# Patient Record
Sex: MALE | Race: WHITE | NOT HISPANIC OR LATINO | Employment: OTHER | URBAN - METROPOLITAN AREA
[De-identification: names, ages, dates, MRNs, and addresses within clinical notes are randomized per-mention and may not be internally consistent; named-entity substitution may affect disease eponyms.]

---

## 2017-02-13 ENCOUNTER — TELEPHONE (OUTPATIENT)
Dept: RHEUMATOLOGY | Facility: CLINIC | Age: 72
End: 2017-02-13

## 2017-03-02 ENCOUNTER — TELEPHONE (OUTPATIENT)
Dept: RHEUMATOLOGY | Facility: CLINIC | Age: 72
End: 2017-03-02

## 2017-03-02 ENCOUNTER — OFFICE VISIT (OUTPATIENT)
Dept: RHEUMATOLOGY | Facility: CLINIC | Age: 72
End: 2017-03-02
Payer: MEDICARE

## 2017-03-02 VITALS
SYSTOLIC BLOOD PRESSURE: 152 MMHG | HEART RATE: 70 BPM | WEIGHT: 282.19 LBS | DIASTOLIC BLOOD PRESSURE: 82 MMHG | BODY MASS INDEX: 44.2 KG/M2

## 2017-03-02 DIAGNOSIS — M15.9 PRIMARY OSTEOARTHRITIS INVOLVING MULTIPLE JOINTS: Primary | ICD-10-CM

## 2017-03-02 DIAGNOSIS — M25.552 LEFT HIP PAIN: ICD-10-CM

## 2017-03-02 DIAGNOSIS — M1A.09X0 IDIOPATHIC CHRONIC GOUT OF MULTIPLE SITES WITHOUT TOPHUS: ICD-10-CM

## 2017-03-02 DIAGNOSIS — E66.01 MORBID OBESITY WITH BMI OF 40.0-44.9, ADULT: ICD-10-CM

## 2017-03-02 DIAGNOSIS — M81.0 SENILE OSTEOPOROSIS: ICD-10-CM

## 2017-03-02 DIAGNOSIS — Z51.81 MEDICATION MONITORING ENCOUNTER: ICD-10-CM

## 2017-03-02 DIAGNOSIS — M16.12 PRIMARY OSTEOARTHRITIS OF LEFT HIP: ICD-10-CM

## 2017-03-02 PROCEDURE — 1157F ADVNC CARE PLAN IN RCRD: CPT | Mod: S$GLB,,, | Performed by: PHYSICIAN ASSISTANT

## 2017-03-02 PROCEDURE — 3077F SYST BP >= 140 MM HG: CPT | Mod: S$GLB,,, | Performed by: PHYSICIAN ASSISTANT

## 2017-03-02 PROCEDURE — 1160F RVW MEDS BY RX/DR IN RCRD: CPT | Mod: S$GLB,,, | Performed by: PHYSICIAN ASSISTANT

## 2017-03-02 PROCEDURE — 1125F AMNT PAIN NOTED PAIN PRSNT: CPT | Mod: S$GLB,,, | Performed by: PHYSICIAN ASSISTANT

## 2017-03-02 PROCEDURE — 3079F DIAST BP 80-89 MM HG: CPT | Mod: S$GLB,,, | Performed by: PHYSICIAN ASSISTANT

## 2017-03-02 PROCEDURE — 1159F MED LIST DOCD IN RCRD: CPT | Mod: S$GLB,,, | Performed by: PHYSICIAN ASSISTANT

## 2017-03-02 PROCEDURE — 99214 OFFICE O/P EST MOD 30 MIN: CPT | Mod: S$GLB,,, | Performed by: PHYSICIAN ASSISTANT

## 2017-03-02 PROCEDURE — 99999 PR PBB SHADOW E&M-EST. PATIENT-LVL IV: CPT | Mod: PBBFAC,,, | Performed by: PHYSICIAN ASSISTANT

## 2017-03-02 NOTE — PROGRESS NOTES
Subjective:       Patient ID: Bo Chase is a 72 y.o. male.    Chief Complaint: Hip Pain      HPI Comments: Bo is  Here for urgent visit for left hip pain. Both hip biggest issue.  Most recently left hip. Hip been bothering him vijay with weight bearing. Went to [facility] was in wheelchair most of the time. Left hip started then and has persisted. No injury.  CKD mild. Sparing use of ibuprofen.  200-400 mg once daily.  Tylenol not a lot of help. X-ray right hip showed DJD. Prior back surgery due to ddd and DJD lumbar spine. Today pain 6/10. Left anterior groin. No lateral or posterior hip pain. No radicular qualities. No numbness or tingling.       We follow him regularly for Gout multiarticular involvmeent affecting  bilateral feet and  left elbow in the past. He is  currently on allopurinol 450 mg daily and colchicine only prn. Last uric acid level 3.5 at goal. No attacks.   Prior compression fracture DEXA showed osteopenia he is currently on treatment fosamax Q 14 days dexa scheduled in may when he sees dr osborn.       Hip Pain   Associated symptoms include arthralgias. Pertinent negatives include no abdominal pain, chest pain, chills, congestion, coughing, fatigue, fever, joint swelling, nausea, numbness, rash, vomiting or weakness.   Osteoarthritis   Associated symptoms include arthralgias. Pertinent negatives include no abdominal pain, chest pain, chills, congestion, coughing, fatigue, fever, joint swelling, nausea, numbness, rash, vomiting or weakness.     Review of Systems   Constitutional: Negative.  Negative for chills, fatigue and fever.   HENT: Negative.  Negative for congestion, mouth sores and trouble swallowing.    Eyes: Negative.  Negative for photophobia, redness and visual disturbance.   Respiratory: Negative.  Negative for cough, shortness of breath and wheezing.    Cardiovascular: Negative.  Negative for chest pain and leg swelling.   Gastrointestinal: Negative.  Negative for abdominal  distention, abdominal pain, diarrhea, nausea and vomiting.   Genitourinary: Negative.  Negative for dysuria, flank pain, frequency and urgency.   Musculoskeletal: Positive for arthralgias. Negative for back pain and joint swelling.   Skin: Negative.  Negative for rash.   Neurological: Negative.  Negative for dizziness, weakness and numbness.         Objective:     BP (!) 152/82  Pulse 70  Wt 128 kg (282 lb 3 oz)  BMI 44.2 kg/m2     Physical Exam   Constitutional: He is oriented to person, place, and time and well-developed, well-nourished, and in no distress. No distress.   HENT:   Head: Normocephalic and atraumatic.   Right Ear: External ear normal.   Left Ear: External ear normal.   Mouth/Throat: No oropharyngeal exudate.   Eyes: Conjunctivae and EOM are normal. Pupils are equal, round, and reactive to light. No scleral icterus.   Neck: Neck supple. No thyromegaly present.   Cardiovascular: Normal rate, regular rhythm and normal heart sounds.    No murmur heard.  Pulmonary/Chest: Effort normal and breath sounds normal. No respiratory distress.   Abdominal: Soft. Bowel sounds are normal.   Lymphadenopathy:     He has no cervical adenopathy.   Neurological: He is alert and oriented to person, place, and time. No cranial nerve deficit. He exhibits normal muscle tone. Gait normal. Coordination normal.   Skin: Skin is warm and dry.     Musculoskeletal: Normal range of motion. He exhibits tenderness. He exhibits no edema.   Both hips have normal range of motion  Pain left hip with internal rotation and full flexion  Minimal pain right with IR and flexion  No TTP troch bursa bilaterally   Neg SLR bilaterally              Component      Latest Ref Rng & Units 11/17/2016   WBC      3.90 - 12.70 K/uL 8.92   RBC      4.60 - 6.20 M/uL 5.82   Hemoglobin      14.0 - 18.0 g/dL 19.4 (H)   Hematocrit      40.0 - 54.0 % 55.4 (H)   MCV      82 - 98 fL 95   MCH      27.0 - 31.0 pg 33.3 (H)   MCHC      32.0 - 36.0 % 35.0   RDW       11.5 - 14.5 % 14.6 (H)   Platelets      150 - 350 K/uL 147 (L)   MPV      9.2 - 12.9 fL 10.3   Gran #      1.8 - 7.7 K/uL 6.7   Lymph #      1.0 - 4.8 K/uL 1.8   Mono #      0.3 - 1.0 K/uL 0.6   Eos #      0.0 - 0.5 K/uL 0.1   Baso #      0.00 - 0.20 K/uL 0.02   Gran%      38.0 - 73.0 % 72.4   Lymph%      18.0 - 48.0 % 19.8   Mono%      4.0 - 15.0 % 6.8   Eosinophil%      0.0 - 8.0 % 1.2   Basophil%      0.0 - 1.9 % 0.2   Differential Method       Automated   Sodium      136 - 145 mmol/L 141   Potassium      3.5 - 5.1 mmol/L 4.5   Chloride      95 - 110 mmol/L 105   CO2      23 - 29 mmol/L 24   Glucose      70 - 110 mg/dL 115 (H)   BUN, Bld      8 - 23 mg/dL 28 (H)   Creatinine      0.5 - 1.4 mg/dL 1.7 (H)   Calcium      8.7 - 10.5 mg/dL 9.6   Total Protein      6.0 - 8.4 g/dL 6.8   Albumin      3.5 - 5.2 g/dL 4.0   Total Bilirubin      0.1 - 1.0 mg/dL 0.7   Alkaline Phosphatase      55 - 135 U/L 75   AST      10 - 40 U/L 24   ALT      10 - 44 U/L 47 (H)   Anion Gap      8 - 16 mmol/L 12   eGFR if African American      >60 mL/min/1.73 m:2 46 (A)   eGFR if non African American      >60 mL/min/1.73 m:2 40 (A)   CRP      0.0 - 8.2 mg/L 0.6   Sed Rate      0 - 10 mm/Hr 1   Uric Acid      3.4 - 7.0 mg/dL 3.5       No results found for this or any previous visit (from the past 1008 hour(s)).        9/12/14 DEXA total femur T score -0.8, femur neck -1.2, spine 1.0 osteopenia      9/9/14 lumbar spine x-ray  Findings: Lumbar spine series performed with lateral flexion and extension views. Comparison to 06/09/14. Note again made of generalized osteopenia and multilevel degenerative change. Minimal grade 1 anterolisthesis observed at L4-5 and L5-S1 with   little change between flexion and extension. Stable mild anterior wedge deformity of the T12 and L1 vertebrae. No acute findings.    Assessment:       1. Primary osteoarthritis involving multiple joints    2. Left hip pain    3. Primary osteoarthritis of left hip    4. Senile  osteoporosis    5. Morbid obesity with BMI of 40.0-44.9, adult    6. Idiopathic chronic gout of multiple sites without tophus    7. Medication monitoring encounter        1.  Osteoarthritis bilateral hips left >Right -- left symptomatic   2.  Idiopathic gout of multiple sites with optimal uric acid level on allopurinol 450 mg daily and colchicine prn  3.  Right knee OA- monitored by orthro   4.  Lumbar spondylosis to have laminectomy and decompression surgery 6/24/14  5.  Osteopenia with T12-L1 wedge deformity noted on x-ray will follow- on prevention fosamax Q 14 days   6.  Mild elevated creatinine- will watch     Plan:         Trial limbrel 500 mg bid X 8 weeks, Discussed risk versus benefits and potential side effects of limbrel. Medication Literature given to patient  Can also try otc instaflex advance  Consider ultrasound guided hip injection to help with hip pain  Ibuprofen sparingly 200-400 mg once daily, maintain good hydration for renal function- if limbrel helps then stop the ibuprofen     Follow up as scheduled in 8 weeks with dr osborn with labs and dexa     keep allopurinol to 450 mg daily,   colcrys only prn   continue fosamax Q 14 day,  Watch renal function, dexa next visit     call with any questions, changes, or concerns.

## 2017-03-02 NOTE — TELEPHONE ENCOUNTER
----- Message from Balbina Boyd sent at 3/2/2017  9:44 AM CST -----  Contact: Pt   States he is calling rg wanting to know if he can be seen earlier than his appt/ he is in town now and would like to be called rather quickly and can be reached at [phone]//[email or web address]

## 2017-03-18 ENCOUNTER — NURSE TRIAGE (OUTPATIENT)
Dept: ADMINISTRATIVE | Facility: CLINIC | Age: 72
End: 2017-03-18

## 2017-03-18 DIAGNOSIS — R31.9 HEMATURIA: Primary | ICD-10-CM

## 2017-03-18 NOTE — TELEPHONE ENCOUNTER
"  Reason for Disposition   Blood in urine, but all triage questions negative  (Exception: could be normal menstrual bleeding)    Answer Assessment - Initial Assessment Questions  1. COLOR of URINE: "Describe the color of the urine."  (e.g., tea-colored, pink, red, blood clots, bloody)      Significantly red colored blood is mixed with urine and pieces of flesh and tissue noted  2. ONSET: "When did the bleeding start?"       Past evening  3. EPISODES: "How many times has there been blood in the urine?" or "How many times today?"      Every time pt voids  4. PAIN with URINATION: "Is there any pain with passing your urine?" If so, ask: "How bad is the pain?"  (Scale 1-10; or mild, moderate, severe)     - MILD - complains slightly about urination hurting     - MODERATE - interferes with normal activities       - SEVERE - excruciating, unwilling or unable to urinate because of the pain       No pain   5. FEVER: "Do you have a fever?" If so, ask: "What is your temperature, how was it measured, and when did it start?"      no  6. ASSOCIATED SYMPTOMS: "Are you passing urine more frequently than usual?"      Take a diuretics. No back pain  7. OTHER SYMPTOMS: "Do you have any other symptoms?" (e.g., back/flank pain, abdominal pain, vomiting)      no  8. PREGNANCY: "Is there any chance you are pregnant?" "When was your last menstrual period?"      no    Protocols used: ST URINE - BLOOD IN-A-  Pt advised to [facility] walk in clin today open 8-3. Verbalized understanding.  "

## 2017-03-29 ENCOUNTER — CLINICAL SUPPORT (OUTPATIENT)
Dept: DIABETES | Facility: CLINIC | Age: 72
End: 2017-03-29
Payer: MEDICARE

## 2017-03-29 ENCOUNTER — TELEPHONE (OUTPATIENT)
Dept: UROLOGY | Facility: CLINIC | Age: 72
End: 2017-03-29

## 2017-03-29 VITALS — WEIGHT: 274.69 LBS | BODY MASS INDEX: 43.11 KG/M2 | HEIGHT: 67 IN

## 2017-03-29 DIAGNOSIS — E03.9 UNSPECIFIED HYPOTHYROIDISM: ICD-10-CM

## 2017-03-29 DIAGNOSIS — R60.9 EDEMA, UNSPECIFIED TYPE: ICD-10-CM

## 2017-03-29 DIAGNOSIS — E66.01 MORBID OBESITY DUE TO EXCESS CALORIES: Primary | ICD-10-CM

## 2017-03-29 DIAGNOSIS — I25.810 ATHEROSCLEROSIS OF AUTOLOGOUS VEIN CORONARY ARTERY BYPASS GRAFT, ANGINA PRESENCE UNSPECIFIED: ICD-10-CM

## 2017-03-29 PROCEDURE — 97802 MEDICAL NUTRITION INDIV IN: CPT | Mod: S$GLB,,, | Performed by: DIETITIAN, REGISTERED

## 2017-03-29 PROCEDURE — 99999 PR PBB SHADOW E&M-EST. PATIENT-LVL III: CPT | Mod: PBBFAC,,, | Performed by: DIETITIAN, REGISTERED

## 2017-03-29 RX ORDER — NEPAFENAC 3 MG/ML
SUSPENSION/ DROPS OPHTHALMIC
COMMUNITY
Start: 2017-03-10 | End: 2017-05-31 | Stop reason: ALTCHOICE

## 2017-03-29 RX ORDER — DUREZOL 0.5 MG/ML
EMULSION OPHTHALMIC
Refills: 0 | COMMUNITY
Start: 2017-03-13 | End: 2017-05-31 | Stop reason: ALTCHOICE

## 2017-03-29 RX ORDER — GATIFLOXACIN 5 MG/ML
SOLUTION/ DROPS OPHTHALMIC
COMMUNITY
Start: 2017-03-02 | End: 2017-05-31 | Stop reason: ALTCHOICE

## 2017-03-29 NOTE — MR AVS SNAPSHOT
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, angina presence unspecified         Edema, unspecified type         Unspecified hypothyroidism                To Do List           Future Appointments        Provider Department Dept Phone    4/3/2017 9:10 AM [facility] CT1 LIMIT 500 LBS [facility] [phone]    4/3/2017 12:30 PM [facility] US3 [facility] [phone]    2017 2:00 PM [facility] BMD1 [facility] [phone]    2017 2:40 PM [facility]AT[facility] [phone]    2017 3:00 PM Jordan Floyd MD [facility] Rheumatology [phone]      Goals (5 Years of Data)     None      Follow-Up and Disposition     Return in about 4 weeks (around 2017), or E66.01, I25.810, R60.9, E03.9 Ankita; 4 week f/u.      [facility]     [facility] Nurse Care [facility] -  Assistance  Registered nurses in the [facility] provide clinical advisement, health education, appointment booking, and other advisory services.  Call for this free service at [phone].             Medications           Message regarding Medications     Verify the changes and/or additions to your medication regime listed below are the same as discussed with your clinician today.  If any of these changes or additions are incorrect, please notify your healthcare provider.        STOP taking these medications     colchicine (COLCRYS) 0.6 mg tablet Take 1 tablet (0.6 mg total) by mouth once  daily.           Verify that the below list of medications is an accurate representation of the medications you are currently taking.  If none reported, the list may be blank. If incorrect, please contact your healthcare provider. Carry this list with you in case of emergency.           Current Medications     alendronate (FOSAMAX) 70 MG tablet Take 1 tablet (70 mg total) by mouth every 14 (fourteen) days.    allopurinol (ZYLOPRIM) 300 MG tablet TAKE 1& 1/2 TABLETS BY MOUTH EVERY DAY    aspirin 81 mg Tab 81 mg 2 (two) times daily. 1 Tablet Oral daily    atorvastatin (LIPITOR) 40 MG tablet TAKE 1 TABLET EVERY EVENING     bumetanide (BUMEX) 2 MG tablet TAKE 1 TABLET ONE TIME DAILY    cholecalciferol, vitamin D3, (VITAMIN D3) 400 unit Tab Take 1 tablet by mouth once daily.    DUREZOL 0.05 % Drop ophthalmic solution INSTILL 1 DROP IN OD QID STARTING DAY OF SURGERY FOR 30 DAYS    gatifloxacin (ZYMAR) 0.5 % Drop drops     hydrALAZINE (APRESOLINE) 100 MG tablet 100 mg 2 (two) times daily. 1/2 tablet twice daily    ibuprofen (ADVIL,MOTRIN) 200 MG tablet Take 400 mg by mouth 2 (two) times daily.     ILEVRO 0.3 % DrpS     isosorbide mononitrate (IMDUR) 60 MG 24 hr tablet 60 mg 2 (two) times daily. 1/2 tab twice daily    levothyroxine (SYNTHROID) 100 MCG tablet 1 Tablet Oral Every day    LIMBREL 500 mg Cap Take 500 mg by mouth 2 (two) times daily.    metoprolol tartrate (LOPRESSOR) 25 MG tablet TAKE 1 TABLET TWICE DAILY    multivitamin (THERAGRAN) per tablet Take 1 tablet by mouth once daily.    NITROSTAT 0.4 mg SL tablet DISSOLVE 1 TABLET UNDER THE TONGUE EVERY 5 MINUTES AS NEEDED FOR CHEST PAIN    potassium chloride (KLOR-CON) 10 MEQ TbSR TAKE 1 TABLET FOUR TIMES DAILY    vitamin E 400 UNIT capsule Take 1 capsule (400 Units total) by mouth once daily.    papaverine 30 mg/mL injection Inject 0.3 ml of trimix solution prn ED max once daily  Prepare 10ml of trimix solution containing:    Papaverine 30mg/ml    Phentolamine 1  "mg/ml    Alprostadil 10mcg/ml  Dispense 10ml per refill  Qs syringes 1cc/30g/0.5" and alcohol swabs dispense as needed for Intracavernosal injection           Clinical Reference Information           Your Vitals Were     Height Weight BMI          5' 7" (1.702 m) 124.6 kg (274 lb 11.1 oz) 43.02 kg/m2        Allergies as of 3/29/2017     Cat/feline Products    Latex    Sulfa (Sulfonamide Antibiotics)      Immunizations Administered on Date of Encounter - 3/29/2017     None      Language Assistance Services     ATTENTION: Language assistance services are available, free of charge. Please call [phone].      ATENCIÓN: Si bakari stone, tiene a donohue disposición servicios gratuitos de asistencia lingüística. Llame al [phone].     CHÚ Ý: N?u b?n nói Ti?ng Vi?t, có các d?ch v? h? tr? ngôn ng? mi?n phí dành cho b?n. G?i s? [phone].         [facility] complies with applicable Federal civil rights laws and does not discriminate on the basis of race, color, national origin, age, disability, or sex.        "

## 2017-03-29 NOTE — TELEPHONE ENCOUNTER
Patient was contacted and informed that his appt on 4/12/17 needed to be rescheduled to either earlier or later on that day as MD would not be available; pt was rude and stated that he had no idea that he had an appt or knew anything about the CT and US that were ordered.  Stated that he sees multiple doctors at [facility] and he was too busy to keep running back and forth to the clinic.  Pt stated that he was extremely busy and could not continue the conversation with me. Stated that he would call back and hung up on me.

## 2017-03-29 NOTE — PROGRESS NOTES
"PCP: Kym Luciano MD   REFERRING PROVIDER: Kym Luciano MD     HISTORY OF PRESENT ILLNESS: 72 y.o. male patient is in clinic today for weight management.     Patient has been trying to lose weight for ~25 years. Successful with Medifast previously (13 lbs in two week). Patient's weight has gradually been going up. CABGx4 21 years ago.     VITAL SIGNS:  Height: 5' 7" (170.2 cm)   Weight: 124.6 kg (274 lb 11.1 oz)   IBW: 148 lbs +/-10% and AdjIBW: 180 lbs/82kg    ALLERGIES & MEDICATIONS: Reviewed and Reconciled  MEDICAL/SURGICAL & FAMILY HISTORY: Reviewed and Reconciled    LABORATORY:  Reviewed Diabetes Management Flowsheet and Results Review.    SELF-MONITORING: Food - none are avail    ACTIVITY LEVEL: Aerobic - none. Resistance - none. Patient has outdoor pool that he can use year round - heated. Exercises sporadically.    NUTRITION INTAKE: Meal patterns include 3 meals, 1-2 snacks daily with intake ~2600 cals/d. Patient is not limiting carbohydrates, saturated fat or sodium. Inadequate intakes of fruits, vegetables, whole grains.  B - 2 eggs, small pc. Sausage, 1 slice toast with butter+jelly - coffee with sweeter and milk  S - coffee  L - gumbo with rice, crackers - 1/2 Coke  S - coffee OR 1/2 can Coke  D - Danielson hamburger steak, mashed potatoes w/ gravy, green beans and carrots - 1/2 Coke  S - fruit, yogurt OR cookie  Beverages - water, Coke  Dining out - 3x/week    PSYCHOSOCIAL: Stage of change - preparation  Barriers to change - none.    EDUCATIONAL ASSESSMENT:  Patient needs improvement in self care management skills:    Healthy Eating   Being Active   Monitoring  Taking medications  Problem solving  Healthy coping  Reducing risks      MNT ASSESSMENT:   1600 calories, 7-8 ounces protein daily  30-45 grams carb/meal, 15-30 grams carb/snack  increase fruit 2 serv/d, vegetables 2+ cups/d, whole grains 3+serv/d, lowfat dairy 3+serv/d  low-fat, low-sodium  150 min physical activity per week, moderate " intensity, as tolerated    PLAN:   Reviewed MNT guidelines for weight management.    Encouraged daily self-monitoring of food and activity patterns, return records to clinic.   Provided meal-planning instruction via foodlists, plate method, food models, food labels and/or ADA booklet. Reviewed micro/macronutrient effect on health management. Discussed carbohydrate counting and spacing techniques with emphasis on cardiovascular wellness health strategies, functional foods. Reviewed principles of energy metabolism to assist weight and health management. Discussed use of meal replacements to aid in weight loss. Will decrease use of sugary beverages.   Discussed importance of daily physical activity with review of benefits, methods, guidelines and precautions.   Discussed behavioral strategies for improving social and environmental support of lifestyle changes.    GOALS: Self-monitoring: Daily food & activity journal. Meal Plan: 90% Accuracy. Activity:150 min/wk.  Goal of 10 lbs weight loss by 4 week f/u appt set by patient.   Visit Time Spent:  60 minutes  Thank you for the opportunity to work with your patient.

## 2017-03-31 ENCOUNTER — TELEPHONE (OUTPATIENT)
Dept: RADIOLOGY | Facility: HOSPITAL | Age: 72
End: 2017-03-31

## 2017-04-11 ENCOUNTER — TELEPHONE (OUTPATIENT)
Dept: RADIOLOGY | Facility: HOSPITAL | Age: 72
End: 2017-04-11

## 2017-04-12 ENCOUNTER — HOSPITAL ENCOUNTER (OUTPATIENT)
Dept: RADIOLOGY | Facility: HOSPITAL | Age: 72
Discharge: HOME | End: 2017-04-12
Attending: UROLOGY
Payer: MEDICARE

## 2017-04-12 DIAGNOSIS — R31.9 HEMATURIA: ICD-10-CM

## 2017-04-12 PROCEDURE — 76770 US EXAM ABDO BACK WALL COMP: CPT | Mod: TC,PO

## 2017-04-12 PROCEDURE — 76770 US EXAM ABDO BACK WALL COMP: CPT | Mod: 26,,, | Performed by: RADIOLOGY

## 2017-04-12 PROCEDURE — 74176 CT ABD & PELVIS W/O CONTRAST: CPT | Mod: 26,,, | Performed by: RADIOLOGY

## 2017-04-12 PROCEDURE — 74176 CT ABD & PELVIS W/O CONTRAST: CPT | Mod: TC,PO

## 2017-04-17 ENCOUNTER — TELEPHONE (OUTPATIENT)
Dept: UROLOGY | Facility: CLINIC | Age: 72
End: 2017-04-17

## 2017-04-17 NOTE — TELEPHONE ENCOUNTER
----- Message from Eliana Garcia sent at 4/17/2017  1:58 PM CDT -----  Contact: pt  Pt needs to schedule an appt to review last weeks tests.  First available appt is not until June 1st. Please call pt at [phone]

## 2017-04-24 ENCOUNTER — OFFICE VISIT (OUTPATIENT)
Dept: UROLOGY | Facility: CLINIC | Age: 72
End: 2017-04-24
Payer: MEDICARE

## 2017-04-24 VITALS
SYSTOLIC BLOOD PRESSURE: 134 MMHG | BODY MASS INDEX: 42.33 KG/M2 | WEIGHT: 270.31 LBS | DIASTOLIC BLOOD PRESSURE: 79 MMHG | HEART RATE: 81 BPM

## 2017-04-24 DIAGNOSIS — R31.9 HEMATURIA: Primary | ICD-10-CM

## 2017-04-24 LAB
BACTERIA #/AREA URNS AUTO: NORMAL /HPF
HYALINE CASTS UR QL AUTO: 1 /LPF
MICROSCOPIC COMMENT: NORMAL
RBC #/AREA URNS AUTO: 1 /HPF (ref 0–4)
SQUAMOUS #/AREA URNS AUTO: 0 /HPF
WBC #/AREA URNS AUTO: 2 /HPF (ref 0–5)

## 2017-04-24 PROCEDURE — 3078F DIAST BP <80 MM HG: CPT | Mod: S$GLB,,, | Performed by: UROLOGY

## 2017-04-24 PROCEDURE — 3075F SYST BP GE 130 - 139MM HG: CPT | Mod: S$GLB,,, | Performed by: UROLOGY

## 2017-04-24 PROCEDURE — 81001 URINALYSIS AUTO W/SCOPE: CPT

## 2017-04-24 PROCEDURE — 1159F MED LIST DOCD IN RCRD: CPT | Mod: S$GLB,,, | Performed by: UROLOGY

## 2017-04-24 PROCEDURE — 1126F AMNT PAIN NOTED NONE PRSNT: CPT | Mod: S$GLB,,, | Performed by: UROLOGY

## 2017-04-24 PROCEDURE — 87086 URINE CULTURE/COLONY COUNT: CPT

## 2017-04-24 PROCEDURE — 1160F RVW MEDS BY RX/DR IN RCRD: CPT | Mod: S$GLB,,, | Performed by: UROLOGY

## 2017-04-24 PROCEDURE — 99214 OFFICE O/P EST MOD 30 MIN: CPT | Mod: S$GLB,,, | Performed by: UROLOGY

## 2017-04-24 PROCEDURE — 99999 PR PBB SHADOW E&M-EST. PATIENT-LVL II: CPT | Mod: PBBFAC,,, | Performed by: UROLOGY

## 2017-04-24 NOTE — MR AVS SNAPSHOT
[facility]  [address] [address]  Phone: [phone]  Fax: [phone]                  Bo Chase   2017 11:20 AM   Office Visit    Description:  Male : 1945   Provider:  Brian Leroy IV, MD   Department:  [facility]           Diagnoses this Visit        Comments    Hematuria    -  Primary            To Do List           Future Appointments        Provider Department Dept Phone    2017 1:30 PM Vale Mahoney RD, CDE [facility] Diabetes Management [phone]    2017 1:40 PM Brian Leroy IV, MD [facility] [phone]    2017 2:00 PM [facility] BMD1 [facility] [phone]    2017 2:40 PM LABORATORY, [facility] [phone]    2017 3:00 PM Jordan Floyd MD [facility] Rheumatology [phone]      Goals (5 Years of Data)     None      [facility]     [facility] Nurse Care Line -  Assistance  Unless otherwise directed by your provider, please contact [facility], our nurse care line that is available for  assistance.     Registered nurses in the [facility] provide: appointment scheduling, clinical advisement, health education, and other advisory services.  Call: [phone] (toll free)               Medications           Message regarding Medications     Verify the changes and/or additions to your medication regime listed below are the same as discussed with your clinician today.  If any of these changes or additions are incorrect, please notify your healthcare provider.             Verify that the below list of medications is an accurate representation of the medications you are currently taking.  If none reported, the list may be blank. If incorrect, please contact your healthcare provider. Carry this list with you in case of emergency.           Current Medications     alendronate (FOSAMAX) 70 MG tablet Take 1 tablet (70 mg total) by  "mouth every 14 (fourteen) days.    allopurinol (ZYLOPRIM) 300 MG tablet TAKE 1& 1/2 TABLETS BY MOUTH EVERY DAY    aspirin 81 mg Tab 81 mg 2 (two) times daily. 1 Tablet Oral daily    atorvastatin (LIPITOR) 40 MG tablet TAKE 1 TABLET EVERY EVENING     bumetanide (BUMEX) 2 MG tablet TAKE 1 TABLET ONE TIME DAILY    cholecalciferol, vitamin D3, (VITAMIN D3) 400 unit Tab Take 1 tablet by mouth once daily.    DUREZOL 0.05 % Drop ophthalmic solution INSTILL 1 DROP IN OD QID STARTING DAY OF SURGERY FOR 30 DAYS    gatifloxacin (ZYMAR) 0.5 % Drop drops     hydrALAZINE (APRESOLINE) 100 MG tablet 100 mg 2 (two) times daily. 1/2 tablet twice daily    ibuprofen (ADVIL,MOTRIN) 200 MG tablet Take 400 mg by mouth 2 (two) times daily.     ILEVRO 0.3 % DrpS     isosorbide mononitrate (IMDUR) 60 MG 24 hr tablet 60 mg 2 (two) times daily. 1/2 tab twice daily    levothyroxine (SYNTHROID) 100 MCG tablet 1 Tablet Oral Every day    LIMBREL 500 mg Cap Take 500 mg by mouth 2 (two) times daily.    metoprolol tartrate (LOPRESSOR) 25 MG tablet TAKE 1 TABLET TWICE DAILY    multivitamin (THERAGRAN) per tablet Take 1 tablet by mouth once daily.    NITROSTAT 0.4 mg SL tablet DISSOLVE 1 TABLET UNDER THE TONGUE EVERY 5 MINUTES AS NEEDED FOR CHEST PAIN    papaverine 30 mg/mL injection Inject 0.3 ml of trimix solution prn ED max once daily  Prepare 10ml of trimix solution containing:    Papaverine 30mg/ml    Phentolamine 1 mg/ml    Alprostadil 10mcg/ml  Dispense 10ml per refill  Qs syringes 1cc/30g/0.5" and alcohol swabs dispense as needed for Intracavernosal injection    potassium chloride (KLOR-CON) 10 MEQ TbSR TAKE 1 TABLET FOUR TIMES DAILY    vitamin E 400 UNIT capsule Take 1 capsule (400 Units total) by mouth once daily.           Clinical Reference Information           Your Vitals Were     BP Pulse Weight BMI       134/79 (BP Location: Right arm, Patient Position: Sitting, BP Method: Automatic) 81 122.6 kg (270 lb 4.5 oz) 42.33 kg/m2       Blood " Pressure          Most Recent Value    BP  134/79      Allergies as of 4/24/2017     Cat/feline Products    Latex    Sulfa (Sulfonamide Antibiotics)      Immunizations Administered on Date of Encounter - 4/24/2017     None      Orders Placed During Today's Visit      Normal Orders This Visit    Urinalysis Microscopic     Urine culture       Language Assistance Services     ATTENTION: Language assistance services are available, free of charge. Please call [phone].      ATENCIÓN: Si habla español, tiene a donohue disposición servicios gratuitos de asistencia lingüística. Llame al [phone].     CHÚ Ý: N?u b?n nói Ti?ng Vi?t, có các d?ch v? h? tr? ngôn ng? mi?n phí dành cho b?n. G?i s? [phone].         [facility] complies with applicable Federal civil rights laws and does not discriminate on the basis of race, color, national origin, age, disability, or sex.

## 2017-04-24 NOTE — PROGRESS NOTES
Chief Complaint: Gross hematuria    HPI:   4/24/17: Didn't use the trimix the situation changed.  Did get it and knew how but couldn't come to do it.  After intercourse got hematuria for a day with some small pieces of tissue and clot.  CT/US shows no obvious findings.  Cannot have contrasted study.  No sexual activity since that scare.  Has a thin stream sometimes and other times a good stream.  Had gonorrhea in his youth.  10/7/16: 72 yo man with a heart stent is having ED worsening over the last 5 years and no erection in the last few years.  No abd/pelvic pain and no exac/rel factors.  No hematuria.  No urolithiasis.  No urinary bother.  No  history.  Normal sexual function.  No family history of prostate cancer.  No recent PSA, but PCP checks prostate annually and did this about a month ago.  Has gained 40 pound in last five years.  Recent cardiac workup negative.  Tried a PDE-5 inhib 5 years ago and didn't like how it felt.  Has a weak stream sometimes that opens after a few seconds.    Allergies:  Cat/feline products; Latex; and Sulfa (sulfonamide antibiotics)    Medications:  has a current medication list which includes the following prescription(s): alendronate, allopurinol, aspirin, atorvastatin, bumetanide, cholecalciferol (vitamin d3), durezol, gatifloxacin, hydralazine, ibuprofen, ilevro, isosorbide mononitrate, levothyroxine, limbrel, metoprolol tartrate, multivitamin, nitrostat, papaverine, potassium chloride, and vitamin e.    Review of Systems:  General: No fever, chills, fatigability, or weight loss.  Skin: No rashes, itching, or changes in color or texture of skin.  Chest: Denies MENA, cyanosis, wheezing, cough, and sputum production.  Abdomen: Appetite fine. No weight loss. Denies diarrhea, abdominal pain, hematemesis, or blood in stool.  Musculoskeletal: No joint stiffness or swelling. Denies back pain.  : As above.  All other review of systems negative.    PMH:   has a past medical history  of Acute coronary syndrome; CHF (congestive heart failure); Chronic kidney disease; Coronary artery disease; Hyperlipidemia; Hypertension; Lumbar spondylosis; and Sleep apnea.    PSH:   has a past surgical history that includes throid lobectomy (4/2012); umbilicial hernia; Coronary artery bypass graft (1996); Cardiac catheterization (3/6/2012); Coronary angioplasty; and back surgary.    FamHx: family history includes Heart disease in his father.    SocHx:  reports that he has never smoked. He has never used smokeless tobacco. He reports that he drinks alcohol. He reports that he does not use illicit drugs.      Physical Exam:  Vitals:    04/24/17 1132   BP: 134/79   Pulse: 81     General: A&Ox3, no apparent distress, no deformities  Neck: No masses, normal thyroid  Lungs: normal inspiration, no use of accessory muscles  Heart: normal pulse, no arrhythmias  Abdomen: Soft, NT, ND, no masses, no hernias, no hepatosplenomegaly  Lymphatic: Neck and groin nodes negative  Skin: The skin is warm and dry. No jaundice.  Ext: No c/c/e.  :   10/7/16: Test desc bess, no abnormalities of epididymus. Penis normal, with normal penile and scrotal skin. Meatus normal.     Labs/Studies:   Urinalysis performed in clinic, summary: UA normal    Impression/Plan:   1. Will not address ED going forward  2. Imaging reassuring, will cysto to eval.  3. UA/UCx to lab

## 2017-04-26 ENCOUNTER — NUTRITION (OUTPATIENT)
Dept: DIABETES | Facility: CLINIC | Age: 72
End: 2017-04-26
Payer: MEDICARE

## 2017-04-26 VITALS — WEIGHT: 270.31 LBS | BODY MASS INDEX: 42.43 KG/M2 | HEIGHT: 67 IN

## 2017-04-26 DIAGNOSIS — R60.9 EDEMA, UNSPECIFIED TYPE: ICD-10-CM

## 2017-04-26 DIAGNOSIS — E66.01 MORBID OBESITY DUE TO EXCESS CALORIES: Primary | ICD-10-CM

## 2017-04-26 DIAGNOSIS — E03.9 UNSPECIFIED HYPOTHYROIDISM: ICD-10-CM

## 2017-04-26 DIAGNOSIS — I25.810 ATHEROSCLEROSIS OF AUTOLOGOUS VEIN CORONARY ARTERY BYPASS GRAFT, ANGINA PRESENCE UNSPECIFIED: ICD-10-CM

## 2017-04-26 LAB — BACTERIA UR CULT: NO GROWTH

## 2017-04-26 PROCEDURE — 97803 MED NUTRITION INDIV SUBSEQ: CPT | Mod: S$GLB,,, | Performed by: DIETITIAN, REGISTERED

## 2017-04-26 PROCEDURE — 99999 PR PBB SHADOW E&M-EST. PATIENT-LVL III: CPT | Mod: PBBFAC,,, | Performed by: DIETITIAN, REGISTERED

## 2017-04-26 NOTE — PROGRESS NOTES
"PCP: Kym Luciano MD   REFERRING PROVIDER: Kym Luciano MD     HISTORY OF PRESENT ILLNESS: 72 y.o. male patient is in clinic today for weight management f/u. 5 lbs lost since initial visit.     Patient has been trying to lose weight for ~25 years. Successful with Medifast previously (13 lbs in two week). Patient's weight has gradually been going up. CABGx4 21 years ago.     VITAL SIGNS:  Height: 5' 7" (170.2 cm)   Weight: 122.6 kg (270 lb 4.5 oz)   IBW: 148 lbs +/-10% and AdjIBW: 180 lbs/82kg    ALLERGIES & MEDICATIONS: Reviewed and Reconciled  MEDICAL/SURGICAL & FAMILY HISTORY: Reviewed and Reconciled    LABORATORY:  Reviewed Diabetes Management Flowsheet and Results Review.    SELF-MONITORING: Food - none are avail    ACTIVITY LEVEL: Aerobic - none. Resistance - none. Patient has outdoor pool that he can use year round - heated. Exercises sporadically.    NUTRITION INTAKE: Meal patterns include 3 meals, 1-2 snacks daily with intake ~1500 cals/d. Patient has decreased sugary beverages. Patient has decreased intake of bread/pasta, candy, pastries.   B - oatmeal pack - coffee w/ sweetener & creamer  S - none  L - sandwich on whole wheat sandwich thin, cheese, chicken, mustard   S - snack  D - chili and beans OR grilled salmon, cheesy broccoli  S - none  Beverages - water, Coke  Dining out - 3x/week    PSYCHOSOCIAL: Stage of change - preparation  Barriers to change - none.    EDUCATIONAL ASSESSMENT:  Patient needs improvement in self care management skills:    Healthy Eating   Being Active   Monitoring  Taking medications  Problem solving  Healthy coping  Reducing risks      MNT ASSESSMENT:   1600 calories, 7-8 ounces protein daily  30-45 grams carb/meal, 15-30 grams carb/snack  increase fruit 2 serv/d, vegetables 2+ cups/d, whole grains 3+serv/d, lowfat dairy 3+serv/d  low-fat, low-sodium  150 min physical activity per week, moderate intensity, as tolerated    PLAN:   Reviewed MNT guidelines for weight " management.    Encouraged daily self-monitoring of food and activity patterns, return records to clinic.   Reviewed micro/macronutrient effect on health management.Reviewed principles of energy metabolism to assist weight and health management. Will use calorie tracking leo or food diary. Planning to encourage wife to attend d/t primary source for grocery shopping and cooking.    Discussed importance of daily physical activity with review of benefits, methods, guidelines and precautions.   Discussed behavioral strategies for improving social and environmental support of lifestyle changes.    GOALS: Self-monitoring: Daily food & activity journal. Meal Plan: 90% Accuracy. Activity:150 min/wk.  Goal of 10 lbs weight loss by 4 week f/u appt set by patient.   Visit Time Spent:  30 minutes  Thank you for the opportunity to work with your patient.

## 2017-04-26 NOTE — MR AVS SNAPSHOT
[facility]  [address] [place] [address]  Phone: [phone]  Fax: [phone]                  Bo Chase   2017 1:30 PM   Nutrition    Description:  Male : 1945   Provider:  Vale Mahoney RD, CDE   Department:  [facility]           Diagnoses this Visit        Comments    Morbid obesity due to excess calories    -  Primary     Atherosclerosis of autologous vein coronary artery bypass graft, angina presence unspecified         Edema, unspecified type         Unspecified hypothyroidism                To Do List           Future Appointments        Provider Department Dept Phone    2017 1:40 PM Brian Leroy IV, MD [facility] [phone]    2017 2:00 PM [facility] [phone]    2017 2:40 PM LABORATORY, [facility] [phone]    2017 3:00 PM Jordan Floyd MD [facility] Rheumatology [phone]      Goals (5 Years of Data)     None      Follow-Up and Disposition     Return in about 3 months (around 2017), or E66.[address] [place]; 3 mos. f/u; .      [facility]     [facility] Nurse Care [facility] -  Assistance  Unless otherwise directed by your provider, please contact [facility], our nurse care line that is available for  assistance.     Registered nurses in the [facility] provide: appointment scheduling, clinical advisement, health education, and other advisory services.  Call: [phone] (toll free)               Medications           Message regarding Medications     Verify the changes and/or additions to your medication regime listed below are the same as discussed with your clinician today.  If any of these changes or additions are incorrect, please notify your healthcare provider.             Verify that the below list of medications is an accurate representation of the medications you are currently taking.  If none reported,  "the list may be blank. If incorrect, please contact your healthcare provider. Carry this list with you in case of emergency.           Current Medications     alendronate (FOSAMAX) 70 MG tablet Take 1 tablet (70 mg total) by mouth every 14 (fourteen) days.    allopurinol (ZYLOPRIM) 300 MG tablet TAKE 1& 1/2 TABLETS BY MOUTH EVERY DAY    aspirin 81 mg Tab 81 mg 2 (two) times daily. 1 Tablet Oral daily    atorvastatin (LIPITOR) 40 MG tablet TAKE 1 TABLET EVERY EVENING     bumetanide (BUMEX) 2 MG tablet TAKE 1 TABLET ONE TIME DAILY    cholecalciferol, vitamin D3, (VITAMIN D3) 400 unit Tab Take 1 tablet by mouth once daily.    hydrALAZINE (APRESOLINE) 100 MG tablet 100 mg 2 (two) times daily. 1/2 tablet twice daily    ibuprofen (ADVIL,MOTRIN) 200 MG tablet Take 400 mg by mouth 2 (two) times daily.     isosorbide mononitrate (IMDUR) 60 MG 24 hr tablet 60 mg 2 (two) times daily. 1/2 tab twice daily    levothyroxine (SYNTHROID) 100 MCG tablet 1 Tablet Oral Every day    LIMBREL 500 mg Cap Take 500 mg by mouth 2 (two) times daily.    metoprolol tartrate (LOPRESSOR) 25 MG tablet TAKE 1 TABLET TWICE DAILY    multivitamin (THERAGRAN) per tablet Take 1 tablet by mouth once daily.    NITROSTAT 0.4 mg SL tablet DISSOLVE 1 TABLET UNDER THE TONGUE EVERY 5 MINUTES AS NEEDED FOR CHEST PAIN    papaverine 30 mg/mL injection Inject 0.3 ml of trimix solution prn ED max once daily  Prepare 10ml of trimix solution containing:    Papaverine 30mg/ml    Phentolamine 1 mg/ml    Alprostadil 10mcg/ml  Dispense 10ml per refill  Qs syringes 1cc/30g/0.5" and alcohol swabs dispense as needed for Intracavernosal injection    potassium chloride (KLOR-CON) 10 MEQ TbSR TAKE 1 TABLET FOUR TIMES DAILY    vitamin E 400 UNIT capsule Take 1 capsule (400 Units total) by mouth once daily.    DUREZOL 0.05 % Drop ophthalmic solution INSTILL 1 DROP IN OD QID STARTING DAY OF SURGERY FOR 30 DAYS    gatifloxacin (ZYMAR) 0.5 % Drop drops     ILEVRO 0.3 % Otf          " "  Clinical Reference Information           Your Vitals Were     Height Weight BMI          5' 7" (1.702 m) 122.6 kg (270 lb 4.5 oz) 42.33 kg/m2        Allergies as of 4/26/2017     Cat/feline Products    Latex    Sulfa (Sulfonamide Antibiotics)      Immunizations Administered on Date of Encounter - 4/26/2017     None      Language Assistance Services     ATTENTION: Language assistance services are available, free of charge. Please call [phone].      ATENCIÓN: Si habla español, tiene a donohue disposición servicios gratuitos de asistencia lingüística. Llame al [phone].     CHÚ Ý: N?u b?n nói Ti?ng Vi?t, có các d?ch v? h? tr? ngôn ng? mi?n phí dành cho b?n. G?i s? [phone].         [facility] complies with applicable Federal civil rights laws and does not discriminate on the basis of race, color, national origin, age, disability, or sex.        "

## 2017-05-01 ENCOUNTER — TELEPHONE (OUTPATIENT)
Dept: RHEUMATOLOGY | Facility: CLINIC | Age: 72
End: 2017-05-01

## 2017-05-01 ENCOUNTER — TELEPHONE (OUTPATIENT)
Dept: UROLOGY | Facility: CLINIC | Age: 72
End: 2017-05-01

## 2017-05-01 NOTE — TELEPHONE ENCOUNTER
----- Message from Mel Maria sent at 5/1/2017 12:44 PM CDT -----  Contact: Pt  Pt has an appt on 05/18. Pt states that he has been having severe pain in his left hip. Pt is requesting to have a shot when he comes in for his appt. Pls call pt back at [phone].

## 2017-05-01 NOTE — TELEPHONE ENCOUNTER
Spoke with Mr. Chase and informed him that an injection can be discussed during appointment time.

## 2017-05-01 NOTE — TELEPHONE ENCOUNTER
----- Message from Jaida Petit sent at 5/1/2017 12:54 PM CDT -----  Contact: Patient  Patient called for test results. He can be contacted at [phone].    Thanks,  Jaida

## 2017-05-04 ENCOUNTER — TELEPHONE (OUTPATIENT)
Dept: UROLOGY | Facility: CLINIC | Age: 72
End: 2017-05-04

## 2017-05-04 NOTE — TELEPHONE ENCOUNTER
Returned call to pt; stated he wanted to cancel his cysto appt as he is no longer having blood in his urine and he is voiding without problems.  Stated that he would call us back should symptoms return.

## 2017-05-08 ENCOUNTER — TELEPHONE (OUTPATIENT)
Dept: RHEUMATOLOGY | Facility: CLINIC | Age: 72
End: 2017-05-08

## 2017-05-12 ENCOUNTER — HOSPITAL ENCOUNTER (OUTPATIENT)
Dept: RADIOLOGY | Facility: HOSPITAL | Age: 72
Discharge: HOME | End: 2017-05-12
Attending: INTERNAL MEDICINE
Payer: MEDICARE

## 2017-05-12 ENCOUNTER — OFFICE VISIT (OUTPATIENT)
Dept: RHEUMATOLOGY | Facility: CLINIC | Age: 72
End: 2017-05-12
Payer: MEDICARE

## 2017-05-12 VITALS
HEART RATE: 61 BPM | WEIGHT: 267.44 LBS | DIASTOLIC BLOOD PRESSURE: 72 MMHG | HEIGHT: 67 IN | SYSTOLIC BLOOD PRESSURE: 140 MMHG | BODY MASS INDEX: 41.98 KG/M2

## 2017-05-12 DIAGNOSIS — M1A.09X0 IDIOPATHIC CHRONIC GOUT OF MULTIPLE SITES WITHOUT TOPHUS: ICD-10-CM

## 2017-05-12 DIAGNOSIS — M47.816 SPONDYLOSIS OF LUMBAR REGION WITHOUT MYELOPATHY OR RADICULOPATHY: ICD-10-CM

## 2017-05-12 DIAGNOSIS — N18.30 CHRONIC KIDNEY DISEASE, STAGE III (MODERATE): ICD-10-CM

## 2017-05-12 DIAGNOSIS — M81.0 SENILE OSTEOPOROSIS: ICD-10-CM

## 2017-05-12 DIAGNOSIS — M25.552 LEFT HIP PAIN: Primary | ICD-10-CM

## 2017-05-12 DIAGNOSIS — M25.552 LEFT HIP PAIN: ICD-10-CM

## 2017-05-12 PROCEDURE — 99214 OFFICE O/P EST MOD 30 MIN: CPT | Mod: S$GLB,,, | Performed by: INTERNAL MEDICINE

## 2017-05-12 PROCEDURE — 3077F SYST BP >= 140 MM HG: CPT | Mod: S$GLB,,, | Performed by: INTERNAL MEDICINE

## 2017-05-12 PROCEDURE — 1160F RVW MEDS BY RX/DR IN RCRD: CPT | Mod: S$GLB,,, | Performed by: INTERNAL MEDICINE

## 2017-05-12 PROCEDURE — 1125F AMNT PAIN NOTED PAIN PRSNT: CPT | Mod: S$GLB,,, | Performed by: INTERNAL MEDICINE

## 2017-05-12 PROCEDURE — 73521 X-RAY EXAM HIPS BI 2 VIEWS: CPT | Mod: 26,LT,, | Performed by: RADIOLOGY

## 2017-05-12 PROCEDURE — 99999 PR PBB SHADOW E&M-EST. PATIENT-LVL IV: CPT | Mod: PBBFAC,,, | Performed by: INTERNAL MEDICINE

## 2017-05-12 PROCEDURE — 3078F DIAST BP <80 MM HG: CPT | Mod: S$GLB,,, | Performed by: INTERNAL MEDICINE

## 2017-05-12 PROCEDURE — 73521 X-RAY EXAM HIPS BI 2 VIEWS: CPT | Mod: TC,PO,LT

## 2017-05-12 PROCEDURE — 1159F MED LIST DOCD IN RCRD: CPT | Mod: S$GLB,,, | Performed by: INTERNAL MEDICINE

## 2017-05-12 RX ORDER — ALENDRONATE SODIUM 70 MG/1
70 TABLET ORAL
Qty: 6 TABLET | Refills: 6 | Status: SHIPPED | OUTPATIENT
Start: 2017-05-12 | End: 2018-03-26

## 2017-05-12 RX ORDER — ALLOPURINOL 300 MG/1
TABLET ORAL
Qty: 135 TABLET | Refills: 3 | Status: SHIPPED | OUTPATIENT
Start: 2017-05-12 | End: 2018-07-07 | Stop reason: SDUPTHER

## 2017-05-12 NOTE — MR AVS SNAPSHOT
[facility]umat[facility]  [address] [place] [address]  Phone: [phone]  Fax: [phone]                  Bo Chase   2017 11:30 AM   Office Visit    Description:  Male : 1945   Provider:  Jordan Floyd MD   Department:  [facility] Rheumatology           Reason for Visit     Gout     Osteoarthritis           Diagnoses this Visit        Comments    Left hip pain    -  Primary     Spondylosis of lumbar region without myelopathy or radiculopathy         Senile osteoporosis         Chronic kidney disease, stage III (moderate)         Idiopathic chronic gout of multiple sites without tophus                To Do List           Future Appointments        Provider Department Dept Phone    2017 12:45 PM [facility] XR2 [facility] [phone]    2017 1:00 PM Vale Mahoney RD, CDE [facility] [phone]    8/10/2017 2:30 PM LAB, SAME DAY [facility] [phone]    8/10/2017 3:00 PM Gloria Fang PA-C [facility] Rheumatology [phone]      Goals (5 Years of Data)     None      Follow-Up and Disposition     Return in about 3 months (around 2017) for diego dutton.       These Medications        Disp Refills Start End    alendronate (FOSAMAX) 70 MG tablet 6 tablet 6 2017    Take 1 tablet (70 mg total) by mouth every 14 (fourteen) days. - Oral    Pharmacy: [facility] - [facility] [address] Ph #: [phone]       allopurinol (ZYLOPRIM) 300 MG tablet 135 tablet 3 2017     TAKE 1& 1/2 TABLETS BY MOUTH EVERY DAY    Pharmacy: [facility] - [facility] [address] [place] [address] Ph #: [phone]       Notes to Pharmacy: **Patient requests 90 days supply**      [facility]     [facility] Care [facility] -  Assistance  Unless otherwise directed by your provider, please contact [facility], our nurse care line that is available for   assistance.     Registered nurses in the [facility] provide: appointment scheduling, clinical advisement, health education, and other advisory services.  Call: [phone] (toll free)               Medications           Message regarding Medications     Verify the changes and/or additions to your medication regime listed below are the same as discussed with your clinician today.  If any of these changes or additions are incorrect, please notify your healthcare provider.             Verify that the below list of medications is an accurate representation of the medications you are currently taking.  If none reported, the list may be blank. If incorrect, please contact your healthcare provider. Carry this list with you in case of emergency.           Current Medications     alendronate (FOSAMAX) 70 MG tablet Take 1 tablet (70 mg total) by mouth every 14 (fourteen) days.    allopurinol (ZYLOPRIM) 300 MG tablet TAKE 1& 1/2 TABLETS BY MOUTH EVERY DAY    aspirin 81 mg Tab 81 mg 2 (two) times daily. 1 Tablet Oral daily    atorvastatin (LIPITOR) 40 MG tablet TAKE 1 TABLET EVERY EVENING     bumetanide (BUMEX) 2 MG tablet TAKE 1 TABLET ONE TIME DAILY    cholecalciferol, vitamin D3, (VITAMIN D3) 400 unit Tab Take 1 tablet by mouth once daily.    DUREZOL 0.05 % Drop ophthalmic solution INSTILL 1 DROP IN OD QID STARTING DAY OF SURGERY FOR 30 DAYS    gatifloxacin (ZYMAR) 0.5 % Drop drops     hydrALAZINE (APRESOLINE) 100 MG tablet 100 mg 2 (two) times daily. 1/2 tablet twice daily    ibuprofen (ADVIL,MOTRIN) 200 MG tablet Take 400 mg by mouth 2 (two) times daily.     ILEVRO 0.3 % DrpS     isosorbide mononitrate (IMDUR) 60 MG 24 hr tablet 60 mg 2 (two) times daily. 1/2 tab twice daily    levothyroxine (SYNTHROID) 100 MCG tablet 1 Tablet Oral Every day    LIMBREL 500 mg Cap Take 500 mg by mouth 2 (two) times daily.    metoprolol tartrate (LOPRESSOR) 25 MG tablet TAKE 1 TABLET TWICE DAILY    multivitamin (THERAGRAN) per  "tablet Take 1 tablet by mouth once daily.    NITROSTAT 0.4 mg SL tablet DISSOLVE 1 TABLET UNDER THE TONGUE EVERY 5 MINUTES AS NEEDED FOR CHEST PAIN    papaverine 30 mg/mL injection Inject 0.3 ml of trimix solution prn ED max once daily  Prepare 10ml of trimix solution containing:    Papaverine 30mg/ml    Phentolamine 1 mg/ml    Alprostadil 10mcg/ml  Dispense 10ml per refill  Qs syringes 1cc/30g/0.5" and alcohol swabs dispense as needed for Intracavernosal injection    potassium chloride (KLOR-CON) 10 MEQ TbSR TAKE 1 TABLET FOUR TIMES DAILY    vitamin E 400 UNIT capsule Take 1 capsule (400 Units total) by mouth once daily.           Clinical Reference Information           Your Vitals Were     BP Pulse Height Weight BMI    140/72 61 5' 7" (1.702 m) 121.3 kg (267 lb 6.7 oz) 41.88 kg/m2      Blood Pressure          Most Recent Value    BP  (!)  140/72      Allergies as of 5/12/2017     Cat/feline Products    Latex    Sulfa (Sulfonamide Antibiotics)      Immunizations Administered on Date of Encounter - 5/12/2017     None      Orders Placed During Today's Visit     Future Labs/Procedures Expected by Expires    MRI Lower Extremity Joint WO Cont Left  5/12/2017 5/12/2018    X-Ray Hips Bilateral 2 View Inc AP Pelvis  5/12/2017 5/12/2018    Recurring Lab Work Interval Expires    Basic metabolic panel   7/11/2018      Instructions    Lower ibuprofen to just in am    Tylenol 500 mg- 2  In pm    Stay hydrated     Continue exercises- will help    No chnge in Fosamax       Language Assistance Services     ATTENTION: Language assistance services are available, free of charge. Please call [phone].      ATENCIÓN: Si habla español, tiene a donohue disposición servicios gratuitos de asistencia lingüística. Llame al [phone].     CHÚ Ý: N?u b?n nói Ti?ng Vi?t, có các d?ch v? h? tr? ngôn ng? mi?n phí dành cho b?n. G?i s? [phone].         [facility] complies with applicable Federal civil rights laws and does not " discriminate on the basis of race, color, national origin, age, disability, or sex.

## 2017-05-12 NOTE — PROGRESS NOTES
RHEUMATOLOGY FOLLOWUP    CHIEF COMPLAINT:  Left hip pain, 3 to 4 out of 10.    PERTINENT HISTORY:  Bo was last seen in March.  At that time, he was beginning   to have more pain in his left hip.  This was interesting, because in the past   it had always been his right hip that had bothered him when he had had an MRI   done several years ago showing damage in the hip on the right side.  His big   problem over the past has been his lumbar disk disease.  He has had major   surgery at four levels and that did improve his quality of life.  Unfortunately   now, with activities or anything extra brings a lot of pain in his groin.    Unfortunately, he is taking more ibuprofen now, 600 mg twice daily.  We tried to   have him limit that, because he has underlying renal disease.  Allopurinol is   at 450 mg a day, and he has not had  to take any colchicine and no gout attacks.  His   uric acid is at goal.  He does have osteopenia with chronic compression   fracture.  He is on Fosamax to treat this, but a little lower dose than the   usual, because of his renal insufficiency.    REVIEW OF SYSTEMS:  GENERAL:  Weight is a little higher that he would like.  No recent infections.    No recent falls or severe injuries.  No fevers, chills, fatigability, change in   appetite.  SKIN:  No rashes, itching or changes in color or texture of skin, no Raynaud's,   no malar rash.  HEAD:  No headache or recent head trauma.  EYES:  Visual acuity fine.  No ocular pain or redness.  EARS:  Denies ear pain, discharge or vertigo.  NOSE:  No loss of smell.  No epistaxis or postnasal drip.  MOUTH AND THROAT:  No hoarseness or change in voice or oral ulcers.  No   difficulty swallowing or dryness.  NODES:  Denies swollen glands.  CHEST:  No lung diseases.  Denies shortness of breath, MENA, cyanosis, wheezing,   cough and sputum production.  CARDIOVASCULAR:  Cardiac disease is present and he is on antihypertensives and   has a history of coronary artery  disease.  Denies chest pain, PND, orthopnea or   reduced exercise tolerance.  ABDOMEN:  No weight loss.  Denies nausea, vomiting, diarrhea, abdominal pain,   hematemesis or blood in stool.  URINARY:  Renal disease as mentioned in his HPI.  No flank pain, dysuria or   hematuria.  PERIPHERAL VASCULAR:  No claudication or cyanosis.  NEUROLOGIC:  No numbness, weakness or tingling.  MUSCULOSKELETAL:  Now, he is on Enbrel now.  He is trying help his inflammation   without affecting his kidney.  He is maintenance vitamin D along with Fosamax as   mentioned every other week.  His MedCard otherwise is reviewed.    PHYSICAL EXAMINATION:  VITAL SIGNS:  With his height 67 inches, his weight is at 121 kg, his BMI is 41,   blood pressure 148/72, pulse in the 60s.  APPEARANCE:  Well nourished, well developed, in no acute distress.  SKIN:  No rashes, no wounds, no ecchymosis.  Nail beds pink.  No digital ulcers.    No nodules or tightness noted.  HEENT:  Normocephalic, atraumatic, alert and oriented x3.  PERRLA.  Conjunctivae   clear, sclerae nonicteric.  No tonsillar enlargement.  No pharyngeal erythema   or exudate.  No ulcers or lesions noted.  Mucous membranes moist and pink.  Oral   pharynx clear.  Neck supple, no JVD.  Normal ROM.  Thyroid normal.  No masses   or tracheal deviation.  No cervical, axillary or inguinal lymph node   enlargement.  CHEST:  Lungs clear to auscultation bilaterally.  No dullness to percussion.  No   accessory muscle use.  No intercostal retraction noted.  CARDIOVASCULAR:  Normal S1, S2.  No rubs, murmurs or gallops.  No peripheral   edema.  ABDOMEN:  Bowel sounds normal, abdomen soft, not distended.  No tenderness or   masses.  No hepatosplenomegaly.  EXTREMITIES:  No clubbing, cyanosis or edema noted.  Dorsalis and pedis pulses   2+ palpable.  NEUROLOGICAL:  Cranial nerves II-XII intact.  Motor 5/5 strength major   flexors/extensors.  No tremor.  GAIT AND POSTURE:  Normal gait.  No cerebellar  signs.  MUSCULOSKELETAL:  Exam of the hip shows a definite decreased internal rotation   with pain on the left.  The pain is in the groin.  The right also has decreased   internal rotation, though it is not painful today.  His right knee has minimal   crepitus, minimal tenderness.  X-rays though in the past have shown moderately   advanced damage in the medial compartment.  The left knee moves well.  Straight   leg raises are negative today.  He has had no flare-ups of the small joints, red   swollen tender joints or gout including the feet.    LABORATORY DATA:  His lab today shows his creatinine is mainly elevated at 1.6,   with creatinine clearance at 43.  Electrolytes still good.  LFTs still normal.    Glucose and calcium are normal.    IMPRESSION:  1.  Persistent left hip pain - I think this may be intrinsic hip osteoarthritis.    Referral from the back is possible, but seems less likely.  2.  Prior history of right degenerative hip disease.  3.  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  4.  Gout, stable with uric acid today at 4, excellent control.    PLAN:  1.  X-rays of the left hip today.  If we do not see significant changes, we will   do an MRI of the hip, because we need to sort out this problem for him.  2.  Try to cut back on his ibuprofen, just using 600 mg in the morning and then   switch over to Tylenol 500 two in the evening.  3.  Continue his exercise program.  I think that will be great for his arthritis   and his back.  4.  No change in allopurinol.  5.  See back in several months, but we will try to sort out his hip problem with   the needed tests soon.            /ls [id number] blank(s)        SL/PN  dd: 05/12/2017 12:04:03 (CDT)  td: 05/12/2017 17:33:11 (CDT)  Doc ID   #[id number]  Job ID #[id number]    CC: Kym Luciano M.D.      DXa- Openia- T score  -1.6 at Fn, nl at TH and spine-  Mild decrease at both sites since 2014

## 2017-05-12 NOTE — PATIENT INSTRUCTIONS
Lower ibuprofen to just 3 in am- if gets better, skip     Tylenol 500 mg- 2  In pm and can repeat in 4 hrs if needed    Stay hydrated - helps kidney    Continue exercises- will help    No change in Fosamax- bone density relatively stable    Will send you hip reports

## 2017-05-31 ENCOUNTER — TELEPHONE (OUTPATIENT)
Dept: RHEUMATOLOGY | Facility: CLINIC | Age: 72
End: 2017-05-31

## 2017-05-31 ENCOUNTER — NUTRITION (OUTPATIENT)
Dept: DIABETES | Facility: CLINIC | Age: 72
End: 2017-05-31
Payer: MEDICARE

## 2017-05-31 VITALS — HEIGHT: 67 IN | BODY MASS INDEX: 40.48 KG/M2 | WEIGHT: 257.94 LBS

## 2017-05-31 DIAGNOSIS — E66.01 MORBID OBESITY DUE TO EXCESS CALORIES: Primary | ICD-10-CM

## 2017-05-31 DIAGNOSIS — N18.30 CHRONIC KIDNEY DISEASE, STAGE III (MODERATE): ICD-10-CM

## 2017-05-31 PROCEDURE — 99999 PR PBB SHADOW E&M-EST. PATIENT-LVL III: CPT | Mod: PBBFAC,,, | Performed by: DIETITIAN, REGISTERED

## 2017-05-31 PROCEDURE — 97803 MED NUTRITION INDIV SUBSEQ: CPT | Mod: S$GLB,,, | Performed by: DIETITIAN, REGISTERED

## 2017-05-31 NOTE — PROGRESS NOTES
"PCP: Kym Luciano MD   REFERRING PROVIDER: Kym Luciano MD     HISTORY OF PRESENT ILLNESS: 72 y.o. male patient is in clinic today for weight management f/u. 16.8 lbs lost since initial visit (3/26/17).  Exceeded 10 lb weight loss goal set at last visit.    Patient has been trying to lose weight for ~25 years. Successful with Medifast previously (13 lbs in two weeks). Patient's weight has gradually been going up. CABGx4 21 years ago.     VITAL SIGNS:  Height: 5' 7" (170.2 cm)   Weight: 117 kg (257 lb 15 oz)   IBW: 148 lbs +/-10% and AdjIBW: 180 lbs/82kg    ALLERGIES & MEDICATIONS: Reviewed and Reconciled  MEDICAL/SURGICAL & FAMILY HISTORY: Reviewed and Reconciled    LABORATORY:  Reviewed Diabetes Management Flowsheet and Results Review.    SELF-MONITORING: Food - none are avail    ACTIVITY LEVEL:  Patient has outdoor pool that he can use year round - heated. Patient has started physical activity 4-5 days per week, (70-80 minutes per day) mix of cardio + resistance).     NUTRITION INTAKE: Meal patterns include 3 meals, 1-2 snacks daily with intake ~1500 cals/d. Has cut out sugary beverages, portion control improved. Using protein bars, supplements as needed.   B - protein shake with water + ice + protein w/ meds - 2 cups coffee w/ sweetener & creamer (before workout)  S - none  L - small salad, García's dinner  S - none  D - small pork, string beans, vegetable  S - none OR grape  Beverages - water, Coke once per week  Dining out - 3x/week    PSYCHOSOCIAL: Stage of change - action  Barriers to change - none.    EDUCATIONAL ASSESSMENT:  Patient needs improvement in self care management skills:    Healthy Eating   Being Active   Monitoring  Taking medications  Problem solving  Healthy coping  Reducing risks      MNT ASSESSMENT:   1600 calories, 7-8 ounces protein daily  30-45 grams carb/meal, 15-30 grams carb/snack  increase fruit 2 serv/d, vegetables 2+ cups/d, whole grains 3+serv/d, lowfat dairy " 3+serv/d  low-fat, low-sodium  150 min physical activity per week, moderate intensity, as tolerated    PLAN:   Reviewed MNT guidelines for weight management.    Encouraged daily self-monitoring of food and activity patterns, return records to clinic.   Reviewed micro/macronutrient effect on health management.Reviewed principles of energy metabolism to assist weight and health management.     Discussed importance of daily physical activity with review of benefits, methods, guidelines and precautions.   Discussed behavioral strategies for improving social and environmental support of lifestyle changes.    GOALS: Self-monitoring: Daily food & activity journal. Meal Plan: 90% Accuracy. Activity:150 min/wk.  Goal of 10 lbs weight loss by 4 week f/u appt set by patient. Emphasis on pre- and post-workout snacks/meals since patient has started physical activity regimen.  Visit Time Spent:  30 minutes  Thank you for the opportunity to work with your patient.

## 2017-05-31 NOTE — TELEPHONE ENCOUNTER
Left a message for call back from patient. Last seen [facility] 06/2017. Unsure if he is seen elsewhere.

## 2017-05-31 NOTE — TELEPHONE ENCOUNTER
----- Message from Jordan Floyd MD sent at 5/28/2017 10:23 PM CDT -----  /See if has orthro in mind for hip surgery

## 2017-06-02 NOTE — TELEPHONE ENCOUNTER
Spoke with Mr. Martinezaaron who states that he does see an Orthopedic outside of [facility] who did Xrays some time back and he was informed that he was not a candidate then for Hip surgery.  Salvatore will contact his Orthopedic and have them fax over the results to compare with xrays done at [facility]. Please advise.

## 2017-06-05 DIAGNOSIS — M25.552 LEFT HIP PAIN: Primary | ICD-10-CM

## 2017-06-05 DIAGNOSIS — M16.12 PRIMARY OSTEOARTHRITIS OF LEFT HIP: ICD-10-CM

## 2017-06-05 RX ORDER — POTASSIUM CHLORIDE 750 MG/1
TABLET, EXTENDED RELEASE ORAL
Qty: 360 TABLET | Refills: 1 | OUTPATIENT
Start: 2017-06-05

## 2017-06-05 RX ORDER — METOPROLOL TARTRATE 25 MG/1
TABLET, FILM COATED ORAL
Qty: 180 TABLET | Refills: 3 | OUTPATIENT
Start: 2017-06-05

## 2017-06-05 NOTE — TELEPHONE ENCOUNTER
Mr. Chase states he would like a referral to  Dr. Gregory at [facility] or to whom Dr. Floyd would suggest or hip surgery. He did state that he would like an Orthopedic who does this type of surgery regularly. Please advise.

## 2017-06-07 NOTE — TELEPHONE ENCOUNTER
Spoke with Mr. Chase and informed him of Minh Haynes PA-C's advisement. Mr. Martinezaaron states he will call Dr. Gregory's office to schedule an appointment.

## 2017-06-20 ENCOUNTER — OFFICE VISIT (OUTPATIENT)
Dept: NEPHROLOGY | Facility: CLINIC | Age: 72
End: 2017-06-20
Payer: MEDICARE

## 2017-06-20 VITALS
SYSTOLIC BLOOD PRESSURE: 116 MMHG | DIASTOLIC BLOOD PRESSURE: 70 MMHG | BODY MASS INDEX: 41.35 KG/M2 | HEART RATE: 64 BPM | WEIGHT: 263.44 LBS | HEIGHT: 67 IN

## 2017-06-20 DIAGNOSIS — N52.01 ERECTILE DYSFUNCTION DUE TO ARTERIAL INSUFFICIENCY: ICD-10-CM

## 2017-06-20 DIAGNOSIS — I51.89 DIASTOLIC DYSFUNCTION: ICD-10-CM

## 2017-06-20 DIAGNOSIS — M16.12 ARTHRITIS OF LEFT HIP: ICD-10-CM

## 2017-06-20 DIAGNOSIS — N25.81 SECONDARY HYPERPARATHYROIDISM OF RENAL ORIGIN: ICD-10-CM

## 2017-06-20 DIAGNOSIS — I87.2 VENOUS STASIS DERMATITIS OF BOTH LOWER EXTREMITIES: ICD-10-CM

## 2017-06-20 DIAGNOSIS — M76.891 ENTHESOPATHY OF RIGHT HIP REGION: ICD-10-CM

## 2017-06-20 DIAGNOSIS — I75.023 CHOLESTEROL EMBOLISM OF LOWER EXTREMITY, BILATERAL: ICD-10-CM

## 2017-06-20 DIAGNOSIS — N18.31 CHRONIC KIDNEY DISEASE (CKD) STAGE G3A/A1, MODERATELY DECREASED GLOMERULAR FILTRATION RATE (GFR) BETWEEN 45-59 ML/MIN/1.73 SQUARE METER AND ALBUMINURIA CREATININE RATIO LESS THAN 30 MG/G: Primary | ICD-10-CM

## 2017-06-20 DIAGNOSIS — M1A.09X0 IDIOPATHIC CHRONIC GOUT OF MULTIPLE SITES WITHOUT TOPHUS: ICD-10-CM

## 2017-06-20 DIAGNOSIS — R60.0 BILATERAL EDEMA OF LOWER EXTREMITY: ICD-10-CM

## 2017-06-20 PROCEDURE — 96372 THER/PROPH/DIAG INJ SC/IM: CPT | Mod: S$GLB,,, | Performed by: INTERNAL MEDICINE

## 2017-06-20 PROCEDURE — 1125F AMNT PAIN NOTED PAIN PRSNT: CPT | Mod: S$GLB,,, | Performed by: INTERNAL MEDICINE

## 2017-06-20 PROCEDURE — 1159F MED LIST DOCD IN RCRD: CPT | Mod: S$GLB,,, | Performed by: INTERNAL MEDICINE

## 2017-06-20 PROCEDURE — 99999 PR PBB SHADOW E&M-EST. PATIENT-LVL V: CPT | Mod: PBBFAC,,, | Performed by: INTERNAL MEDICINE

## 2017-06-20 PROCEDURE — 99354 PR PROLONGED SVC, OUPT, 1ST HR: CPT | Mod: 25,S$GLB,, | Performed by: INTERNAL MEDICINE

## 2017-06-20 PROCEDURE — 99204 OFFICE O/P NEW MOD 45 MIN: CPT | Mod: 25,S$GLB,, | Performed by: INTERNAL MEDICINE

## 2017-06-20 RX ORDER — METHYLPREDNISOLONE 4 MG/1
TABLET ORAL
Qty: 1 PACKAGE | Refills: 0 | Status: SHIPPED | OUTPATIENT
Start: 2017-06-20 | End: 2017-06-28

## 2017-06-20 RX ORDER — METOLAZONE 5 MG/1
5 TABLET ORAL DAILY
Qty: 30 TABLET | Refills: 11 | Status: SHIPPED | OUTPATIENT
Start: 2017-06-20 | End: 2018-07-07 | Stop reason: SDUPTHER

## 2017-06-20 RX ORDER — ASPIRIN 325 MG
325 TABLET ORAL DAILY
Qty: 90 TABLET | Refills: 3 | Status: SHIPPED | OUTPATIENT
Start: 2017-06-20 | End: 2019-04-01

## 2017-06-20 RX ORDER — DEXAMETHASONE SODIUM PHOSPHATE 4 MG/ML
8 INJECTION, SOLUTION INTRA-ARTICULAR; INTRALESIONAL; INTRAMUSCULAR; INTRAVENOUS; SOFT TISSUE ONCE
Status: COMPLETED | OUTPATIENT
Start: 2017-06-20 | End: 2017-06-20

## 2017-06-20 RX ADMIN — DEXAMETHASONE SODIUM PHOSPHATE 8 MG: 4 INJECTION, SOLUTION INTRA-ARTICULAR; INTRALESIONAL; INTRAMUSCULAR; INTRAVENOUS; SOFT TISSUE at 04:06

## 2017-06-20 NOTE — PROGRESS NOTES
Subjective:       Patient ID: Bo Chase is a 72 y.o. White male who presents for new evaluation of Chronic Kidney Disease; Edema; Hypertension; and Obesity    HPI   Patient is a 72-year-old white male who was up  by profession.  Has history of chronic kidney disease for at least 5 years.  He was last seen by Dr. Prather in 2012.  Renal ultrasound at that time showed bilateral renal cysts.  Left kidney had a cyst of 2.5 cm.  Patient never had any proteinuria.  His creatinine has been fluctuating between 1.6 and 2.0.  His fluctuations have been due to diuretic therapy.  In the last 5 years his kidney function has been stable.  He has had history of coronary artery disease status post shortness of breath and dyspnea and exertion.    12/ 2014Occluded lad lcx and rca.Occluded svg to d1svg to rca patent svg to  om1 90% eccentric treated with dennis with filter wire protection.Patent lima.No complication. Dr. Joel     2014 LEANDRO : now on CPAP    6/2015 LBP s/p KAROLINA and s/p surgery laminectomy     4/2017 Renal [facility] CT scan of the abdomen shows extensive calcifications of the aorta    Review of Systems   Constitutional: Negative.  Negative for activity change, appetite change, chills, diaphoresis, fatigue and fever.        On plant based diet 20 ib weight loss in 8 weeks     Rides bike ; water aerobics ; weight training    HENT: Negative.  Negative for congestion and trouble swallowing.    Eyes: Negative.    Respiratory: Negative.  Negative for cough, chest tightness, shortness of breath and wheezing.    Cardiovascular: Positive for leg swelling. Negative for chest pain and palpitations.   Gastrointestinal: Negative.  Negative for abdominal distention, abdominal pain, nausea and vomiting.   Genitourinary: Negative.  Negative for decreased urine volume, difficulty urinating, dysuria, enuresis, flank pain, frequency, hematuria, penile swelling, scrotal swelling and urgency.   Musculoskeletal: Negative.  Negative for  "arthralgias, back pain, joint swelling and neck pain.   Skin: Negative for rash.   Neurological: Negative.  Negative for tremors, seizures and headaches.   Psychiatric/Behavioral: Negative.  Negative for confusion and sleep disturbance. The patient is not nervous/anxious.        Objective:   /70   Pulse 64   Ht 5' 7" (1.702 m)   Wt 119.5 kg (263 lb 7.2 oz)   BMI 41.26 kg/m²      Physical Exam   Constitutional: He is oriented to person, place, and time. He appears well-developed and well-nourished. No distress.   HENT:   Head: Normocephalic.   Eyes: EOM are normal. Pupils are equal, round, and reactive to light.   Neck: Normal range of motion. Neck supple. No JVD present. No thyromegaly present.   Cardiovascular: Normal rate, regular rhythm, S1 normal, S2 normal, normal heart sounds and intact distal pulses.  PMI is not displaced.  Exam reveals no gallop and no friction rub.    No murmur heard.  Cholesterol emboli ; loud P2    Pulmonary/Chest: Effort normal and breath sounds normal. No respiratory distress. He has no wheezes. He has no rales. He exhibits no tenderness.   Abdominal: He exhibits no distension and no mass. There is no hepatosplenomegaly. There is no tenderness. There is no rebound and no CVA tenderness. No hernia.   Musculoskeletal: Normal range of motion. He exhibits no edema or tenderness.   Right iliac crest tenderness with muscle enthesopathy     Left hip pain with low ROM    Lymphadenopathy:     He has no cervical adenopathy.   Neurological: He is alert and oriented to person, place, and time. He has normal reflexes. He is not disoriented. He displays normal reflexes. No cranial nerve deficit. He exhibits normal muscle tone. Coordination normal.   Skin: Skin is warm and dry. No rash noted. No erythema.   Psychiatric: He has a normal mood and affect. His behavior is normal. Judgment and thought content normal.             Lab Results   Component Value Date    CREATININE 1.6 (H) 05/12/2017 "    BUN 24 (H) 05/12/2017     05/12/2017    K 4.1 05/12/2017     05/12/2017    CO2 29 05/12/2017     Lab Results   Component Value Date    WBC 8.92 11/17/2016    HGB 19.4 (H) 11/17/2016    HCT 55.4 (H) 11/17/2016    MCV 95 11/17/2016     (L) 11/17/2016     Lab Results   Component Value Date    .0 (H) 02/04/2014    CALCIUM 9.5 05/12/2017    PHOS 3.9 07/25/2016     JAGJIT)    Assessment:    )    1. Chronic kidney disease (CKD) stage G3a/A1, moderately decreased glomerular filtration rate (GFR) between 45-59 mL/min/1.73 square meter and albuminuria creatinine ratio less than 30 mg/g    2. Idiopathic chronic gout of multiple sites without tophus    3. Secondary hyperparathyroidism of renal origin    4. Bilateral edema of lower extremity    5. Venous stasis dermatitis of both lower extremities    6. Cholesterol embolism of lower extremity, bilateral    7. Diastolic dysfunction    8. Erectile dysfunction due to arterial insufficiency    9. Arthritis of left hip    10. Enthesopathy of right hip region        Plan:         1.  Chronic kidney disease stage III: Likely GFR is about 30-60%.  Recheck for proteinuria on follow-up.  Check Cystatin C for accurate GFR.  Avoid nonsteroidals.  Instructions for Tylenol for pain given.  Avoid IV dye.  Continue current conservative management.  Recheck parathyroid hormone level.  Recheck vitamin D level on follow-up.  We will watch kidney function on Limbrel.    2.  Chronic gout: Last attack was 2016.  Uric acid is controlled on allopurinol    3.  Hyperparathyroidism due to chronic kidney disease stage III: Continue with vitamin D therapy.  Recheck PTH and vitamin D level on follow-up    4.  Extensive edema anasarca on the lower extremities with chronic venous stasis findings as well as brawny edema of the skin: Patient had an ultrasound of the legs in 2012 which showed no DVT.  Clinically patient may have a many hypertension, right heart failure, diastolic  dysfunction and venous insufficiency as the cause of the leg swelling.  Continue with Bumex for now.  Add metolazone once a week.  If the swelling gets worse can take daily for 3 days.  Adjust the metolazone to 3 times a week if needed as discussed in the clinic in detail.  Detailed instructions given.  On follow-up May consider using torsemide instead of Bumex.  Since the blood pressure is on the low side I would stop hydralazine at this point.  Aggressive diuresis along with avoiding hypotension.  Check 2-D echo.  Consult pulmonary for possible pulmonary hypertension    5.  Cholesterol emboli of the lower extremities: Extensive aortic calcifications noted on the CT scan.  Plan to increase aspirin to 325 mg daily.  May add Plavix if needed.  Continue Lipitor.    6.  Diastolic dysfunction of the heart: Repeat 2-D echo    7.  Erectile dysfunction organic in nature due to arterial insufficiency as observed on the CT scan: Okay to use intracavernosal injections.    8.  Arthritis of the left hip along with severe enthesopathy on the right iliac crest.:  Avoid nonsteroidals.  We will give Decadron injection in the clinic.  Also given Medrol Dosepak.  Instructions for Tylenol given.  Warm compresses for muscle pain.  Let us know if the pain does not get any better.      Patient has multiple medical problems with multiorgan system involvement.  Extended time spent today is about 45 minutes in addition to the regular time required for follow-up visit.  The extended time was used face-to-face with the patient along with discussion with different specialties and greater than 50% of the extended time face-to-face was used to discuss the therapeutic options involving all the comorbid conditions as outlined above.      Follow-up 4 weeks      Washington Bernal MD

## 2017-06-20 NOTE — PATIENT INSTRUCTIONS
tylenol 500 mg 2-3 tablets every 6 hours for pain       Avoid nonsteroidals; no Advil or Motrin; okay to take Tylenol for pain; if needs stronger pain medications please let us know    Cloudant: Compression stockings : Jobst Brand Compression: Mild 10-15 or 15-20 mm of Hg compression Knee high         Check your weights every morning after getting out of bed and urinating. Take Metolazone 5 mg Mon-wed-fri      If your weight goes up 3 pounds  overnight or 5 ib in one week take metolazone 5 mg daily       If your weight goes down  3 pounds  overnight or 5 ib in one week hold diuretics for 3 days and restart with half dose until you reach your desired weight     1.  Chronic kidney disease stage III: Likely GFR is about 30-60%.  Recheck for proteinuria on follow-up.  Check Cystatin C for accurate GFR.  Avoid nonsteroidals.  Instructions for Tylenol for pain given.  Avoid IV dye.  Continue current conservative management.  Recheck parathyroid hormone level.  Recheck vitamin D level on follow-up.  We will watch kidney function on Limbrel.    2.  Chronic gout: Last attack was 2016.  Uric acid is controlled on allopurinol    3.  Hyperparathyroidism due to chronic kidney disease stage III: Continue with vitamin D therapy.  Recheck PTH and vitamin D level on follow-up    4.  Extensive edema anasarca on the lower extremities with chronic venous stasis findings as well as brawny edema of the skin: Patient had an ultrasound of the legs in 2012 which showed no DVT.  Clinically patient may have a many hypertension, right heart failure, diastolic dysfunction and venous insufficiency as the cause of the leg swelling.  Continue with Bumex for now.  Add metolazone once a week.  If the swelling gets worse can take daily for 3 days.  Adjust the metolazone to 3 times a week if needed as discussed in the clinic in detail.  Detailed instructions given.  On follow-up May consider using torsemide instead of Bumex.  Since the  blood pressure is on the low side I would stop hydralazine at this point.  Aggressive diuresis along with avoiding hypotension.  Check 2-D echo.  Consult pulmonary for possible pulmonary hypertension    5.  Cholesterol emboli of the lower extremities: Extensive aortic calcifications noted on the CT scan.  Plan to increase aspirin to 325 mg daily.  May add Plavix if needed.  Continue Lipitor.    6.  Diastolic dysfunction of the heart: Repeat 2-D echo    7.  Erectile dysfunction organic in nature due to arterial insufficiency as observed on the CT scan: Okay to use intracavernosal injections.    8.  Arthritis of the left hip along with severe enthesopathy on the right iliac crest.:  Avoid nonsteroidals.  We will give Decadron injection in the clinic.  Also given Medrol Dosepak.  Instructions for Tylenol given.  Warm compresses for muscle pain.  Let us know if the pain does not get any better.

## 2017-06-28 ENCOUNTER — NUTRITION (OUTPATIENT)
Dept: DIABETES | Facility: CLINIC | Age: 72
End: 2017-06-28
Payer: MEDICARE

## 2017-06-28 ENCOUNTER — TELEPHONE (OUTPATIENT)
Dept: DIABETES | Facility: CLINIC | Age: 72
End: 2017-06-28

## 2017-06-28 VITALS — WEIGHT: 248.69 LBS | BODY MASS INDEX: 39.03 KG/M2 | HEIGHT: 67 IN

## 2017-06-28 DIAGNOSIS — N18.30 CHRONIC KIDNEY DISEASE, STAGE III (MODERATE): ICD-10-CM

## 2017-06-28 DIAGNOSIS — E66.01 MORBID OBESITY DUE TO EXCESS CALORIES: Primary | ICD-10-CM

## 2017-06-28 PROCEDURE — 99999 PR PBB SHADOW E&M-EST. PATIENT-LVL III: CPT | Mod: PBBFAC,,, | Performed by: DIETITIAN, REGISTERED

## 2017-06-28 PROCEDURE — 97803 MED NUTRITION INDIV SUBSEQ: CPT | Mod: S$GLB,,, | Performed by: DIETITIAN, REGISTERED

## 2017-06-28 RX ORDER — MULTIVIT WITH MINERALS/HERBS
1 TABLET ORAL DAILY
COMMUNITY
End: 2018-12-19

## 2017-06-28 NOTE — PROGRESS NOTES
"PCP: Kym Luciano MD   REFERRING PROVIDER: Kym Luciano MD     HISTORY OF PRESENT ILLNESS: 72 y.o. male patient is in clinic today for weight management f/u. 26 lbs lost since initial visit (3/26/17).  10 lb weight loss goal set at last visit - 9.2 lbs down x 4 weeks.    Patient has been trying to lose weight for ~25 years. Successful with Medifast previously (13 lbs in two weeks). Patient's weight has gradually been going up. CABGx4 21 years ago.     VITAL SIGNS:  Height: 5' 7" (170.2 cm)   Weight: 112.8 kg (248 lb 10.9 oz)   IBW: 148 lbs +/-10% and AdjIBW: 180 lbs/82kg    ALLERGIES & MEDICATIONS: Reviewed and Reconciled  MEDICAL/SURGICAL & FAMILY HISTORY: Reviewed and Reconciled    LABORATORY:  Reviewed Diabetes Management Flowsheet and Results Review.    SELF-MONITORING: Food - none are avail    ACTIVITY LEVEL:  Patient has outdoor pool that he can use year round - heated. Patient has started physical activity 2-3 days per week, (70-80 minutes per day) mix of cardio + resistance). 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    NUTRITION INTAKE: Meal patterns include 3 meals, 1-2 snacks daily with intake ~1500 cals/d. Continues to use protein bars, supplements as needed. Maintaining dietary changes.  B - RTE protein shake - 2 cups coffee w/ sweetener & creamer (before workout)  S - none  L - Mexican rest 1/2 salad with guac, grilled tilapia, chips - water  S - none  D - 2 pork chops, 1 biscuit, veg, fruit bowl - water  S - none OR grape  Beverages - water, Coke once per week  Dining out - 3x/week    PSYCHOSOCIAL: Stage of change - action  Barriers to change - none.    EDUCATIONAL ASSESSMENT:  Patient is able to verbalize and/or demonstrate appropriate self care management skills.  Healthy Eating   Being Active   Monitoring  Taking medications  Problem solving  Healthy coping  Reducing risks      MNT ASSESSMENT:   1600 calories, 7-8 ounces protein daily  30-45 grams carb/meal, 15-30 grams carb/snack  increase fruit 2 serv/d, " vegetables 2+ cups/d, whole grains 3+serv/d, lowfat dairy 3+serv/d  low-fat, low-sodium  150 min physical activity per week, moderate intensity, as tolerated    PLAN:   Reviewed MNT guidelines for weight management.    Encouraged daily self-monitoring of food and activity patterns, return records to clinic.   Reviewed micro/macronutrient effect on health management.Reviewed principles of energy metabolism to assist weight and health management.     Discussed importance of daily physical activity with review of benefits, methods, guidelines and precautions.   Discussed behavioral strategies for improving social and environmental support of lifestyle changes.    GOALS: Self-monitoring: Daily food & activity journal. Meal Plan: 90% Accuracy. Activity:150 min/wk.  Goal of 10 lbs weight loss by 4 week f/u appt set by patient.   Visit Time Spent:  30 minutes  Thank you for the opportunity to work with your patient.

## 2017-06-28 NOTE — TELEPHONE ENCOUNTER
----- Message from Balbina Boyd sent at 6/28/2017 11:57 AM CDT -----  Contact: pt jordant Lilliana   States pt is going to be 20-30 min for appt today at 1 and can pt can be reached at [phone]//thanks/dbw

## 2017-07-12 ENCOUNTER — LAB VISIT (OUTPATIENT)
Dept: LAB | Facility: HOSPITAL | Age: 72
End: 2017-07-12
Attending: INTERNAL MEDICINE
Payer: MEDICARE

## 2017-07-12 DIAGNOSIS — R60.0 BILATERAL EDEMA OF LOWER EXTREMITY: ICD-10-CM

## 2017-07-12 DIAGNOSIS — E03.9 UNSPECIFIED HYPOTHYROIDISM: Primary | ICD-10-CM

## 2017-07-12 DIAGNOSIS — N18.31 CHRONIC KIDNEY DISEASE (CKD) STAGE G3A/A1, MODERATELY DECREASED GLOMERULAR FILTRATION RATE (GFR) BETWEEN 45-59 ML/MIN/1.73 SQUARE METER AND ALBUMINURIA CREATININE RATIO LESS THAN 30 MG/G: ICD-10-CM

## 2017-07-12 LAB
25(OH)D3+25(OH)D2 SERPL-MCNC: 43 NG/ML
ALBUMIN SERPL BCP-MCNC: 3.5 G/DL
ANION GAP SERPL CALC-SCNC: 12 MMOL/L
BASOPHILS # BLD AUTO: 0.02 K/UL
BASOPHILS NFR BLD: 0.3 %
BUN SERPL-MCNC: 26 MG/DL
CALCIUM SERPL-MCNC: 9.1 MG/DL
CHLORIDE SERPL-SCNC: 103 MMOL/L
CO2 SERPL-SCNC: 25 MMOL/L
CREAT SERPL-MCNC: 1.4 MG/DL
DIFFERENTIAL METHOD: ABNORMAL
EOSINOPHIL # BLD AUTO: 0.1 K/UL
EOSINOPHIL NFR BLD: 1.7 %
ERYTHROCYTE [DISTWIDTH] IN BLOOD BY AUTOMATED COUNT: 15.2 %
EST. GFR  (AFRICAN AMERICAN): 58 ML/MIN/1.73 M^2
EST. GFR  (NON AFRICAN AMERICAN): 50.2 ML/MIN/1.73 M^2
GLUCOSE SERPL-MCNC: 89 MG/DL
HCT VFR BLD AUTO: 49.4 %
HGB BLD-MCNC: 17 G/DL
LYMPHOCYTES # BLD AUTO: 1.3 K/UL
LYMPHOCYTES NFR BLD: 17.6 %
MCH RBC QN AUTO: 32.4 PG
MCHC RBC AUTO-ENTMCNC: 34.4 %
MCV RBC AUTO: 94 FL
MONOCYTES # BLD AUTO: 0.5 K/UL
MONOCYTES NFR BLD: 6.8 %
NEUTROPHILS # BLD AUTO: 5.5 K/UL
NEUTROPHILS NFR BLD: 73.2 %
PHOSPHATE SERPL-MCNC: 2.7 MG/DL
PLATELET # BLD AUTO: 153 K/UL
PMV BLD AUTO: 10.7 FL
POTASSIUM SERPL-SCNC: 3.4 MMOL/L
PTH-INTACT SERPL-MCNC: 91 PG/ML
RBC # BLD AUTO: 5.24 M/UL
SODIUM SERPL-SCNC: 140 MMOL/L
WBC # BLD AUTO: 7.49 K/UL

## 2017-07-12 PROCEDURE — 36415 COLL VENOUS BLD VENIPUNCTURE: CPT

## 2017-07-12 PROCEDURE — 82306 VITAMIN D 25 HYDROXY: CPT

## 2017-07-12 PROCEDURE — 83970 ASSAY OF PARATHORMONE: CPT

## 2017-07-12 PROCEDURE — 82610 CYSTATIN C: CPT

## 2017-07-12 PROCEDURE — 85025 COMPLETE CBC W/AUTO DIFF WBC: CPT

## 2017-07-12 PROCEDURE — 80069 RENAL FUNCTION PANEL: CPT

## 2017-07-14 LAB
CYSTATIN C SERPL-MCNC: 1.34 MG/L
GFR/BSA.PRED SERPLBLD CYS-BASED-ARV: 50 ML/MIN/BSA

## 2017-07-25 ENCOUNTER — DOCUMENTATION ONLY (OUTPATIENT)
Dept: CARDIOLOGY | Facility: CLINIC | Age: 72
End: 2017-07-25

## 2017-07-25 ENCOUNTER — CLINICAL SUPPORT (OUTPATIENT)
Dept: CARDIOLOGY | Facility: CLINIC | Age: 72
End: 2017-07-25
Payer: MEDICARE

## 2017-07-25 DIAGNOSIS — I87.2 VENOUS STASIS DERMATITIS OF BOTH LOWER EXTREMITIES: ICD-10-CM

## 2017-07-25 DIAGNOSIS — I51.89 DIASTOLIC DYSFUNCTION: ICD-10-CM

## 2017-07-25 DIAGNOSIS — I51.7 CARDIOMEGALY: ICD-10-CM

## 2017-07-25 DIAGNOSIS — R60.0 BILATERAL EDEMA OF LOWER EXTREMITY: ICD-10-CM

## 2017-07-25 LAB
DIASTOLIC DYSFUNCTION: NO
ESTIMATED PA SYSTOLIC PRESSURE: 26.01
MITRAL VALVE REGURGITATION: NORMAL
RETIRED EF AND QEF - SEE NOTES: 60 (ref 55–65)
TRICUSPID VALVE REGURGITATION: NORMAL

## 2017-07-25 PROCEDURE — 96374 THER/PROPH/DIAG INJ IV PUSH: CPT | Mod: 59,S$GLB,, | Performed by: NUCLEAR MEDICINE

## 2017-07-25 PROCEDURE — 93306 TTE W/DOPPLER COMPLETE: CPT | Mod: S$GLB,,, | Performed by: NUCLEAR MEDICINE

## 2017-07-25 NOTE — PROGRESS NOTES
Pt presented for an echocardiogram today.  This study was performed in conjunction with Optison contrast agent because of poor endocardial visualization.  Procedure was explained to the patient, she verbalized understanding and signed the consent.  IV, 24ga x 1 attempts, was started in the right AC using aseptic technique.  Administered a total of 3ml of Optison (lot # [id number], expiration date 04/05/2018).  Patient tolerated the procedure well.  IV discontinued, pressure dressing applied.

## 2017-07-28 ENCOUNTER — NUTRITION (OUTPATIENT)
Dept: DIABETES | Facility: CLINIC | Age: 72
End: 2017-07-28
Payer: MEDICARE

## 2017-07-28 VITALS — WEIGHT: 252.63 LBS | HEIGHT: 67 IN | BODY MASS INDEX: 39.65 KG/M2

## 2017-07-28 DIAGNOSIS — N18.2 CHRONIC KIDNEY DISEASE, STAGE 2 (MILD): ICD-10-CM

## 2017-07-28 DIAGNOSIS — E66.01 MORBID (SEVERE) OBESITY DUE TO EXCESS CALORIES: Primary | ICD-10-CM

## 2017-07-28 PROCEDURE — 99999 PR PBB SHADOW E&M-EST. PATIENT-LVL III: CPT | Mod: PBBFAC,,, | Performed by: DIETITIAN, REGISTERED

## 2017-07-28 PROCEDURE — 97803 MED NUTRITION INDIV SUBSEQ: CPT | Mod: S$GLB,,, | Performed by: DIETITIAN, REGISTERED

## 2017-07-28 NOTE — PROGRESS NOTES
"PCP: Kym Luciano MD   REFERRING PROVIDER: Kym Luciano MD     HISTORY OF PRESENT ILLNESS: 72 y.o. male patient is in clinic today for weight management f/u. 22 lbs lost since initial visit (3/26/17).  10 lb weight loss goal set at last visit - 4 lbs gained x 4 weeks.    Patient has been trying to lose weight for ~25 years. Successful with Medifast previously (13 lbs in two weeks). Patient's weight has gradually been going up. CABGx4 21 years ago.     VITAL SIGNS:  Height: 5' 7" (170.2 cm)   Weight: 114.6 kg (252 lb 10.4 oz)   IBW: 148 lbs +/-10% and AdjIBW: 180 lbs/82kg    ALLERGIES & MEDICATIONS: Reviewed and Reconciled  MEDICAL/SURGICAL & FAMILY HISTORY: Reviewed and Reconciled    LABORATORY:  Reviewed Diabetes Management Flowsheet and Results Review.    SELF-MONITORING: Food - none are avail    ACTIVITY LEVEL:  Patient has outdoor pool that he can use year round - heated. Patient has started physical activity 2-3 days per week, (70-80 minutes per day) mix of cardio + resistance). 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    NUTRITION INTAKE: Meal patterns include 3 meals, 1-2 snacks daily with intake ~1600 cals/d. Patient is cutting back on use of protein drinks/bars and focusing on portion control of more "normal" foods.  B - egg, whole wheat sandwich thin, ham - coffee  S - none  L - ham, lettuce, tomato, cheese, sandwich thin - alone or with soup  S - fresh peach  D - chicken schawarma plate, 1 slice gisela - Tanzanian tea  S - none  Beverages - water, 1/2 soda per day  Dining out - 3x/week    PSYCHOSOCIAL: Stage of change - action  Barriers to change - none.    EDUCATIONAL ASSESSMENT:  Patient is able to verbalize and/or demonstrate appropriate self care management skills.  Healthy Eating   Being Active   Monitoring  Taking medications  Problem solving  Healthy coping  Reducing risks    MNT ASSESSMENT:   1600 calories, 7-8 ounces protein daily  30-45 grams carb/meal, 15-30 grams carb/snack  increase fruit 2 serv/d, " vegetables 2+ cups/d, whole grains 3+serv/d, lowfat dairy 3+serv/d  low-fat, low-sodium  150 min physical activity per week, moderate intensity, as tolerated    PLAN:   Reviewed MNT guidelines for weight management.    Encouraged daily self-monitoring of food and activity patterns, return records to clinic.   Reviewed micro/macronutrient effect on health management.Reviewed principles of energy metabolism to assist weight and health management.  Patient will continue to work on portion control and add protein shake back into routine.    Discussed importance of daily physical activity with review of benefits, methods, guidelines and precautions.   Discussed behavioral strategies for improving social and environmental support of lifestyle changes.    GOALS: Self-monitoring: Daily food & activity journal. Meal Plan: 90% Accuracy. Activity:150 min/wk.  Goal of 10 lbs weight loss by 4 week f/u appt set by patient.   Visit Time Spent:  30 minutes  Thank you for the opportunity to work with your patient.

## 2017-08-01 ENCOUNTER — OFFICE VISIT (OUTPATIENT)
Dept: NEPHROLOGY | Facility: CLINIC | Age: 72
End: 2017-08-01
Payer: MEDICARE

## 2017-08-01 VITALS
BODY MASS INDEX: 39.9 KG/M2 | DIASTOLIC BLOOD PRESSURE: 68 MMHG | WEIGHT: 254.19 LBS | HEART RATE: 80 BPM | SYSTOLIC BLOOD PRESSURE: 114 MMHG | HEIGHT: 67 IN

## 2017-08-01 DIAGNOSIS — N25.81 SECONDARY HYPERPARATHYROIDISM OF RENAL ORIGIN: ICD-10-CM

## 2017-08-01 DIAGNOSIS — I27.20 PULMONARY HYPERTENSION: ICD-10-CM

## 2017-08-01 DIAGNOSIS — N18.31 CHRONIC KIDNEY DISEASE (CKD) STAGE G3A/A1, MODERATELY DECREASED GLOMERULAR FILTRATION RATE (GFR) BETWEEN 45-59 ML/MIN/1.73 SQUARE METER AND ALBUMINURIA CREATININE RATIO LESS THAN 30 MG/G: Primary | ICD-10-CM

## 2017-08-01 DIAGNOSIS — R60.0 BILATERAL EDEMA OF LOWER EXTREMITY: ICD-10-CM

## 2017-08-01 DIAGNOSIS — I75.023 CHOLESTEROL EMBOLISM OF LOWER EXTREMITY, BILATERAL: ICD-10-CM

## 2017-08-01 DIAGNOSIS — E87.6 HYPOKALEMIA: ICD-10-CM

## 2017-08-01 DIAGNOSIS — I87.2 VENOUS STASIS DERMATITIS OF BOTH LOWER EXTREMITIES: ICD-10-CM

## 2017-08-01 DIAGNOSIS — M1A.09X0 IDIOPATHIC CHRONIC GOUT OF MULTIPLE SITES WITHOUT TOPHUS: ICD-10-CM

## 2017-08-01 PROCEDURE — 99214 OFFICE O/P EST MOD 30 MIN: CPT | Mod: S$GLB,,, | Performed by: INTERNAL MEDICINE

## 2017-08-01 PROCEDURE — 1126F AMNT PAIN NOTED NONE PRSNT: CPT | Mod: S$GLB,,, | Performed by: INTERNAL MEDICINE

## 2017-08-01 PROCEDURE — 99999 PR PBB SHADOW E&M-EST. PATIENT-LVL IV: CPT | Mod: PBBFAC,,, | Performed by: INTERNAL MEDICINE

## 2017-08-01 PROCEDURE — 1159F MED LIST DOCD IN RCRD: CPT | Mod: S$GLB,,, | Performed by: INTERNAL MEDICINE

## 2017-08-01 RX ORDER — TORSEMIDE 20 MG/1
40 TABLET ORAL DAILY
Qty: 180 TABLET | Refills: 3 | Status: SHIPPED | OUTPATIENT
Start: 2017-08-01 | End: 2018-09-04 | Stop reason: SDUPTHER

## 2017-08-01 RX ORDER — POTASSIUM CHLORIDE 750 MG/1
40 TABLET, EXTENDED RELEASE ORAL DAILY
Qty: 360 TABLET | Refills: 1 | Status: SHIPPED | OUTPATIENT
Start: 2017-08-01 | End: 2018-02-15 | Stop reason: SDUPTHER

## 2017-08-01 NOTE — PATIENT INSTRUCTIONS
Check your weights every morning after getting out of bed and urinating.   Target weight 245 ib     If your weight goes up 3 pounds  overnight or 5 ib in one week take 2 torsemide in the am  and call us if the fluid doesn't come off    If your weight goes down  3 pounds  overnight or 5 ib in one week hold diuretics for 3 days and restart with half dose until you reach your desired weight              1.  Chronic kidney disease stage III: GFR is about 50%.  No heavy proteinuria on follow-up.  Cystatin C for accurate GFR=50%   Avoid nonsteroidals.  Instructions for Tylenol for pain given.  Avoid IV dye.  Continue current conservative management.    We will watch kidney function on Limbrel.    2.  Chronic gout: Last attack was 2016.  Uric acid is controlled on allopurinol    3.  Hyperparathyroidism due to chronic kidney disease stage III: Continue with vitamin D therapy.      4.  Extensive edema anasarca on the lower extremities with chronic venous stasis findings as well as brawny edema of the skin: Patient had an ultrasound of the legs in 2012 which showed no DVT.  Clinically patient has pulm. hypertension, right heart failure with elevated PA pressure on echo no diastolic dysfunction possibly venous insufficiency as the cause of the leg swelling.     metolazone 2x week.  If the swelling gets worse can take daily for 3 days.  Adjust the metolazone to 3 times a week if needed as discussed in the clinic in detail.  Detailed instructions given.  torsemide 20-40 mg in am instead of Bumex.  Reviewed 2-D echo.  Consult pulmonary for possible pulmonary hypertension    5.  Cholesterol emboli of the lower extremities: Extensive aortic calcifications noted on the CT scan.  Plan to increase aspirin to 325 mg daily.  May add Plavix if needed.  Continue Lipitor.    6.  Pulm HTN: Repeat 2-D echo reviewed consult Pulmonary    7.  Hypokalemia: increase KCL to 40 meq daily vijay when taking more diuretics     8.  Arthritis of the left  hip along with severe enthesopathy on the right iliac crest.:  Avoid nonsteroidals.  xrays reviewed as ordered by dr. Floyd pending ortho follow up      Follow-up 8 weeks

## 2017-08-01 NOTE — PROGRESS NOTES
Subjective:       Patient ID: Bo Chase is a 72 y.o. White male who presents for follow-up evaluation of Chronic Kidney Disease    HPI   Patient is a 72-year-old white male who was up  by profession.  Has history of chronic kidney disease for at least 5 years.  He was last seen by Dr. Prather in 2012.  Renal ultrasound at that time showed bilateral renal cysts.  Left kidney had a cyst of 2.5 cm.  Patient never had any proteinuria.  His creatinine has been fluctuating between 1.6 and 2.0.  His fluctuations have been due to diuretic therapy.  In the last 5 years his kidney function has been stable.  He has had history of coronary artery disease status post shortness of breath and dyspnea and exertion.    12/ 2014Occluded lad lcx and rca.Occluded svg to d1svg to rca patent svg to  om1 90% eccentric treated with dennis with filter wire protection.Patent lima.No complication. Dr. Joel     2014 LEANDRO : now on CPAP    6/2015 LBP s/p KAROLINA and s/p surgery laminectomy     4/2017 Renal [facility] CT scan of the abdomen shows extensive calcifications of the aorta    August 2017 2-D echo reviewed= PA pressure >26 mm Hg, creatinine down to 1.4, uric acid controlled, vitamin D level is 43, PTH 91, estimated GFR with Cystatin C is 50%; weight down by 10 ib from 263 to 254 ib    Review of Systems   Constitutional: Negative.  Negative for activity change, appetite change, chills, diaphoresis, fatigue and fever.        On plant based diet 20 ib weight loss in 8 weeks     Rides bike ; water aerobics ; weight training    HENT: Negative.  Negative for congestion and trouble swallowing.    Eyes: Negative.    Respiratory: Negative.  Negative for cough, chest tightness, shortness of breath and wheezing.    Cardiovascular: Positive for leg swelling. Negative for chest pain and palpitations.   Gastrointestinal: Negative.  Negative for abdominal distention, abdominal pain, nausea and vomiting.   Genitourinary: Negative.  Negative for decreased  "urine volume, difficulty urinating, dysuria, enuresis, flank pain, frequency, hematuria, penile swelling, scrotal swelling and urgency.   Musculoskeletal: Negative.  Negative for arthralgias, back pain, joint swelling and neck pain.   Skin: Negative for rash.   Neurological: Negative.  Negative for tremors, seizures and headaches.   Psychiatric/Behavioral: Negative.  Negative for confusion and sleep disturbance. The patient is not nervous/anxious.        Objective:   /68   Pulse 80   Ht 5' 7" (1.702 m)   Wt 115.3 kg (254 lb 3.1 oz)   BMI 39.81 kg/m²      Physical Exam   Constitutional: He is oriented to person, place, and time. He appears well-developed and well-nourished. No distress.   HENT:   Head: Normocephalic.   Eyes: EOM are normal. Pupils are equal, round, and reactive to light.   Neck: Normal range of motion. Neck supple. No JVD present. No thyromegaly present.   Cardiovascular: Normal rate, regular rhythm, S1 normal, S2 normal, normal heart sounds and intact distal pulses.  PMI is not displaced.  Exam reveals no gallop and no friction rub.    No murmur heard.  Cholesterol emboli ; loud P2    Pulmonary/Chest: Effort normal and breath sounds normal. No respiratory distress. He has no wheezes. He has no rales. He exhibits no tenderness.   Abdominal: He exhibits no distension and no mass. There is no hepatosplenomegaly. There is no tenderness. There is no rebound and no CVA tenderness. No hernia.   Musculoskeletal: Normal range of motion. He exhibits no edema or tenderness.   Right iliac crest tenderness with muscle enthesopathy     Left hip pain with low ROM    Lymphadenopathy:     He has no cervical adenopathy.   Neurological: He is alert and oriented to person, place, and time. He has normal reflexes. He is not disoriented. He displays normal reflexes. No cranial nerve deficit. He exhibits normal muscle tone. Coordination normal.   Skin: Skin is warm and dry. No rash noted. No erythema. "   Psychiatric: He has a normal mood and affect. His behavior is normal. Judgment and thought content normal.             Lab Results   Component Value Date    CREATININE 1.4 07/12/2017    BUN 26 (H) 07/12/2017     07/12/2017    K 3.4 (L) 07/12/2017     07/12/2017    CO2 25 07/12/2017     Lab Results   Component Value Date    WBC 7.49 07/12/2017    HGB 17.0 07/12/2017    HCT 49.4 07/12/2017    MCV 94 07/12/2017     07/12/2017     Lab Results   Component Value Date    PTH 91.0 (H) 07/12/2017    CALCIUM 9.1 07/12/2017    PHOS 2.7 07/12/2017     Lab Results   Component Value Date    URICACID 4.0 05/12/2017         Assessment:    )    1. Chronic kidney disease (CKD) stage G3a/A1, moderately decreased glomerular filtration rate (GFR) between 45-59 mL/min/1.73 square meter and albuminuria creatinine ratio less than 30 mg/g    2. Idiopathic chronic gout of multiple sites without tophus    3. Secondary hyperparathyroidism of renal origin    4. Bilateral edema of lower extremity    5. Venous stasis dermatitis of both lower extremities    6. Cholesterol embolism of lower extremity, bilateral    7. Hypokalemia    8. Pulmonary hypertension        Plan:         1.  Chronic kidney disease stage III: GFR is about 50%.  No heavy proteinuria on follow-up.  Cystatin C for accurate GFR=50%   Avoid nonsteroidals.  Instructions for Tylenol for pain given.  Avoid IV dye.  Continue current conservative management.    We will watch kidney function on Limbrel.    2.  Chronic gout: Last attack was 2016.  Uric acid is controlled on allopurinol    3.  Hyperparathyroidism due to chronic kidney disease stage III: Continue with vitamin D therapy.      4.  Extensive edema anasarca on the lower extremities with chronic venous stasis findings as well as brawny edema of the skin: Patient had an ultrasound of the legs in 2012 which showed no DVT.  Clinically patient has pulm. hypertension, right heart failure with elevated PA  pressure on echo no diastolic dysfunction possibly venous insufficiency as the cause of the leg swelling.     metolazone 2x week.  If the swelling gets worse can take daily for 3 days.  Adjust the metolazone to 3 times a week if needed as discussed in the clinic in detail.  Detailed instructions given.  torsemide 20-40 mg in am instead of Bumex.  Reviewed 2-D echo.  Consult pulmonary for possible pulmonary hypertension    5.  Cholesterol emboli of the lower extremities: Extensive aortic calcifications noted on the CT scan.  Plan to increase aspirin to 325 mg daily.  May add Plavix if needed.  Continue Lipitor.    6.  Pulm HTN: Repeat 2-D echo reviewed consult Pulmonary    7.  Hypokalemia: increase KCL to 40 meq daily vijay when taking more diuretics     8.  Arthritis of the left hip along with severe enthesopathy on the right iliac crest.:  Avoid nonsteroidals.  xrays reviewed as ordered by dr. Floyd pending ortho follow up      Follow-up 8 weeks      Washington Bernal MD

## 2017-08-07 ENCOUNTER — TELEPHONE (OUTPATIENT)
Dept: RHEUMATOLOGY | Facility: CLINIC | Age: 72
End: 2017-08-07

## 2017-08-07 NOTE — TELEPHONE ENCOUNTER
Patient is requesting a injection in Left Hip at his appointment on Thursday. Wants to make sure that nothing is needed as far as xrays and approvals. Informed him I will double check to make sure.

## 2017-08-07 NOTE — TELEPHONE ENCOUNTER
----- Message from Veronica Ace sent at 8/7/2017 10:22 AM CDT -----  Contact: Pt  Pt needs to speak with nurse regarding hip pain. Please give pt a call at..[phone] (home) [phone] (work)

## 2017-08-09 ENCOUNTER — TELEPHONE (OUTPATIENT)
Dept: RHEUMATOLOGY | Facility: CLINIC | Age: 72
End: 2017-08-09

## 2017-08-09 NOTE — TELEPHONE ENCOUNTER
----- Message from Montserrat Lock sent at 8/9/2017  2:32 PM CDT -----  Contact: pt  States he needs to know if he will be able to get a shot tomorrow. Please call pt at [phone]. Thank you

## 2017-08-09 NOTE — TELEPHONE ENCOUNTER
Spoke with patient. Ms Fang does not do the Ultra Sound guided injections. Dr. Floyd will do the injection after Ms Annalisa sees him tomorrow. Patient verbalized understanding.

## 2017-08-10 ENCOUNTER — PROCEDURE VISIT (OUTPATIENT)
Dept: RHEUMATOLOGY | Facility: CLINIC | Age: 72
End: 2017-08-10
Payer: MEDICARE

## 2017-08-10 ENCOUNTER — OFFICE VISIT (OUTPATIENT)
Dept: RHEUMATOLOGY | Facility: CLINIC | Age: 72
End: 2017-08-10
Payer: MEDICARE

## 2017-08-10 ENCOUNTER — LAB VISIT (OUTPATIENT)
Dept: LAB | Facility: HOSPITAL | Age: 72
End: 2017-08-10
Attending: INTERNAL MEDICINE
Payer: MEDICARE

## 2017-08-10 VITALS
RESPIRATION RATE: 20 BRPM | WEIGHT: 253.06 LBS | DIASTOLIC BLOOD PRESSURE: 75 MMHG | SYSTOLIC BLOOD PRESSURE: 124 MMHG | HEIGHT: 67 IN | BODY MASS INDEX: 39.72 KG/M2 | HEART RATE: 78 BPM

## 2017-08-10 DIAGNOSIS — M81.0 SENILE OSTEOPOROSIS: ICD-10-CM

## 2017-08-10 DIAGNOSIS — M1A.09X0 IDIOPATHIC CHRONIC GOUT OF MULTIPLE SITES WITHOUT TOPHUS: Primary | ICD-10-CM

## 2017-08-10 DIAGNOSIS — M16.12 PRIMARY OSTEOARTHRITIS OF LEFT HIP: ICD-10-CM

## 2017-08-10 DIAGNOSIS — N18.30 CHRONIC KIDNEY DISEASE, STAGE III (MODERATE): ICD-10-CM

## 2017-08-10 DIAGNOSIS — M16.12 PRIMARY OSTEOARTHRITIS OF LEFT HIP: Primary | ICD-10-CM

## 2017-08-10 LAB
ANION GAP SERPL CALC-SCNC: 15 MMOL/L
BUN SERPL-MCNC: 33 MG/DL
CALCIUM SERPL-MCNC: 9.8 MG/DL
CHLORIDE SERPL-SCNC: 94 MMOL/L
CO2 SERPL-SCNC: 34 MMOL/L
CREAT SERPL-MCNC: 1.9 MG/DL
EST. GFR  (AFRICAN AMERICAN): 40 ML/MIN/1.73 M^2
EST. GFR  (NON AFRICAN AMERICAN): 35 ML/MIN/1.73 M^2
GLUCOSE SERPL-MCNC: 94 MG/DL
POTASSIUM SERPL-SCNC: 3.1 MMOL/L
SODIUM SERPL-SCNC: 143 MMOL/L

## 2017-08-10 PROCEDURE — 1125F AMNT PAIN NOTED PAIN PRSNT: CPT | Mod: S$GLB,,, | Performed by: PHYSICIAN ASSISTANT

## 2017-08-10 PROCEDURE — 99213 OFFICE O/P EST LOW 20 MIN: CPT | Mod: 25,S$GLB,, | Performed by: PHYSICIAN ASSISTANT

## 2017-08-10 PROCEDURE — 20611 DRAIN/INJ JOINT/BURSA W/US: CPT | Mod: S$GLB,,, | Performed by: INTERNAL MEDICINE

## 2017-08-10 PROCEDURE — 99999 PR PBB SHADOW E&M-EST. PATIENT-LVL IV: CPT | Mod: PBBFAC,,, | Performed by: PHYSICIAN ASSISTANT

## 2017-08-10 PROCEDURE — 3078F DIAST BP <80 MM HG: CPT | Mod: S$GLB,,, | Performed by: PHYSICIAN ASSISTANT

## 2017-08-10 PROCEDURE — 3074F SYST BP LT 130 MM HG: CPT | Mod: S$GLB,,, | Performed by: PHYSICIAN ASSISTANT

## 2017-08-10 PROCEDURE — 3008F BODY MASS INDEX DOCD: CPT | Mod: S$GLB,,, | Performed by: PHYSICIAN ASSISTANT

## 2017-08-10 PROCEDURE — 1159F MED LIST DOCD IN RCRD: CPT | Mod: S$GLB,,, | Performed by: PHYSICIAN ASSISTANT

## 2017-08-10 RX ORDER — METHYLPREDNISOLONE ACETATE 80 MG/ML
60 INJECTION, SUSPENSION INTRA-ARTICULAR; INTRALESIONAL; INTRAMUSCULAR; SOFT TISSUE
Status: DISCONTINUED | OUTPATIENT
Start: 2017-08-10 | End: 2017-08-10

## 2017-08-10 RX ORDER — BETAMETHASONE SODIUM PHOSPHATE AND BETAMETHASONE ACETATE 3; 3 MG/ML; MG/ML
3 INJECTION, SUSPENSION INTRA-ARTICULAR; INTRALESIONAL; INTRAMUSCULAR; SOFT TISSUE
Status: COMPLETED | OUTPATIENT
Start: 2017-08-10 | End: 2017-08-10

## 2017-08-10 RX ORDER — BETAMETHASONE SODIUM PHOSPHATE AND BETAMETHASONE ACETATE 3; 3 MG/ML; MG/ML
3 INJECTION, SUSPENSION INTRA-ARTICULAR; INTRALESIONAL; INTRAMUSCULAR; SOFT TISSUE
Status: DISCONTINUED | OUTPATIENT
Start: 2017-08-10 | End: 2017-08-10

## 2017-08-10 RX ORDER — METHYLPREDNISOLONE ACETATE 80 MG/ML
60 INJECTION, SUSPENSION INTRA-ARTICULAR; INTRALESIONAL; INTRAMUSCULAR; SOFT TISSUE
Status: COMPLETED | OUTPATIENT
Start: 2017-08-10 | End: 2017-08-10

## 2017-08-10 RX ADMIN — BETAMETHASONE SODIUM PHOSPHATE AND BETAMETHASONE ACETATE 3 MG: 3; 3 INJECTION, SUSPENSION INTRA-ARTICULAR; INTRALESIONAL; INTRAMUSCULAR; SOFT TISSUE at 04:08

## 2017-08-10 RX ADMIN — METHYLPREDNISOLONE ACETATE 60 MG: 80 INJECTION, SUSPENSION INTRA-ARTICULAR; INTRALESIONAL; INTRAMUSCULAR; SOFT TISSUE at 04:08

## 2017-08-10 NOTE — PROCEDURES
Large Joint Aspiration/Injection  Date/Time: 8/10/2017 4:00 PM  Performed by: MARIELA MADDOX  Authorized by: MARIELA MADDOX     Consent Done?:  Yes (Verbal)  Indications:  Pain  Procedure site marked: Yes    Timeout: Prior to procedure the correct patient, procedure, and site was verified      Location:  Hip  Site:  L hip joint  Prep: Patient was prepped and draped in usual sterile fashion    Ultrasonic Guidance for needle placement: Yes  Images are saved and documented.  Needle size:  22 G  Approach:  Anterior  Aspirate amount (ml):  0  Patient tolerance:  Patient tolerated the procedure well with no immediate complications    Additional Comments: PLAN:  RIGHT HIP LOW-FREQUENCY ULTRASOUND PROCEDURE NOTE:      TheLeft hip was analyzed using the low-frequency ultrasound probe .  Vertical and longitudinal views    were done of the femur and femoralneck/head area.  An area was isolated to inject at the femoral neck and distant from the femoral artery.  That area was marked for subsequent  injection. The skin was sterilely prepped and anesthetized with 5 cc of 1% Lidocaine down to the hip capsule using a 3 1/2 inch 22g  spinal needle under US guidance. The hip joint was then injected with 1/2 cc of Celestone soluspan 6mg/cc, 3/4 cc of DepoMedrol 80mg/cc, and 3 cc of 1% Lidocaine. The medicine was visualized entering the joint space. There were no complications. Pressure was continued on the injection site for 5 min as she is on anti platlet therapy. Icing and ROM exercise discharge instructions were given. There was increased IR on exam to 15 degrees with less pain post injection.

## 2017-08-10 NOTE — PROGRESS NOTES
Subjective:       Patient ID: Bo Chase is a 72 y.o. male.    Chief Complaint: Gout; Osteoarthritis; and Osteoporosis      Bo is for rheum follow up. OA, gout, ckd and Osteoporosis    We follow him regularly for Gout multiarticular involvmeent affecting  bilateral feet and  left elbow in the past. He is  currently on allopurinol 450 mg daily and colchicine only prn. Last uric acid level 3.5 at goal. No attacks.   Prior compression fracture DEXA showed osteopenia he is currently on treatment fosamax Q 14 days dexa 5/2017 stable, fosamax continued.       Biggest issue is Left hip pain. Has been persistent for about 7 months. Pain with getting up from seated position. X-ray showed djd mod to severe in both hips. Right not bothering him. Set up today for ultrasound guided Left Hip injection with dr osborn. Has not seen ortho surgeon yet.  Today pain 2/10. Left anterior groin. No lateral or posterior hip pain. No radicular qualities. No numbness or tingling.           Hip Pain   Associated symptoms include arthralgias. Pertinent negatives include no abdominal pain, chest pain, chills, congestion, coughing, fatigue, fever, joint swelling, nausea, numbness, rash, vomiting or weakness.   Osteoarthritis   Associated symptoms include arthralgias. Pertinent negatives include no abdominal pain, chest pain, chills, congestion, coughing, fatigue, fever, joint swelling, nausea, numbness, rash, vomiting or weakness.     Review of Systems   Constitutional: Negative.  Negative for chills, fatigue and fever.   HENT: Negative.  Negative for congestion, mouth sores and trouble swallowing.    Eyes: Negative.  Negative for photophobia, redness and visual disturbance.   Respiratory: Negative.  Negative for cough, shortness of breath and wheezing.    Cardiovascular: Negative.  Negative for chest pain and leg swelling.   Gastrointestinal: Negative.  Negative for abdominal distention, abdominal pain, diarrhea, nausea and vomiting.  "  Genitourinary: Negative.  Negative for dysuria, flank pain, frequency and urgency.   Musculoskeletal: Positive for arthralgias. Negative for back pain and joint swelling.   Skin: Negative.  Negative for rash.   Neurological: Negative.  Negative for dizziness, weakness and numbness.         Objective:     /75   Pulse 78   Resp 20   Ht 5' 7" (1.702 m)   Wt 114.8 kg (253 lb 1.4 oz)   BMI 39.64 kg/m²      Physical Exam   Constitutional: He is oriented to person, place, and time and well-developed, well-nourished, and in no distress. No distress.   HENT:   Head: Normocephalic and atraumatic.   Right Ear: External ear normal.   Left Ear: External ear normal.   Mouth/Throat: No oropharyngeal exudate.   Eyes: Conjunctivae and EOM are normal. Pupils are equal, round, and reactive to light. No scleral icterus.   Neck: Neck supple. No thyromegaly present.   Cardiovascular: Normal rate, regular rhythm and normal heart sounds.    No murmur heard.  Pulmonary/Chest: Effort normal and breath sounds normal. No respiratory distress.   Abdominal: Soft. Bowel sounds are normal.   Lymphadenopathy:     He has no cervical adenopathy.   Neurological: He is alert and oriented to person, place, and time. No cranial nerve deficit. He exhibits normal muscle tone. Gait normal. Coordination normal.   Skin: Skin is warm and dry.     Musculoskeletal: Normal range of motion. He exhibits tenderness. He exhibits no edema.   Both hips have normal range of motion  Pain left hip with internal rotation and full flexion  Minimal pain right with IR and flexion  No TTP troch bursa bilaterally   Neg SLR bilaterally                Component      Latest Ref Rng & Units 5/12/2017   Uric Acid      3.4 - 7.0 mg/dL 4.0       Recent Results (from the past 1008 hour(s))   Urinalysis    Collection Time: 07/12/17 11:02 AM   Result Value Ref Range    Specimen UA Urine, Clean Catch     Color, UA Yellow Yellow, Straw, Landy    Appearance, UA Hazy (A) Clear "    pH, UA 6.0 5.0 - 8.0    Specific Gravity, UA 1.020 1.005 - 1.030    Protein, UA Negative Negative    Glucose, UA Negative Negative    Ketones, UA Negative Negative    Bilirubin (UA) Negative Negative    Occult Blood UA Negative Negative    Nitrite, UA Negative Negative    Urobilinogen, UA Negative <2.0 EU/dL    Leukocytes, UA Negative Negative   Protein / creatinine ratio, urine    Collection Time: 07/12/17 11:02 AM   Result Value Ref Range    Protein, Urine Random 16 (H) 0 - 15 mg/dL    Creatinine, Random Ur 129.0 23.0 - 375.0 mg/dL    Prot/Creat Ratio, Ur 0.12 0.00 - 0.20   Urinalysis Microscopic    Collection Time: 07/12/17 11:02 AM   Result Value Ref Range    RBC, UA 1 0 - 4 /hpf    WBC, UA 1 0 - 5 /hpf    Squam Epithel, UA 0 /hpf    Microscopic Comment SEE COMMENT    Cystatin C, Serum    Collection Time: 07/12/17 11:13 AM   Result Value Ref Range    Cystatin C 1.34 0.82 - 1.53 mg/L    eGFR by Systatin C 50 >60 mL/min/BSA   Renal function panel    Collection Time: 07/12/17 11:13 AM   Result Value Ref Range    Glucose 89 70 - 110 mg/dL    Sodium 140 136 - 145 mmol/L    Potassium 3.4 (L) 3.5 - 5.1 mmol/L    Chloride 103 95 - 110 mmol/L    CO2 25 23 - 29 mmol/L    BUN, Bld 26 (H) 8 - 23 mg/dL    Calcium 9.1 8.7 - 10.5 mg/dL    Creatinine 1.4 0.5 - 1.4 mg/dL    Albumin 3.5 3.5 - 5.2 g/dL    Phosphorus 2.7 2.7 - 4.5 mg/dL    eGFR if African American 58.0 (A) >60 mL/min/1.73 m^2    eGFR if non African American 50.2 (A) >60 mL/min/1.73 m^2    Anion Gap 12 8 - 16 mmol/L   CBC auto differential    Collection Time: 07/12/17 11:13 AM   Result Value Ref Range    WBC 7.49 3.90 - 12.70 K/uL    RBC 5.24 4.60 - 6.20 M/uL    Hemoglobin 17.0 14.0 - 18.0 g/dL    Hematocrit 49.4 40.0 - 54.0 %    MCV 94 82 - 98 fL    MCH 32.4 (H) 27.0 - 31.0 pg    MCHC 34.4 32.0 - 36.0 %    RDW 15.2 (H) 11.5 - 14.5 %    Platelets 153 150 - 350 K/uL    MPV 10.7 9.2 - 12.9 fL    Gran # 5.5 1.8 - 7.7 K/uL    Lymph # 1.3 1.0 - 4.8 K/uL    Mono # 0.5  0.3 - 1.0 K/uL    Eos # 0.1 0.0 - 0.5 K/uL    Baso # 0.02 0.00 - 0.20 K/uL    Gran% 73.2 (H) 38.0 - 73.0 %    Lymph% 17.6 (L) 18.0 - 48.0 %    Mono% 6.8 4.0 - 15.0 %    Eosinophil% 1.7 0.0 - 8.0 %    Basophil% 0.3 0.0 - 1.9 %    Differential Method Automated    Vitamin D    Collection Time: 07/12/17 11:13 AM   Result Value Ref Range    Vit D, 25-Hydroxy 43 30 - 96 ng/mL   PTH, intact    Collection Time: 07/12/17 11:13 AM   Result Value Ref Range    PTH, Intact 91.0 (H) 9.0 - 77.0 pg/mL   TSH    Collection Time: 07/12/17 11:13 AM   Result Value Ref Range    TSH 1.321 0.400 - 4.000 uIU/mL   2D echo with color flow doppler    Collection Time: 07/25/17 11:00 AM   Result Value Ref Range    EF 60 55 - 65    Mitral Valve Regurgitation TRIVIAL TO MILD     Diastolic Dysfunction No     Est. PA Systolic Pressure 26.01     Tricuspid Valve Regurgitation TRIVIAL TO MILD    Basic metabolic panel    Collection Time: 08/10/17  2:41 PM   Result Value Ref Range    Sodium 143 136 - 145 mmol/L    Potassium 3.1 (L) 3.5 - 5.1 mmol/L    Chloride 94 (L) 95 - 110 mmol/L    CO2 34 (H) 23 - 29 mmol/L    Glucose 94 70 - 110 mg/dL    BUN, Bld 33 (H) 8 - 23 mg/dL    Creatinine 1.9 (H) 0.5 - 1.4 mg/dL    Calcium 9.8 8.7 - 10.5 mg/dL    Anion Gap 15 8 - 16 mmol/L    eGFR if African American 40 (A) >60 mL/min/1.73 m^2    eGFR if non African American 35 (A) >60 mL/min/1.73 m^2           9/12/14 DEXA total femur T score -0.8, femur neck -1.2, spine 1.0 osteopenia      9/9/14 lumbar spine x-ray  Findings: Lumbar spine series performed with lateral flexion and extension views. Comparison to 06/09/14. Note again made of generalized osteopenia and multilevel degenerative change. Minimal grade 1 anterolisthesis observed at L4-5 and L5-S1 with   little change between flexion and extension. Stable mild anterior wedge deformity of the T12 and L1 vertebrae. No acute findings.    Assessment:       1. Idiopathic chronic gout of multiple sites without tophus     2. Chronic kidney disease, stage III (moderate)    3. Primary osteoarthritis of left hip    4. Senile osteoporosis        1.  Osteoarthritis bilateral hips left >Right -- left symptomatic - ultrasound guided hip injection today   2.  Idiopathic gout of multiple sites with optimal uric acid level on allopurinol 450 mg daily and colchicine prn  3.  Right knee OA- monitored by orthro   4.  Lumbar spondylosis to have laminectomy and decompression surgery 6/24/14  5.  Osteopenia with T12-L1 wedge deformity noted on x-ray will follow- on prevention fosamax Q 14 days - dexa stable   6.  Mild elevated creatinine- will watch     Plan:          ultrasound guided left hip injection today  If not helpful then BINTA    keep allopurinol to 450 mg daily,   colcrys only prn   continue fosamax Q 14 day,  Watch renal function, dexa 2 years      rtc 6 mon nato with cmp and uric acid     call with any questions, changes, or concerns.

## 2017-08-25 ENCOUNTER — NUTRITION (OUTPATIENT)
Dept: DIABETES | Facility: CLINIC | Age: 72
End: 2017-08-25
Payer: MEDICARE

## 2017-08-25 VITALS — WEIGHT: 247.81 LBS | HEIGHT: 67 IN | BODY MASS INDEX: 38.89 KG/M2

## 2017-08-25 DIAGNOSIS — E66.01 MORBID (SEVERE) OBESITY DUE TO EXCESS CALORIES: Primary | ICD-10-CM

## 2017-08-25 DIAGNOSIS — N18.2 CHRONIC KIDNEY DISEASE, STAGE 2 (MILD): ICD-10-CM

## 2017-08-25 PROCEDURE — G0108 DIAB MANAGE TRN  PER INDIV: HCPCS | Mod: S$GLB,,, | Performed by: DIETITIAN, REGISTERED

## 2017-08-25 PROCEDURE — 99999 PR PBB SHADOW E&M-EST. PATIENT-LVL III: CPT | Mod: PBBFAC,,, | Performed by: DIETITIAN, REGISTERED

## 2017-08-25 NOTE — PROGRESS NOTES
"PCP: Kym Luciano MD   REFERRING PROVIDER: Kym Luciano MD     HISTORY OF PRESENT ILLNESS: 72 y.o. male patient is in clinic today for weight management f/u. 27 lbs lost since initial visit (3/26/17).  10 lb weight loss goal set at last visit - 5 lbs lost x 4 weeks.    Patient has been trying to lose weight for ~25 years. Successful with Medifast previously (13 lbs in two weeks). Patient's weight has gradually been going up. CABGx4 21 years ago.     VITAL SIGNS:  Height: 5' 7" (170.2 cm)   Weight: 112.4 kg (247 lb 12.8 oz)   IBW: 148 lbs +/-10% and AdjIBW: 180 lbs/82kg    ALLERGIES & MEDICATIONS: Reviewed and Reconciled  MEDICAL/SURGICAL & FAMILY HISTORY: Reviewed and Reconciled    LABORATORY:  Reviewed Diabetes Management Flowsheet and Results Review.    SELF-MONITORING: Food - none are avail    ACTIVITY LEVEL:  Patient has outdoor pool that he can use year round - heated. Pool exercises few times per week (2-3). 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     NUTRITION INTAKE: Meal patterns include 3 meals, 1-2 snacks daily with intake ~1600 cals/d. Patient is usually having a protein shake for breakfast, Healthy Choice lunch, home-cooked dinner.   B - 2 eggs, pkt instant grits - coffee  S - none  L - salad at home - alone or with soup  S - fresh peach  D - pizza - 3 slices, iced tea  S - none  Beverages - water, 1/2 soda per day  Dining out - 3x/week    PSYCHOSOCIAL: Stage of change - action  Barriers to change - none.    EDUCATIONAL ASSESSMENT:  Patient is able to verbalize and/or demonstrate appropriate self care management skills.  Healthy Eating   Being Active   Monitoring  Taking medications  Problem solving  Healthy coping  Reducing risks    MNT ASSESSMENT:   1600 calories, 7-8 ounces protein daily  30-45 grams carb/meal, 15-30 grams carb/snack  increase fruit 2 serv/d, vegetables 2+ cups/d, whole grains 3+serv/d, lowfat dairy 3+serv/d  low-fat, low-sodium  150 min physical activity per week, moderate intensity, as " tolerated    PLAN:   Reviewed MNT guidelines for weight management.    Encouraged daily self-monitoring of food and activity patterns, return records to clinic.   Reviewed micro/macronutrient effect on health management.Reviewed principles of energy metabolism to assist weight and health management.     Discussed importance of daily physical activity with review of benefits, methods, guidelines and precautions. Emphasis on increasing pool days.    Discussed behavioral strategies for improving social and environmental support of lifestyle changes.    GOALS: Self-monitoring: Daily food & activity journal. Meal Plan: 90% Accuracy. Activity:150 min/wk.  Goal of 10 lbs weight loss by 4 week f/u appt set by patient.   Visit Time Spent:  30 minutes  Thank you for the opportunity to work with your patient.

## 2017-08-28 ENCOUNTER — TELEPHONE (OUTPATIENT)
Dept: PULMONOLOGY | Facility: CLINIC | Age: 72
End: 2017-08-28

## 2017-08-28 DIAGNOSIS — K76.6 PAH (PULMONARY ARTERIAL HYPERTENSION) WITH PORTAL HYPERTENSION: Primary | ICD-10-CM

## 2017-08-28 DIAGNOSIS — I27.21 PAH (PULMONARY ARTERIAL HYPERTENSION) WITH PORTAL HYPERTENSION: Primary | ICD-10-CM

## 2017-08-30 ENCOUNTER — TELEPHONE (OUTPATIENT)
Dept: PULMONOLOGY | Facility: CLINIC | Age: 72
End: 2017-08-30

## 2017-08-30 ENCOUNTER — OFFICE VISIT (OUTPATIENT)
Dept: PULMONOLOGY | Facility: CLINIC | Age: 72
End: 2017-08-30
Payer: MEDICARE

## 2017-08-30 VITALS
DIASTOLIC BLOOD PRESSURE: 82 MMHG | OXYGEN SATURATION: 92 % | RESPIRATION RATE: 18 BRPM | SYSTOLIC BLOOD PRESSURE: 122 MMHG | HEART RATE: 74 BPM | BODY MASS INDEX: 39.29 KG/M2 | HEIGHT: 67 IN | WEIGHT: 250.31 LBS

## 2017-08-30 DIAGNOSIS — D75.1 HYPOXEMIC POLYCYTHEMIA: ICD-10-CM

## 2017-08-30 DIAGNOSIS — G47.33 OSA ON CPAP: Primary | ICD-10-CM

## 2017-08-30 DIAGNOSIS — R60.0 EDEMA OF LEG: ICD-10-CM

## 2017-08-30 PROCEDURE — 3008F BODY MASS INDEX DOCD: CPT | Mod: S$GLB,,, | Performed by: INTERNAL MEDICINE

## 2017-08-30 PROCEDURE — 1159F MED LIST DOCD IN RCRD: CPT | Mod: S$GLB,,, | Performed by: INTERNAL MEDICINE

## 2017-08-30 PROCEDURE — 99214 OFFICE O/P EST MOD 30 MIN: CPT | Mod: S$GLB,,, | Performed by: INTERNAL MEDICINE

## 2017-08-30 PROCEDURE — 3074F SYST BP LT 130 MM HG: CPT | Mod: S$GLB,,, | Performed by: INTERNAL MEDICINE

## 2017-08-30 PROCEDURE — 3079F DIAST BP 80-89 MM HG: CPT | Mod: S$GLB,,, | Performed by: INTERNAL MEDICINE

## 2017-08-30 PROCEDURE — 99999 PR PBB SHADOW E&M-EST. PATIENT-LVL IV: CPT | Mod: PBBFAC,,, | Performed by: INTERNAL MEDICINE

## 2017-08-30 NOTE — LETTER
August 30, 2017      Washington Bernal MD  [address] [facility] [address]  [facility] [address]           [facility]  [address] [place] [id number]  Phone: [phone]  Fax: [phone]          Patient: Bo Chase   MR Number: [id number]   YOB: 1945   Date of Visit: 8/30/2017       Dear Dr. Washington Bernal:    Thank you for referring Bo Chase to me for evaluation. Attached you will find relevant portions of my assessment and plan of care.    If you have questions, please do not hesitate to call me. I look forward to following Bo Chase along with you.    Sincerely,    Bon Honeycutt MD    Enclosure  CC:  No Recipients    If you would like to receive this communication electronically, please contact [email or web address] or [phone] to request more information on [employer] Link access.    For providers and/or their staff who would like to refer a patient to [facility], please contact us through our one-stop-shop provider referral line, [facility], at [phone].    If you feel you have received this communication in error or would no longer like to receive these types of communications, please e-mail [email or web address]

## 2017-08-30 NOTE — PROGRESS NOTES
Subjective:       Patient ID: Bo Chase is a 72 y.o. male.    Chief Complaint: Sleep Apnea    Mr. Bo Berman is referred to me by Dr. Gabe Delarosa   He has a LAW practice in [facility]  He is followed up in our department seen by Elizabeth Lejeune NP  His referral was prompted by concern for lower extremity edema and possible pulmonary hypertension  He has normal lifestyle and exercises daily goes to the gym lifts weights.  He is able to mow his three-acre land with a riding mower and uses weedeater without any limitations  He is functional class is 0 his MRC grade score is 0.  Since he has made some health adjustments his extremity edema has improved  He has been seen by cardiology multiple times and had bypass surgery and also stents placed in  He had angiography which I reviewed  Very distant smoking history  Last chest x-ray was July last year  His echocardiogram ejection fraction 60% no diastolic dysfunction with PA pressure estimation was normal.  I reviewed his original sleep study in 2012   he had severe obstructive sleep apnea AHI was 62.6 events per hour.  123 hypopneas.  He was titrated to CPAP 10 cm water pressure  He tells me that he is using the CPAP well benefits from it.  Bedtime 10:30 to 11 wake up time 6 AM  On occasion he has difficulty falling asleep.  He has used Tylenol PM  I noted in his notes that he has some polycythemia recently  Discussed with him that the echocardiogram is not remarkable for a screening concern for pulmonary hypertension however we will get PFTs x-ray on the 6 minute walk.  Patient does not have any functional symptoms other than the edema that was reported that's now improving  We will obtain a download of his device when I see him  The testing can be scheduled at his convenience  I'll seen that he dropped weight from 270 pounds on the original study to 250 pounds  Immunizations are up-to-date.  He will   need for Prevnar 13 vaccination      Review of Systems  "  Constitutional: Negative.    HENT: Negative.    Eyes: Negative.    Respiratory: Positive for apnea. Negative for snoring, sputum production, shortness of breath, wheezing and use of rescue inhaler.    Cardiovascular: Positive for leg swelling.   Genitourinary: Negative.    Endocrine: endocrine negative   Musculoskeletal: Positive for back pain.   Skin: Negative.    Gastrointestinal: Negative.    Neurological: Negative.    Psychiatric/Behavioral: Negative.        Objective:       Vitals:    08/30/17 0830   BP: 122/82   Pulse: 74   Resp: 18   SpO2: (!) 92%   Weight: 113.6 kg (250 lb 5.3 oz)   Height: 5' 7" (1.702 m)     Physical Exam   Constitutional: He is oriented to person, place, and time. He appears well-developed and well-nourished. He is obese.   HENT:   Head: Normocephalic.   Nose: Nose normal.   Mouth/Throat: Oropharynx is clear and moist. Mallampati Score: IV.   Neck: Normal range of motion. Neck supple. No JVD present. No tracheal deviation present. No thyromegaly present.   Cardiovascular: Normal rate, regular rhythm, normal heart sounds and intact distal pulses.    No murmur heard.  Pulmonary/Chest: Normal expansion, effort normal and breath sounds normal.   Abdominal: Soft.   Musculoskeletal: Normal range of motion. He exhibits edema.   Lymphadenopathy:     He has no cervical adenopathy.   Neurological: He is alert and oriented to person, place, and time. Gait normal.   Skin: Skin is warm and dry. No cyanosis. Nails show no clubbing.   Psychiatric: He has a normal mood and affect. His behavior is normal.   Nursing note and vitals reviewed.    Personal Diagnostic Review  Chest x-ray: Last was 07/2016  Echo reviewed  [facility] reviewed  No flowsheet data found.      CONCLUSIONS     1 - Concentric hypertrophy.     2 - No wall motion abnormalities.     3 - Normal left ventricular systolic function (EF 60-65%).     4 - Normal left ventricular diastolic function.     5 - Normal right ventricular systolic function " .     6 - Trivial to mild mitral regurgitation.     7 - Trivial to mild tricuspid regurgitation  Assessment:       Problem List Items Addressed This Visit     BMI 39.0-39.9,adult     Weight loss and exercise  Fluid management         LEANDRO on CPAP - Primary     Reported adherence and benefit per patient  CPAP 10 cm  Historical study reveiwed         Relevant Orders    Stress test, pulmonary    Complete PFT without bronchodilator - Clinic    X-Ray Chest PA And Lateral    PULSE OXIMETRY OVERNIGHT    Hypoxemic polycythemia     Seen Dr Winkler 2015  May need ONSAT on CPAP         Relevant Orders    Complete PFT without bronchodilator - Clinic    PULSE OXIMETRY OVERNIGHT      Other Visit Diagnoses     Edema of leg        Relevant Orders    Stress test, pulmonary    Complete PFT without bronchodilator - Clinic        Plan:          Return in about 2 months (around 10/30/2017) for PFT, 6MWD test, CXR, Download, CPAP supplies, Overnight SAT.    This note was prepared using voice recognition system and is likely to have sound alike errors that may have been overlooked even after proof reading.  Please call me with any questions    Discussed diagnosis, its evaluation, treatment and usual course. All questions answered.    Thank you for the courtesy of participating in the care of this patient: Dr Gabe Honeycutt MD

## 2017-08-30 NOTE — TELEPHONE ENCOUNTER
----- Message from Bon Honeycutt MD sent at 8/30/2017 10:58 AM CDT -----  Inform patient added ONSAT

## 2017-10-19 ENCOUNTER — TELEPHONE (OUTPATIENT)
Dept: PULMONOLOGY | Facility: CLINIC | Age: 72
End: 2017-10-19

## 2017-10-30 ENCOUNTER — PROCEDURE VISIT (OUTPATIENT)
Dept: PULMONOLOGY | Facility: CLINIC | Age: 72
End: 2017-10-30
Payer: MEDICARE

## 2017-10-30 DIAGNOSIS — D75.1 HYPOXEMIC POLYCYTHEMIA: ICD-10-CM

## 2017-10-30 DIAGNOSIS — G47.33 OSA ON CPAP: ICD-10-CM

## 2017-10-30 PROCEDURE — 94762 N-INVAS EAR/PLS OXIMTRY CONT: CPT | Mod: S$GLB,,, | Performed by: INTERNAL MEDICINE

## 2017-10-31 ENCOUNTER — PROCEDURE VISIT (OUTPATIENT)
Dept: PULMONOLOGY | Facility: CLINIC | Age: 72
End: 2017-10-31
Payer: MEDICARE

## 2017-10-31 DIAGNOSIS — D75.1 HYPOXEMIC POLYCYTHEMIA: ICD-10-CM

## 2017-10-31 DIAGNOSIS — G47.33 OSA ON CPAP: ICD-10-CM

## 2017-10-31 DIAGNOSIS — R60.0 EDEMA OF LEG: ICD-10-CM

## 2017-10-31 PROCEDURE — 94010 BREATHING CAPACITY TEST: CPT | Mod: S$GLB,,, | Performed by: INTERNAL MEDICINE

## 2017-10-31 PROCEDURE — 94729 DIFFUSING CAPACITY: CPT | Mod: S$GLB,,, | Performed by: INTERNAL MEDICINE

## 2017-10-31 PROCEDURE — 94726 PLETHYSMOGRAPHY LUNG VOLUMES: CPT | Mod: S$GLB,,, | Performed by: INTERNAL MEDICINE

## 2017-10-31 NOTE — PROCEDURES
[facility]  [address] * [place] [address]  Telephone: [phone]  Test date: 10/30/17 Start: 10/30/17 21:49:50 Bo Chase  Doctor: Dr. Honeycutt End: 10/31/17 07:15:38 [id number]  Oximetry: Summary Report  Comments: On CPAP 10 cmH2O with Room Air  Recording time: 09:25:48 Highest pulse: 89 Highest SpO2: 97%  Excluded samplin:03:36 Lowest pulse: 35 Lowest SpO2: 81%  Total valid samplin:22:12 Mean pulse: 69 Mean SpO2: 91.1%  1 S.D.: 4.7 1 S.D.: 1.9  Time with SpO2<90: 1:16:04, 13.5%  Time with SpO2<80: 0:00:00, 0.0%  Time with SpO2<70: 0:00:00, 0.0%  Time with SpO2<60: 0:00:00, 0.0%  Time with SpO2<88: 0:14:16, 2.5%  Time with SpO2 =>90: 8:06:08, 86.5%  Time with SpO2=>80 & <90: 1:16:04, 13.5%  Time with SpO2=>70 & <80: 0:00:00, 0.0%  Time with SpO2=>60 & <70: 0:00:00, 0.0%  The longest continuous time with saturation <=89 was 00:07:00, which started at  10/31/17 04:31:22.  A desaturation event was defined as a decrease of saturation by 4 or more.  No events were excluded due to artifact.  There were 25 desaturation events over 3 minutes duration.  There were 58 desaturation events of less than 3 minutes duration during which:  The mean high was 94.3%. The mean low was 88.6%.  The number of these events that were:  > 0 & <10 seconds: 0 > 0 seconds: 58  =>10 & <20 seconds: 11 =>10 seconds: 58  =>20 & <30 seconds: 9 =>20 seconds: 47  =>30 & <40 seconds: 7 =>30 seconds: 38  =>40 & <50 seconds: 10 =>40 seconds: 31  =>50 & <60 seconds: 3 =>50 seconds: 21  =>60 seconds: 18 =>60 seconds: 18  The mean length of desaturation events that were >=10 sec & <=3 mins was: 51.8 sec.  Desaturation event index (events >=10 sec per sampled hour): 6.2  Desaturation event index (events >= 0 sec per sampled hour): 6.2  © 7355-1802 [employer] Oximetry version Standard XA7829.  Oximeter: RespirBent Pixelss 920M Plus memory, 4 second resolution.    REPORT    OVERNIGHT OXIMETRY  REPORT:    Dictated by: Bon Honeycutt MD  Test date: 10/31/2017  Dictated on: 2017      Comment: This test was performed on CPAP 10 cm     A desaturation event was defined as a decrease of saturation by 4 or more.    REPORT SUMMARY  Total valid samplin:22:12   High SpO2: 97%    Low SpO2: 81%    Mean SpO2  91.1 %  Cumulative time with oxygen saturation less than 88% (TC88): 00:07:00    CLINICAL INTERPRETATION   There is  significant nocturnal oxygen desaturation,  Clinical correlation is advised and  Recommend overnight polysomnography if clinically indicated    Medicare Criteria Comments:   Oximetry test results suggest the patient falls under Medicare Group 1 Criteria. ( Arterial oxygen saturation at or below 88% for at least 5 minutes taken during sleep)  Bon Honeycutt MD    Details about Medicare Group Criteria coverage can be found at http://www.cms.hhs.gov/manuals/downloads/

## 2017-11-03 LAB
PRE DLCO: 15.07 ML/MMHG/MIN (ref 20.97–29.26)
PRE ERV: 0.45 L
PRE FEF 25 75: 1.03 L/S (ref 1.41–2.87)
PRE FET 100: 11.05 S
PRE FEV1 FVC: 67 %
PRE FEV1: 1.97 L (ref 2.48–3.2)
PRE FIF 50: 2.02 L/S
PRE FRC PL: 2.51 L (ref 2.02–3.23)
PRE FVC: 2.94 L (ref 3.46–4.31)
PRE KROGHS K: 3.22 1/MIN
PRE PEF: 6.2 L/S (ref 6.52–8.64)
PRE RV: 2.06 L (ref 1.96–2.75)
PRE SVC: 3 L
PRE TLC: 5.06 L (ref 5.28–6.28)
PREDICTED DLCO: 25.12 ML/MMHG/MIN (ref 20.97–29.26)
PREDICTED FEV1 FVC: 73.4 % (ref 68.56–78.24)
PREDICTED FEV1: 2.84 L (ref 2.48–3.2)
PREDICTED FRC N2: 2.62 L (ref 2.02–3.23)
PREDICTED FRC PL: 2.62 L (ref 2.02–3.23)
PREDICTED FVC: 3.88 L (ref 3.46–4.31)
PREDICTED RV: 2.35 L (ref 1.96–2.75)
PREDICTED SVC: 3.45 L
PREDICTED TLC: 5.78 L (ref 5.28–6.28)
PROVOCATION PROTOCOL: ABNORMAL

## 2017-11-09 ENCOUNTER — HOSPITAL ENCOUNTER (OUTPATIENT)
Dept: RADIOLOGY | Facility: HOSPITAL | Age: 72
Discharge: HOME | End: 2017-11-09
Attending: INTERNAL MEDICINE
Payer: MEDICARE

## 2017-11-09 ENCOUNTER — OFFICE VISIT (OUTPATIENT)
Dept: PULMONOLOGY | Facility: CLINIC | Age: 72
End: 2017-11-09
Payer: MEDICARE

## 2017-11-09 VITALS
HEART RATE: 88 BPM | BODY MASS INDEX: 41.03 KG/M2 | DIASTOLIC BLOOD PRESSURE: 70 MMHG | RESPIRATION RATE: 22 BRPM | SYSTOLIC BLOOD PRESSURE: 110 MMHG | WEIGHT: 261.44 LBS | HEIGHT: 67 IN | OXYGEN SATURATION: 94 %

## 2017-11-09 DIAGNOSIS — D75.1 HYPOXEMIC POLYCYTHEMIA: ICD-10-CM

## 2017-11-09 DIAGNOSIS — G47.33 OSA ON CPAP: ICD-10-CM

## 2017-11-09 DIAGNOSIS — J44.9 CHRONIC OBSTRUCTIVE PULMONARY DISEASE, UNSPECIFIED COPD TYPE: ICD-10-CM

## 2017-11-09 DIAGNOSIS — R60.0 EDEMA OF LEG: ICD-10-CM

## 2017-11-09 PROCEDURE — 71020 XR CHEST PA AND LATERAL: CPT | Mod: 26,,, | Performed by: RADIOLOGY

## 2017-11-09 PROCEDURE — 99999 PR PBB SHADOW E&M-EST. PATIENT-LVL III: CPT | Mod: PBBFAC,,, | Performed by: INTERNAL MEDICINE

## 2017-11-09 PROCEDURE — 99214 OFFICE O/P EST MOD 30 MIN: CPT | Mod: 25,S$GLB,, | Performed by: INTERNAL MEDICINE

## 2017-11-09 PROCEDURE — 71020 XR CHEST PA AND LATERAL: CPT | Mod: TC

## 2017-11-09 PROCEDURE — 90662 IIV NO PRSV INCREASED AG IM: CPT | Mod: S$GLB,,, | Performed by: INTERNAL MEDICINE

## 2017-11-09 PROCEDURE — G0008 ADMIN INFLUENZA VIRUS VAC: HCPCS | Mod: S$GLB,,, | Performed by: INTERNAL MEDICINE

## 2017-11-09 RX ORDER — ALBUTEROL SULFATE 90 UG/1
1-2 AEROSOL, METERED RESPIRATORY (INHALATION) EVERY 6 HOURS PRN
Qty: 1 INHALER | Refills: 5 | Status: SHIPPED | OUTPATIENT
Start: 2017-11-09 | End: 2018-11-07 | Stop reason: SDUPTHER

## 2017-11-09 RX ORDER — HYDRALAZINE HYDROCHLORIDE 100 MG/1
0.5 TABLET, FILM COATED ORAL 2 TIMES DAILY
COMMUNITY
Start: 2017-09-01 | End: 2018-09-13

## 2017-11-09 NOTE — PROGRESS NOTES
"Subjective:       Patient ID: Bo Chase is a 72 y.o. male.    Chief Complaint: Sleep Apnea    Bo Chase is 72 years old  This a follow-up appointment  He has polycythemia and required fentanyl walked him in the past  He is treated with CPAP 10 cm water pressure  He is using the device and benefits from it  Today's study was to review some of his results  His overnight such saturation study did show that oxygen saturation was below 88% for 7 minutes  I reviewed his historical sleep study which suggested that he may need higher pressures  I'll get his study and his device interrogated and we may need to give him a loaner device or increase his pressure to 11 cm of 12 cm  His spirometry is consistent with a moderate obstructive defect this is congruent with his described history of exercise-induced asthma  He also has a mild impairment in his diffusion capacity that improves with adjustment for alveolar volume  We agreed that we will give him a bronchodilator to use on when necessary basis  We will adjust his CPAP  I'll see him back in about 3-6 months      Review of Systems   Constitutional: Negative.    HENT: Negative.    Eyes: Negative.    Respiratory: Negative for apnea, snoring, sputum production, shortness of breath, wheezing and use of rescue inhaler.    Cardiovascular: Negative.    Genitourinary: Negative.    Endocrine: endocrine negative   Musculoskeletal: Negative.    Skin: Negative.    Gastrointestinal: Negative.    Neurological: Negative.    Psychiatric/Behavioral: Negative.        Objective:       Vitals:    11/09/17 1557   BP: 110/70   BP Location: Right arm   Patient Position: Sitting   Pulse: 88   Resp: (!) 22   SpO2: (!) 94%   Weight: 118.6 kg (261 lb 7.5 oz)   Height: 5' 7.2" (1.707 m)     Physical Exam   Constitutional: He is oriented to person, place, and time. He appears well-developed and well-nourished.   HENT:   Head: Normocephalic.   Neck: Neck supple.   Musculoskeletal: Normal range of " "motion.   Neurological: He is alert and oriented to person, place, and time.   Skin: Skin is warm.   Psychiatric: He has a normal mood and affect.   Nursing note and vitals reviewed.    Personal Diagnostic Review  Chest x-ray: *  Clear lung fields  No infiltrates      Pulmonary function tests: FEV1: 1.97  (69 % predicted), FVC:  2.94 (76 % predicted),   FEV1/FVC:  67, T.06 (88 % predicted), RV/TLVC: 41 (99 % predicted), DLCO: 15.1 (60 % predicted)    6MW Test  Height: 5' 7.2" (170.7 cm)  Weight: 118.6 kg (261 lb 7.5 oz)  BMI (Calculated): 40.8  Predicted Distance: 265.37  Interpretation  Predicted Distance Meters (Calculated): 418.04 meters    Overnight sat  Sampling 9 hr 22 mins  SpO2 was below 89% was 7 min    No flowsheet data found.      Assessment:       Problem List Items Addressed This Visit     BMI 39.0-39.9,adult - Primary     Weight loss and exercise         LEANDRO on CPAP     Needs download  PSG : pressure tested 6-9 cm but was prescribed CPAP 10 cm  Suspect needs higher pressure  Either APAP loaner for 30 days, will see Amparo or increase to 11 cm    Overnight sat showed time below 88% was 14.16mins         Relevant Orders    MyChart Patient Entered CPAP Usage    Hypoxemic polycythemia    Chronic obstruct airways disease     Hx of Asthma, exercise induced, especially when ran track  Used inhaler and nebulizer  FEV1 1.97( 69%), FVC 2.94( 76%), FEV1/FVC 67  TLC was 88%  DLCO was 15.1( 60%)         Relevant Medications    albuterol 90 mcg/actuation inhaler      Other Visit Diagnoses     Edema of leg            Plan:       Adherence to CPAP discussed  Inhaler technique discussed  Adherence to diuretics and discussed  We may need to increase the pressure in his device     Return in about 3 months (around 2018) for nurse schedule flu shot schedule, Download, CPAP supplies, Weight loss and exercise.    This note was prepared using voice recognition system and is likely to have sound alike errors that " may have been overlooked even after proof reading.  Please call me with any questions      Time spent: 20 minutes in face to face  discussion concerning diagnosis, prognosis, review of lab and test results, benefits of treatment as well as management of disease, counseling of patient and coordination of care between various health  care providers . Greater than half the time spent was used for coordination of care and counseling of patient.     Bon Honeycutt    Pulmonary/Critical care/Sleepmedicine

## 2017-11-09 NOTE — ASSESSMENT & PLAN NOTE
Hx of Asthma, exercise induced, especially when ran track  Used inhaler and nebulizer  FEV1 1.97( 69%), FVC 2.94( 76%), FEV1/FVC 67  TLC was 88%  DLCO was 15.1( 60%)

## 2017-11-09 NOTE — ASSESSMENT & PLAN NOTE
Needs download  PSG 2012: pressure tested 6-9 cm but was prescribed CPAP 10 cm  Suspect needs higher pressure  Either APAP loaner for 30 days, will see Amparo or increase to 11 cm    Overnight sat showed time below 88% was 14.16mins

## 2017-11-25 NOTE — TELEPHONE ENCOUNTER
----- Message from Praveena Rene sent at 2/13/2017 12:08 PM CST -----  Call pt at [phone]///pt having joint pains and wondering if you can get him in sooner//regla gerber  
Pt called and appt made for 2/16/17  
Statement Selected

## 2017-12-05 DIAGNOSIS — G47.33 OSA ON CPAP: Primary | ICD-10-CM

## 2018-02-13 NOTE — PROGRESS NOTES
"Subjective:       Patient ID: Bo Chase is a 73 y.o. male.    Chief Complaint: LEANDRO on cpap    Bo Chase is 72 years old  This a follow-up appointment  No issues with PAP  He is treated with CPAP 10 cm water pressure  He is using the device and benefits from it  I have reviewed the patient's medical history in detail and updated the computerized patient record.        Review of Systems   Constitutional: Negative.    HENT: Negative.    Eyes: Negative.    Respiratory: Negative for apnea, snoring, sputum production, shortness of breath, wheezing and use of rescue inhaler.    Cardiovascular: Negative.    Genitourinary: Negative.    Endocrine: endocrine negative   Musculoskeletal: Negative.    Skin: Negative.    Gastrointestinal: Negative.    Neurological: Negative.    Psychiatric/Behavioral: Negative.        Objective:       Vitals:    02/14/18 1349   BP: 120/82   Pulse: 73   Resp: 18   SpO2: (!) 93%   Weight: 120.1 kg (264 lb 14.1 oz)   Height: 5' 7" (1.702 m)     Physical Exam   Constitutional: He is oriented to person, place, and time. He appears well-developed and well-nourished. He is obese.   HENT:   Head: Normocephalic.   Neck: Neck supple.   Cardiovascular: Normal rate.    Pulmonary/Chest: Normal expansion and effort normal.   Abdominal: Soft.   Musculoskeletal: Normal range of motion.   Neurological: He is alert and oriented to person, place, and time.   Skin: Skin is warm.   Psychiatric: He has a normal mood and affect.   Nursing note and vitals reviewed.    Personal Diagnostic Review    Download  11/5/2017- 12/4/2017  29 days  Usage > 4 hrs was 96.7%        Assessment:       Problem List Items Addressed This Visit     BMI 39.0-39.9,adult     exercise          LEANDRO on CPAP - Primary     Enterprise score 1  BEd time 10 pm  Wake time 0630 am  CPAP 10 cm  USage > 4 hrs was 96.7%  Using and benefits              Plan:       Adherence to CPAP discussed  Inhaler technique discussed  Adherence to diuretics and " discussed  We may need to increase the pressure in his device     Follow-up in about 1 year (around 2/14/2019) for Weight loss and exercise, Download, CPAP supplies.    This note was prepared using voice recognition system and is likely to have sound alike errors that may have been overlooked even after proof reading.  Please call me with any questions      Time spent: 20 minutes in face to face  discussion concerning diagnosis, prognosis, review of lab and test results, benefits of treatment as well as management of disease, counseling of patient and coordination of care between various health  care providers . Greater than half the time spent was used for coordination of care and counseling of patient.     Bon Honeycutt    Pulmonary/Critical care/Sleepmedicine

## 2018-02-14 ENCOUNTER — OFFICE VISIT (OUTPATIENT)
Dept: PULMONOLOGY | Facility: CLINIC | Age: 73
End: 2018-02-14
Payer: MEDICARE

## 2018-02-14 VITALS
HEIGHT: 67 IN | OXYGEN SATURATION: 93 % | SYSTOLIC BLOOD PRESSURE: 120 MMHG | DIASTOLIC BLOOD PRESSURE: 82 MMHG | BODY MASS INDEX: 41.57 KG/M2 | WEIGHT: 264.88 LBS | HEART RATE: 73 BPM | RESPIRATION RATE: 18 BRPM

## 2018-02-14 DIAGNOSIS — G47.33 OSA ON CPAP: Primary | ICD-10-CM

## 2018-02-14 PROCEDURE — 99999 PR PBB SHADOW E&M-EST. PATIENT-LVL III: CPT | Mod: PBBFAC,,, | Performed by: INTERNAL MEDICINE

## 2018-02-14 PROCEDURE — 3008F BODY MASS INDEX DOCD: CPT | Mod: S$GLB,,, | Performed by: INTERNAL MEDICINE

## 2018-02-14 PROCEDURE — 99214 OFFICE O/P EST MOD 30 MIN: CPT | Mod: S$GLB,,, | Performed by: INTERNAL MEDICINE

## 2018-02-14 PROCEDURE — 1159F MED LIST DOCD IN RCRD: CPT | Mod: S$GLB,,, | Performed by: INTERNAL MEDICINE

## 2018-02-14 PROCEDURE — 99499 UNLISTED E&M SERVICE: CPT | Mod: S$GLB,,, | Performed by: INTERNAL MEDICINE

## 2018-02-14 RX ORDER — TRAMADOL HYDROCHLORIDE 50 MG/1
TABLET ORAL
Refills: 3 | COMMUNITY
Start: 2018-01-12 | End: 2018-09-13 | Stop reason: SDUPTHER

## 2018-02-14 NOTE — PROGRESS NOTES
Patient, Bo Chase (MRN #[id number]), presented with a recorded BMI of 41.49 kg/m^2 consistent with the definition of morbid obesity (ICD-10 E66.01). The patient's morbid obesity was monitored, evaluated, addressed and/or treated. This addendum to the medical record is made on 02/14/2018.

## 2018-02-14 NOTE — ASSESSMENT & PLAN NOTE
Jasper score 1  BEd time 10 pm  Wake time 0630 am  CPAP 10 cm  USage > 4 hrs was 96.7%  Using and benefits

## 2018-02-15 ENCOUNTER — OFFICE VISIT (OUTPATIENT)
Dept: RHEUMATOLOGY | Facility: CLINIC | Age: 73
End: 2018-02-15
Payer: MEDICARE

## 2018-02-15 ENCOUNTER — TELEPHONE (OUTPATIENT)
Dept: RHEUMATOLOGY | Facility: CLINIC | Age: 73
End: 2018-02-15

## 2018-02-15 ENCOUNTER — LAB VISIT (OUTPATIENT)
Dept: LAB | Facility: HOSPITAL | Age: 73
End: 2018-02-15
Attending: PHYSICIAN ASSISTANT
Payer: MEDICARE

## 2018-02-15 VITALS
HEIGHT: 67 IN | WEIGHT: 265.19 LBS | HEART RATE: 72 BPM | DIASTOLIC BLOOD PRESSURE: 76 MMHG | BODY MASS INDEX: 41.62 KG/M2 | SYSTOLIC BLOOD PRESSURE: 119 MMHG

## 2018-02-15 DIAGNOSIS — M81.0 SENILE OSTEOPOROSIS: ICD-10-CM

## 2018-02-15 DIAGNOSIS — N18.30 CHRONIC KIDNEY DISEASE, STAGE III (MODERATE): ICD-10-CM

## 2018-02-15 DIAGNOSIS — M1A.09X0 IDIOPATHIC CHRONIC GOUT OF MULTIPLE SITES WITHOUT TOPHUS: ICD-10-CM

## 2018-02-15 DIAGNOSIS — M16.12 PRIMARY OSTEOARTHRITIS OF LEFT HIP: ICD-10-CM

## 2018-02-15 DIAGNOSIS — E87.6 HYPOKALEMIA DUE TO INADEQUATE POTASSIUM INTAKE: ICD-10-CM

## 2018-02-15 DIAGNOSIS — M1A.09X0 IDIOPATHIC CHRONIC GOUT OF MULTIPLE SITES WITHOUT TOPHUS: Primary | ICD-10-CM

## 2018-02-15 DIAGNOSIS — M15.9 PRIMARY OSTEOARTHRITIS INVOLVING MULTIPLE JOINTS: ICD-10-CM

## 2018-02-15 LAB
ALBUMIN SERPL BCP-MCNC: 4 G/DL
ALP SERPL-CCNC: 78 U/L
ALT SERPL W/O P-5'-P-CCNC: 56 U/L
ANION GAP SERPL CALC-SCNC: 12 MMOL/L
AST SERPL-CCNC: 40 U/L
BILIRUB SERPL-MCNC: 0.9 MG/DL
BUN SERPL-MCNC: 35 MG/DL
CALCIUM SERPL-MCNC: 10.3 MG/DL
CHLORIDE SERPL-SCNC: 91 MMOL/L
CO2 SERPL-SCNC: 36 MMOL/L
CREAT SERPL-MCNC: 1.9 MG/DL
EST. GFR  (AFRICAN AMERICAN): 40 ML/MIN/1.73 M^2
EST. GFR  (NON AFRICAN AMERICAN): 34 ML/MIN/1.73 M^2
GLUCOSE SERPL-MCNC: 103 MG/DL
POTASSIUM SERPL-SCNC: 2.7 MMOL/L
PROT SERPL-MCNC: 7.4 G/DL
SODIUM SERPL-SCNC: 139 MMOL/L
URATE SERPL-MCNC: 7 MG/DL

## 2018-02-15 PROCEDURE — 1125F AMNT PAIN NOTED PAIN PRSNT: CPT | Mod: S$GLB,,, | Performed by: PHYSICIAN ASSISTANT

## 2018-02-15 PROCEDURE — 84550 ASSAY OF BLOOD/URIC ACID: CPT | Mod: PO

## 2018-02-15 PROCEDURE — 1159F MED LIST DOCD IN RCRD: CPT | Mod: S$GLB,,, | Performed by: PHYSICIAN ASSISTANT

## 2018-02-15 PROCEDURE — 3008F BODY MASS INDEX DOCD: CPT | Mod: S$GLB,,, | Performed by: PHYSICIAN ASSISTANT

## 2018-02-15 PROCEDURE — 36415 COLL VENOUS BLD VENIPUNCTURE: CPT | Mod: PO

## 2018-02-15 PROCEDURE — 99214 OFFICE O/P EST MOD 30 MIN: CPT | Mod: S$GLB,,, | Performed by: PHYSICIAN ASSISTANT

## 2018-02-15 PROCEDURE — 80053 COMPREHEN METABOLIC PANEL: CPT | Mod: PO

## 2018-02-15 PROCEDURE — 99999 PR PBB SHADOW E&M-EST. PATIENT-LVL IV: CPT | Mod: PBBFAC,,, | Performed by: PHYSICIAN ASSISTANT

## 2018-02-15 RX ORDER — POTASSIUM CHLORIDE 750 MG/1
20 TABLET, EXTENDED RELEASE ORAL 2 TIMES DAILY
Qty: 360 TABLET | Refills: 0 | Status: SHIPPED | OUTPATIENT
Start: 2018-02-15 | End: 2018-07-07 | Stop reason: SDUPTHER

## 2018-02-15 NOTE — PROGRESS NOTES
Subjective:       Patient ID: Bo Chase is a 73 y.o. male.    Chief Complaint: Osteoarthritis and Gout      Bo is for rheum follow up. OA, gout, ckd and Osteoporosis    We follow him regularly for Gout multiarticular involvmeent affecting  bilateral feet and  left elbow in the past. He is  currently on allopurinol 450 mg daily and colchicine only prn. Last uric acid level 4.0 at goal. No attacks.   Prior compression fracture DEXA showed osteopenia he is currently on treatment fosamax Q 14 days (started 9/2015)  dexa 5/2017 stable with mod fx risk spine increased 4%. Still on fosamax Q 14 day        Biggest issue is Left hip pain. Has been persistent for over 1 year. Now the right having pain. Worse with walking and weightbearing. Saw ortho. Needs to loose some weight before surgery. Had Left IA hip injection releif X 1 day.   X-ray showed djd mod to severe in both hips. Today pain 6/10. Left anterior groin. No lateral or posterior hip pain. No radicular qualities. No numbness or tingling.     Had increased LED Dr Francisco increased his diuretic. He had low K on 10 mEq bid. Last K level was 3.4. Today K 2.7. No cp or palpitations. No sob or muscle cramping. No dizziness or weakness           Hip Pain   Associated symptoms include arthralgias. Pertinent negatives include no abdominal pain, chest pain, chills, congestion, coughing, fatigue, fever, joint swelling, nausea, numbness, rash, vomiting or weakness.   Osteoarthritis   Associated symptoms include arthralgias. Pertinent negatives include no abdominal pain, chest pain, chills, congestion, coughing, fatigue, fever, joint swelling, nausea, numbness, rash, vomiting or weakness.     Review of Systems   Constitutional: Negative.  Negative for chills, fatigue and fever.   HENT: Negative.  Negative for congestion, mouth sores and trouble swallowing.    Eyes: Negative.  Negative for photophobia, redness and visual disturbance.   Respiratory: Negative.  Negative for  "cough, shortness of breath and wheezing.    Cardiovascular: Negative.  Negative for chest pain and leg swelling.   Gastrointestinal: Negative.  Negative for abdominal distention, abdominal pain, diarrhea, nausea and vomiting.   Genitourinary: Negative.  Negative for dysuria, flank pain, frequency and urgency.   Musculoskeletal: Positive for arthralgias and gait problem. Negative for back pain and joint swelling.   Skin: Negative.  Negative for rash.   Neurological: Negative for dizziness, weakness and numbness.         Objective:     /76   Pulse 72   Ht 5' 7" (1.702 m)   Wt 120.3 kg (265 lb 3.4 oz)   BMI 41.54 kg/m²      Physical Exam   Constitutional: He is oriented to person, place, and time and well-developed, well-nourished, and in no distress. No distress.   HENT:   Head: Normocephalic and atraumatic.   Right Ear: External ear normal.   Left Ear: External ear normal.   Mouth/Throat: No oropharyngeal exudate.   Eyes: Conjunctivae and EOM are normal. Pupils are equal, round, and reactive to light. No scleral icterus.   Neck: Neck supple. No thyromegaly present.   Cardiovascular: Normal rate, regular rhythm and normal heart sounds.    No murmur heard.  Pulmonary/Chest: Effort normal and breath sounds normal. No respiratory distress.   Abdominal: Soft. Bowel sounds are normal.   Lymphadenopathy:     He has no cervical adenopathy.   Neurological: He is alert and oriented to person, place, and time. No cranial nerve deficit. He exhibits normal muscle tone. Gait normal. Coordination normal.   Skin: Skin is warm and dry.     Musculoskeletal: Normal range of motion. He exhibits tenderness. He exhibits no edema.   Both hips have normal range of motion  Pain left hip with internal rotation and full flexion  Minimal pain right with IR and flexion  No TTP troch bursa bilaterally   Neg SLR bilaterally                Component      Latest Ref Rng & Units 5/12/2017   Uric Acid      3.4 - 7.0 mg/dL 4.0         Recent " Results (from the past 1008 hour(s))   Comprehensive metabolic panel    Collection Time: 02/15/18  2:10 PM   Result Value Ref Range    Sodium 139 136 - 145 mmol/L    Potassium 2.7 (LL) 3.5 - 5.1 mmol/L    Chloride 91 (L) 95 - 110 mmol/L    CO2 36 (H) 23 - 29 mmol/L    Glucose 103 70 - 110 mg/dL    BUN, Bld 35 (H) 8 - 23 mg/dL    Creatinine 1.9 (H) 0.5 - 1.4 mg/dL    Calcium 10.3 8.7 - 10.5 mg/dL    Total Protein 7.4 6.0 - 8.4 g/dL    Albumin 4.0 3.5 - 5.2 g/dL    Total Bilirubin 0.9 0.1 - 1.0 mg/dL    Alkaline Phosphatase 78 55 - 135 U/L    AST 40 10 - 40 U/L    ALT 56 (H) 10 - 44 U/L    Anion Gap 12 8 - 16 mmol/L    eGFR if African American 40 (A) >60 mL/min/1.73 m^2    eGFR if non African American 34 (A) >60 mL/min/1.73 m^2   Uric acid    Collection Time: 02/15/18  2:10 PM   Result Value Ref Range    Uric Acid 7.0 3.4 - 7.0 mg/dL           9/12/14 DEXA total femur T score -0.8, femur neck -1.2, spine 1.0 osteopenia      9/9/14 lumbar spine x-ray  Findings: Lumbar spine series performed with lateral flexion and extension views. Comparison to 06/09/14. Note again made of generalized osteopenia and multilevel degenerative change. Minimal grade 1 anterolisthesis observed at L4-5 and L5-S1 with   little change between flexion and extension. Stable mild anterior wedge deformity of the T12 and L1 vertebrae. No acute findings.    Assessment:       1. Idiopathic chronic gout of multiple sites without tophus    2. Primary osteoarthritis involving multiple joints    3. Primary osteoarthritis of left hip    4. Senile osteoporosis    5. Hypokalemia due to inadequate potassium intake        1.  Osteoarthritis bilateral hips left >Right -- left symptomatic - ultrasound guided hip injection no effective- needs BINTA but needs to loose weight 1st   2.  Idiopathic gout of multiple sites with optimal uric acid level on allopurinol 450 mg daily and colchicine prn  3.  Right knee OA- monitored by orthro   4.  Lumbar spondylosis to have  laminectomy and decompression surgery 6/24/14  5.  Osteopenia with T12-L1 wedge deformity noted on x-ray will follow- on prevention fosamax Q 14 days X 2.5 years, stable dexa 2017- mod fx   - dexa stable   6. Worsening renal function most likely related to recent increase in diuretic dose- Low K related as well        Plan:          keep allopurinol to 450 mg daily,   colcrys only prn       Stop his fosamax Q 14 day,  Watch renal function, dexa 2 years, if density falls consider prolia as best opitons    Increase his KCL to 20 mEq bid and follow up with renal for further monitoring  Copy of labs and note sent to Dr Francisco for review and further instructions       rtc 6-7 mon nato with cmp and uric acid     call with any questions, changes, or concerns.

## 2018-02-15 NOTE — Clinical Note
Pt now on zaroxyln 2 X week Seen today K low at 2.7- he was asymptomatic He is  on KCL 10 mEq bid, I increased to 20 mEq bid and told Bo your office will get with him on further follow up and labs.  hang Castro

## 2018-02-16 ENCOUNTER — TELEPHONE (OUTPATIENT)
Dept: NEPHROLOGY | Facility: CLINIC | Age: 73
End: 2018-02-16

## 2018-02-16 NOTE — TELEPHONE ENCOUNTER
----- Message from Washington Bernal MD sent at 2/16/2018  8:33 AM CST -----  Bring him in for labs and follow up in  A week time   ----- Message -----  From: Gloria Fang PA-C  Sent: 2/15/2018   4:05 PM  To: Washington Bernal MD    Pt now on zaroxyln 2 X week  Seen today K low at 2.7- he was asymptomatic  He is  on KCL 10 mEq bid, I increased to 20 mEq bid and told Bo your office will get with him on further follow up and labs.    hang Castro

## 2018-02-16 NOTE — TELEPHONE ENCOUNTER
Called patient to make a sooner appointment to see . No answer, left message for patient to return the call.

## 2018-02-21 ENCOUNTER — TELEPHONE (OUTPATIENT)
Dept: NEPHROLOGY | Facility: CLINIC | Age: 73
End: 2018-02-21

## 2018-03-22 ENCOUNTER — LAB VISIT (OUTPATIENT)
Dept: LAB | Facility: HOSPITAL | Age: 73
End: 2018-03-22
Attending: INTERNAL MEDICINE
Payer: MEDICARE

## 2018-03-22 DIAGNOSIS — R60.0 BILATERAL EDEMA OF LOWER EXTREMITY: ICD-10-CM

## 2018-03-22 DIAGNOSIS — N18.31 CHRONIC KIDNEY DISEASE (CKD) STAGE G3A/A1, MODERATELY DECREASED GLOMERULAR FILTRATION RATE (GFR) BETWEEN 45-59 ML/MIN/1.73 SQUARE METER AND ALBUMINURIA CREATININE RATIO LESS THAN 30 MG/G: ICD-10-CM

## 2018-03-22 LAB
BASOPHILS # BLD AUTO: 0.07 K/UL
BASOPHILS NFR BLD: 0.6 %
DIFFERENTIAL METHOD: ABNORMAL
EOSINOPHIL # BLD AUTO: 0.1 K/UL
EOSINOPHIL NFR BLD: 1.2 %
ERYTHROCYTE [DISTWIDTH] IN BLOOD BY AUTOMATED COUNT: 16.8 %
HCT VFR BLD AUTO: 56.2 %
HGB BLD-MCNC: 19.3 G/DL
IMM GRANULOCYTES # BLD AUTO: 0.07 K/UL
IMM GRANULOCYTES NFR BLD AUTO: 0.6 %
LYMPHOCYTES # BLD AUTO: 1.6 K/UL
LYMPHOCYTES NFR BLD: 14.2 %
MCH RBC QN AUTO: 30 PG
MCHC RBC AUTO-ENTMCNC: 34.3 G/DL
MCV RBC AUTO: 87 FL
MONOCYTES # BLD AUTO: 1.1 K/UL
MONOCYTES NFR BLD: 9.4 %
NEUTROPHILS # BLD AUTO: 8.3 K/UL
NEUTROPHILS NFR BLD: 74 %
NRBC BLD-RTO: 0 /100 WBC
PLATELET # BLD AUTO: 210 K/UL
PMV BLD AUTO: 10 FL
PTH-INTACT SERPL-MCNC: 235 PG/ML
RBC # BLD AUTO: 6.44 M/UL
WBC # BLD AUTO: 11.19 K/UL

## 2018-03-22 PROCEDURE — 85025 COMPLETE CBC W/AUTO DIFF WBC: CPT

## 2018-03-22 PROCEDURE — 80069 RENAL FUNCTION PANEL: CPT

## 2018-03-22 PROCEDURE — 83970 ASSAY OF PARATHORMONE: CPT

## 2018-03-22 PROCEDURE — 36415 COLL VENOUS BLD VENIPUNCTURE: CPT | Mod: PO

## 2018-03-23 LAB
ALBUMIN SERPL BCP-MCNC: 4.1 G/DL
ANION GAP SERPL CALC-SCNC: 11 MMOL/L
BUN SERPL-MCNC: 32 MG/DL
CALCIUM SERPL-MCNC: 9.9 MG/DL
CHLORIDE SERPL-SCNC: 89 MMOL/L
CO2 SERPL-SCNC: 39 MMOL/L
CREAT SERPL-MCNC: 1.8 MG/DL
EST. GFR  (AFRICAN AMERICAN): 42.5 ML/MIN/1.73 M^2
EST. GFR  (NON AFRICAN AMERICAN): 36.8 ML/MIN/1.73 M^2
GLUCOSE SERPL-MCNC: 95 MG/DL
PHOSPHATE SERPL-MCNC: 3.1 MG/DL
POTASSIUM SERPL-SCNC: 2.9 MMOL/L
SODIUM SERPL-SCNC: 139 MMOL/L

## 2018-03-26 ENCOUNTER — OFFICE VISIT (OUTPATIENT)
Dept: NEPHROLOGY | Facility: CLINIC | Age: 73
End: 2018-03-26
Payer: MEDICARE

## 2018-03-26 VITALS
WEIGHT: 262.81 LBS | DIASTOLIC BLOOD PRESSURE: 78 MMHG | HEIGHT: 67 IN | SYSTOLIC BLOOD PRESSURE: 124 MMHG | BODY MASS INDEX: 41.25 KG/M2 | HEART RATE: 80 BPM

## 2018-03-26 DIAGNOSIS — E87.6 HYPOKALEMIA: ICD-10-CM

## 2018-03-26 DIAGNOSIS — N25.81 SECONDARY HYPERPARATHYROIDISM OF RENAL ORIGIN: ICD-10-CM

## 2018-03-26 DIAGNOSIS — R60.0 BILATERAL EDEMA OF LOWER EXTREMITY: ICD-10-CM

## 2018-03-26 DIAGNOSIS — N18.31 CHRONIC KIDNEY DISEASE (CKD) STAGE G3A/A1, MODERATELY DECREASED GLOMERULAR FILTRATION RATE (GFR) BETWEEN 45-59 ML/MIN/1.73 SQUARE METER AND ALBUMINURIA CREATININE RATIO LESS THAN 30 MG/G: Primary | ICD-10-CM

## 2018-03-26 DIAGNOSIS — I75.023 CHOLESTEROL EMBOLISM OF LOWER EXTREMITY, BILATERAL: ICD-10-CM

## 2018-03-26 DIAGNOSIS — I87.2 VENOUS STASIS DERMATITIS OF BOTH LOWER EXTREMITIES: ICD-10-CM

## 2018-03-26 PROCEDURE — 3078F DIAST BP <80 MM HG: CPT | Mod: CPTII,S$GLB,, | Performed by: INTERNAL MEDICINE

## 2018-03-26 PROCEDURE — 99999 PR PBB SHADOW E&M-EST. PATIENT-LVL IV: CPT | Mod: PBBFAC,,, | Performed by: INTERNAL MEDICINE

## 2018-03-26 PROCEDURE — 99214 OFFICE O/P EST MOD 30 MIN: CPT | Mod: S$GLB,,, | Performed by: INTERNAL MEDICINE

## 2018-03-26 PROCEDURE — 3074F SYST BP LT 130 MM HG: CPT | Mod: CPTII,S$GLB,, | Performed by: INTERNAL MEDICINE

## 2018-03-26 RX ORDER — ISOSORBIDE MONONITRATE 30 MG/1
30 TABLET, EXTENDED RELEASE ORAL DAILY
COMMUNITY
End: 2021-02-18 | Stop reason: SDUPTHER

## 2018-03-26 RX ORDER — SPIRONOLACTONE 25 MG/1
25 TABLET ORAL DAILY
Qty: 90 TABLET | Refills: 3 | Status: SHIPPED | OUTPATIENT
Start: 2018-03-26 | End: 2018-09-13 | Stop reason: SDUPTHER

## 2018-03-26 NOTE — PROGRESS NOTES
Subjective:       Patient ID: Bo Chase is a 73 y.o. White male who presents for follow-up evaluation of Chronic Kidney Disease; Hypertension; and Gout    Hypertension   Pertinent negatives include no chest pain, headaches, neck pain, palpitations or shortness of breath.      Patient is a  white male who was  by profession.  Has history of chronic kidney disease for at least 5 years.  He was last seen by Dr. Prather in 2012.  Renal ultrasound at that time showed bilateral renal cysts.  Left kidney had a cyst of 2.5 cm.  Patient never had any proteinuria.  His creatinine has been fluctuating between 1.6 and 2.0.  His fluctuations have been due to diuretic therapy.  In the last 5 years his kidney function has been stable.  He has had history of coronary artery disease status post shortness of breath and dyspnea and exertion.    12/ 2014Occluded lad lcx and rca.Occluded svg to d1svg to rca patent svg to  om1 90% eccentric treated with dennis with filter wire protection.Patent lima.No complication. Dr. Joel     2014 LEANDRO : now on CPAP    6/2015 LBP s/p KAROLINA and s/p surgery laminectomy     4/2017 Renal [facility] CT scan of the abdomen shows extensive calcifications of the aorta    August 2017 2-D echo reviewed= PA pressure >26 mm Hg, creatinine down to 1.4, uric acid controlled, vitamin D level is 43, PTH 91, estimated GFR with Cystatin C is 50%; weight down by 10 ib from 263 to 254 ib    March 2018 patient's creatinine is up to 1.8.  Weight is up to 262 pounds.  Approximate GFR about 40%.  GFR loss is about 10% per year.  No proteinuria.  PTH has gone up from 91 in 2017-235 in March 2018.  Labwork shows severe metabolic alkalosis, severe hypokalemia, low chloride levels due to complications of diuretics. Severe Polycythemia may need phlebotomy     Review of Systems   Constitutional: Negative.  Negative for activity change, appetite change, chills, diaphoresis, fatigue and fever.         water aerobics ; weight training  "   HENT: Negative.  Negative for congestion and trouble swallowing.    Eyes: Negative.    Respiratory: Negative.  Negative for cough, chest tightness, shortness of breath and wheezing.    Cardiovascular: Positive for leg swelling. Negative for chest pain and palpitations.   Gastrointestinal: Negative.  Negative for abdominal distention, abdominal pain, nausea and vomiting.   Genitourinary: Negative.  Negative for decreased urine volume, difficulty urinating, dysuria, enuresis, flank pain, frequency, hematuria, penile swelling, scrotal swelling and urgency.   Musculoskeletal: Negative.  Negative for arthralgias, back pain, joint swelling and neck pain.   Skin: Negative for rash.   Neurological: Negative.  Negative for tremors, seizures and headaches.   Psychiatric/Behavioral: Negative.  Negative for confusion and sleep disturbance. The patient is not nervous/anxious.        Objective:   /78   Pulse 80   Ht 5' 7" (1.702 m)   Wt 119.2 kg (262 lb 12.6 oz)   BMI 41.16 kg/m²      Physical Exam   Constitutional: He is oriented to person, place, and time. He appears well-developed and well-nourished. No distress.   HENT:   Head: Normocephalic.   Eyes: EOM are normal. Pupils are equal, round, and reactive to light.   Neck: Normal range of motion. Neck supple. No JVD present. No thyromegaly present.   Cardiovascular: Normal rate, regular rhythm, S1 normal, S2 normal, normal heart sounds and intact distal pulses.  PMI is not displaced.  Exam reveals no gallop and no friction rub.    No murmur heard.  Cholesterol emboli ; loud P2    Pulmonary/Chest: Effort normal and breath sounds normal. No respiratory distress. He has no wheezes. He has no rales. He exhibits no tenderness.   Abdominal: He exhibits no distension and no mass. There is no hepatosplenomegaly. There is no tenderness. There is no rebound and no CVA tenderness. No hernia.   Musculoskeletal: Normal range of motion. He exhibits no edema or tenderness.   2+ " edema R>L    Lymphadenopathy:     He has no cervical adenopathy.   Neurological: He is alert and oriented to person, place, and time. He has normal reflexes. He is not disoriented. He displays normal reflexes. No cranial nerve deficit. He exhibits normal muscle tone. Coordination normal.   Skin: Skin is warm and dry. No rash noted. No erythema.   Psychiatric: He has a normal mood and affect. His behavior is normal. Judgment and thought content normal.             Lab Results   Component Value Date    CREATININE 1.8 (H) 03/22/2018    BUN 32 (H) 03/22/2018     03/22/2018    K 2.9 (L) 03/22/2018    CL 89 (L) 03/22/2018    CO2 39 (H) 03/22/2018     Lab Results   Component Value Date    WBC 11.19 03/22/2018    HGB 19.3 (H) 03/22/2018    HCT 56.2 (H) 03/22/2018    MCV 87 03/22/2018     03/22/2018     Lab Results   Component Value Date    .0 (H) 03/22/2018    CALCIUM 9.9 03/22/2018    PHOS 3.1 03/22/2018     Lab Results   Component Value Date    URICACID 7.0 02/15/2018         Assessment:    )    1. Chronic kidney disease (CKD) stage G3a/A1, moderately decreased glomerular filtration rate (GFR) between 45-59 mL/min/1.73 square meter and albuminuria creatinine ratio less than 30 mg/g    2. Idiopathic chronic gout of multiple sites without tophus    3. Secondary hyperparathyroidism of renal origin    4. Bilateral edema of lower extremity    5. Venous stasis dermatitis of both lower extremities    6. Cholesterol embolism of lower extremity, bilateral    7. Hypokalemia    8. Pulmonary hypertension    9. Diastolic dysfunction    10. Arthritis of left hip    11. Enthesopathy of right hip region        Plan:         1.  Chronic kidney disease stage III: GFR is about 40%- 50% fluctuating creatinine is due to diuretics.   No heavy proteinuria.        2.  Chronic gout: Last attack was 2016.  Uric acid is controlled on allopurinol 450 mg     3.  Hyperparathyroidism due to chronic kidney disease stage III: Continue  with vitamin D therapy.      4.  Extensive edema anasarca on the lower extremities with chronic venous stasis findings as well as brawny edema of the skin: Patient had an ultrasound of the legs in 2012 which showed no DVT.  Clinically patient has pulm. hypertension, metolazone 2x week + torsemide 20 mg x2 daily       5.  Cholesterol emboli of the lower extremities: Extensive aortic calcifications noted on the CT scan.    aspirin to 325 mg daily. Continue Lipitor.    6.  Pulm HTN: records of CPAP and dr. Honeycutt reviewed     7.  Hypokalemia: add aldactone 25 mg  KCL to 40 meq daily.     8. Fup dr. Edgar for polycythemia            Follow-up 8 weeks      Washington Bernal MD

## 2018-03-26 NOTE — PATIENT INSTRUCTIONS
1.  Chronic kidney disease stage III: GFR is about 40%- 50% fluctuating creatinine is due to diuretics.   No heavy proteinuria.        2.  Chronic gout: Last attack was 2016.  Uric acid is controlled on allopurinol 450 mg     3.  Hyperparathyroidism due to chronic kidney disease stage III: Continue with vitamin D therapy.      4.  Extensive edema anasarca on the lower extremities with chronic venous stasis findings as well as brawny edema of the skin: Patient had an ultrasound of the legs in 2012 which showed no DVT.  Clinically patient has pulm. hypertension, metolazone 2x week + torsemide 20 mg x2 daily       5.  Cholesterol emboli of the lower extremities: Extensive aortic calcifications noted on the CT scan.    aspirin to 325 mg daily. Continue Lipitor.    6.  Pulm HTN: records of CPAP and dr. Honeycutt reviewed     7.  Hypokalemia: add aldactone 25 mg  KCL to 40 meq daily.     8. Fup dr. Edgar for polycythemia

## 2018-04-12 ENCOUNTER — OFFICE VISIT (OUTPATIENT)
Dept: HEMATOLOGY/ONCOLOGY | Facility: CLINIC | Age: 73
End: 2018-04-12
Payer: MEDICARE

## 2018-04-12 VITALS
RESPIRATION RATE: 18 BRPM | HEART RATE: 73 BPM | DIASTOLIC BLOOD PRESSURE: 77 MMHG | OXYGEN SATURATION: 95 % | SYSTOLIC BLOOD PRESSURE: 135 MMHG | TEMPERATURE: 99 F

## 2018-04-12 DIAGNOSIS — D75.1 ACQUIRED POLYCYTHEMIA: ICD-10-CM

## 2018-04-12 PROCEDURE — 3078F DIAST BP <80 MM HG: CPT | Mod: CPTII,S$GLB,, | Performed by: INTERNAL MEDICINE

## 2018-04-12 PROCEDURE — 99215 OFFICE O/P EST HI 40 MIN: CPT | Mod: S$GLB,,, | Performed by: INTERNAL MEDICINE

## 2018-04-12 PROCEDURE — 99999 PR PBB SHADOW E&M-EST. PATIENT-LVL III: CPT | Mod: PBBFAC,,, | Performed by: INTERNAL MEDICINE

## 2018-04-12 PROCEDURE — 3075F SYST BP GE 130 - 139MM HG: CPT | Mod: CPTII,S$GLB,, | Performed by: INTERNAL MEDICINE

## 2018-04-12 NOTE — PROGRESS NOTES
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 He is referred for polycythemia which has been present and progressive since 2012. He reports a history of CAD but denies history of DVT, CVA, or other clots. He also denies rash or pruritus. Patient diagnosed with LEANDRO over 1 year ago but did not use CPAP due to discomfort of the masks. He reports that he is attempting to obtain a new mask and give CPAP another trial.    I have reviewed and updated the HPI, ROS, PMHx, Social Hx, Family Hx and treatment history.      Today's Visit:   Patient presents today without complaints.  He reports that H&H is significantly increased over the previous year.  He remains compliant with CPAP    HPI    Review of Systems   Constitutional: Positive for fatigue. Negative for activity change, appetite change, chills, diaphoresis, fever and unexpected weight change.   HENT: Negative for congestion, drooling, ear discharge, ear pain, facial swelling, hearing loss, nosebleeds, rhinorrhea, sinus pressure, sneezing, sore throat, trouble swallowing and voice change.    Eyes: Negative.  Negative for pain.   Respiratory: Negative for apnea, cough, choking, chest tightness and shortness of breath.    Cardiovascular: Negative for chest pain, palpitations and leg swelling.   Gastrointestinal: Negative for abdominal distention, abdominal pain, diarrhea, nausea and vomiting.   Endocrine: Negative.    Genitourinary: Negative for difficulty urinating, dysuria, frequency, genital sores and hematuria.   Musculoskeletal: Positive for back pain. Negative for arthralgias, gait problem,  joint swelling, myalgias, neck pain and neck stiffness.   Skin: Negative for color change, pallor, rash and wound.   Allergic/Immunologic: Negative.  Negative for environmental allergies.   Neurological: Negative for dizziness, tremors, syncope, facial asymmetry, speech difficulty, weakness and light-headedness.   Hematological: Negative for adenopathy. Does not bruise/bleed easily.   Psychiatric/Behavioral: Negative for agitation, confusion and hallucinations. The patient is not hyperactive.        Objective:       Vitals:    04/12/18 1534   BP: 135/77   Pulse: 73   Resp: 18   Temp: 99.2 °F (37.3 °C)   TempSrc: Oral   SpO2: 95%      Physical Exam   Constitutional: He is oriented to person, place, and time. Vital signs are normal. He appears well-developed and well-nourished. No distress.   HENT:   Head: Normocephalic and atraumatic.   Nose: Nose normal.   Mouth/Throat: Uvula is midline, oropharynx is clear and moist and mucous membranes are normal. Mucous membranes are not pale, not dry and not cyanotic. Normal dentition. No oropharyngeal exudate.   Eyes: Conjunctivae and EOM are normal. Pupils are equal, round, and reactive to light. Right eye exhibits no discharge. Left eye exhibits no discharge. No scleral icterus.   Neck: Normal range of motion. Neck supple. No tracheal deviation present. No thyromegaly present.   Cardiovascular: Normal rate, regular rhythm and intact distal pulses.    Murmur heard.   Systolic murmur is present with a grade of 2/6   Pulmonary/Chest: Effort normal and breath sounds normal. No respiratory distress. He has no decreased breath sounds. He has no wheezes. He has no rhonchi. He has no rales. He exhibits no tenderness.   Abdominal: Soft. Bowel sounds are normal. He exhibits no distension and no mass. There is no tenderness. There is no rebound and no guarding. No hernia.   Musculoskeletal: Normal range of motion. He exhibits no edema (bess lower ext edema), tenderness or deformity.    Lymphadenopathy:        Head (right side): No submental and no submandibular adenopathy present.        Head (left side): No submental and no submandibular adenopathy present.     He has no axillary adenopathy.        Right: No supraclavicular adenopathy present.        Left: No supraclavicular adenopathy present.   Neurological: He is alert and oriented to person, place, and time. He displays normal reflexes. No cranial nerve deficit. He exhibits normal muscle tone. Coordination normal.   Skin: Skin is warm, dry and intact. No abrasion, no bruising, no ecchymosis and no rash noted. He is not diaphoretic. No erythema. No pallor.   Psychiatric: He has a normal mood and affect. His behavior is normal. Judgment and thought content normal.       Lab Results   Component Value Date    WBC 11.19 03/22/2018    HGB 19.3 (H) 03/22/2018    HCT 56.2 (H) 03/22/2018    MCV 87 03/22/2018     03/22/2018     Lab Results   Component Value Date    CREATININE 1.8 (H) 03/22/2018    BUN 32 (H) 03/22/2018     03/22/2018    K 2.9 (L) 03/22/2018    CL 89 (L) 03/22/2018    CO2 39 (H) 03/22/2018         Assessment:       Assessment:   The patient is a 70yo gentleman with polycythemia since 2012 secondary to LEANDRO. Patient's H/H significantly improved since initiating CPAP therapy, however, hematocrit is increased to 56.2.  We will proceed with phlebotomy 1 unit every 2 weeks until hematocrit is less than 50.  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

## 2018-04-13 DIAGNOSIS — D75.1 ACQUIRED POLYCYTHEMIA: Primary | ICD-10-CM

## 2018-04-16 ENCOUNTER — INFUSION (OUTPATIENT)
Dept: INFUSION THERAPY | Facility: HOSPITAL | Age: 73
End: 2018-04-16
Attending: INTERNAL MEDICINE
Payer: MEDICARE

## 2018-04-16 VITALS
SYSTOLIC BLOOD PRESSURE: 123 MMHG | HEART RATE: 83 BPM | DIASTOLIC BLOOD PRESSURE: 74 MMHG | TEMPERATURE: 98 F | RESPIRATION RATE: 18 BRPM | OXYGEN SATURATION: 95 %

## 2018-04-16 DIAGNOSIS — D75.1 ACQUIRED POLYCYTHEMIA: Primary | ICD-10-CM

## 2018-04-16 PROCEDURE — 99195 PHLEBOTOMY: CPT

## 2018-04-16 NOTE — PATIENT INSTRUCTIONS
[facility]  [address]  [phone] phone     [phone] fax  Hours of Operation: Monday- Friday 8:00am- 5:00pm  After hours phone  [phone]  Hematology / Oncology Physicians on call      Dr. wKabena Edgar                        Please call with any concerns regarding your appointment today.

## 2018-04-16 NOTE — NURSING
1 unit therapeutic phlebotomy performed via right AC then discarded, pressure dressing applied.  Pt accepted juice.  Pt denies any complaints of dizziness, lightheadedness, or faintness.  Pt to return to clinic on 5/30.  Patient ambulated out without difficulty.

## 2018-04-30 ENCOUNTER — INFUSION (OUTPATIENT)
Dept: INFUSION THERAPY | Facility: HOSPITAL | Age: 73
End: 2018-04-30
Attending: INTERNAL MEDICINE
Payer: MEDICARE

## 2018-04-30 VITALS
RESPIRATION RATE: 16 BRPM | OXYGEN SATURATION: 95 % | WEIGHT: 260 LBS | SYSTOLIC BLOOD PRESSURE: 101 MMHG | DIASTOLIC BLOOD PRESSURE: 66 MMHG | HEART RATE: 84 BPM | BODY MASS INDEX: 40.81 KG/M2 | HEIGHT: 67 IN

## 2018-04-30 DIAGNOSIS — D75.1 ACQUIRED POLYCYTHEMIA: Primary | ICD-10-CM

## 2018-04-30 PROCEDURE — 99195 PHLEBOTOMY: CPT

## 2018-04-30 NOTE — PATIENT INSTRUCTIONS
[facility]  [address]  [address]  [phone] phone     [phone] fax  Hours of Operation: Monday- Friday 8:00am- 5:00pm  After hours phone  [phone]  Hematology / Oncology Physicians on call      Dr. Kwabena Edgar                        Please call with any concerns regarding your appointment today.      FALL PREVENTION   Falls often occur due to slipping, tripping or losing your balance. Here are ways to reduce your risk of falling again.   Was there anything that caused your fall that can be fixed, removed or replaced?   Make your home safe by keeping walkways clear of objects you may trip over.   Use non-slip pads under rugs.   Do not walk in poorly lit areas.   Do not stand on chairs or wobbly ladders.   Use caution when reaching overhead or looking upward. This position can cause a loss of balance.   Be sure your shoes fit properly, have non-slip bottoms and are in good condition.   Be cautious when going up and down stairs, curbs, and when walking on uneven sidewalks.   If your balance is poor, consider using a cane or walker.   If your fall was related to alcohol use, stop or limit alcohol intake.   If your fall was related to use of sleeping medicines, talk to your doctor about this. You may need to reduce your dosage at bedtime if you awaken during the night to go to the bathroom.   To reduce the need for nighttime bathroom trips:   Avoid drinking fluids for several hours before going to bed   Empty your bladder before going to bed   Men can keep a urinal at the bedside   © 5948-8129 [employer], [address] [place] [address], [place] [address]. All rights reserved. This information is not intended as a substitute for professional medical care. Always follow your healthcare professional's instructions.

## 2018-05-14 ENCOUNTER — INFUSION (OUTPATIENT)
Dept: INFUSION THERAPY | Facility: HOSPITAL | Age: 73
End: 2018-05-14
Attending: INTERNAL MEDICINE
Payer: MEDICARE

## 2018-05-14 VITALS
DIASTOLIC BLOOD PRESSURE: 71 MMHG | HEART RATE: 74 BPM | OXYGEN SATURATION: 95 % | HEIGHT: 67 IN | SYSTOLIC BLOOD PRESSURE: 117 MMHG | RESPIRATION RATE: 18 BRPM | WEIGHT: 260 LBS | TEMPERATURE: 100 F | BODY MASS INDEX: 40.81 KG/M2

## 2018-05-14 DIAGNOSIS — D75.1 ACQUIRED POLYCYTHEMIA: Primary | ICD-10-CM

## 2018-05-14 PROCEDURE — 99195 PHLEBOTOMY: CPT

## 2018-05-14 NOTE — NURSING
1 unit therapeutic phlebotomy performed via right AC then discarded, pressure dressing applied.  Pt accepted cranberry juice.  Blood pressure 109/66 hr 71 upon completion.  Pt denies any complaints of dizziness, lightheadedness, or faintness.  Pt to return to clinic on 5/24/18.  Patient ambulated out without difficulty.

## 2018-05-24 ENCOUNTER — LAB VISIT (OUTPATIENT)
Dept: LAB | Facility: HOSPITAL | Age: 73
End: 2018-05-24
Attending: INTERNAL MEDICINE
Payer: MEDICARE

## 2018-05-24 ENCOUNTER — OFFICE VISIT (OUTPATIENT)
Dept: HEMATOLOGY/ONCOLOGY | Facility: CLINIC | Age: 73
End: 2018-05-24
Payer: MEDICARE

## 2018-05-24 VITALS
HEIGHT: 67 IN | OXYGEN SATURATION: 97 % | TEMPERATURE: 97 F | WEIGHT: 260 LBS | HEART RATE: 78 BPM | BODY MASS INDEX: 40.81 KG/M2 | SYSTOLIC BLOOD PRESSURE: 123 MMHG | DIASTOLIC BLOOD PRESSURE: 68 MMHG

## 2018-05-24 DIAGNOSIS — D75.1 ACQUIRED POLYCYTHEMIA: ICD-10-CM

## 2018-05-24 DIAGNOSIS — D75.1 ACQUIRED POLYCYTHEMIA: Primary | ICD-10-CM

## 2018-05-24 LAB
ALBUMIN SERPL BCP-MCNC: 3.8 G/DL
ALP SERPL-CCNC: 71 U/L
ALT SERPL W/O P-5'-P-CCNC: 49 U/L
ANION GAP SERPL CALC-SCNC: 10 MMOL/L
AST SERPL-CCNC: 33 U/L
BASOPHILS # BLD AUTO: 0.03 K/UL
BASOPHILS NFR BLD: 0.3 %
BILIRUB SERPL-MCNC: 0.8 MG/DL
BUN SERPL-MCNC: 35 MG/DL
CALCIUM SERPL-MCNC: 9.6 MG/DL
CHLORIDE SERPL-SCNC: 98 MMOL/L
CO2 SERPL-SCNC: 33 MMOL/L
CREAT SERPL-MCNC: 2 MG/DL
DIFFERENTIAL METHOD: ABNORMAL
EOSINOPHIL # BLD AUTO: 0.1 K/UL
EOSINOPHIL NFR BLD: 1.1 %
ERYTHROCYTE [DISTWIDTH] IN BLOOD BY AUTOMATED COUNT: 16.2 %
EST. GFR  (AFRICAN AMERICAN): 37 ML/MIN/1.73 M^2
EST. GFR  (NON AFRICAN AMERICAN): 32 ML/MIN/1.73 M^2
GLUCOSE SERPL-MCNC: 129 MG/DL
HCT VFR BLD AUTO: 49.2 %
HGB BLD-MCNC: 17.2 G/DL
LYMPHOCYTES # BLD AUTO: 2.4 K/UL
LYMPHOCYTES NFR BLD: 21 %
MCH RBC QN AUTO: 32.1 PG
MCHC RBC AUTO-ENTMCNC: 35 G/DL
MCV RBC AUTO: 92 FL
MONOCYTES # BLD AUTO: 0.7 K/UL
MONOCYTES NFR BLD: 6.5 %
NEUTROPHILS # BLD AUTO: 8 K/UL
NEUTROPHILS NFR BLD: 71.5 %
PLATELET # BLD AUTO: 185 K/UL
PMV BLD AUTO: 9.3 FL
POTASSIUM SERPL-SCNC: 3.7 MMOL/L
PROT SERPL-MCNC: 7 G/DL
RBC # BLD AUTO: 5.36 M/UL
SODIUM SERPL-SCNC: 141 MMOL/L
WBC # BLD AUTO: 11.3 K/UL

## 2018-05-24 PROCEDURE — 99999 PR PBB SHADOW E&M-EST. PATIENT-LVL III: CPT | Mod: PBBFAC,,, | Performed by: INTERNAL MEDICINE

## 2018-05-24 PROCEDURE — 36415 COLL VENOUS BLD VENIPUNCTURE: CPT

## 2018-05-24 PROCEDURE — 3074F SYST BP LT 130 MM HG: CPT | Mod: CPTII,S$GLB,, | Performed by: INTERNAL MEDICINE

## 2018-05-24 PROCEDURE — 85025 COMPLETE CBC W/AUTO DIFF WBC: CPT

## 2018-05-24 PROCEDURE — 3078F DIAST BP <80 MM HG: CPT | Mod: CPTII,S$GLB,, | Performed by: INTERNAL MEDICINE

## 2018-05-24 PROCEDURE — 80053 COMPREHEN METABOLIC PANEL: CPT

## 2018-05-24 PROCEDURE — 99214 OFFICE O/P EST MOD 30 MIN: CPT | Mod: S$GLB,,, | Performed by: INTERNAL MEDICINE

## 2018-05-24 RX ORDER — LIDOCAINE HYDROCHLORIDE 10 MG/ML
1 INJECTION, SOLUTION EPIDURAL; INFILTRATION; INTRACAUDAL; PERINEURAL ONCE
Status: CANCELLED | OUTPATIENT
Start: 2018-05-24 | End: 2018-05-24

## 2018-05-24 NOTE — PROGRESS NOTES
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 He is referred for polycythemia which has been present and progressive since 2012. He reports a history of CAD but denies history of DVT, CVA, or other clots. He also denies rash or pruritus. Patient diagnosed with LEANDRO over 1 year ago but did not use CPAP due to discomfort of the masks. He reports that he is attempting to obtain a new mask and give CPAP another trial.    I have reviewed and updated the HPI, ROS, PMHx, Social Hx, Family Hx and treatment history.      Today's Visit:   Patient is without new complaints today and he remains compliant with CPAP    HPI    Review of Systems   Constitutional: Positive for fatigue. Negative for activity change, appetite change, chills, diaphoresis, fever and unexpected weight change.   HENT: Negative for congestion, ear discharge, ear pain, facial swelling, hearing loss, nosebleeds, postnasal drip, rhinorrhea, sinus pain, sinus pressure, sneezing and sore throat.    Eyes: Negative.  Negative for pain.   Respiratory: Negative for apnea, cough, choking, chest tightness, shortness of breath, wheezing and stridor.    Cardiovascular: Negative for chest pain, palpitations and leg swelling.   Gastrointestinal: Negative for abdominal distention, abdominal pain, anal bleeding, constipation, diarrhea, nausea and vomiting.   Endocrine: Negative.    Genitourinary: Negative for difficulty urinating, dysuria, enuresis, flank pain, frequency, genital sores and hematuria.   Musculoskeletal: Positive for back pain. Negative for arthralgias, gait problem, joint swelling,  "myalgias, neck pain and neck stiffness.   Skin: Negative for color change, pallor, rash and wound.   Allergic/Immunologic: Negative.  Negative for environmental allergies.   Neurological: Negative for dizziness, seizures, syncope, facial asymmetry, speech difficulty, light-headedness, numbness and headaches.   Hematological: Negative for adenopathy. Does not bruise/bleed easily.   Psychiatric/Behavioral: Negative for agitation, behavioral problems, confusion, decreased concentration, dysphoric mood and hallucinations. The patient is not hyperactive.        Objective:       Vitals:    05/24/18 1424   BP: 123/68   Pulse: 78   Temp: 97.1 °F (36.2 °C)   TempSrc: Oral   SpO2: 97%   Weight: 117.9 kg (260 lb)   Height: 5' 7" (1.702 m)      Physical Exam   Constitutional: He is oriented to person, place, and time. Vital signs are normal. He appears well-developed and well-nourished. No distress.   HENT:   Head: Normocephalic and atraumatic.   Nose: Nose normal.   Mouth/Throat: Uvula is midline, oropharynx is clear and moist and mucous membranes are normal. Mucous membranes are not pale, not dry and not cyanotic. Normal dentition. No oropharyngeal exudate.   Eyes: Conjunctivae and EOM are normal. Pupils are equal, round, and reactive to light. Right eye exhibits no discharge. Left eye exhibits no discharge. No scleral icterus.   Neck: Normal range of motion. Neck supple. No tracheal deviation present. No thyromegaly present.   Cardiovascular: Normal rate, regular rhythm and intact distal pulses.    Murmur heard.   Systolic murmur is present with a grade of 2/6   Pulmonary/Chest: Effort normal and breath sounds normal. No respiratory distress. He has no decreased breath sounds. He has no wheezes. He has no rhonchi. He has no rales. He exhibits no tenderness.   Abdominal: Soft. Bowel sounds are normal. He exhibits no distension and no mass. There is no tenderness. There is no rebound and no guarding. No hernia. "   Musculoskeletal: Normal range of motion. He exhibits no edema (bess lower ext edema), tenderness or deformity.   Lymphadenopathy:        Head (right side): No submental and no submandibular adenopathy present.        Head (left side): No submental and no submandibular adenopathy present.     He has no axillary adenopathy.        Right: No supraclavicular adenopathy present.        Left: No supraclavicular adenopathy present.   Neurological: He is alert and oriented to person, place, and time. He displays normal reflexes. No cranial nerve deficit. He exhibits normal muscle tone. Coordination normal.   Skin: Skin is warm, dry and intact. No rash noted. Rash is not pustular and not urticarial. No cyanosis. Nails show no clubbing.   Psychiatric: He has a normal mood and affect. His behavior is normal. Judgment and thought content normal.       Lab Results   Component Value Date    WBC 11.30 05/24/2018    HGB 17.2 05/24/2018    HCT 49.2 05/24/2018    MCV 92 05/24/2018     05/24/2018     Lab Results   Component Value Date    CREATININE 2.0 (H) 05/24/2018    BUN 35 (H) 05/24/2018     05/24/2018    K 3.7 05/24/2018    CL 98 05/24/2018    CO2 33 (H) 05/24/2018         Assessment:       Assessment:   The patient is a 68yo gentleman with polycythemia since 2012 secondary to LEANDRO. Patient's H/H significantly improved since initiating CPAP therapy, however, hematocrit is increased to 56.2.    He has received bilateral phlebotomies with hematocrit improving to 49.7.  We will now decrease frequency of phlebotomies to once every 4 weeks and have the patient follow up in 3 months.      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

## 2018-06-14 ENCOUNTER — INFUSION (OUTPATIENT)
Dept: INFUSION THERAPY | Facility: HOSPITAL | Age: 73
End: 2018-06-14
Attending: INTERNAL MEDICINE
Payer: MEDICARE

## 2018-06-14 VITALS
WEIGHT: 259.94 LBS | HEART RATE: 80 BPM | HEIGHT: 67 IN | OXYGEN SATURATION: 96 % | DIASTOLIC BLOOD PRESSURE: 66 MMHG | RESPIRATION RATE: 16 BRPM | BODY MASS INDEX: 40.8 KG/M2 | SYSTOLIC BLOOD PRESSURE: 107 MMHG

## 2018-06-14 DIAGNOSIS — D75.1 ACQUIRED POLYCYTHEMIA: Primary | ICD-10-CM

## 2018-06-14 PROCEDURE — 99195 PHLEBOTOMY: CPT

## 2018-06-14 RX ORDER — LIDOCAINE HYDROCHLORIDE 10 MG/ML
1 INJECTION, SOLUTION EPIDURAL; INFILTRATION; INTRACAUDAL; PERINEURAL ONCE
Status: CANCELLED | OUTPATIENT
Start: 2018-06-14 | End: 2018-06-14

## 2018-06-14 NOTE — PATIENT INSTRUCTIONS
[facility]  [address]  [address] [address]  [phone] phone     [phone] fax  Hours of Operation: Monday- Friday 8:00am- 5:00pm  After hours phone  [phone]  Hematology / Oncology Physicians on call      Dr. Kwabena Edgar                        Please call with any concerns regarding your appointment today.    FALL PREVENTION   Falls often occur due to slipping, tripping or losing your balance. Here are ways to reduce your risk of falling again.   Was there anything that caused your fall that can be fixed, removed or replaced?   Make your home safe by keeping walkways clear of objects you may trip over.   Use non-slip pads under rugs.   Do not walk in poorly lit areas.   Do not stand on chairs or wobbly ladders.   Use caution when reaching overhead or looking upward. This position can cause a loss of balance.   Be sure your shoes fit properly, have non-slip bottoms and are in good condition.   Be cautious when going up and down stairs, curbs, and when walking on uneven sidewalks.   If your balance is poor, consider using a cane or walker.   If your fall was related to alcohol use, stop or limit alcohol intake.   If your fall was related to use of sleeping medicines, talk to your doctor about this. You may need to reduce your dosage at bedtime if you awaken during the night to go to the bathroom.   To reduce the need for nighttime bathroom trips:   Avoid drinking fluids for several hours before going to bed   Empty your bladder before going to bed   Men can keep a urinal at the bedside   © 7620-0340 Letitia Francois, [address] [place] [address], [place] [address]. All rights reserved. This information is not intended as a substitute for professional medical care. Always follow your healthcare professional's instructions.

## 2018-06-14 NOTE — NURSING
1350  6/14/18  1 unit therapeutic phlebotomy performed via right AC then discarded, pressure dressing applied.  Juice offered and taken by pt.  Blood pressure 107/66, P 80 upon completion.  Pt denies any complaints of dizziness or weakness.  Pt to return to clinic on 7/12/18.  Patient ambulated out without difficulty.

## 2018-06-21 ENCOUNTER — OFFICE VISIT (OUTPATIENT)
Dept: NEPHROLOGY | Facility: CLINIC | Age: 73
End: 2018-06-21
Payer: MEDICARE

## 2018-06-21 ENCOUNTER — TELEPHONE (OUTPATIENT)
Dept: NEPHROLOGY | Facility: CLINIC | Age: 73
End: 2018-06-21

## 2018-06-21 VITALS
DIASTOLIC BLOOD PRESSURE: 78 MMHG | HEART RATE: 65 BPM | SYSTOLIC BLOOD PRESSURE: 124 MMHG | WEIGHT: 274.94 LBS | HEIGHT: 67 IN | BODY MASS INDEX: 43.15 KG/M2

## 2018-06-21 DIAGNOSIS — I87.2 VENOUS STASIS DERMATITIS OF BOTH LOWER EXTREMITIES: ICD-10-CM

## 2018-06-21 DIAGNOSIS — I75.023 CHOLESTEROL EMBOLISM OF LOWER EXTREMITY, BILATERAL: ICD-10-CM

## 2018-06-21 DIAGNOSIS — R60.0 BILATERAL EDEMA OF LOWER EXTREMITY: ICD-10-CM

## 2018-06-21 DIAGNOSIS — M1A.09X0 IDIOPATHIC CHRONIC GOUT OF MULTIPLE SITES WITHOUT TOPHUS: ICD-10-CM

## 2018-06-21 DIAGNOSIS — N25.81 SECONDARY HYPERPARATHYROIDISM OF RENAL ORIGIN: ICD-10-CM

## 2018-06-21 DIAGNOSIS — M16.12 ARTHRITIS OF LEFT HIP: ICD-10-CM

## 2018-06-21 DIAGNOSIS — N18.31 CHRONIC KIDNEY DISEASE (CKD) STAGE G3A/A1, MODERATELY DECREASED GLOMERULAR FILTRATION RATE (GFR) BETWEEN 45-59 ML/MIN/1.73 SQUARE METER AND ALBUMINURIA CREATININE RATIO LESS THAN 30 MG/G: Primary | ICD-10-CM

## 2018-06-21 DIAGNOSIS — I51.89 DIASTOLIC DYSFUNCTION: ICD-10-CM

## 2018-06-21 DIAGNOSIS — E87.6 HYPOKALEMIA: ICD-10-CM

## 2018-06-21 DIAGNOSIS — I27.20 PULMONARY HYPERTENSION: ICD-10-CM

## 2018-06-21 PROCEDURE — 3078F DIAST BP <80 MM HG: CPT | Mod: CPTII,S$GLB,, | Performed by: INTERNAL MEDICINE

## 2018-06-21 PROCEDURE — 3074F SYST BP LT 130 MM HG: CPT | Mod: CPTII,S$GLB,, | Performed by: INTERNAL MEDICINE

## 2018-06-21 PROCEDURE — 99999 PR PBB SHADOW E&M-EST. PATIENT-LVL III: CPT | Mod: PBBFAC,,, | Performed by: INTERNAL MEDICINE

## 2018-06-21 PROCEDURE — 99214 OFFICE O/P EST MOD 30 MIN: CPT | Mod: S$GLB,,, | Performed by: INTERNAL MEDICINE

## 2018-06-21 RX ORDER — IBUPROFEN 200 MG
200 TABLET ORAL EVERY 6 HOURS PRN
COMMUNITY
End: 2018-09-13

## 2018-06-21 NOTE — PROGRESS NOTES
Subjective:       Patient ID: Bo Chase is a 73 y.o. White male who presents for follow-up evaluation of Chronic Kidney Disease; Edema; hyperparathyroid of renal origin; Hypertension; and Hypokalemia    Hypertension   Pertinent negatives include no chest pain, headaches, neck pain, palpitations or shortness of breath.   Edema   Pertinent negatives include no abdominal pain, arthralgias, chest pain, chills, congestion, coughing, diaphoresis, fatigue, fever, headaches, joint swelling, nausea, neck pain, rash or vomiting.      Patient is a  white male who was  by profession.  Has history of chronic kidney disease for at least 5 years.  He was last seen by Dr. Prather in 2012.  Renal ultrasound at that time showed bilateral renal cysts.  Left kidney had a cyst of 2.5 cm.  Patient never had any proteinuria.  His creatinine has been fluctuating between 1.6 and 2.0.  His fluctuations have been due to diuretic therapy.  In the last 5 years his kidney function has been stable.  He has had history of coronary artery disease status post shortness of breath and dyspnea and exertion.    12/ 2014Occluded lad lcx and rca.Occluded svg to d1svg to rca patent svg to  om1 90% eccentric treated with dennis with filter wire protection.Patent lima.No complication. Dr. Joel     2014 LEANDRO : now on CPAP    6/2015 LBP s/p KAROLINA and s/p surgery laminectomy     4/2017 Renal [facility] CT scan of the abdomen shows extensive calcifications of the aorta    August 2017 2-D echo reviewed= PA pressure >26 mm Hg, creatinine down to 1.4, uric acid controlled, vitamin D level is 43, PTH 91, estimated GFR with Cystatin C is 50%; weight down by 10 ib from 263 to 254 ib    March 2018 patient's creatinine is up to 1.8.  Weight is up to 262 pounds.  Approximate GFR about 40%.  GFR loss is about 10% per year.  No proteinuria.  PTH has gone up from 91 in 2017-235 in March 2018.  Labwork shows severe metabolic alkalosis, severe hypokalemia, low chloride levels  "due to complications of diuretics. Severe Polycythemia may need phlebotomy     6/2018 K+ is better ; Hgb is better ; weight is higher ; GFR 37 % with Cr 1.7 ; retired and now in gym     Review of Systems   Constitutional: Negative.  Negative for activity change, appetite change, chills, diaphoresis, fatigue and fever.         water aerobics ; weight training    HENT: Negative.  Negative for congestion and trouble swallowing.    Eyes: Negative.    Respiratory: Negative.  Negative for cough, chest tightness, shortness of breath and wheezing.    Cardiovascular: Positive for leg swelling. Negative for chest pain and palpitations.   Gastrointestinal: Negative.  Negative for abdominal distention, abdominal pain, nausea and vomiting.   Genitourinary: Negative.  Negative for decreased urine volume, difficulty urinating, dysuria, enuresis, flank pain, frequency, hematuria, penile swelling, scrotal swelling and urgency.   Musculoskeletal: Negative.  Negative for arthralgias, back pain, joint swelling and neck pain.   Skin: Negative for rash.   Neurological: Negative.  Negative for tremors, seizures and headaches.   Psychiatric/Behavioral: Negative.  Negative for confusion and sleep disturbance. The patient is not nervous/anxious.        Objective:   /78   Pulse 65   Ht 5' 7" (1.702 m)   Wt 124.7 kg (274 lb 14.6 oz)   BMI 43.06 kg/m²      Physical Exam   Constitutional: He is oriented to person, place, and time. He appears well-developed and well-nourished. No distress.   HENT:   Head: Normocephalic.   Eyes: EOM are normal. Pupils are equal, round, and reactive to light.   Neck: Normal range of motion. Neck supple. No JVD present. No thyromegaly present.   Cardiovascular: Normal rate, regular rhythm, S1 normal, S2 normal, normal heart sounds and intact distal pulses.  PMI is not displaced.  Exam reveals no gallop and no friction rub.    No murmur heard.  Cholesterol emboli ; loud P2    Pulmonary/Chest: Effort normal " and breath sounds normal. No respiratory distress. He has no wheezes. He has no rales. He exhibits no tenderness.   Abdominal: He exhibits no distension and no mass. There is no hepatosplenomegaly. There is no tenderness. There is no rebound and no CVA tenderness. No hernia.   Musculoskeletal: Normal range of motion. He exhibits no edema or tenderness.   2+ edema R>L    Lymphadenopathy:     He has no cervical adenopathy.   Neurological: He is alert and oriented to person, place, and time. He has normal reflexes. He is not disoriented. He displays normal reflexes. No cranial nerve deficit. He exhibits normal muscle tone. Coordination normal.   Skin: Skin is warm and dry. No rash noted. No erythema.   Psychiatric: He has a normal mood and affect. His behavior is normal. Judgment and thought content normal.             Lab Results   Component Value Date    CREATININE 1.8 (H) 06/14/2018    CREATININE 1.8 (H) 06/14/2018    BUN 28 (H) 06/14/2018    BUN 28 (H) 06/14/2018     06/14/2018     06/14/2018    K 3.6 06/14/2018    K 3.6 06/14/2018    CL 96 06/14/2018    CL 96 06/14/2018    CO2 29 06/14/2018    CO2 29 06/14/2018     Lab Results   Component Value Date    WBC 11.56 06/14/2018    HGB 18.7 (H) 06/14/2018    HCT 53.2 06/14/2018    MCV 92 06/14/2018     06/14/2018     Lab Results   Component Value Date    .0 (H) 06/14/2018    CALCIUM 10.1 06/14/2018    CALCIUM 10.1 06/14/2018    PHOS 3.0 06/14/2018    PHOS 3.0 06/14/2018     Lab Results   Component Value Date    URICACID 5.1 06/14/2018         Assessment:    )    1. Chronic kidney disease (CKD) stage G3a/A1, moderately decreased glomerular filtration rate (GFR) between 45-59 mL/min/1.73 square meter and albuminuria creatinine ratio less than 30 mg/g    2. Secondary hyperparathyroidism of renal origin    3. Bilateral edema of lower extremity    4. Venous stasis dermatitis of both lower extremities    5. Cholesterol embolism of lower extremity,  bilateral    6. Hypokalemia    7. Idiopathic chronic gout of multiple sites without tophus    8. Pulmonary hypertension    9. Diastolic dysfunction    10. Arthritis of left hip        Plan:         1.  Chronic kidney disease stage III: GFR is about  37 % fluctuating creatinine is due to diuretics.   No heavy proteinuria.        2.  Chronic gout: Last attack was 2016.  Uric acid is controlled on allopurinol 450 mg     3.  Hyperparathyroidism due to chronic kidney disease stage III: Continue with vitamin D therapy.      4.  Extensive edema anasarca on the lower extremities with chronic venous stasis findings as well as brawny edema of the skin: Patient had an ultrasound of the legs in 2012 which showed no DVT.  Clinically patient has pulm. hypertension, metolazone 2x week + torsemide 20 mg x2 daily. D/w patient that a higher creatinine of 2.0-2.5 would be appropriate if it achieves adequate diuresis.---Cardio-renal syndrome     Right now creatinine is 1.8 ( lower than before with more edema and fluid over load) c/w dilutional effect     I would recommend more diuresis and accept a higher creatinine upto 2.5       5.  Cholesterol emboli of the lower extremities: Extensive aortic calcifications noted on the CT scan.    aspirin to 325 mg daily. Continue Lipitor.    6.  Pulm HTN: records of CPAP and dr. Honeycutt reviewed     7.  Hypokalemia:keep  aldactone 25 mg  KCL to 40 meq daily.     8. Fup dr. Edgar for polycythemia monthly phlebotomy     9. HTN: may have to stop hydralazine with exercise BP is < 120            Follow-up 12 weeks      Washington Bernal MD

## 2018-07-07 DIAGNOSIS — M1A.09X0 IDIOPATHIC CHRONIC GOUT OF MULTIPLE SITES WITHOUT TOPHUS: ICD-10-CM

## 2018-07-07 DIAGNOSIS — E87.6 HYPOKALEMIA DUE TO INADEQUATE POTASSIUM INTAKE: ICD-10-CM

## 2018-07-07 RX ORDER — METOLAZONE 5 MG/1
TABLET ORAL
Qty: 90 TABLET | Refills: 11 | Status: SHIPPED | OUTPATIENT
Start: 2018-07-07 | End: 2018-09-13 | Stop reason: SDUPTHER

## 2018-07-09 RX ORDER — POTASSIUM CHLORIDE 750 MG/1
TABLET, EXTENDED RELEASE ORAL
Qty: 360 TABLET | Refills: 1 | Status: SHIPPED | OUTPATIENT
Start: 2018-07-09 | End: 2018-09-13 | Stop reason: SDUPTHER

## 2018-07-09 RX ORDER — ALLOPURINOL 300 MG/1
TABLET ORAL
Qty: 135 TABLET | Refills: 0 | Status: SHIPPED | OUTPATIENT
Start: 2018-07-09 | End: 2018-09-11 | Stop reason: SDUPTHER

## 2018-07-12 ENCOUNTER — INFUSION (OUTPATIENT)
Dept: INFUSION THERAPY | Facility: HOSPITAL | Age: 73
End: 2018-07-12
Attending: INTERNAL MEDICINE
Payer: MEDICARE

## 2018-07-12 VITALS — DIASTOLIC BLOOD PRESSURE: 77 MMHG | SYSTOLIC BLOOD PRESSURE: 118 MMHG | HEART RATE: 105 BPM

## 2018-07-12 DIAGNOSIS — D75.1 ACQUIRED POLYCYTHEMIA: Primary | ICD-10-CM

## 2018-07-12 PROCEDURE — 99195 PHLEBOTOMY: CPT

## 2018-07-12 RX ORDER — LIDOCAINE HYDROCHLORIDE 10 MG/ML
1 INJECTION, SOLUTION EPIDURAL; INFILTRATION; INTRACAUDAL; PERINEURAL ONCE
Status: CANCELLED | OUTPATIENT
Start: 2018-07-12 | End: 2018-07-12

## 2018-07-12 NOTE — NURSING
1 unit therapeutic phlebotomy performed via left AC then discarded, pressure dressing applied. Cranberry juice provided and consumed.  Blood pressure 118/77, pulse 105 at 10 min post completion.  Pt denies any complaints of dizziness, lightheadedness, or faintness.  Pt to return to clinic on 8/9/18.  Patient ambulated out without difficulty and in no apparent distress.

## 2018-08-09 ENCOUNTER — TELEPHONE (OUTPATIENT)
Dept: INFUSION THERAPY | Facility: HOSPITAL | Age: 73
End: 2018-08-09

## 2018-08-10 ENCOUNTER — INFUSION (OUTPATIENT)
Dept: INFUSION THERAPY | Facility: HOSPITAL | Age: 73
End: 2018-08-10
Attending: INTERNAL MEDICINE
Payer: MEDICARE

## 2018-08-10 VITALS
RESPIRATION RATE: 18 BRPM | DIASTOLIC BLOOD PRESSURE: 67 MMHG | OXYGEN SATURATION: 95 % | TEMPERATURE: 98 F | HEART RATE: 83 BPM | SYSTOLIC BLOOD PRESSURE: 111 MMHG

## 2018-08-10 DIAGNOSIS — D75.1 ACQUIRED POLYCYTHEMIA: Primary | ICD-10-CM

## 2018-08-10 PROCEDURE — 99195 PHLEBOTOMY: CPT

## 2018-08-10 RX ORDER — LIDOCAINE HYDROCHLORIDE 10 MG/ML
1 INJECTION, SOLUTION EPIDURAL; INFILTRATION; INTRACAUDAL; PERINEURAL ONCE
Status: CANCELLED | OUTPATIENT
Start: 2018-08-10 | End: 2018-08-10

## 2018-08-10 NOTE — NURSING
1 unit therapeutic phlebotomy performed via_right__AC then discarded, pressure dressing applied.  Pt refused snack.  Accepted juice  Blood pressure _see flow sheet_____ upon completion.  Pt denies any complaints of dizziness, lightheadedness, or faintness.  Pt to return to clinic on _____.  Patient ambulated out without difficulty.

## 2018-08-22 ENCOUNTER — TELEPHONE (OUTPATIENT)
Dept: HEMATOLOGY/ONCOLOGY | Facility: CLINIC | Age: 73
End: 2018-08-22

## 2018-08-22 NOTE — TELEPHONE ENCOUNTER
----- Message from Yamini Sam sent at 8/22/2018 11:27 AM CDT -----  Contact: pt  The pt wants to cancel his 8/23 infusion appt, can be reached at [phone]///[id number]

## 2018-08-23 ENCOUNTER — LAB VISIT (OUTPATIENT)
Dept: LAB | Facility: HOSPITAL | Age: 73
End: 2018-08-23
Attending: INTERNAL MEDICINE
Payer: MEDICARE

## 2018-08-23 ENCOUNTER — OFFICE VISIT (OUTPATIENT)
Dept: HEMATOLOGY/ONCOLOGY | Facility: CLINIC | Age: 73
End: 2018-08-23
Payer: MEDICARE

## 2018-08-23 VITALS
SYSTOLIC BLOOD PRESSURE: 104 MMHG | BODY MASS INDEX: 42.28 KG/M2 | OXYGEN SATURATION: 95 % | WEIGHT: 269.38 LBS | RESPIRATION RATE: 18 BRPM | HEIGHT: 67 IN | DIASTOLIC BLOOD PRESSURE: 61 MMHG | HEART RATE: 80 BPM | TEMPERATURE: 99 F

## 2018-08-23 DIAGNOSIS — D75.1 ACQUIRED POLYCYTHEMIA: Primary | ICD-10-CM

## 2018-08-23 DIAGNOSIS — D75.1 ACQUIRED POLYCYTHEMIA: ICD-10-CM

## 2018-08-23 LAB
BASOPHILS # BLD AUTO: 0.03 K/UL
BASOPHILS NFR BLD: 0.3 %
DIFFERENTIAL METHOD: ABNORMAL
EOSINOPHIL # BLD AUTO: 0.1 K/UL
EOSINOPHIL NFR BLD: 0.6 %
ERYTHROCYTE [DISTWIDTH] IN BLOOD BY AUTOMATED COUNT: 15.5 %
HCT VFR BLD AUTO: 50.4 %
HGB BLD-MCNC: 17.6 G/DL
LYMPHOCYTES # BLD AUTO: 1.6 K/UL
LYMPHOCYTES NFR BLD: 14.5 %
MCH RBC QN AUTO: 31.4 PG
MCHC RBC AUTO-ENTMCNC: 34.9 G/DL
MCV RBC AUTO: 90 FL
MONOCYTES # BLD AUTO: 0.8 K/UL
MONOCYTES NFR BLD: 7.6 %
NEUTROPHILS # BLD AUTO: 8.4 K/UL
NEUTROPHILS NFR BLD: 77 %
PLATELET # BLD AUTO: 191 K/UL
PMV BLD AUTO: 9.2 FL
RBC # BLD AUTO: 5.6 M/UL
WBC # BLD AUTO: 10.9 K/UL

## 2018-08-23 PROCEDURE — 99214 OFFICE O/P EST MOD 30 MIN: CPT | Mod: S$GLB,,, | Performed by: INTERNAL MEDICINE

## 2018-08-23 PROCEDURE — 3078F DIAST BP <80 MM HG: CPT | Mod: CPTII,S$GLB,, | Performed by: INTERNAL MEDICINE

## 2018-08-23 PROCEDURE — 99999 PR PBB SHADOW E&M-EST. PATIENT-LVL III: CPT | Mod: PBBFAC,,, | Performed by: INTERNAL MEDICINE

## 2018-08-23 PROCEDURE — 36415 COLL VENOUS BLD VENIPUNCTURE: CPT

## 2018-08-23 PROCEDURE — 85025 COMPLETE CBC W/AUTO DIFF WBC: CPT

## 2018-08-23 PROCEDURE — 3074F SYST BP LT 130 MM HG: CPT | Mod: CPTII,S$GLB,, | Performed by: INTERNAL MEDICINE

## 2018-08-27 NOTE — PROGRESS NOTES
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 He is referred for polycythemia which has been present and progressive since 2012. He reports a history of CAD but denies history of DVT, CVA, or other clots. He also denies rash or pruritus. Patient diagnosed with LEANDRO over 1 year ago but did not use CPAP due to discomfort of the masks. He reports that he is attempting to obtain a new mask and give CPAP another trial.    I have reviewed and updated the HPI, ROS, PMHx, Social Hx, Family Hx and treatment history.      Today's Visit:   Patient is without new complaints today  He presents today to discuss additional phlebotomy.  He remains compliant with CPAP    HPI    Review of Systems   Constitutional: Negative for activity change, appetite change, chills, diaphoresis, fatigue, fever and unexpected weight change.   HENT: Negative for congestion, dental problem, drooling, ear discharge, ear pain, facial swelling, hearing loss, mouth sores, nosebleeds, postnasal drip, rhinorrhea and sinus pressure.    Eyes: Negative.    Respiratory: Negative for cough, choking, shortness of breath, wheezing and stridor.    Cardiovascular: Negative for chest pain, palpitations and leg swelling.   Gastrointestinal: Negative for abdominal distention, abdominal pain, anal bleeding, constipation, diarrhea and nausea.   Endocrine: Negative.    Genitourinary: Negative for difficulty urinating, dysuria, enuresis, flank pain, frequency, genital sores and hematuria.   Musculoskeletal: Negative for arthralgias, back pain, gait problem, joint swelling and myalgias.   Skin:  "Negative for color change, rash and wound.   Allergic/Immunologic: Negative.    Neurological: Negative for dizziness, tremors, seizures, syncope, facial asymmetry, speech difficulty, light-headedness, numbness and headaches.   Hematological: Negative for adenopathy. Does not bruise/bleed easily.   Psychiatric/Behavioral: Negative for agitation, behavioral problems, confusion, decreased concentration, dysphoric mood and hallucinations. The patient is not nervous/anxious and is not hyperactive.        Objective:       Vitals:    08/23/18 1442   BP: 104/61   Pulse: 80   Resp: 18   Temp: 98.7 °F (37.1 °C)   TempSrc: Oral   SpO2: 95%   Weight: 122.2 kg (269 lb 6.4 oz)   Height: 5' 7" (1.702 m)      Physical Exam   Constitutional: He is oriented to person, place, and time. Vital signs are normal. He appears well-developed and well-nourished. No distress.   HENT:   Head: Normocephalic and atraumatic.   Nose: Nose normal.   Mouth/Throat: Uvula is midline, oropharynx is clear and moist and mucous membranes are normal. Mucous membranes are not pale, not dry and not cyanotic. Normal dentition. No oropharyngeal exudate.   Eyes: Conjunctivae and EOM are normal. Pupils are equal, round, and reactive to light. No scleral icterus.   Neck: Normal range of motion. Neck supple. No tracheal deviation present. No thyromegaly present.   Cardiovascular: Normal rate, regular rhythm and intact distal pulses. Exam reveals no gallop and no friction rub.   Murmur heard.   Systolic murmur is present with a grade of 2/6.  Pulmonary/Chest: Effort normal and breath sounds normal. No respiratory distress. He has no decreased breath sounds. He has no wheezes. He has no rhonchi. He has no rales. He exhibits no tenderness.   Abdominal: Soft. Bowel sounds are normal. He exhibits no distension and no mass. There is no tenderness. There is no guarding.   Musculoskeletal: Normal range of motion. He exhibits no edema (bess lower ext edema) or deformity. "   Lymphadenopathy:        Head (right side): No submental and no submandibular adenopathy present.        Head (left side): No submental and no submandibular adenopathy present.     He has no axillary adenopathy.        Right: No supraclavicular adenopathy present.        Left: No supraclavicular adenopathy present.   Neurological: He is alert and oriented to person, place, and time. He displays normal reflexes. No cranial nerve deficit or sensory deficit. He exhibits normal muscle tone. Coordination normal.   Skin: Skin is warm, dry and intact. Rash is not pustular and not urticarial. No cyanosis or erythema. No pallor. Nails show no clubbing.   Psychiatric: He has a normal mood and affect. His behavior is normal. Judgment and thought content normal.       Lab Results   Component Value Date    WBC 10.90 08/23/2018    HGB 17.6 08/23/2018    HCT 50.4 08/23/2018    MCV 90 08/23/2018     08/23/2018     Lab Results   Component Value Date    CREATININE 1.8 (H) 06/14/2018    CREATININE 1.8 (H) 06/14/2018    BUN 28 (H) 06/14/2018    BUN 28 (H) 06/14/2018     06/14/2018     06/14/2018    K 3.6 06/14/2018    K 3.6 06/14/2018    CL 96 06/14/2018    CL 96 06/14/2018    CO2 29 06/14/2018    CO2 29 06/14/2018         Assessment:       Assessment:   The patient is a 70yo gentleman with polycythemia since 2012 secondary to LEANDRO. Patient's H/H significantly improved since initiating CPAP therapy, however, hematocrit is increased to 56.2.    He has received biweekly phlebotomies with hematocrit improving to 49.7.  We have decreased the frequency of phlebotomies to every 4 weeks and patient presents today with hematocrit of 50.4.  We will arrange phlebotomy asap and continue phlebotomies at a frequency of every 4 weeks.  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

## 2018-08-28 ENCOUNTER — INFUSION (OUTPATIENT)
Dept: INFUSION THERAPY | Facility: HOSPITAL | Age: 73
End: 2018-08-28
Attending: INTERNAL MEDICINE
Payer: MEDICARE

## 2018-08-28 VITALS
SYSTOLIC BLOOD PRESSURE: 116 MMHG | DIASTOLIC BLOOD PRESSURE: 73 MMHG | RESPIRATION RATE: 18 BRPM | TEMPERATURE: 100 F | OXYGEN SATURATION: 95 % | HEART RATE: 78 BPM

## 2018-08-28 DIAGNOSIS — D75.1 ACQUIRED POLYCYTHEMIA: Primary | ICD-10-CM

## 2018-08-28 PROCEDURE — 99195 PHLEBOTOMY: CPT

## 2018-08-28 RX ORDER — LIDOCAINE HYDROCHLORIDE 10 MG/ML
1 INJECTION, SOLUTION EPIDURAL; INFILTRATION; INTRACAUDAL; PERINEURAL ONCE
Status: CANCELLED | OUTPATIENT
Start: 2018-08-28 | End: 2018-08-28

## 2018-08-28 NOTE — NURSING
1 unit therapeutic phlebotomy performed via_right__AC then discarded, pressure dressing applied.  Pt accepted juice .  Blood pressure see flow sheet______ upon completion.  Pt denies any complaints of dizziness, lightheadedness, or faintness.  Pt to return to clinic on _____.  Patient ambulated out without difficulty.

## 2018-08-28 NOTE — PATIENT INSTRUCTIONS
[facility]  [address]  [phone] phone     [phone] fax  Hours of Operation: Monday- Friday 8:00am- 5:00pm  After hours phone  [phone]  Hematology / Oncology Physicians on call      Dr. Kwabena Edgar                        Please call with any concerns regarding your appointment today.

## 2018-09-04 RX ORDER — TORSEMIDE 20 MG/1
TABLET ORAL
Qty: 180 TABLET | Refills: 3 | Status: SHIPPED | OUTPATIENT
Start: 2018-09-04 | End: 2018-09-13 | Stop reason: SDUPTHER

## 2018-09-11 ENCOUNTER — LAB VISIT (OUTPATIENT)
Dept: LAB | Facility: HOSPITAL | Age: 73
End: 2018-09-11
Attending: INTERNAL MEDICINE
Payer: MEDICARE

## 2018-09-11 ENCOUNTER — TELEPHONE (OUTPATIENT)
Dept: RHEUMATOLOGY | Facility: CLINIC | Age: 73
End: 2018-09-11

## 2018-09-11 ENCOUNTER — OFFICE VISIT (OUTPATIENT)
Dept: RHEUMATOLOGY | Facility: CLINIC | Age: 73
End: 2018-09-11
Payer: MEDICARE

## 2018-09-11 VITALS
HEART RATE: 89 BPM | HEIGHT: 67 IN | BODY MASS INDEX: 42.32 KG/M2 | SYSTOLIC BLOOD PRESSURE: 137 MMHG | WEIGHT: 269.63 LBS | DIASTOLIC BLOOD PRESSURE: 75 MMHG

## 2018-09-11 DIAGNOSIS — R60.0 BILATERAL EDEMA OF LOWER EXTREMITY: ICD-10-CM

## 2018-09-11 DIAGNOSIS — E87.6 HYPOKALEMIA: ICD-10-CM

## 2018-09-11 DIAGNOSIS — N25.81 SECONDARY HYPERPARATHYROIDISM OF RENAL ORIGIN: ICD-10-CM

## 2018-09-11 DIAGNOSIS — I51.89 DIASTOLIC DYSFUNCTION: ICD-10-CM

## 2018-09-11 DIAGNOSIS — N18.31 CHRONIC KIDNEY DISEASE (CKD) STAGE G3A/A1, MODERATELY DECREASED GLOMERULAR FILTRATION RATE (GFR) BETWEEN 45-59 ML/MIN/1.73 SQUARE METER AND ALBUMINURIA CREATININE RATIO LESS THAN 30 MG/G: ICD-10-CM

## 2018-09-11 DIAGNOSIS — M16.12 PRIMARY OSTEOARTHRITIS OF LEFT HIP: ICD-10-CM

## 2018-09-11 DIAGNOSIS — M16.12 ARTHRITIS OF LEFT HIP: ICD-10-CM

## 2018-09-11 DIAGNOSIS — N18.30 CHRONIC KIDNEY DISEASE, STAGE III (MODERATE): ICD-10-CM

## 2018-09-11 DIAGNOSIS — M1A.09X0 IDIOPATHIC CHRONIC GOUT OF MULTIPLE SITES WITHOUT TOPHUS: ICD-10-CM

## 2018-09-11 DIAGNOSIS — M15.9 PRIMARY OSTEOARTHRITIS INVOLVING MULTIPLE JOINTS: ICD-10-CM

## 2018-09-11 DIAGNOSIS — I75.023 CHOLESTEROL EMBOLISM OF LOWER EXTREMITY, BILATERAL: ICD-10-CM

## 2018-09-11 DIAGNOSIS — M81.0 SENILE OSTEOPOROSIS: Primary | ICD-10-CM

## 2018-09-11 DIAGNOSIS — I27.20 PULMONARY HYPERTENSION: ICD-10-CM

## 2018-09-11 DIAGNOSIS — M16.11 PRIMARY OSTEOARTHRITIS OF RIGHT HIP: ICD-10-CM

## 2018-09-11 DIAGNOSIS — I87.2 VENOUS STASIS DERMATITIS OF BOTH LOWER EXTREMITIES: ICD-10-CM

## 2018-09-11 LAB
25(OH)D3+25(OH)D2 SERPL-MCNC: 57 NG/ML
ALBUMIN SERPL BCP-MCNC: 3.9 G/DL
ALBUMIN SERPL BCP-MCNC: 4.1 G/DL
ALP SERPL-CCNC: 83 U/L
ALT SERPL W/O P-5'-P-CCNC: 74 U/L
ANION GAP SERPL CALC-SCNC: 14 MMOL/L
ANION GAP SERPL CALC-SCNC: 15 MMOL/L
AST SERPL-CCNC: 45 U/L
BASOPHILS # BLD AUTO: 0.02 K/UL
BASOPHILS NFR BLD: 0.2 %
BILIRUB SERPL-MCNC: 0.7 MG/DL
BUN SERPL-MCNC: 41 MG/DL
BUN SERPL-MCNC: 41 MG/DL
CALCIUM SERPL-MCNC: 10.8 MG/DL
CALCIUM SERPL-MCNC: 11 MG/DL
CHLORIDE SERPL-SCNC: 91 MMOL/L
CHLORIDE SERPL-SCNC: 92 MMOL/L
CO2 SERPL-SCNC: 33 MMOL/L
CO2 SERPL-SCNC: 35 MMOL/L
COMPLEXED PSA SERPL-MCNC: 0.89 NG/ML
CREAT SERPL-MCNC: 2.3 MG/DL
CREAT SERPL-MCNC: 2.4 MG/DL
DIFFERENTIAL METHOD: ABNORMAL
EOSINOPHIL # BLD AUTO: 0.1 K/UL
EOSINOPHIL NFR BLD: 0.6 %
ERYTHROCYTE [DISTWIDTH] IN BLOOD BY AUTOMATED COUNT: 15.5 %
EST. GFR  (AFRICAN AMERICAN): 30 ML/MIN/1.73 M^2
EST. GFR  (AFRICAN AMERICAN): 32 ML/MIN/1.73 M^2
EST. GFR  (NON AFRICAN AMERICAN): 26 ML/MIN/1.73 M^2
EST. GFR  (NON AFRICAN AMERICAN): 27 ML/MIN/1.73 M^2
GIANT PLATELETS BLD QL SMEAR: PRESENT
GLUCOSE SERPL-MCNC: 188 MG/DL
GLUCOSE SERPL-MCNC: 189 MG/DL
HCT VFR BLD AUTO: 53.1 %
HGB BLD-MCNC: 17.9 G/DL
LYMPHOCYTES # BLD AUTO: 1.5 K/UL
LYMPHOCYTES NFR BLD: 13.5 %
MCH RBC QN AUTO: 30.5 PG
MCHC RBC AUTO-ENTMCNC: 33.7 G/DL
MCV RBC AUTO: 91 FL
MONOCYTES # BLD AUTO: 0.7 K/UL
MONOCYTES NFR BLD: 6.3 %
NEUTROPHILS # BLD AUTO: 8.8 K/UL
NEUTROPHILS NFR BLD: 79.6 %
PHOSPHATE SERPL-MCNC: 3.4 MG/DL
PLATELET # BLD AUTO: 198 K/UL
PLATELET BLD QL SMEAR: ABNORMAL
PMV BLD AUTO: 9.8 FL
POTASSIUM SERPL-SCNC: 3.4 MMOL/L
POTASSIUM SERPL-SCNC: 3.5 MMOL/L
PROT SERPL-MCNC: 7.7 G/DL
PTH-INTACT SERPL-MCNC: 67 PG/ML
RBC # BLD AUTO: 5.87 M/UL
SMUDGE CELLS BLD QL SMEAR: PRESENT
SODIUM SERPL-SCNC: 140 MMOL/L
SODIUM SERPL-SCNC: 140 MMOL/L
URATE SERPL-MCNC: 7 MG/DL
WBC # BLD AUTO: 11.06 K/UL

## 2018-09-11 PROCEDURE — 82306 VITAMIN D 25 HYDROXY: CPT

## 2018-09-11 PROCEDURE — 99214 OFFICE O/P EST MOD 30 MIN: CPT | Mod: S$PBB,,, | Performed by: PHYSICIAN ASSISTANT

## 2018-09-11 PROCEDURE — 36415 COLL VENOUS BLD VENIPUNCTURE: CPT | Mod: PO

## 2018-09-11 PROCEDURE — 84550 ASSAY OF BLOOD/URIC ACID: CPT | Mod: PO

## 2018-09-11 PROCEDURE — 99214 OFFICE O/P EST MOD 30 MIN: CPT | Mod: PBBFAC,PO | Performed by: PHYSICIAN ASSISTANT

## 2018-09-11 PROCEDURE — 80053 COMPREHEN METABOLIC PANEL: CPT | Mod: PO

## 2018-09-11 PROCEDURE — 85025 COMPLETE CBC W/AUTO DIFF WBC: CPT | Mod: PO

## 2018-09-11 PROCEDURE — 83970 ASSAY OF PARATHORMONE: CPT

## 2018-09-11 PROCEDURE — 80069 RENAL FUNCTION PANEL: CPT | Mod: PO

## 2018-09-11 PROCEDURE — 1101F PT FALLS ASSESS-DOCD LE1/YR: CPT | Mod: CPTII,,, | Performed by: PHYSICIAN ASSISTANT

## 2018-09-11 PROCEDURE — 82610 CYSTATIN C: CPT

## 2018-09-11 PROCEDURE — 3078F DIAST BP <80 MM HG: CPT | Mod: CPTII,,, | Performed by: PHYSICIAN ASSISTANT

## 2018-09-11 PROCEDURE — 99999 PR PBB SHADOW E&M-EST. PATIENT-LVL IV: CPT | Mod: PBBFAC,,, | Performed by: PHYSICIAN ASSISTANT

## 2018-09-11 PROCEDURE — 84153 ASSAY OF PSA TOTAL: CPT

## 2018-09-11 PROCEDURE — 3075F SYST BP GE 130 - 139MM HG: CPT | Mod: CPTII,,, | Performed by: PHYSICIAN ASSISTANT

## 2018-09-11 RX ORDER — ALLOPURINOL 300 MG/1
450 TABLET ORAL DAILY
Qty: 135 TABLET | Refills: 3 | Status: SHIPPED | OUTPATIENT
Start: 2018-09-11 | End: 2018-09-13 | Stop reason: SDUPTHER

## 2018-09-11 NOTE — TELEPHONE ENCOUNTER
----- Message from Jaida Petit sent at 9/11/2018 10:43 AM CDT -----  Contact: Patient  Patient called and stated he thought his appointment was at [facility]; he is now heading to [facility] and stated he should make it here by 11 am. He can be contacted at [phone].    Thanks,  Jaida

## 2018-09-11 NOTE — PROGRESS NOTES
Subjective:       Patient ID: Bo Chase is a 73 y.o. male.    Chief Complaint: Gout      Bo is for rheum follow up. OA, gout, ckd and Osteoporosis    We follow him regularly for Gout multiarticular involvmeent affecting  bilateral feet and  left elbow in the past. He is  currently on allopurinol 450 mg daily and colchicine only prn. Last uric acid level 4.0 at goal. No attacks.     Prior compression fracture DEXA showed osteopenia treated treatment fosamax Q 14 days (started 9/2015)  dexa 5/2017 stable with mod fx risk spine increased 4%. Due to decline in renal function, fosamax stopped. Will repeat dexa in 2-3 years then consider prolia if falling bmd or fx occur.        Biggest issue is Left hip pain. Has been persistent for almost 2 yrs now, the right hip is bothersome the past year as well. Worse with walking and weightbearing. Saw ortho. Needs to loose some weight before surgery. Has not been successful with weight loss. Had Left IA hip injection releif X 1 day. X-ray showed djd mod to severe in both hips. Today pain 3/10. Left anterior groin. No lateral or posterior hip pain. No radicular qualities. No numbness or tingling.             Osteoarthritis   Associated symptoms include arthralgias. Pertinent negatives include no abdominal pain, chest pain, chills, congestion, coughing, fatigue, fever, joint swelling, nausea, numbness, rash, vomiting or weakness.   Hip Pain   Associated symptoms include arthralgias. Pertinent negatives include no abdominal pain, chest pain, chills, congestion, coughing, fatigue, fever, joint swelling, nausea, numbness, rash, vomiting or weakness.     Review of Systems   Constitutional: Negative.  Negative for chills, fatigue and fever.   HENT: Negative.  Negative for congestion, mouth sores and trouble swallowing.    Eyes: Negative.  Negative for photophobia, redness and visual disturbance.   Respiratory: Negative.  Negative for cough, shortness of breath and wheezing.   "  Cardiovascular: Negative.  Negative for chest pain and leg swelling.   Gastrointestinal: Negative.  Negative for abdominal distention, abdominal pain, diarrhea, nausea and vomiting.   Genitourinary: Negative.  Negative for dysuria, flank pain, frequency and urgency.   Musculoskeletal: Positive for arthralgias and gait problem. Negative for back pain and joint swelling.   Skin: Negative.  Negative for rash.   Neurological: Negative for dizziness, weakness and numbness.         Objective:     /75   Pulse 89   Ht 5' 7" (1.702 m)   Wt 122.3 kg (269 lb 10 oz)   BMI 42.23 kg/m²      Physical Exam   Constitutional: He is oriented to person, place, and time and well-developed, well-nourished, and in no distress. No distress.   HENT:   Head: Normocephalic and atraumatic.   Right Ear: External ear normal.   Left Ear: External ear normal.   Mouth/Throat: No oropharyngeal exudate.   Eyes: Conjunctivae and EOM are normal. Pupils are equal, round, and reactive to light. No scleral icterus.   Neck: Neck supple. No thyromegaly present.   Cardiovascular: Normal rate, regular rhythm and normal heart sounds.    No murmur heard.  Pulmonary/Chest: Effort normal and breath sounds normal. No respiratory distress.   Abdominal: Soft. Bowel sounds are normal.   Lymphadenopathy:     He has no cervical adenopathy.   Neurological: He is alert and oriented to person, place, and time. No cranial nerve deficit. He exhibits normal muscle tone. Gait normal. Coordination normal.   Skin: Skin is warm and dry.     Musculoskeletal: Normal range of motion. He exhibits tenderness. He exhibits no edema.   Both hips have normal range of motion  Pain left hip with internal rotation and full flexion  Minimal pain right with IR and flexion  No TTP troch bursa bilaterally   Neg SLR bilaterally                    Recent Results (from the past 1008 hour(s))   CBC auto differential    Collection Time: 08/23/18  2:55 PM   Result Value Ref Range    WBC " 10.90 3.90 - 12.70 K/uL    RBC 5.60 4.60 - 6.20 M/uL    Hemoglobin 17.6 14.0 - 18.0 g/dL    Hematocrit 50.4 40.0 - 54.0 %    MCV 90 82 - 98 fL    MCH 31.4 (H) 27.0 - 31.0 pg    MCHC 34.9 32.0 - 36.0 g/dL    RDW 15.5 (H) 11.5 - 14.5 %    Platelets 191 150 - 350 K/uL    MPV 9.2 9.2 - 12.9 fL    Gran # (ANC) 8.4 (H) 1.8 - 7.7 K/uL    Lymph # 1.6 1.0 - 4.8 K/uL    Mono # 0.8 0.3 - 1.0 K/uL    Eos # 0.1 0.0 - 0.5 K/uL    Baso # 0.03 0.00 - 0.20 K/uL    Gran% 77.0 (H) 38.0 - 73.0 %    Lymph% 14.5 (L) 18.0 - 48.0 %    Mono% 7.6 4.0 - 15.0 %    Eosinophil% 0.6 0.0 - 8.0 %    Basophil% 0.3 0.0 - 1.9 %    Differential Method Automated    Comprehensive metabolic panel    Collection Time: 09/11/18 11:31 AM   Result Value Ref Range    Sodium 140 136 - 145 mmol/L    Potassium 3.5 3.5 - 5.1 mmol/L    Chloride 91 (L) 95 - 110 mmol/L    CO2 35 (H) 23 - 29 mmol/L    Glucose 188 (H) 70 - 110 mg/dL    BUN, Bld 41 (H) 8 - 23 mg/dL    Creatinine 2.4 (H) 0.5 - 1.4 mg/dL    Calcium 10.8 (H) 8.7 - 10.5 mg/dL    Total Protein 7.7 6.0 - 8.4 g/dL    Albumin 4.1 3.5 - 5.2 g/dL    Total Bilirubin 0.7 0.1 - 1.0 mg/dL    Alkaline Phosphatase 83 55 - 135 U/L    AST 45 (H) 10 - 40 U/L    ALT 74 (H) 10 - 44 U/L    Anion Gap 14 8 - 16 mmol/L    eGFR if African American 30 (A) >60 mL/min/1.73 m^2    eGFR if non African American 26 (A) >60 mL/min/1.73 m^2   Uric acid    Collection Time: 09/11/18 11:31 AM   Result Value Ref Range    Uric Acid 7.0 3.4 - 7.0 mg/dL   CBC auto differential    Collection Time: 09/11/18 11:31 AM   Result Value Ref Range    WBC 11.06 3.90 - 12.70 K/uL    RBC 5.87 4.60 - 6.20 M/uL    Hemoglobin 17.9 14.0 - 18.0 g/dL    Hematocrit 53.1 40.0 - 54.0 %    MCV 91 82 - 98 fL    MCH 30.5 27.0 - 31.0 pg    MCHC 33.7 32.0 - 36.0 g/dL    RDW 15.5 (H) 11.5 - 14.5 %    Platelets 198 150 - 350 K/uL    MPV 9.8 9.2 - 12.9 fL    Gran # (ANC) 8.8 (H) 1.8 - 7.7 K/uL    Lymph # 1.5 1.0 - 4.8 K/uL    Mono # 0.7 0.3 - 1.0 K/uL    Eos # 0.1 0.0 - 0.5  K/uL    Baso # 0.02 0.00 - 0.20 K/uL    Gran% 79.6 (H) 38.0 - 73.0 %    Lymph% 13.5 (L) 18.0 - 48.0 %    Mono% 6.3 4.0 - 15.0 %    Eosinophil% 0.6 0.0 - 8.0 %    Basophil% 0.2 0.0 - 1.9 %    Platelet Estimate Appears normal     Smudge Cells Present     Large/Giant Platelets Present     Differential Method Automated    Renal function panel    Collection Time: 09/11/18 11:31 AM   Result Value Ref Range    Glucose 189 (H) 70 - 110 mg/dL    Sodium 140 136 - 145 mmol/L    Potassium 3.4 (L) 3.5 - 5.1 mmol/L    Chloride 92 (L) 95 - 110 mmol/L    CO2 33 (H) 23 - 29 mmol/L    BUN, Bld 41 (H) 8 - 23 mg/dL    Calcium 11.0 (H) 8.7 - 10.5 mg/dL    Creatinine 2.3 (H) 0.5 - 1.4 mg/dL    Albumin 3.9 3.5 - 5.2 g/dL    Phosphorus 3.4 2.7 - 4.5 mg/dL    eGFR if African American 32 (A) >60 mL/min/1.73 m^2    eGFR if non African American 27 (A) >60 mL/min/1.73 m^2    Anion Gap 15 8 - 16 mmol/L   Urinalysis    Collection Time: 09/11/18 11:37 AM   Result Value Ref Range    Specimen UA Urine, Clean Catch     Color, UA Yellow Yellow, Straw, Landy    Appearance, UA Clear Clear    pH, UA 7.0 5.0 - 8.0    Specific Gravity, UA 1.010 1.005 - 1.030    Protein, UA Negative Negative    Glucose, UA Negative Negative    Ketones, UA Negative Negative    Bilirubin (UA) Negative Negative    Occult Blood UA Negative Negative    Nitrite, UA Negative Negative    Leukocytes, UA Negative Negative           9/12/14 DEXA total femur T score -0.8, femur neck -1.2, spine 1.0 osteopenia      9/9/14 lumbar spine x-ray  Findings: Lumbar spine series performed with lateral flexion and extension views. Comparison to 06/09/14. Note again made of generalized osteopenia and multilevel degenerative change. Minimal grade 1 anterolisthesis observed at L4-5 and L5-S1 with   little change between flexion and extension. Stable mild anterior wedge deformity of the T12 and L1 vertebrae. No acute findings.    Assessment:       1. Senile osteoporosis    2. Chronic kidney disease,  stage III (moderate)    3. Primary osteoarthritis of left hip    4. Primary osteoarthritis of right hip    5. Primary osteoarthritis involving multiple joints    6. Secondary hyperparathyroidism of renal origin    7. Idiopathic chronic gout of multiple sites without tophus        1.  Osteoarthritis bilateral hips left >Right -- left symptomatic - ultrasound guided hip injection no help,  needs BINTA but needs to loose weight 1st   2.  Idiopathic gout of multiple sites with optimal uric acid level on allopurinol 450 mg daily and colchicine prn  3.  Right knee OA- monitored by orthro   4.  Lumbar spondylosis to have laminectomy and decompression surgery 6/24/14  5.  Osteopenia with T12-L1 wedge deformity noted on x-ray will follow- on prevention fosamax Q 14 days X 2.5 years, stable dexa 2017- mod fx- will repeat in 2-3 year- prolia best option if treatment indicated            Plan:          uric acid is suboptimal but will see if pt can hydrate better, watch his diet  For now will keep allopurinol to 450 mg daily  Uloric will be our next option if any gout attacks occur     colcrys only prn- no more than 1 tab 2 X weekly prn  Avoid nsaids      Remain off all OP treatment, bisphosphonate Contraindicated  with his renal function, repeat dexa 2 years, if density falls consider prolia as best opitons    rtc 12 mon nato with cmp and uric acid   Repeat dexa at that time also     call with any questions, changes, or concerns.

## 2018-09-12 LAB
CYSTATIN C SERPL-MCNC: 2.27 MG/L
GFR/BSA.PRED SERPLBLD CYS-BASED-ARV: 25 ML/MIN/BSA

## 2018-09-13 ENCOUNTER — OFFICE VISIT (OUTPATIENT)
Dept: NEPHROLOGY | Facility: CLINIC | Age: 73
End: 2018-09-13
Payer: MEDICARE

## 2018-09-13 VITALS — HEART RATE: 88 BPM | SYSTOLIC BLOOD PRESSURE: 120 MMHG | DIASTOLIC BLOOD PRESSURE: 78 MMHG

## 2018-09-13 DIAGNOSIS — M1A.09X0 IDIOPATHIC CHRONIC GOUT OF MULTIPLE SITES WITHOUT TOPHUS: ICD-10-CM

## 2018-09-13 DIAGNOSIS — E87.6 HYPOKALEMIA DUE TO INADEQUATE POTASSIUM INTAKE: ICD-10-CM

## 2018-09-13 DIAGNOSIS — E87.6 HYPOKALEMIA: ICD-10-CM

## 2018-09-13 DIAGNOSIS — N25.81 SECONDARY HYPERPARATHYROIDISM OF RENAL ORIGIN: ICD-10-CM

## 2018-09-13 DIAGNOSIS — E83.52 HYPERCALCEMIA: ICD-10-CM

## 2018-09-13 DIAGNOSIS — N18.31 CHRONIC KIDNEY DISEASE (CKD) STAGE G3A/A1, MODERATELY DECREASED GLOMERULAR FILTRATION RATE (GFR) BETWEEN 45-59 ML/MIN/1.73 SQUARE METER AND ALBUMINURIA CREATININE RATIO LESS THAN 30 MG/G: Primary | ICD-10-CM

## 2018-09-13 DIAGNOSIS — G89.4 CHRONIC PAIN SYNDROME: ICD-10-CM

## 2018-09-13 DIAGNOSIS — I27.20 PULMONARY HYPERTENSION: ICD-10-CM

## 2018-09-13 DIAGNOSIS — R60.0 BILATERAL EDEMA OF LOWER EXTREMITY: ICD-10-CM

## 2018-09-13 DIAGNOSIS — I75.023 CHOLESTEROL EMBOLISM OF LOWER EXTREMITY, BILATERAL: ICD-10-CM

## 2018-09-13 DIAGNOSIS — I51.89 DIASTOLIC DYSFUNCTION: ICD-10-CM

## 2018-09-13 DIAGNOSIS — I87.2 VENOUS STASIS DERMATITIS OF BOTH LOWER EXTREMITIES: ICD-10-CM

## 2018-09-13 DIAGNOSIS — N28.1 COMPLEX RENAL CYST: ICD-10-CM

## 2018-09-13 PROCEDURE — 3078F DIAST BP <80 MM HG: CPT | Mod: CPTII,,, | Performed by: INTERNAL MEDICINE

## 2018-09-13 PROCEDURE — 99213 OFFICE O/P EST LOW 20 MIN: CPT | Mod: PBBFAC | Performed by: INTERNAL MEDICINE

## 2018-09-13 PROCEDURE — 1101F PT FALLS ASSESS-DOCD LE1/YR: CPT | Mod: CPTII,,, | Performed by: INTERNAL MEDICINE

## 2018-09-13 PROCEDURE — 99999 PR PBB SHADOW E&M-EST. PATIENT-LVL III: CPT | Mod: PBBFAC,,, | Performed by: INTERNAL MEDICINE

## 2018-09-13 PROCEDURE — 99214 OFFICE O/P EST MOD 30 MIN: CPT | Mod: S$PBB,,, | Performed by: INTERNAL MEDICINE

## 2018-09-13 PROCEDURE — 3074F SYST BP LT 130 MM HG: CPT | Mod: CPTII,,, | Performed by: INTERNAL MEDICINE

## 2018-09-13 RX ORDER — ALLOPURINOL 300 MG/1
450 TABLET ORAL DAILY
Qty: 135 TABLET | Refills: 3 | Status: SHIPPED | OUTPATIENT
Start: 2018-09-13 | End: 2019-06-17 | Stop reason: SDUPTHER

## 2018-09-13 RX ORDER — SPIRONOLACTONE 25 MG/1
25 TABLET ORAL DAILY
Qty: 90 TABLET | Refills: 3 | Status: SHIPPED | OUTPATIENT
Start: 2018-09-13 | End: 2019-10-03 | Stop reason: SDUPTHER

## 2018-09-13 RX ORDER — TORSEMIDE 20 MG/1
40 TABLET ORAL DAILY
Qty: 180 TABLET | Refills: 3 | Status: SHIPPED | OUTPATIENT
Start: 2018-09-13 | End: 2019-06-17 | Stop reason: SDUPTHER

## 2018-09-13 RX ORDER — TRAMADOL HYDROCHLORIDE 50 MG/1
TABLET ORAL
Qty: 180 TABLET | Refills: 2 | Status: SHIPPED | OUTPATIENT
Start: 2018-09-13 | End: 2019-03-21 | Stop reason: SDUPTHER

## 2018-09-13 RX ORDER — POTASSIUM CHLORIDE 750 MG/1
TABLET, EXTENDED RELEASE ORAL
Qty: 360 TABLET | Refills: 1 | Status: SHIPPED | OUTPATIENT
Start: 2018-09-13 | End: 2019-10-09 | Stop reason: SDUPTHER

## 2018-09-13 RX ORDER — METOLAZONE 5 MG/1
TABLET ORAL
Qty: 90 TABLET | Refills: 11 | Status: SHIPPED | OUTPATIENT
Start: 2018-09-13 | End: 2020-01-09 | Stop reason: SDUPTHER

## 2018-09-13 NOTE — PROGRESS NOTES
Subjective:       Patient ID: Bo Chase is a 73 y.o. White male who presents for follow-up evaluation of Chronic Kidney Disease; Hypertension; and Edema    Edema   Pertinent negatives include no abdominal pain, arthralgias, chest pain, chills, congestion, coughing, diaphoresis, fatigue, fever, headaches, joint swelling, nausea, neck pain, rash or vomiting.   Hypertension   Pertinent negatives include no chest pain, headaches, neck pain, palpitations or shortness of breath.      Patient is a  white male who was  by profession.  Has history of chronic kidney disease for at least 5 years.  He was last seen by Dr. Prather in 2012.  Renal ultrasound at that time showed bilateral renal cysts.  Left kidney had a cyst of 2.5 cm.  Patient never had any proteinuria.  His creatinine has been fluctuating between 1.6 and 2.0.  His fluctuations have been due to diuretic therapy.  In the last 5 years his kidney function has been stable.  He has had history of coronary artery disease status post shortness of breath and dyspnea and exertion.    12/ 2014Occluded lad lcx and rca.Occluded svg to d1svg to rca patent svg to  om1 90% eccentric treated with dennis with filter wire protection.Patent lima.No complication. Dr. Joel     2014 LEANDRO : now on CPAP    6/2015 LBP s/p KAROLINA and s/p surgery laminectomy     4/2017 Renal USD CT scan of the abdomen shows extensive calcifications of the aorta    August 2017 2-D echo reviewed= PA pressure >26 mm Hg, creatinine down to 1.4, uric acid controlled, vitamin D level is 43, PTH 91, estimated GFR with Cystatin C is 50%; weight down by 10 ib from 263 to 254 ib    March 2018 patient's creatinine is up to 1.8.  Weight is up to 262 pounds.  Approximate GFR about 40%.  GFR loss is about 10% per year.  No proteinuria.  PTH has gone up from 91 in 2017-235 in March 2018.  Labwork shows severe metabolic alkalosis, severe hypokalemia, low chloride levels due to complications of diuretics. Severe  Polycythemia may need phlebotomy     6/2018 K+ is better ; Hgb is better ; weight is higher ; GFR 37 % with Cr 1.7 ; retired and now in gym     09/2018 High Calcium; stop D; check USD and SPEP     Review of Systems   Constitutional: Negative.  Negative for activity change, appetite change, chills, diaphoresis, fatigue and fever.         water aerobics ; weight training    HENT: Negative.  Negative for congestion and trouble swallowing.    Eyes: Negative.    Respiratory: Negative.  Negative for cough, chest tightness, shortness of breath and wheezing.    Cardiovascular: Positive for leg swelling. Negative for chest pain and palpitations.   Gastrointestinal: Negative.  Negative for abdominal distention, abdominal pain, nausea and vomiting.   Genitourinary: Negative.  Negative for decreased urine volume, difficulty urinating, dysuria, enuresis, flank pain, frequency, hematuria, penile swelling, scrotal swelling and urgency.   Musculoskeletal: Negative.  Negative for arthralgias, back pain, joint swelling and neck pain.   Skin: Negative for rash.   Neurological: Negative.  Negative for tremors, seizures and headaches.   Psychiatric/Behavioral: Negative.  Negative for confusion and sleep disturbance. The patient is not nervous/anxious.        Objective:   /78   Pulse 88      Weight at home 270     gained 10 lb since last visit.  From 259-269  Physical Exam   Constitutional: He is oriented to person, place, and time. He appears well-developed and well-nourished. No distress.   HENT:   Head: Normocephalic.   Eyes: EOM are normal. Pupils are equal, round, and reactive to light.   Neck: Normal range of motion. Neck supple. No JVD present. No thyromegaly present.   Cardiovascular: Normal rate, regular rhythm, S1 normal, S2 normal, normal heart sounds and intact distal pulses. PMI is not displaced. Exam reveals no gallop and no friction rub.   No murmur heard.  Cholesterol emboli ; loud P2    Pulmonary/Chest: Effort  normal and breath sounds normal. No respiratory distress. He has no wheezes. He has no rales. He exhibits no tenderness.   Abdominal: He exhibits no distension and no mass. There is no hepatosplenomegaly. There is no tenderness. There is no rebound and no CVA tenderness. No hernia.   Musculoskeletal: Normal range of motion. He exhibits no edema or tenderness.   2+ edema R>L    Lymphadenopathy:     He has no cervical adenopathy.   Neurological: He is alert and oriented to person, place, and time. He has normal reflexes. He is not disoriented. He displays normal reflexes. No cranial nerve deficit. He exhibits normal muscle tone. Coordination normal.   Skin: Skin is warm and dry. No rash noted. No erythema.   Psychiatric: He has a normal mood and affect. His behavior is normal. Judgment and thought content normal.             Lab Results   Component Value Date    CREATININE 2.4 (H) 09/11/2018    CREATININE 2.3 (H) 09/11/2018    BUN 41 (H) 09/11/2018    BUN 41 (H) 09/11/2018     09/11/2018     09/11/2018    K 3.5 09/11/2018    K 3.4 (L) 09/11/2018    CL 91 (L) 09/11/2018    CL 92 (L) 09/11/2018    CO2 35 (H) 09/11/2018    CO2 33 (H) 09/11/2018     Lab Results   Component Value Date    WBC 11.06 09/11/2018    HGB 17.9 09/11/2018    HCT 53.1 09/11/2018    MCV 91 09/11/2018     09/11/2018     Lab Results   Component Value Date    PTH 67.0 09/11/2018    CALCIUM 10.8 (H) 09/11/2018    CALCIUM 11.0 (H) 09/11/2018    PHOS 3.4 09/11/2018     Lab Results   Component Value Date    URICACID 7.0 09/11/2018         Assessment:    )    1. Chronic kidney disease (CKD) stage G3a/A1, moderately decreased glomerular filtration rate (GFR) between 45-59 mL/min/1.73 square meter and albuminuria creatinine ratio less than 30 mg/g    2. Bilateral edema of lower extremity    3. Secondary hyperparathyroidism of renal origin    4. Cholesterol embolism of lower extremity, bilateral    5. Venous stasis dermatitis of both lower  extremities    6. Hypokalemia    7. Idiopathic chronic gout of multiple sites without tophus    8. Pulmonary hypertension    9. Diastolic dysfunction    10. Complex renal cyst    11. Hypercalcemia    12. Chronic pain syndrome        Plan:         1.  Chronic kidney disease stage III 4/stage IV patient may have cardiorenal syndrome:  fluctuating creatinine is due to diuretics.   No heavy proteinuria.  Creatinine is higher today.  GFR 25%-35% ; d/c Hydralazine ; stop Ibuprofen    2.  Chronic gout: Last attack was 2016.  Uric acid is controlled on allopurinol 450 mg     3.  Hyperparathyroidism due to chronic kidney disease stage III:  Stop vitamin-D therapy since the calcium is too high.  Consider Rayaldee on follow-up. Stop Vit D and check renal USd repeat SPEP ( normal in 2012)     4.  Extensive edema anasarca on the lower extremities with chronic venous stasis findings as well as brawny edema of the skin: Patient had an ultrasound of the legs in 2012 which showed no DVT.  Clinically patient has pulm. hypertension, metolazone 2x week + torsemide 20 mg x2 daily. D/w patient that a higher creatinine of 2.0-2.5 would be appropriate if it achieves adequate diuresis.---Cardio-renal syndrome     5.  Cholesterol emboli of the lower extremities: Extensive aortic calcifications noted on the CT scan.    aspirin to 325 mg daily. Continue Lipitor.    6.  Pulm HTN: records of CPAP and dr. Honeycutt reviewed     7.  Hypokalemia:keep  aldactone 25 mg  KCL to 40 meq daily.     8. Fup dr. Edgar for polycythemia monthly phlebotomy.  Patient had multiple complex cysts in the ultrasound done in 2017.  Will repeat renal ultrasound and check for the follow-up on the complex cysts    9. HTN: may have to stop hydralazine with exercise BP is < 120     10. Chronic Pain: using Tramadol and NSAids. Stop Nsaids. Combine two tylenol with each tramadol            Follow-up 12 weeks      Washington Bernal MD

## 2018-09-13 NOTE — PATIENT INSTRUCTIONS
1.  Chronic kidney disease stage III 4/stage IV patient may have cardiorenal syndrome:  fluctuating creatinine is due to diuretics.   No heavy proteinuria.  Creatinine is higher today.  GFR 25%-35% ; d/c Hydralazine ; stop Ibuprofen    2.  Chronic gout: Last attack was 2016.  Uric acid is controlled on allopurinol 450 mg     3.  Hyperparathyroidism due to chronic kidney disease stage III:  Stop vitamin-D therapy since the calcium is too high.  Consider Rayaldee on follow-up. Stop Vit D and check renal USd repeat SPEP ( normal in 2012)     4.  Extensive edema anasarca on the lower extremities with chronic venous stasis findings as well as brawny edema of the skin: Patient had an ultrasound of the legs in 2012 which showed no DVT.  Clinically patient has pulm. hypertension, metolazone 2x week + torsemide 20 mg x2 daily. D/w patient that a higher creatinine of 2.0-2.5 would be appropriate if it achieves adequate diuresis.---Cardio-renal syndrome     5.  Cholesterol emboli of the lower extremities: Extensive aortic calcifications noted on the CT scan.    aspirin to 325 mg daily. Continue Lipitor.    6.  Pulm HTN: records of CPAP and dr. Honeycutt reviewed     7.  Hypokalemia:keep  aldactone 25 mg  KCL to 40 meq daily.     8. Fup dr. Edgar for polycythemia monthly phlebotomy.  Patient had multiple complex cysts in the ultrasound done in 2017.  Will repeat renal ultrasound and check for the follow-up on the complex cysts    9. HTN: may have to stop hydralazine with exercise BP is < 120     10. Chronic Pain: using Tramadol and NSAids. Stop Nsaids. Combine two tylenol with each tramadol            Follow-up 12 weeks

## 2018-09-25 ENCOUNTER — INFUSION (OUTPATIENT)
Dept: INFUSION THERAPY | Facility: HOSPITAL | Age: 73
End: 2018-09-25
Attending: INTERNAL MEDICINE
Payer: MEDICARE

## 2018-09-25 VITALS
RESPIRATION RATE: 20 BRPM | HEIGHT: 67 IN | HEART RATE: 73 BPM | SYSTOLIC BLOOD PRESSURE: 111 MMHG | BODY MASS INDEX: 42.38 KG/M2 | WEIGHT: 270 LBS | TEMPERATURE: 98 F | OXYGEN SATURATION: 95 % | DIASTOLIC BLOOD PRESSURE: 71 MMHG

## 2018-09-25 DIAGNOSIS — D75.1 ACQUIRED POLYCYTHEMIA: Primary | ICD-10-CM

## 2018-09-25 PROCEDURE — 99195 PHLEBOTOMY: CPT

## 2018-09-25 RX ORDER — LIDOCAINE HYDROCHLORIDE 10 MG/ML
1 INJECTION, SOLUTION EPIDURAL; INFILTRATION; INTRACAUDAL; PERINEURAL ONCE
Status: CANCELLED | OUTPATIENT
Start: 2018-09-25 | End: 2018-09-25

## 2018-09-25 NOTE — NURSING
1 unit therapeutic phlebotomy performed via__right_AC then discarded, pressure dressing applied.  Pt refused juice or snack.  Blood pressure __111/72____ upon completion.  Pt denies any complaints of dizziness, lightheadedness, or faintness. .  Patient ambulated out without difficulty.
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
## 2018-11-07 ENCOUNTER — TELEPHONE (OUTPATIENT)
Dept: GYNECOLOGIC ONCOLOGY | Facility: CLINIC | Age: 73
End: 2018-11-07

## 2018-11-07 ENCOUNTER — OFFICE VISIT (OUTPATIENT)
Dept: CARDIOLOGY | Facility: CLINIC | Age: 73
End: 2018-11-07
Payer: MEDICARE

## 2018-11-07 ENCOUNTER — CLINICAL SUPPORT (OUTPATIENT)
Dept: CARDIOLOGY | Facility: CLINIC | Age: 73
End: 2018-11-07
Payer: MEDICARE

## 2018-11-07 VITALS
DIASTOLIC BLOOD PRESSURE: 64 MMHG | WEIGHT: 274.94 LBS | SYSTOLIC BLOOD PRESSURE: 114 MMHG | BODY MASS INDEX: 43.15 KG/M2 | HEIGHT: 67 IN | HEART RATE: 81 BPM

## 2018-11-07 DIAGNOSIS — I50.32 CHRONIC DIASTOLIC CONGESTIVE HEART FAILURE: ICD-10-CM

## 2018-11-07 DIAGNOSIS — I25.810 CORONARY ARTERY DISEASE INVOLVING CORONARY BYPASS GRAFT OF NATIVE HEART WITHOUT ANGINA PECTORIS: Primary | Chronic | ICD-10-CM

## 2018-11-07 DIAGNOSIS — I25.10 CAD (CORONARY ARTERY DISEASE): ICD-10-CM

## 2018-11-07 DIAGNOSIS — J44.9 CHRONIC OBSTRUCTIVE PULMONARY DISEASE, UNSPECIFIED COPD TYPE: ICD-10-CM

## 2018-11-07 DIAGNOSIS — Z95.1 S/P CABG X 4: ICD-10-CM

## 2018-11-07 DIAGNOSIS — N18.4 STAGE 4 CHRONIC KIDNEY DISEASE: ICD-10-CM

## 2018-11-07 DIAGNOSIS — E78.5 HYPERLIPIDEMIA WITH TARGET LDL LESS THAN 70: ICD-10-CM

## 2018-11-07 DIAGNOSIS — I10 HTN (HYPERTENSION): ICD-10-CM

## 2018-11-07 DIAGNOSIS — G47.33 OSA ON CPAP: ICD-10-CM

## 2018-11-07 DIAGNOSIS — Z95.5 S/P CORONARY ARTERY STENT PLACEMENT: ICD-10-CM

## 2018-11-07 DIAGNOSIS — I25.10 CAD (CORONARY ARTERY DISEASE): Primary | ICD-10-CM

## 2018-11-07 PROCEDURE — 99999 PR PBB SHADOW E&M-EST. PATIENT-LVL III: CPT | Mod: PBBFAC,,, | Performed by: INTERNAL MEDICINE

## 2018-11-07 PROCEDURE — 99215 OFFICE O/P EST HI 40 MIN: CPT | Mod: S$GLB,,, | Performed by: INTERNAL MEDICINE

## 2018-11-07 PROCEDURE — 93000 ELECTROCARDIOGRAM COMPLETE: CPT | Mod: S$GLB,,, | Performed by: INTERNAL MEDICINE

## 2018-11-07 PROCEDURE — 3078F DIAST BP <80 MM HG: CPT | Mod: CPTII,S$GLB,, | Performed by: INTERNAL MEDICINE

## 2018-11-07 PROCEDURE — 3074F SYST BP LT 130 MM HG: CPT | Mod: CPTII,S$GLB,, | Performed by: INTERNAL MEDICINE

## 2018-11-07 PROCEDURE — 1101F PT FALLS ASSESS-DOCD LE1/YR: CPT | Mod: CPTII,S$GLB,, | Performed by: INTERNAL MEDICINE

## 2018-11-07 RX ORDER — ALBUTEROL SULFATE 90 UG/1
1-2 AEROSOL, METERED RESPIRATORY (INHALATION) EVERY 6 HOURS PRN
Qty: 1 INHALER | Refills: 5 | Status: SHIPPED | OUTPATIENT
Start: 2018-11-07 | End: 2018-11-07 | Stop reason: SDUPTHER

## 2018-11-07 RX ORDER — ALBUTEROL SULFATE 90 UG/1
1-2 AEROSOL, METERED RESPIRATORY (INHALATION) EVERY 6 HOURS PRN
Qty: 1 INHALER | Refills: 5 | Status: SHIPPED | OUTPATIENT
Start: 2018-11-07 | End: 2023-11-29

## 2018-11-07 NOTE — PROGRESS NOTES
Subjective:   Patient ID:  Bo Chase is a 73 y.o. male who presents for follow up of Chest Pain; Shortness of Breath; and Leg Swelling      74 yo male, came in for worsening SOB. Last fu with Dr. Moreno in .  Hx of CAD, s/p CABGx4 1996, s/p cath 03/2012 occluded SVG to diag, HTN, HLP, DM, morbid obesity, sleep apnea on CPAP, hypothyroidism.    He was admitted to [facility] 08/2014 for NSTEMI, planned to have LHC test but he signed out AMA.    Had LHC 12/29/2014 s/p PCI of SVG to OM1.  8/31/2016 negative nuclear stress test for ischemia.    ECho normal EF    Pt states that he f/u with cardiologist [facility] in the past two yr. Negative exerrcise stress test 8 weeks ago at [facility].   Recent Dx of melanoma on nose and right chest pain. S/p chest tumoe early .   Recently dyspnea worse recently. With chest tightness. Sleep with CPAP.  Chronic leg swelling.  In  off Metolazone by himself and continued Lasix, then gained 10 punds. Now on Torsemide 20 mg bid and Metolazone 5 mg twice a week.  Use abuteral 3 times a week.            Past Medical History:   Diagnosis Date    Acute coronary syndrome     CHF (congestive heart failure)     Chronic kidney disease     Coronary artery disease     Hyperlipidemia     Hypertension     Lumbar spondylosis     Sleep apnea        Past Surgical History:   Procedure Laterality Date    back surgary      CARDIAC CATHETERIZATION  3/6/2012    CORONARY ANGIOPLASTY      CORONARY ARTERY BYPASS GRAFT  1996    x4    HEART CATH-LEFT Left 12/29/2014    Performed [facility] at [facility]    throid lobectomy  4/2012    umbilicial hernia         Social History     Tobacco Use    Smoking status: Never Smoker    Smokeless tobacco: Never Used   Substance Use Topics    Alcohol use: Yes     Comment: 6 BEERS A YEAR     Drug use: No       Family History   Problem Relation Age of Onset    Heart disease Father          Review of Systems   Constitution: Positive for  malaise/fatigue. Negative for decreased appetite, diaphoresis, fever, weakness and night sweats.   HENT: Negative for nosebleeds.    Eyes: Negative for blurred vision and double vision.   Cardiovascular: Positive for dyspnea on exertion and leg swelling. Negative for chest pain, claudication, irregular heartbeat, near-syncope, orthopnea, palpitations, paroxysmal nocturnal dyspnea and syncope.   Respiratory: Negative for cough, shortness of breath, sleep disturbances due to breathing, snoring, sputum production and wheezing.    Endocrine: Negative for cold intolerance and polyuria.   Hematologic/Lymphatic: Does not bruise/bleed easily.   Skin: Negative for rash.   Musculoskeletal: Negative for back pain, falls, joint pain, joint swelling and neck pain.   Gastrointestinal: Negative for abdominal pain, heartburn, nausea and vomiting.   Genitourinary: Negative for dysuria, frequency and hematuria.   Neurological: Negative for difficulty with concentration, dizziness, focal weakness, headaches, light-headedness, numbness and seizures.   Psychiatric/Behavioral: Negative for depression, memory loss and substance abuse. The patient does not have insomnia.    Allergic/Immunologic: Negative for HIV exposure and hives.       Objective:   Physical Exam   Constitutional: He is oriented to person, place, and time. He appears well-nourished.   HENT:   Head: Normocephalic.   Eyes: Pupils are equal, round, and reactive to light.   Neck: Normal carotid pulses and no JVD present. Carotid bruit is not present. No thyromegaly present.   Cardiovascular: Normal rate, regular rhythm, normal heart sounds and normal pulses.  No extrasystoles are present. PMI is not displaced. Exam reveals no gallop and no S3.   No murmur heard.  Pulmonary/Chest: Breath sounds normal. No stridor. No respiratory distress.   Abdominal: Soft. Bowel sounds are normal. There is no tenderness. There is no rebound.   Musculoskeletal: Normal range of motion.    Neurological: He is alert and oriented to person, place, and time.   Skin: Skin is intact. No rash noted.   Psychiatric: His behavior is normal.       Lab Results   Component Value Date    CHOL 150 08/31/2016    CHOL 152 09/09/2015    CHOL 137 12/19/2014     Lab Results   Component Value Date    HDL 29 (L) 08/31/2016    HDL 28 (L) 09/09/2015    HDL 29 (L) 12/19/2014     Lab Results   Component Value Date    LDLCALC 72.8 08/31/2016    LDLCALC 82.6 09/09/2015    LDLCALC 70.8 12/19/2014     Lab Results   Component Value Date    TRIG 241 (H) 08/31/2016    TRIG 207 (H) 09/09/2015    TRIG 186 (H) 12/19/2014     Lab Results   Component Value Date    CHOLHDL 19.3 (L) 08/31/2016    CHOLHDL 18.4 (L) 09/09/2015    CHOLHDL 21.2 12/19/2014       Chemistry        Component Value Date/Time     09/11/2018 1131     09/11/2018 1131    K 3.5 09/11/2018 1131    K 3.4 (L) 09/11/2018 1131    CL 91 (L) 09/11/2018 1131    CL 92 (L) 09/11/2018 1131    CO2 35 (H) 09/11/2018 1131    CO2 33 (H) 09/11/2018 1131    BUN 41 (H) 09/11/2018 1131    BUN 41 (H) 09/11/2018 1131    CREATININE 2.4 (H) 09/11/2018 1131    CREATININE 2.3 (H) 09/11/2018 1131     (H) 09/11/2018 1131     (H) 09/11/2018 1131        Component Value Date/Time    CALCIUM 10.8 (H) 09/11/2018 1131    CALCIUM 11.0 (H) 09/11/2018 1131    ALKPHOS 83 09/11/2018 1131    AST 45 (H) 09/11/2018 1131    ALT 74 (H) 09/11/2018 1131    BILITOT 0.7 09/11/2018 1131    ESTGFRAFRICA 30 (A) 09/11/2018 1131    ESTGFRAFRICA 32 (A) 09/11/2018 1131    EGFRNONAA 26 (A) 09/11/2018 1131    EGFRNONAA 27 (A) 09/11/2018 1131          Lab Results   Component Value Date    HGBA1C 6.0 08/27/2014     Lab Results   Component Value Date    TSH 1.321 07/12/2017     Lab Results   Component Value Date    INR 1.1 07/25/2016    INR 1.1 12/19/2014    INR 1.1 03/01/2012     Lab Results   Component Value Date    WBC 11.06 09/11/2018    HGB 17.9 09/11/2018    HCT 53.1 09/11/2018    MCV 91  09/11/2018     09/11/2018     BMP  Sodium   Date Value Ref Range Status   09/11/2018 140 136 - 145 mmol/L Final   09/11/2018 140 136 - 145 mmol/L Final     Potassium   Date Value Ref Range Status   09/11/2018 3.5 3.5 - 5.1 mmol/L Final   09/11/2018 3.4 (L) 3.5 - 5.1 mmol/L Final     Chloride   Date Value Ref Range Status   09/11/2018 91 (L) 95 - 110 mmol/L Final   09/11/2018 92 (L) 95 - 110 mmol/L Final     CO2   Date Value Ref Range Status   09/11/2018 35 (H) 23 - 29 mmol/L Final   09/11/2018 33 (H) 23 - 29 mmol/L Final     BUN, Bld   Date Value Ref Range Status   09/11/2018 41 (H) 8 - 23 mg/dL Final   09/11/2018 41 (H) 8 - 23 mg/dL Final     Creatinine   Date Value Ref Range Status   09/11/2018 2.4 (H) 0.5 - 1.4 mg/dL Final   09/11/2018 2.3 (H) 0.5 - 1.4 mg/dL Final     Calcium   Date Value Ref Range Status   09/11/2018 10.8 (H) 8.7 - 10.5 mg/dL Final   09/11/2018 11.0 (H) 8.7 - 10.5 mg/dL Final     Anion Gap   Date Value Ref Range Status   09/11/2018 14 8 - 16 mmol/L Final   09/11/2018 15 8 - 16 mmol/L Final     eGFR if    Date Value Ref Range Status   09/11/2018 30 (A) >60 mL/min/1.73 m^2 Final   09/11/2018 32 (A) >60 mL/min/1.73 m^2 Final     eGFR if non    Date Value Ref Range Status   09/11/2018 26 (A) >60 mL/min/1.73 m^2 Final     Comment:     Calculation used to obtain the estimated glomerular filtration  rate (eGFR) is the CKD-EPI equation.      09/11/2018 27 (A) >60 mL/min/1.73 m^2 Final     Comment:     Calculation used to obtain the estimated glomerular filtration  rate (eGFR) is the CKD-EPI equation.        BNP  @LABRCNTIP(BNP,BNPTRIAGEBLO)@  @LABRCNTIP(troponini)@  CrCl cannot be calculated (Patient's most recent lab result is older than the maximum 7 days allowed.).  No results found in the last 24 hours.  No results found in the last 24 hours.  No results found in the last 24 hours.    Assessment:      1. Coronary artery disease involving coronary bypass graft  of native heart without angina pectoris    2. Chronic diastolic congestive heart failure    3. HTN (hypertension)    4. Chronic obstructive pulmonary disease, unspecified COPD type    5. Hyperlipidemia with target LDL less than 70    6. S/P CABG x 4    7. S/P coronary artery stent placement    8. BMI 39.0-39.9,adult    9. LEANDRO on CPAP    10. Stage 4 chronic kidney disease      C/o worsening SOB. CHF exacerbation vs COPD.  BP and LDL wnl    Plan:   Check BNP and BMP  Advise Metolazone 5 mg on MWFS with Torsemide 20 gm bid.  Albuterol inhaler daily  Echo ordered  Daily weight. Please call the office if gain 3 pounds in 1 day or 5 pounds in 1 week.  Fluid restriction 1.5 liters a day  Na< 2 gm  RTC in 1 week

## 2018-11-07 NOTE — TELEPHONE ENCOUNTER
----- Message from Brandy Linn sent at 11/7/2018  2:39 PM CST -----  Contact: pt  Please call pt @ [phone],pt have some questions for nurse.

## 2018-11-08 ENCOUNTER — OFFICE VISIT (OUTPATIENT)
Dept: HEMATOLOGY/ONCOLOGY | Facility: CLINIC | Age: 73
End: 2018-11-08
Payer: MEDICARE

## 2018-11-08 ENCOUNTER — INFUSION (OUTPATIENT)
Dept: INFUSION THERAPY | Facility: HOSPITAL | Age: 73
End: 2018-11-08
Attending: INTERNAL MEDICINE
Payer: MEDICARE

## 2018-11-08 VITALS
SYSTOLIC BLOOD PRESSURE: 127 MMHG | WEIGHT: 275.81 LBS | DIASTOLIC BLOOD PRESSURE: 73 MMHG | BODY MASS INDEX: 43.29 KG/M2 | TEMPERATURE: 99 F | WEIGHT: 275.81 LBS | HEIGHT: 67 IN | DIASTOLIC BLOOD PRESSURE: 73 MMHG | HEIGHT: 67 IN | HEART RATE: 81 BPM | OXYGEN SATURATION: 95 % | RESPIRATION RATE: 20 BRPM | HEART RATE: 72 BPM | SYSTOLIC BLOOD PRESSURE: 127 MMHG | BODY MASS INDEX: 43.29 KG/M2

## 2018-11-08 DIAGNOSIS — D75.1 POLYCYTHEMIA, SECONDARY: Primary | ICD-10-CM

## 2018-11-08 DIAGNOSIS — D75.1 ACQUIRED POLYCYTHEMIA: Primary | ICD-10-CM

## 2018-11-08 DIAGNOSIS — D75.1 ACQUIRED POLYCYTHEMIA: ICD-10-CM

## 2018-11-08 PROCEDURE — 99215 OFFICE O/P EST HI 40 MIN: CPT | Mod: S$GLB,,, | Performed by: INTERNAL MEDICINE

## 2018-11-08 PROCEDURE — 99999 PR PBB SHADOW E&M-EST. PATIENT-LVL III: CPT | Mod: PBBFAC,,, | Performed by: INTERNAL MEDICINE

## 2018-11-08 PROCEDURE — 3078F DIAST BP <80 MM HG: CPT | Mod: CPTII,S$GLB,, | Performed by: INTERNAL MEDICINE

## 2018-11-08 PROCEDURE — 3074F SYST BP LT 130 MM HG: CPT | Mod: CPTII,S$GLB,, | Performed by: INTERNAL MEDICINE

## 2018-11-08 PROCEDURE — 1100F PTFALLS ASSESS-DOCD GE2>/YR: CPT | Mod: CPTII,S$GLB,, | Performed by: INTERNAL MEDICINE

## 2018-11-08 PROCEDURE — 3288F FALL RISK ASSESSMENT DOCD: CPT | Mod: CPTII,S$GLB,, | Performed by: INTERNAL MEDICINE

## 2018-11-08 PROCEDURE — 99195 PHLEBOTOMY: CPT

## 2018-11-08 RX ORDER — HYDRALAZINE HYDROCHLORIDE 100 MG/1
TABLET, FILM COATED ORAL
COMMUNITY
Start: 2018-10-19 | End: 2020-01-09 | Stop reason: SDUPTHER

## 2018-11-08 RX ORDER — ATORVASTATIN CALCIUM 40 MG/1
TABLET, FILM COATED ORAL DAILY
COMMUNITY
Start: 2018-10-10 | End: 2021-02-18 | Stop reason: SDUPTHER

## 2018-11-08 RX ORDER — LIDOCAINE HYDROCHLORIDE 10 MG/ML
1 INJECTION, SOLUTION EPIDURAL; INFILTRATION; INTRACAUDAL; PERINEURAL ONCE
Status: CANCELLED | OUTPATIENT
Start: 2018-11-08 | End: 2018-11-08

## 2018-11-08 RX ORDER — TRIAMCINOLONE ACETONIDE 55 UG/1
SPRAY, METERED NASAL
COMMUNITY
Start: 2017-12-04 | End: 2019-02-04

## 2018-11-08 RX ORDER — ACETAMINOPHEN 500MG/15ML
LIQUID (ML) ORAL DAILY PRN
COMMUNITY
Start: 2018-10-06 | End: 2019-04-01

## 2018-11-08 NOTE — PATIENT INSTRUCTIONS
[facility]  [address]  [address]  [phone] phone     [phone] fax  Hours of Operation: Monday- Friday 8:00am- 5:00pm  After hours phone  [phone]  Hematology / Oncology Physicians on call      Dr. Kwabena Edgar                        Please call with any concerns regarding your appointment today.      FALL PREVENTION   Falls often occur due to slipping, tripping or losing your balance. Here are ways to reduce your risk of falling again.   Was there anything that caused your fall that can be fixed, removed or replaced?   Make your home safe by keeping walkways clear of objects you may trip over.   Use non-slip pads under rugs.   Do not walk in poorly lit areas.   Do not stand on chairs or wobbly ladders.   Use caution when reaching overhead or looking upward. This position can cause a loss of balance.   Be sure your shoes fit properly, have non-slip bottoms and are in good condition.   Be cautious when going up and down stairs, curbs, and when walking on uneven sidewalks.   If your balance is poor, consider using a cane or walker.   If your fall was related to alcohol use, stop or limit alcohol intake.   If your fall was related to use of sleeping medicines, talk to your doctor about this. You may need to reduce your dosage at bedtime if you awaken during the night to go to the bathroom.   To reduce the need for nighttime bathroom trips:   Avoid drinking fluids for several hours before going to bed   Empty your bladder before going to bed   Men can keep a urinal at the bedside   © [id number] [employer]it[employer], [address] [place] [address], [place] [address]. All rights reserved. This information is not intended as a substitute for professional medical care. Always follow your healthcare professional's instructions.

## 2018-11-08 NOTE — PROGRESS NOTES
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 He is referred for polycythemia which has been present and progressive since 2012. He reports a history of CAD but denies history of DVT, CVA, or other clots. He also denies rash or pruritus. Patient diagnosed with LEANDRO over 1 year ago but did not use CPAP due to discomfort of the masks. He reports that he is attempting to obtain a new mask and give CPAP another trial.    I have reviewed and updated the HPI, ROS, PMHx, Social Hx, Family Hx and treatment history.      Today's Visit:   Patient presents for routine visit.  He continues to receive phlebotomy monthly however he missed last phlebotomy in October        HPI    Review of Systems   Constitutional: Negative for activity change, chills, diaphoresis, fatigue, fever and unexpected weight change.   HENT: Negative for congestion, dental problem, drooling, ear discharge, facial swelling, hearing loss, rhinorrhea, sinus pressure and sneezing.    Eyes: Negative.    Respiratory: Negative for apnea, cough, choking, chest tightness and shortness of breath.    Cardiovascular: Positive for leg swelling. Negative for chest pain and palpitations.   Gastrointestinal: Negative for abdominal distention, abdominal pain, constipation, nausea, rectal pain and vomiting.   Endocrine: Negative.    Genitourinary: Negative for difficulty urinating, dysuria, enuresis, flank pain and frequency.   Musculoskeletal: Negative for arthralgias, back pain, gait problem, joint swelling, myalgias, neck pain and neck stiffness.   Skin: Negative for color change, pallor, rash and  "wound.   Allergic/Immunologic: Negative.    Neurological: Negative for dizziness, tremors, syncope, speech difficulty and light-headedness.   Hematological: Negative for adenopathy. Does not bruise/bleed easily.   Psychiatric/Behavioral: Negative for agitation, decreased concentration, dysphoric mood and self-injury. The patient is not nervous/anxious.        Objective:       Vitals:    11/08/18 1403   BP: 127/73   Pulse: 81   Temp: 99.4 °F (37.4 °C)   SpO2: 95%   Weight: 125.1 kg (275 lb 12.7 oz)   Height: 5' 7" (1.702 m)      Physical Exam   Constitutional: He is oriented to person, place, and time. Vital signs are normal. He appears well-developed and well-nourished. No distress.   HENT:   Head: Normocephalic and atraumatic.   Nose: Nose normal.   Mouth/Throat: Uvula is midline, oropharynx is clear and moist and mucous membranes are normal. Mucous membranes are not pale, not dry and not cyanotic. Normal dentition. No oropharyngeal exudate.   Eyes: Conjunctivae and EOM are normal. Pupils are equal, round, and reactive to light. No scleral icterus.   Neck: Normal range of motion. Neck supple. No tracheal deviation present. No thyromegaly present.   Cardiovascular: Normal rate, regular rhythm and intact distal pulses. Exam reveals no gallop and no friction rub.   Murmur heard.   Systolic murmur is present with a grade of 2/6.  Pulmonary/Chest: Effort normal and breath sounds normal. No respiratory distress. He has no decreased breath sounds. He has no wheezes. He has no rhonchi. He has no rales. He exhibits no tenderness.   Abdominal: Soft. Bowel sounds are normal. He exhibits no distension and no mass. There is no tenderness. There is no guarding.   Musculoskeletal: Normal range of motion. He exhibits edema (bess lower ext edema). He exhibits no deformity.   Lymphadenopathy:        Head (right side): No submental and no submandibular adenopathy present.        Head (left side): No submental and no submandibular " adenopathy present.     He has no axillary adenopathy.        Right: No supraclavicular adenopathy present.        Left: No supraclavicular adenopathy present.   Neurological: He is alert and oriented to person, place, and time. He displays normal reflexes. No cranial nerve deficit or sensory deficit. He exhibits normal muscle tone. Coordination normal.   Skin: Skin is warm, dry and intact. No abrasion, no bruising, no ecchymosis and no rash noted. Rash is not urticarial. He is not diaphoretic. No cyanosis. No pallor. Nails show no clubbing.   Psychiatric: He has a normal mood and affect. His behavior is normal. Judgment and thought content normal.       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 Patient's H/H significantly improved since initiating CPAP therapy, however, hematocrit is increased to 56.2.    Patient was started on phlebotomy and has tolerated the phlebotomies.  His hematocrit is 49.5 today in we can extend the interval between phlebotomies.  We will continue phlebotomy every 6 weeks and he will follow up in 3 months with repeat labs.      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

## 2018-11-09 ENCOUNTER — TELEPHONE (OUTPATIENT)
Dept: CARDIOLOGY | Facility: CLINIC | Age: 73
End: 2018-11-09

## 2018-11-09 NOTE — TELEPHONE ENCOUNTER
----- Message from Lior Elaine MD sent at 11/9/2018  1:50 PM CST -----  Lab showed CHF controlled.,  Continue  current Rx

## 2018-11-15 ENCOUNTER — OFFICE VISIT (OUTPATIENT)
Dept: CARDIOLOGY | Facility: CLINIC | Age: 73
End: 2018-11-15
Payer: MEDICARE

## 2018-11-15 ENCOUNTER — HOSPITAL ENCOUNTER (OUTPATIENT)
Dept: RADIOLOGY | Facility: HOSPITAL | Age: 73
Discharge: HOME | End: 2018-11-15
Attending: INTERNAL MEDICINE
Payer: MEDICARE

## 2018-11-15 VITALS
SYSTOLIC BLOOD PRESSURE: 108 MMHG | WEIGHT: 274.69 LBS | HEIGHT: 67 IN | HEART RATE: 88 BPM | BODY MASS INDEX: 43.11 KG/M2 | DIASTOLIC BLOOD PRESSURE: 80 MMHG

## 2018-11-15 DIAGNOSIS — N25.81 SECONDARY HYPERPARATHYROIDISM OF RENAL ORIGIN: ICD-10-CM

## 2018-11-15 DIAGNOSIS — Z95.1 S/P CABG X 4: ICD-10-CM

## 2018-11-15 DIAGNOSIS — I27.20 PULMONARY HYPERTENSION: ICD-10-CM

## 2018-11-15 DIAGNOSIS — I10 ESSENTIAL HYPERTENSION: Chronic | ICD-10-CM

## 2018-11-15 DIAGNOSIS — N18.30 CHRONIC KIDNEY DISEASE, STAGE III (MODERATE): ICD-10-CM

## 2018-11-15 DIAGNOSIS — G47.33 OSA ON CPAP: ICD-10-CM

## 2018-11-15 DIAGNOSIS — J44.9 CHRONIC OBSTRUCTIVE PULMONARY DISEASE, UNSPECIFIED COPD TYPE: ICD-10-CM

## 2018-11-15 DIAGNOSIS — E83.52 HYPERCALCEMIA: ICD-10-CM

## 2018-11-15 DIAGNOSIS — M1A.09X0 IDIOPATHIC CHRONIC GOUT OF MULTIPLE SITES WITHOUT TOPHUS: ICD-10-CM

## 2018-11-15 DIAGNOSIS — I87.2 VENOUS STASIS DERMATITIS OF BOTH LOWER EXTREMITIES: ICD-10-CM

## 2018-11-15 DIAGNOSIS — N28.1 COMPLEX RENAL CYST: ICD-10-CM

## 2018-11-15 DIAGNOSIS — E87.6 HYPOKALEMIA: ICD-10-CM

## 2018-11-15 DIAGNOSIS — I51.89 DIASTOLIC DYSFUNCTION: ICD-10-CM

## 2018-11-15 DIAGNOSIS — Z95.5 S/P CORONARY ARTERY STENT PLACEMENT: ICD-10-CM

## 2018-11-15 DIAGNOSIS — N18.31 CHRONIC KIDNEY DISEASE (CKD) STAGE G3A/A1, MODERATELY DECREASED GLOMERULAR FILTRATION RATE (GFR) BETWEEN 45-59 ML/MIN/1.73 SQUARE METER AND ALBUMINURIA CREATININE RATIO LESS THAN 30 MG/G: ICD-10-CM

## 2018-11-15 DIAGNOSIS — I25.810 CORONARY ARTERY DISEASE INVOLVING CORONARY BYPASS GRAFT OF NATIVE HEART WITHOUT ANGINA PECTORIS: Primary | Chronic | ICD-10-CM

## 2018-11-15 DIAGNOSIS — E78.5 HYPERLIPIDEMIA WITH TARGET LDL LESS THAN 70: ICD-10-CM

## 2018-11-15 DIAGNOSIS — I50.32 CHRONIC DIASTOLIC CONGESTIVE HEART FAILURE: ICD-10-CM

## 2018-11-15 DIAGNOSIS — R60.0 BILATERAL EDEMA OF LOWER EXTREMITY: ICD-10-CM

## 2018-11-15 DIAGNOSIS — I75.023 CHOLESTEROL EMBOLISM OF LOWER EXTREMITY, BILATERAL: ICD-10-CM

## 2018-11-15 PROCEDURE — 76770 US EXAM ABDO BACK WALL COMP: CPT | Mod: 26,,, | Performed by: RADIOLOGY

## 2018-11-15 PROCEDURE — 76770 US EXAM ABDO BACK WALL COMP: CPT | Mod: TC

## 2018-11-15 PROCEDURE — 99999 PR PBB SHADOW E&M-EST. PATIENT-LVL III: CPT | Mod: PBBFAC,,, | Performed by: INTERNAL MEDICINE

## 2018-11-15 PROCEDURE — 1100F PTFALLS ASSESS-DOCD GE2>/YR: CPT | Mod: CPTII,S$GLB,, | Performed by: INTERNAL MEDICINE

## 2018-11-15 PROCEDURE — 3288F FALL RISK ASSESSMENT DOCD: CPT | Mod: CPTII,S$GLB,, | Performed by: INTERNAL MEDICINE

## 2018-11-15 PROCEDURE — 3074F SYST BP LT 130 MM HG: CPT | Mod: CPTII,S$GLB,, | Performed by: INTERNAL MEDICINE

## 2018-11-15 PROCEDURE — 99214 OFFICE O/P EST MOD 30 MIN: CPT | Mod: S$GLB,,, | Performed by: INTERNAL MEDICINE

## 2018-11-15 PROCEDURE — 3079F DIAST BP 80-89 MM HG: CPT | Mod: CPTII,S$GLB,, | Performed by: INTERNAL MEDICINE

## 2018-11-15 RX ORDER — OMEPRAZOLE 20 MG/1
20 CAPSULE, DELAYED RELEASE ORAL DAILY
COMMUNITY
End: 2019-02-04

## 2018-11-15 NOTE — PROGRESS NOTES
Subjective:   Patient ID:  Bo Chase is a 73 y.o. male who presents for follow up of Follow-up      72 yo male, came in for worsening SOB. Last fu with Dr. Moreno in .  Hx of CAD, s/p CABGx4 1996, s/p cath 03/2012 occluded SVG to diag, HTN, HLP, DM, morbid obesity, sleep apnea on CPAP, hypothyroidism.    He was admitted to [facility] 08/2014 for NSTEMI, planned to have LHC test but he signed out AMA.    Had LHC 12/29/2014 s/p PCI of SVG to OM1.  8/31/2016 negative nuclear stress test for ischemia.    ECho normal EF    Pt states that he f/u with cardiologist at [facility] in the past two yr. Negative exerrcise stress test 8 weeks ago at [facility].   Recent Dx of melanoma on nose and right chest pain. S/p chest tumoe early .   Recently dyspnea worse recently. With chest tightness. Sleep with CPAP.  Chronic leg swelling.  In  off Metolazone by himself and continued Lasix, then gained 10 punds. Now on Torsemide 20 mg bid and Metolazone 5 mg twice a week.  Use abuteral 3 times a week.  BNP 54 and Cr stable  Today. States that his symptom improve after took PPI         Past Medical History:   Diagnosis Date    Acute coronary syndrome     CHF (congestive heart failure)     Chronic kidney disease     Coronary artery disease     Hyperlipidemia     Hypertension     Lumbar spondylosis     Sleep apnea        Past Surgical History:   Procedure Laterality Date    back surgary      CARDIAC CATHETERIZATION  3/6/2012    CORONARY ANGIOPLASTY      CORONARY ARTERY BYPASS GRAFT  1996    x4    HEART CATH-LEFT Left 12/29/2014    Performed [facility] Joel [facility]    throid lobectomy  4/2012    umbilicial hernia         Social History     Tobacco Use    Smoking status: Never Smoker    Smokeless tobacco: Never Used   Substance Use Topics    Alcohol use: Yes     Comment: 6 BEERS A YEAR     Drug use: No       Family History   Problem Relation Age of Onset    Heart disease Father          Review of  Systems   Constitution: Positive for malaise/fatigue. Negative for decreased appetite, diaphoresis, fever, weakness and night sweats.   HENT: Negative for nosebleeds.    Eyes: Negative for blurred vision and double vision.   Cardiovascular: Positive for dyspnea on exertion and leg swelling. Negative for chest pain, claudication, irregular heartbeat, near-syncope, orthopnea, palpitations, paroxysmal nocturnal dyspnea and syncope.   Respiratory: Negative for cough, shortness of breath, sleep disturbances due to breathing, snoring, sputum production and wheezing.    Endocrine: Negative for cold intolerance and polyuria.   Hematologic/Lymphatic: Does not bruise/bleed easily.   Skin: Negative for rash.   Musculoskeletal: Negative for back pain, falls, joint pain, joint swelling and neck pain.   Gastrointestinal: Negative for abdominal pain, heartburn, nausea and vomiting.   Genitourinary: Negative for dysuria, frequency and hematuria.   Neurological: Negative for difficulty with concentration, dizziness, focal weakness, headaches, light-headedness, numbness and seizures.   Psychiatric/Behavioral: Negative for depression, memory loss and substance abuse. The patient does not have insomnia.    Allergic/Immunologic: Negative for HIV exposure and hives.       Objective:   Physical Exam   Constitutional: He is oriented to person, place, and time. He appears well-nourished.   HENT:   Head: Normocephalic.   Eyes: Pupils are equal, round, and reactive to light.   Neck: Normal carotid pulses and no JVD present. Carotid bruit is not present. No thyromegaly present.   Cardiovascular: Normal rate, regular rhythm, normal heart sounds and normal pulses.  No extrasystoles are present. PMI is not displaced. Exam reveals no gallop and no S3.   No murmur heard.  Pulmonary/Chest: Breath sounds normal. No stridor. No respiratory distress.   Abdominal: Soft. Bowel sounds are normal. There is no tenderness. There is no rebound.    Musculoskeletal: Normal range of motion.   Neurological: He is alert and oriented to person, place, and time.   Skin: Skin is intact. No rash noted.   hyperpigmentation   Psychiatric: His behavior is normal.       Lab Results   Component Value Date    CHOL 150 08/31/2016    CHOL 152 09/09/2015    CHOL 137 12/19/2014     Lab Results   Component Value Date    HDL 29 (L) 08/31/2016    HDL 28 (L) 09/09/2015    HDL 29 (L) 12/19/2014     Lab Results   Component Value Date    LDLCALC 72.8 08/31/2016    LDLCALC 82.6 09/09/2015    LDLCALC 70.8 12/19/2014     Lab Results   Component Value Date    TRIG 241 (H) 08/31/2016    TRIG 207 (H) 09/09/2015    TRIG 186 (H) 12/19/2014     Lab Results   Component Value Date    CHOLHDL 19.3 (L) 08/31/2016    CHOLHDL 18.4 (L) 09/09/2015    CHOLHDL 21.2 12/19/2014       Chemistry        Component Value Date/Time     (L) 11/15/2018 1015    K 3.5 11/15/2018 1015    CL 87 (L) 11/15/2018 1015    CO2 34 (H) 11/15/2018 1015    BUN 57 (H) 11/15/2018 1015    CREATININE 2.1 (H) 11/15/2018 1015     (H) 11/15/2018 1015        Component Value Date/Time    CALCIUM 10.5 11/15/2018 1015    ALKPHOS 77 11/08/2018 1400    AST 48 (H) 11/08/2018 1400    ALT 62 (H) 11/08/2018 1400    BILITOT 0.6 11/08/2018 1400    ESTGFRAFRICA 35.3 (A) 11/15/2018 1015    EGFRNONAA 30.5 (A) 11/15/2018 1015          Lab Results   Component Value Date    HGBA1C 6.0 08/27/2014     Lab Results   Component Value Date    TSH 1.321 07/12/2017     Lab Results   Component Value Date    INR 1.1 07/25/2016    INR 1.1 12/19/2014    INR 1.1 03/01/2012     Lab Results   Component Value Date    WBC 14.60 (H) 11/15/2018    HGB 16.8 11/15/2018    HCT 50.5 11/15/2018    MCV 86 11/15/2018     11/15/2018     BMP  Sodium   Date Value Ref Range Status   11/15/2018 133 (L) 136 - 145 mmol/L Final     Potassium   Date Value Ref Range Status   11/15/2018 3.5 3.5 - 5.1 mmol/L Final     Chloride   Date Value Ref Range Status    11/15/2018 87 (L) 95 - 110 mmol/L Final     CO2   Date Value Ref Range Status   11/15/2018 34 (H) 23 - 29 mmol/L Final     BUN, Bld   Date Value Ref Range Status   11/15/2018 57 (H) 8 - 23 mg/dL Final     Creatinine   Date Value Ref Range Status   11/15/2018 2.1 (H) 0.5 - 1.4 mg/dL Final     Calcium   Date Value Ref Range Status   11/15/2018 10.5 8.7 - 10.5 mg/dL Final     Anion Gap   Date Value Ref Range Status   11/15/2018 12 8 - 16 mmol/L Final     eGFR if    Date Value Ref Range Status   11/15/2018 35.3 (A) >60 mL/min/1.73 m^2 Final     eGFR if non    Date Value Ref Range Status   11/15/2018 30.5 (A) >60 mL/min/1.73 m^2 Final     Comment:     Calculation used to obtain the estimated glomerular filtration  rate (eGFR) is the CKD-EPI equation.        BNP  @LABRCNTIP(BNP,BNPTRIAGEBLO)@  @LABRCNTIP(troponini)@  Estimated Creatinine Clearance: 40.3 mL/min (A) (based on SCr of 2.1 mg/dL (H)).  No results found in the last 24 hours.  No results found in the last 24 hours.  No results found in the last 24 hours.    Assessment:      1. Coronary artery disease involving coronary bypass graft of native heart without angina pectoris    2. Chronic diastolic congestive heart failure    3. Essential hypertension    4. Hyperlipidemia with target LDL less than 70    5. S/P CABG x 4    6. S/P coronary artery stent placement    7. Chronic kidney disease, stage III (moderate)    8. Chronic obstructive pulmonary disease, unspecified COPD type    9. BMI 39.0-39.9,adult    10. LEANDRO on CPAP        Plan:   Continue Torsemide 20 mg bid   Mtolazone 5mg prn  Daily weight. Please call the office if gain 3 pounds in 1 day or 5 pounds in 1 week.  Fluid restriction 1.5 liters a day  Na< 2 gm  Continue ASA, lipitor. Aldactone. Hydra;lazine, isosorbide, and BB  RTC in 4 months

## 2018-11-26 ENCOUNTER — TELEPHONE (OUTPATIENT)
Dept: CARDIOLOGY | Facility: CLINIC | Age: 73
End: 2018-11-26

## 2018-11-26 NOTE — TELEPHONE ENCOUNTER
Spoke with pt with lab results.  Pt verbalized understanding.      ----- Message from Wendy Nuno sent at 11/26/2018 12:14 PM CST -----  Patient returning call..[phone]

## 2018-12-19 ENCOUNTER — INFUSION (OUTPATIENT)
Dept: INFUSION THERAPY | Facility: HOSPITAL | Age: 73
End: 2018-12-19
Attending: INTERNAL MEDICINE
Payer: MEDICARE

## 2018-12-19 VITALS
DIASTOLIC BLOOD PRESSURE: 75 MMHG | RESPIRATION RATE: 18 BRPM | OXYGEN SATURATION: 93 % | SYSTOLIC BLOOD PRESSURE: 114 MMHG | HEART RATE: 77 BPM | TEMPERATURE: 99 F

## 2018-12-19 DIAGNOSIS — D75.1 ACQUIRED POLYCYTHEMIA: Primary | ICD-10-CM

## 2018-12-19 PROCEDURE — 99195 PHLEBOTOMY: CPT

## 2018-12-19 RX ORDER — LIDOCAINE HYDROCHLORIDE 10 MG/ML
1 INJECTION, SOLUTION EPIDURAL; INFILTRATION; INTRACAUDAL; PERINEURAL ONCE
Status: CANCELLED | OUTPATIENT
Start: 2018-12-19 | End: 2018-12-19

## 2018-12-19 NOTE — DISCHARGE INSTRUCTIONS
[facility]  [address]  [phone] phone     [phone] fax  Hours of Operation: Monday- Friday 8:00am- 5:00pm  After hours phone  [phone]  Hematology / Oncology Physicians on call      Dr. Kwabena Edgar                        Please call with any concerns regarding your appointment today.

## 2018-12-19 NOTE — NURSING
1 unit therapeutic phlebotomy performed via__R_AC then discarded, pressure dressing applied.  Pt refused juice or snack but drank water.  Blood pressure 114/75______ upon completion.  Pt denies any complaints of dizziness, lightheadedness, or faintness.  Pt to return to clinic on __1/31/2019___.  Patient ambulated out without difficulty.

## 2018-12-21 NOTE — TELEPHONE ENCOUNTER
----- Message from Brandy Linn sent at 2/21/2018 11:03 AM CST -----  Contact: pt  Please call pt @ [phone] regarding new orders for lab for appt on 3/26   Topical Clindamycin Pregnancy And Lactation Text: This medication is Pregnancy Category B and is considered safe during pregnancy. It is unknown if it is excreted in breast milk. Ketoconazole Counseling:   Patient counseled regarding improving absorption with orange juice.  Adverse effects include but are not limited to breast enlargement, headache, diarrhea, nausea, upset stomach, liver function test abnormalities, taste disturbance, and stomach pain.  There is a rare possibility of liver failure that can occur when taking ketoconazole. The patient understands that monitoring of LFTs may be required, especially at baseline. The patient verbalized understanding of the proper use and possible adverse effects of ketoconazole.  All of the patient's questions and concerns were addressed. Eucrisa Counseling: Patient may experience a mild burning sensation during topical application. Eucrisa is not approved in children less than 2 years of age. Azathioprine Counseling:  I discussed with the patient the risks of azathioprine including but not limited to myelosuppression, immunosuppression, hepatotoxicity, lymphoma, and infections.  The patient understands that monitoring is required including baseline LFTs, Creatinine, possible TPMP genotyping and weekly CBCs for the first month and then every 2 weeks thereafter.  The patient verbalized understanding of the proper use and possible adverse effects of azathioprine.  All of the patient's questions and concerns were addressed. Valtrex Counseling: I discussed with the patient the risks of valacyclovir including but not limited to kidney damage, nausea, vomiting and severe allergy.  The patient understands that if the infection seems to be worsening or is not improving, they are to call. Erythromycin Counseling:  I discussed with the patient the risks of erythromycin including but not limited to GI upset, allergic reaction, drug rash, diarrhea, increase in liver enzymes, and yeast infections. Hydroxyzine Pregnancy And Lactation Text: This medication is not safe during pregnancy and should not be taken. It is also excreted in breast milk and breast feeding isn't recommended. Stelara Pregnancy And Lactation Text: This medication is Pregnancy Category B and is considered safe during pregnancy. It is unknown if this medication is excreted in breast milk. Colchicine Counseling:  Patient counseled regarding adverse effects including but not limited to stomach upset (nausea, vomiting, stomach pain, or diarrhea).  Patient instructed to limit alcohol consumption while taking this medication.  Colchicine may reduce blood counts especially with prolonged use.  The patient understands that monitoring of kidney function and blood counts may be required, especially at baseline. The patient verbalized understanding of the proper use and possible adverse effects of colchicine.  All of the patient's questions and concerns were addressed. Acitretin Counseling:  I discussed with the patient the risks of acitretin including but not limited to hair loss, dry lips/skin/eyes, liver damage, hyperlipidemia, depression/suicidal ideation, photosensitivity.  Serious rare side effects can include but are not limited to pancreatitis, pseudotumor cerebri, bony changes, clot formation/stroke/heart attack.  Patient understands that alcohol is contraindicated since it can result in liver toxicity and significantly prolong the elimination of the drug by many years. Tetracycline Counseling: Patient counseled regarding possible photosensitivity and increased risk for sunburn.  Patient instructed to avoid sunlight, if possible.  When exposed to sunlight, patients should wear protective clothing, sunglasses, and sunscreen.  The patient was instructed to call the office immediately if the following severe adverse effects occur:  hearing changes, easy bruising/bleeding, severe headache, or vision changes.  The patient verbalized understanding of the proper use and possible adverse effects of tetracycline.  All of the patient's questions and concerns were addressed. Patient understands to avoid pregnancy while on therapy due to potential birth defects. Cyclophosphamide Pregnancy And Lactation Text: This medication is Pregnancy Category D and it isn't considered safe during pregnancy. This medication is excreted in breast milk. Oxybutynin Counseling:  I discussed with the patient the risks of oxybutynin including but not limited to skin rash, drowsiness, dry mouth, difficulty urinating, and blurred vision. Cimzia Counseling:  I discussed with the patient the risks of Cimzia including but not limited to immunosuppression, allergic reactions and infections.  The patient understands that monitoring is required including a PPD at baseline and must alert us or the primary physician if symptoms of infection or other concerning signs are noted. Cyclophosphamide Counseling:  I discussed with the patient the risks of cyclophosphamide including but not limited to hair loss, hormonal abnormalities, decreased fertility, abdominal pain, diarrhea, nausea and vomiting, bone marrow suppression and infection. The patient understands that monitoring is required while taking this medication. Rifampin Pregnancy And Lactation Text: This medication is Pregnancy Category C and it isn't know if it is safe during pregnancy. It is also excreted in breast milk and should not be used if you are breast feeding. Clofazimine Pregnancy And Lactation Text: This medication is Pregnancy Category C and isn't considered safe during pregnancy. It is excreted in breast milk. Glycopyrrolate Pregnancy And Lactation Text: This medication is Pregnancy Category B and is considered safe during pregnancy. It is unknown if it is excreted breast milk. Benzoyl Peroxide Counseling: Patient counseled that medicine may cause skin irritation and bleach clothing.  In the event of skin irritation, the patient was advised to reduce the amount of the drug applied or use it less frequently.   The patient verbalized understanding of the proper use and possible adverse effects of benzoyl peroxide.  All of the patient's questions and concerns were addressed. Hydroxyzine Counseling: Patient advised that the medication is sedating and not to drive a car after taking this medication.  Patient informed of potential adverse effects including but not limited to dry mouth, urinary retention, and blurry vision.  The patient verbalized understanding of the proper use and possible adverse effects of hydroxyzine.  All of the patient's questions and concerns were addressed. Azithromycin Counseling:  I discussed with the patient the risks of azithromycin including but not limited to GI upset, allergic reaction, drug rash, diarrhea, and yeast infections. Picato Pregnancy And Lactation Text: This medication is Pregnancy Category C. It is unknown if this medication is excreted in breast milk. Tetracycline Pregnancy And Lactation Text: This medication is Pregnancy Category D and not consider safe during pregnancy. It is also excreted in breast milk. Acitretin Pregnancy And Lactation Text: This medication is Pregnancy Category X and should not be given to women who are pregnant or may become pregnant in the future. This medication is excreted in breast milk. Ketoconazole Pregnancy And Lactation Text: This medication is Pregnancy Category C and it isn't know if it is safe during pregnancy. It is also excreted in breast milk and breast feeding isn't recommended. Birth Control Pills Counseling: Birth Control Pill Counseling: I discussed with the patient the potential side effects of OCPs including but not limited to increased risk of stroke, heart attack, thrombophlebitis, deep venous thrombosis, hepatic adenomas, breast changes, GI upset, headaches, and depression.  The patient verbalized understanding of the proper use and possible adverse effects of OCPs. All of the patient's questions and concerns were addressed. Cyclosporine Counseling:  I discussed with the patient the risks of cyclosporine including but not limited to hypertension, gingival hyperplasia,myelosuppression, immunosuppression, liver damage, kidney damage, neurotoxicity, lymphoma, and serious infections. The patient understands that monitoring is required including baseline blood pressure, CBC, CMP, lipid panel and uric acid, and then 1-2 times monthly CMP and blood pressure. Bactrim Counseling:  I discussed with the patient the risks of sulfa antibiotics including but not limited to GI upset, allergic reaction, drug rash, diarrhea, dizziness, photosensitivity, and yeast infections.  Rarely, more serious reactions can occur including but not limited to aplastic anemia, agranulocytosis, methemoglobinemia, blood dyscrasias, liver or kidney failure, lung infiltrates or desquamative/blistering drug rashes. Azathioprine Pregnancy And Lactation Text: This medication is Pregnancy Category D and isn't considered safe during pregnancy. It is unknown if this medication is excreted in breast milk. Carac Counseling:  I discussed with the patient the risks of Carac including but not limited to erythema, scaling, itching, weeping, crusting, and pain. Benzoyl Peroxide Pregnancy And Lactation Text: This medication is Pregnancy Category C. It is unknown if benzoyl peroxide is excreted in breast milk. Taltz Counseling: I discussed with the patient the risks of ixekizumab including but not limited to immunosuppression, serious infections, worsening of inflammatory bowel disease and drug reactions.  The patient understands that monitoring is required including a PPD at baseline and must alert us or the primary physician if symptoms of infection or other concerning signs are noted. Hydroxychloroquine Counseling:  I discussed with the patient that a baseline ophthalmologic exam is needed at the start of therapy and every year thereafter while on therapy. A CBC may also be warranted for monitoring.  The side effects of this medication were discussed with the patient, including but not limited to agranulocytosis, aplastic anemia, seizures, rashes, retinopathy, and liver toxicity. Patient instructed to call the office should any adverse effect occur.  The patient verbalized understanding of the proper use and possible adverse effects of Plaquenil.  All the patient's questions and concerns were addressed. Azithromycin Pregnancy And Lactation Text: This medication is considered safe during pregnancy and is also secreted in breast milk. Protopic Counseling: Patient may experience a mild burning sensation during topical application. Protopic is not approved in children less than 2 years of age. There have been case reports of hematologic and skin malignancies in patients using topical calcineurin inhibitors although causality is questionable. Eucrisa Pregnancy And Lactation Text: This medication has not been assigned a Pregnancy Risk Category but animal studies failed to show danger with the topical medication. It is unknown if the medication is excreted in breast milk. Infliximab Counseling:  I discussed with the patient the risks of infliximab including but not limited to myelosuppression, immunosuppression, autoimmune hepatitis, demyelinating diseases, lymphoma, and serious infections.  The patient understands that monitoring is required including a PPD at baseline and must alert us or the primary physician if symptoms of infection or other concerning signs are noted. Erythromycin Pregnancy And Lactation Text: This medication is Pregnancy Category B and is considered safe during pregnancy. It is also excreted in breast milk. Topical Sulfur Applications Counseling: Topical Sulfur Counseling: Patient counseled that this medication may cause skin irritation or allergic reactions.  In the event of skin irritation, the patient was advised to reduce the amount of the drug applied or use it less frequently.   The patient verbalized understanding of the proper use and possible adverse effects of topical sulfur application.  All of the patient's questions and concerns were addressed. Valtrex Pregnancy And Lactation Text: this medication is Pregnancy Category B and is considered safe during pregnancy. This medication is not directly found in breast milk but it's metabolite acyclovir is present. Include Pregnancy/Lactation Warning?: No Cimzia Pregnancy And Lactation Text: This medication crosses the placenta but can be considered safe in certain situations. Cimzia may be excreted in breast milk. Cyclosporine Pregnancy And Lactation Text: This medication is Pregnancy Category C and it isn't know if it is safe during pregnancy. This medication is excreted in breast milk. Birth Control Pills Pregnancy And Lactation Text: This medication should be avoided if pregnant and for the first 30 days post-partum. Carac Pregnancy And Lactation Text: This medication is Pregnancy Category X and contraindicated in pregnancy and in women who may become pregnant. It is unknown if this medication is excreted in breast milk. Dapsone Counseling: I discussed with the patient the risks of dapsone including but not limited to hemolytic anemia, agranulocytosis, rashes, methemoglobinemia, kidney failure, peripheral neuropathy, headaches, GI upset, and liver toxicity.  Patients who start dapsone require monitoring including baseline LFTs and weekly CBCs for the first month, then every month thereafter.  The patient verbalized understanding of the proper use and possible adverse effects of dapsone.  All of the patient's questions and concerns were addressed. Albendazole Counseling:  I discussed with the patient the risks of albendazole including but not limited to cytopenia, kidney damage, nausea/vomiting and severe allergy.  The patient understands that this medication is being used in an off-label manner. Bactrim Pregnancy And Lactation Text: This medication is Pregnancy Category D and is known to cause fetal risk.  It is also excreted in breast milk. Rituxan Counseling:  I discussed with the patient the risks of Rituxan infusions. Side effects can include infusion reactions, severe drug rashes including mucocutaneous reactions, reactivation of latent hepatitis and other infections and rarely progressive multifocal leukoencephalopathy.  All of the patient's questions and concerns were addressed. Solaraze Counseling:  I discussed with the patient the risks of Solaraze including but not limited to erythema, scaling, itching, weeping, crusting, and pain. Metronidazole Counseling:  I discussed with the patient the risks of metronidazole including but not limited to seizures, nausea/vomiting, a metallic taste in the mouth, nausea/vomiting and severe allergy. Protopic Pregnancy And Lactation Text: This medication is Pregnancy Category C. It is unknown if this medication is excreted in breast milk when applied topically. Hydroquinone Counseling:  Patient advised that medication may result in skin irritation, lightening (hypopigmentation), dryness, and burning.  In the event of skin irritation, the patient was advised to reduce the amount of the drug applied or use it less frequently.  Rarely, spots that are treated with hydroquinone can become darker (pseudoochronosis).  Should this occur, patient instructed to stop medication and call the office. The patient verbalized understanding of the proper use and possible adverse effects of hydroquinone.  All of the patient's questions and concerns were addressed. Cellcept Counseling:  I discussed with the patient the risks of mycophenolate mofetil including but not limited to infection/immunosuppression, GI upset, hypokalemia, hypercholesterolemia, bone marrow suppression, lymphoproliferative disorders, malignancy, GI ulceration/bleed/perforation, colitis, interstitial lung disease, kidney failure, progressive multifocal leukoencephalopathy, and birth defects.  The patient understands that monitoring is required including a baseline creatinine and regular CBC testing. In addition, patient must alert us immediately if symptoms of infection or other concerning signs are noted. Cosentyx Counseling:  I discussed with the patient the risks of Cosentyx including but not limited to worsening of Crohn's disease, immunosuppression, allergic reactions and infections.  The patient understands that monitoring is required including a PPD at baseline and must alert us or the primary physician if symptoms of infection or other concerning signs are noted. Hydroxychloroquine Pregnancy And Lactation Text: This medication has been shown to cause fetal harm but it isn't assigned a Pregnancy Risk Category. There are small amounts excreted in breast milk. Terbinafine Counseling: Patient counseling regarding adverse effects of terbinafine including but not limited to headache, diarrhea, rash, upset stomach, liver function test abnormalities, itching, taste/smell disturbance, nausea, abdominal pain, and flatulence.  There is a rare possibility of liver failure that can occur when taking terbinafine.  The patient understands that a baseline LFT and kidney function test may be required. The patient verbalized understanding of the proper use and possible adverse effects of terbinafine.  All of the patient's questions and concerns were addressed. Bexarotene Counseling:  I discussed with the patient the risks of bexarotene including but not limited to hair loss, dry lips/skin/eyes, liver abnormalities, hyperlipidemia, pancreatitis, depression/suicidal ideation, photosensitivity, drug rash/allergic reactions, hypothyroidism, anemia, leukopenia, infection, cataracts, and teratogenicity.  Patient understands that they will need regular blood tests to check lipid profile, liver function tests, white blood cell count, thyroid function tests and pregnancy test if applicable. Topical Sulfur Applications Pregnancy And Lactation Text: This medication is Pregnancy Category C and has an unknown safety profile during pregnancy. It is unknown if this topical medication is excreted in breast milk. Taltz Pregnancy And Lactation Text: The risk during pregnancy and breastfeeding is uncertain with this medication. Fluconazole Counseling:  Patient counseled regarding adverse effects of fluconazole including but not limited to headache, diarrhea, nausea, upset stomach, liver function test abnormalities, taste disturbance, and stomach pain.  There is a rare possibility of liver failure that can occur when taking fluconazole.  The patient understands that monitoring of LFTs and kidney function test may be required, especially at baseline. The patient verbalized understanding of the proper use and possible adverse effects of fluconazole.  All of the patient's questions and concerns were addressed. 5-Fu Counseling: 5-Fluorouracil Counseling:  I discussed with the patient the risks of 5-fluorouracil including but not limited to erythema, scaling, itching, weeping, crusting, and pain. Solaraze Pregnancy And Lactation Text: This medication is Pregnancy Category B and is considered safe. There is some data to suggest avoiding during the third trimester. It is unknown if this medication is excreted in breast milk. Spironolactone Counseling: Patient advised regarding risks of diarrhea, abdominal pain, hyperkalemia, birth defects (for female patients), liver toxicity and renal toxicity. The patient may need blood work to monitor liver and kidney function and potassium levels while on therapy. The patient verbalized understanding of the proper use and possible adverse effects of spironolactone.  All of the patient's questions and concerns were addressed. Cephalexin Counseling: I counseled the patient regarding use of cephalexin as an antibiotic for prophylactic and/or therapeutic purposes. Cephalexin (commonly prescribed under brand name Keflex) is a cephalosporin antibiotic which is active against numerous classes of bacteria, including most skin bacteria. Side effects may include nausea, diarrhea, gastrointestinal upset, rash, hives, yeast infections, and in rare cases, hepatitis, kidney disease, seizures, fever, confusion, neurologic symptoms, and others. Patients with severe allergies to penicillin medications are cautioned that there is about a 10% incidence of cross-reactivity with cephalosporins. When possible, patients with penicillin allergies should use alternatives to cephalosporins for antibiotic therapy. Imiquimod Counseling:  I discussed with the patient the risks of imiquimod including but not limited to erythema, scaling, itching, weeping, crusting, and pain.  Patient understands that the inflammatory response to imiquimod is variable from person to person and was educated regarded proper titration schedule.  If flu-like symptoms develop, patient knows to discontinue the medication and contact us. Rituxan Pregnancy And Lactation Text: This medication is Pregnancy Category C and it isn't know if it is safe during pregnancy. It is unknown if this medication is excreted in breast milk but similar antibodies are known to be excreted. Minocycline Counseling: Patient advised regarding possible photosensitivity and discoloration of the teeth, skin, lips, tongue and gums.  Patient instructed to avoid sunlight, if possible.  When exposed to sunlight, patients should wear protective clothing, sunglasses, and sunscreen.  The patient was instructed to call the office immediately if the following severe adverse effects occur:  hearing changes, easy bruising/bleeding, severe headache, or vision changes.  The patient verbalized understanding of the proper use and possible adverse effects of minocycline.  All of the patient's questions and concerns were addressed. Dupixent Counseling: I discussed with the patient the risks of dupilumab including but not limited to eye infection and irritation, cold sores, injection site reactions, worsening of asthma, allergic reactions and increased risk of parasitic infection.  Live vaccines should be avoided while taking dupilumab. Dupilumab will also interact with certain medications such as warfarin and cyclosporine. The patient understands that monitoring is required and they must alert us or the primary physician if symptoms of infection or other concerning signs are noted. Zyclara Counseling:  I discussed with the patient the risks of imiquimod including but not limited to erythema, scaling, itching, weeping, crusting, and pain.  Patient understands that the inflammatory response to imiquimod is variable from person to person and was educated regarded proper titration schedule.  If flu-like symptoms develop, patient knows to discontinue the medication and contact us. Terbinafine Pregnancy And Lactation Text: This medication is Pregnancy Category B and is considered safe during pregnancy. It is also excreted in breast milk and breast feeding isn't recommended. Albendazole Pregnancy And Lactation Text: This medication is Pregnancy Category C and it isn't known if it is safe during pregnancy. It is also excreted in breast milk. Metronidazole Pregnancy And Lactation Text: This medication is Pregnancy Category B and considered safe during pregnancy.  It is also excreted in breast milk. Tremfya Counseling: I discussed with the patient the risks of guselkumab including but not limited to immunosuppression, serious infections, worsening of inflammatory bowel disease and drug reactions.  The patient understands that monitoring is required including a PPD at baseline and must alert us or the primary physician if symptoms of infection or other concerning signs are noted. Dapsone Pregnancy And Lactation Text: This medication is Pregnancy Category C and is not considered safe during pregnancy or breast feeding. Bexarotene Pregnancy And Lactation Text: This medication is Pregnancy Category X and should not be given to women who are pregnant or may become pregnant. This medication should not be used if you are breast feeding. Nsaids Counseling: NSAID Counseling: I discussed with the patient that NSAIDs should be taken with food. Prolonged use of NSAIDs can result in the development of stomach ulcers.  Patient advised to stop taking NSAIDs if abdominal pain occurs.  The patient verbalized understanding of the proper use and possible adverse effects of NSAIDs.  All of the patient's questions and concerns were addressed. Methotrexate Counseling:  Patient counseled regarding adverse effects of methotrexate including but not limited to nausea, vomiting, abnormalities in liver function tests. Patients may develop mouth sores, rash, diarrhea, and abnormalities in blood counts. The patient understands that monitoring is required including LFT's and blood counts.  There is a rare possibility of scarring of the liver and lung problems that can occur when taking methotrexate. Persistent nausea, loss of appetite, pale stools, dark urine, cough, and shortness of breath should be reported immediately. Patient advised to discontinue methotrexate treatment at least three months before attempting to become pregnant.  I discussed the need for folate supplements while taking methotrexate.  These supplements can decrease side effects during methotrexate treatment. The patient verbalized understanding of the proper use and possible adverse effects of methotrexate.  All of the patient's questions and concerns were addressed. Cimetidine Counseling:  I discussed with the patient the risks of Cimetidine including but not limited to gynecomastia, headache, diarrhea, nausea, drowsiness, arrhythmias, pancreatitis, skin rashes, psychosis, bone marrow suppression and kidney toxicity. Topical Retinoid counseling:  Patient advised to apply a pea-sized amount only at bedtime and wait 30 minutes after washing their face before applying.  If too drying, patient may add a non-comedogenic moisturizer. The patient verbalized understanding of the proper use and possible adverse effects of retinoids.  All of the patient's questions and concerns were addressed. Fluconazole Pregnancy And Lactation Text: This medication is Pregnancy Category C and it isn't know if it is safe during pregnancy. It is also excreted in breast milk. Odomzo Counseling- I discussed with the patient the risks of Odomzo including but not limited to nausea, vomiting, diarrhea, constipation, weight loss, changes in the sense of taste, decreased appetite, muscle spasms, and hair loss.  The patient verbalized understanding of the proper use and possible adverse effects of Odomzo.  All of the patient's questions and concerns were addressed. Dupixent Pregnancy And Lactation Text: This medication likely crosses the placenta but the risk for the fetus is uncertain. This medication is excreted in breast milk. Xeljanz Counseling: I discussed with the patient the risks of Xeljanz therapy including increased risk of infection, liver issues, headache, diarrhea, or cold symptoms. Live vaccines should be avoided. They were instructed to call if they have any problems. Isotretinoin Pregnancy And Lactation Text: This medication is Pregnancy Category X and is considered extremely dangerous during pregnancy. It is unknown if it is excreted in breast milk. Ivermectin Counseling:  Patient instructed to take medication on an empty stomach with a full glass of water.  Patient informed of potential adverse effects including but not limited to nausea, diarrhea, dizziness, itching, and swelling of the extremities or lymph nodes.  The patient verbalized understanding of the proper use and possible adverse effects of ivermectin.  All of the patient's questions and concerns were addressed. Isotretinoin Counseling: Patient should get monthly blood tests, not donate blood, not drive at night if vision affected, not share medication, and not undergo elective surgery for 6 months after tx completed. Side effects reviewed, pt to contact office should one occur. Siliq Counseling:  I discussed with the patient the risks of Siliq including but not limited to new or worsening depression, suicidal thoughts and behavior, immunosuppression, malignancy, posterior leukoencephalopathy syndrome, and serious infections.  The patient understands that monitoring is required including a PPD at baseline and must alert us or the primary physician if symptoms of infection or other concerning signs are noted. There is also a special program designed to monitor depression which is required with Siliq. Nsaids Pregnancy And Lactation Text: These medications are considered safe up to 30 weeks gestation. It is excreted in breast milk. Spironolactone Pregnancy And Lactation Text: This medication can cause feminization of the male fetus and should be avoided during pregnancy. The active metabolite is also found in breast milk. Cephalexin Pregnancy And Lactation Text: This medication is Pregnancy Category B and considered safe during pregnancy.  It is also excreted in breast milk but can be used safely for shorter doses. Methotrexate Pregnancy And Lactation Text: This medication is Pregnancy Category X and is known to cause fetal harm. This medication is excreted in breast milk. Erivedge Counseling- I discussed with the patient the risks of Erivedge including but not limited to nausea, vomiting, diarrhea, constipation, weight loss, changes in the sense of taste, decreased appetite, muscle spasms, and hair loss.  The patient verbalized understanding of the proper use and possible adverse effects of Erivedge.  All of the patient's questions and concerns were addressed. Arava Counseling:  Patient counseled regarding adverse effects of Arava including but not limited to nausea, vomiting, abnormalities in liver function tests. Patients may develop mouth sores, rash, diarrhea, and abnormalities in blood counts. The patient understands that monitoring is required including LFTs and blood counts.  There is a rare possibility of scarring of the liver and lung problems that can occur when taking methotrexate. Persistent nausea, loss of appetite, pale stools, dark urine, cough, and shortness of breath should be reported immediately. Patient advised to discontinue Arava treatment and consult with a physician prior to attempting conception. The patient will have to undergo a treatment to eliminate Arava from the body prior to conception. Minoxidil Counseling: Minoxidil is a topical medication which can increase blood flow where it is applied. It is uncertain how this medication increases hair growth. Side effects are uncommon and include stinging and allergic reactions. Quinolones Counseling:  I discussed with the patient the risks of fluoroquinolones including but not limited to GI upset, allergic reaction, drug rash, diarrhea, dizziness, photosensitivity, yeast infections, liver function test abnormalities, tendonitis/tendon rupture. Detail Level: Zone Xelkelseyz Pregnancy And Lactation Text: This medication is Pregnancy Category D and is not considered safe during pregnancy.  The risk during breast feeding is also uncertain. Gabapentin Counseling: I discussed with the patient the risks of gabapentin including but not limited to dizziness, somnolence, fatigue and ataxia. High Dose Vitamin A Counseling: Side effects reviewed, pt to contact office should one occur. Odomzo Pregnancy And Lactation Text: This medication is Pregnancy Category X and is absolutely contraindicated during pregnancy. It is unknown if it is excreted in breast milk. Clindamycin Counseling: I counseled the patient regarding use of clindamycin as an antibiotic for prophylactic and/or therapeutic purposes. Clindamycin is active against numerous classes of bacteria, including skin bacteria. Side effects may include nausea, diarrhea, gastrointestinal upset, rash, hives, yeast infections, and in rare cases, colitis. SSKI Counseling:  I discussed with the patient the risks of SSKI including but not limited to thyroid abnormalities, metallic taste, GI upset, fever, headache, acne, arthralgias, paraesthesias, lymphadenopathy, easy bleeding, arrhythmias, and allergic reaction. Enbrel Counseling:  I discussed with the patient the risks of etanercept including but not limited to myelosuppression, immunosuppression, autoimmune hepatitis, demyelinating diseases, lymphoma, and infections.  The patient understands that monitoring is required including a PPD at baseline and must alert us or the primary physician if symptoms of infection or other concerning signs are noted. Drysol Counseling:  I discussed with the patient the risks of drysol/aluminum chloride including but not limited to skin rash, itching, irritation, burning. Griseofulvin Counseling:  I discussed with the patient the risks of griseofulvin including but not limited to photosensitivity, cytopenia, liver damage, nausea/vomiting and severe allergy.  The patient understands that this medication is best absorbed when taken with a fatty meal (e.g., ice cream or french fries). Prednisone Counseling:  I discussed with the patient the risks of prolonged use of prednisone including but not limited to weight gain, insomnia, osteoporosis, mood changes, diabetes, susceptibility to infection, glaucoma and high blood pressure.  In cases where prednisone use is prolonged, patients should be monitored with blood pressure checks, serum glucose levels and an eye exam.  Additionally, the patient may need to be placed on GI prophylaxis, PCP prophylaxis, and calcium and vitamin D supplementation and/or a bisphosphonate.  The patient verbalized understanding of the proper use and the possible adverse effects of prednisone.  All of the patient's questions and concerns were addressed. High Dose Vitamin A Pregnancy And Lactation Text: High dose vitamin A therapy is contraindicated during pregnancy and breast feeding. Otezla Counseling: The side effects of Otezla were discussed with the patient, including but not limited to worsening or new depression, weight loss, diarrhea, nausea, upper respiratory tract infection, and headache. Patient instructed to call the office should any adverse effect occur.  The patient verbalized understanding of the proper use and possible adverse effects of Otezla.  All the patient's questions and concerns were addressed. Doxycycline Counseling:  Patient counseled regarding possible photosensitivity and increased risk for sunburn.  Patient instructed to avoid sunlight, if possible.  When exposed to sunlight, patients should wear protective clothing, sunglasses, and sunscreen.  The patient was instructed to call the office immediately if the following severe adverse effects occur:  hearing changes, easy bruising/bleeding, severe headache, or vision changes.  The patient verbalized understanding of the proper use and possible adverse effects of doxycycline.  All of the patient's questions and concerns were addressed. Thalidomide Counseling: I discussed with the patient the risks of thalidomide including but not limited to birth defects, anxiety, weakness, chest pain, dizziness, cough and severe allergy. Elidel Counseling: Patient may experience a mild burning sensation during topical application. Elidel is not approved in children less than 2 years of age. There have been case reports of hematologic and skin malignancies in patients using topical calcineurin inhibitors although causality is questionable. Griseofulvin Pregnancy And Lactation Text: This medication is Pregnancy Category X and is known to cause serious birth defects. It is unknown if this medication is excreted in breast milk but breast feeding should be avoided. Tazorac Counseling:  Patient advised that medication is irritating and drying.  Patient may need to apply sparingly and wash off after an hour before eventually leaving it on overnight.  The patient verbalized understanding of the proper use and possible adverse effects of tazorac.  All of the patient's questions and concerns were addressed. Xolair Counseling:  Patient informed of potential adverse effects including but not limited to fever, muscle aches, rash and allergic reactions.  The patient verbalized understanding of the proper use and possible adverse effects of Xolair.  All of the patient's questions and concerns were addressed. Drysol Pregnancy And Lactation Text: This medication is considered safe during pregnancy and breast feeding. Sski Pregnancy And Lactation Text: This medication is Pregnancy Category D and isn't considered safe during pregnancy. It is excreted in breast milk. Clindamycin Pregnancy And Lactation Text: This medication can be used in pregnancy if certain situations. Clindamycin is also present in breast milk. Doxepin Counseling:  Patient advised that the medication is sedating and not to drive a car after taking this medication. Patient informed of potential adverse effects including but not limited to dry mouth, urinary retention, and blurry vision.  The patient verbalized understanding of the proper use and possible adverse effects of doxepin.  All of the patient's questions and concerns were addressed. Simponi Counseling:  I discussed with the patient the risks of golimumab including but not limited to myelosuppression, immunosuppression, autoimmune hepatitis, demyelinating diseases, lymphoma, and serious infections.  The patient understands that monitoring is required including a PPD at baseline and must alert us or the primary physician if symptoms of infection or other concerning signs are noted. Doxycycline Pregnancy And Lactation Text: This medication is Pregnancy Category D and not consider safe during pregnancy. It is also excreted in breast milk but is considered safe for shorter treatment courses. Glycopyrrolate Counseling:  I discussed with the patient the risks of glycopyrrolate including but not limited to skin rash, drowsiness, dry mouth, difficulty urinating, and blurred vision. Stelara Counseling:  I discussed with the patient the risks of ustekinumab including but not limited to immunosuppression, malignancy, posterior leukoencephalopathy syndrome, and serious infections.  The patient understands that monitoring is required including a PPD at baseline and must alert us or the primary physician if symptoms of infection or other concerning signs are noted. Topical Clindamycin Counseling: Patient counseled that this medication may cause skin irritation or allergic reactions.  In the event of skin irritation, the patient was advised to reduce the amount of the drug applied or use it less frequently.   The patient verbalized understanding of the proper use and possible adverse effects of clindamycin.  All of the patient's questions and concerns were addressed. Doxepin Pregnancy And Lactation Text: This medication is Pregnancy Category C and it isn't known if it is safe during pregnancy. It is also excreted in breast milk and breast feeding isn't recommended. Rifampin Counseling: I discussed with the patient the risks of rifampin including but not limited to liver damage, kidney damage, red-orange body fluids, nausea/vomiting and severe allergy. Tazorac Pregnancy And Lactation Text: This medication is not safe during pregnancy. It is unknown if this medication is excreted in breast milk. Humira Counseling:  I discussed with the patient the risks of adalimumab including but not limited to myelosuppression, immunosuppression, autoimmune hepatitis, demyelinating diseases, lymphoma, and serious infections.  The patient understands that monitoring is required including a PPD at baseline and must alert us or the primary physician if symptoms of infection or other concerning signs are noted. Picato Counseling:  I discussed with the patient the risks of Picato including but not limited to erythema, scaling, itching, weeping, crusting, and pain. Itraconazole Counseling:  I discussed with the patient the risks of itraconazole including but not limited to liver damage, nausea/vomiting, neuropathy, and severe allergy.  The patient understands that this medication is best absorbed when taken with acidic beverages such as non-diet cola or ginger ale.  The patient understands that monitoring is required including baseline LFTs and repeat LFTs at intervals.  The patient understands that they are to contact us or the primary physician if concerning signs are noted. Otezla Pregnancy And Lactation Text: This medication is Pregnancy Category C and it isn't known if it is safe during pregnancy. It is unknown if it is excreted in breast milk. Clofazimine Counseling:  I discussed with the patient the risks of clofazimine including but not limited to skin and eye pigmentation, liver damage, nausea/vomiting, gastrointestinal bleeding and allergy. Xolair Pregnancy And Lactation Text: This medication is Pregnancy Category B and is considered safe during pregnancy. This medication is excreted in breast milk.

## 2019-01-09 ENCOUNTER — TELEPHONE (OUTPATIENT)
Dept: RHEUMATOLOGY | Facility: CLINIC | Age: 74
End: 2019-01-09

## 2019-01-09 DIAGNOSIS — M10.472 ACUTE GOUT DUE TO OTHER SECONDARY CAUSE INVOLVING TOE OF LEFT FOOT: Primary | ICD-10-CM

## 2019-01-09 RX ORDER — COLCHICINE 0.6 MG/1
TABLET ORAL
Qty: 24 TABLET | Refills: 1 | Status: SHIPPED | OUTPATIENT
Start: 2019-01-09 | End: 2019-01-11 | Stop reason: SDUPTHER

## 2019-01-09 NOTE — TELEPHONE ENCOUNTER
----- Message from Sharlene Benedict sent at 1/9/2019  2:04 PM CST -----  1. What is the name of the medication you are requesting? Colehicine(last filled in 2017)  2. What is the dose? 0 .6mg  3. How do you take the medication? Orally, topically, etc? orally  4. How often do you take this medication? Does not know  5. Do you need a 30 day or 90 day supply? 30  6. How many refills are you requesting? 1  7. What is your preferred pharmacy and location of the pharmacy? [facility] in [place]  [phone]  8. Who can we contact with further questions? [phone]

## 2019-01-09 NOTE — TELEPHONE ENCOUNTER
----- Message from Bronson Lemon sent at 1/9/2019  9:26 AM CST -----  Contact: self  Pt hasn't taken medication over two years and rx is out of date. Please call back at [phone].    1. What is the name of the medication you are requesting? colchicine  2. What is the dose? .6mg  3. How do you take the medication? Orally, topically, etc? orally  4. How often do you take this medication? daily  5. Do you need a 30 day or 90 day supply? N/a  6. How many refills are you requesting? n/a  7. What is your preferred pharmacy and location of the pharmacy?   [facility]  [facility]  [place]     8. Who can we contact with further questions? Self    Thanks,  Bronson Lemon

## 2019-01-09 NOTE — TELEPHONE ENCOUNTER
Refilled colchicine   Max dose is 0.6 mg twice weekly with his ckd IV  Called pt and notified of correct dose  Only uses if gout attack occure  He understands max dose is twice a week

## 2019-01-11 DIAGNOSIS — M10.472 ACUTE GOUT DUE TO OTHER SECONDARY CAUSE INVOLVING TOE OF LEFT FOOT: ICD-10-CM

## 2019-01-12 RX ORDER — COLCHICINE 0.6 MG/1
TABLET ORAL
Qty: 90 TABLET | Refills: 1 | Status: SHIPPED | OUTPATIENT
Start: 2019-01-12 | End: 2019-01-18 | Stop reason: SDUPTHER

## 2019-01-14 ENCOUNTER — TELEPHONE (OUTPATIENT)
Dept: PULMONOLOGY | Facility: CLINIC | Age: 74
End: 2019-01-14

## 2019-01-14 NOTE — PROGRESS NOTES
"Subjective:       Patient ID: Bo Chase is a 73 y.o. male.    Chief Complaint: Sleep Apnea    Bo Chase is 73 years old  This a follow-up appointment: LAst visit:   No issues with PAP  He is treated with CPAP 10 cm water pressure  [place] score 10.  Records in EMR, recurrent bronchitis, cough, nasal congestion  Was treated with Zithromax  No fever currently, persistent cough and nasal congestion, used AFRIN in ast     I have reviewed the patient's medical history in detail and updated the computerized patient record.        Review of Systems   Constitutional: Negative.    HENT: Positive for rhinorrhea, sinus pressure and congestion.    Eyes: Negative.    Respiratory: Positive for cough. Negative for apnea, snoring, sputum production, shortness of breath, wheezing and use of rescue inhaler.    Cardiovascular: Negative.    Genitourinary: Negative.    Endocrine: endocrine negative   Musculoskeletal: Negative.    Skin: Negative.    Gastrointestinal: Negative.    Neurological: Negative.    Psychiatric/Behavioral: Negative.        Objective:       Vitals:    01/15/19 0957   BP: 116/76   Pulse: 80   Resp: 18   SpO2: 96%   Weight: 120.9 kg (266 lb 8 oz)   Height: 5' 6.9" (1.699 m)     Physical Exam   Constitutional: He is oriented to person, place, and time. He appears well-developed and well-nourished. He is obese.   HENT:   Head: Normocephalic.   Nose: Mucosal edema present.   Mouth/Throat: Oropharyngeal exudate present.   Neck: Neck supple.   Cardiovascular: Normal rate.   Pulmonary/Chest: Normal expansion and effort normal.   Abdominal: Soft.   Musculoskeletal: Normal range of motion.   Neurological: He is alert and oriented to person, place, and time.   Skin: Skin is warm.   Psychiatric: He has a normal mood and affect.   Nursing note and vitals reviewed.    Personal Diagnostic Review    [place]:  FEV1: 1.98L( 70.2%), FVC 2.70L( 72.6%)  FEV1/FVC 73    Normal spirometry    CXR  Stable linear areas of scarring and/or " subsegmental atelectasis in the mid lower lung fields.  No new or developing area of consolidation or effusion.  CP angles are clear and trachea midline.  Postsurgical changes CABG noted.  Heart size within normal limits and aorta tortuous.  Stable elevation right hemidiaphragm.  Stable appearance of the T-spine with the multilevel calcified disc and smooth anterior bridging syndesmophyte formation.    XRay sinus  FINDINGS:  Sinuses are symmetrically well developed and aerated without opacification, air-fluid level or definite mucosal thickening.  No sinus expansion or bony erosion noted.  Midline bony septum        Assessment:       Problem List Items Addressed This Visit     LEANDRO on CPAP    Chronic bronchitis      Other Visit Diagnoses     Sinobronchitis    -  Primary    Relevant Medications    levoFLOXacin (LEVAQUIN) 500 MG tablet    fluticasone (FLONASE) 50 mcg/actuation nasal spray    Other Relevant Orders    X-Ray Chest PA And Lateral    X-Ray Sinuses 3 or more views        Plan:       Adherence to CPAP discussed  Inhaler technique discussed    Requested Prescriptions     Signed Prescriptions Disp Refills    levoFLOXacin (LEVAQUIN) 500 MG tablet 10 tablet 0     Sig: Take 1 tablet (500 mg total) by mouth once daily. for 10 days    fluticasone (FLONASE) 50 mcg/actuation nasal spray 1 Bottle 0     Si spray (50 mcg total) by Each Nare route once daily.         Follow-up in about 6 months (around 7/15/2019), or cxr, sinus xray today, Flonase instruction.    This note was prepared using voice recognition system and is likely to have sound alike errors that may have been overlooked even after proof reading.  Please call me with any questions      Time spent: 20 minutes in face to face  discussion concerning diagnosis, prognosis, review of lab and test results, benefits of treatment as well as management of disease, counseling of patient and coordination of care between various health  care providers . Greater  than half the time spent was used for coordination of care and counseling of patient.     Bon Honeycutt    Pulmonary/Critical care/Sleepmedicine

## 2019-01-15 ENCOUNTER — CLINICAL SUPPORT (OUTPATIENT)
Dept: PULMONOLOGY | Facility: CLINIC | Age: 74
End: 2019-01-15
Payer: MEDICARE

## 2019-01-15 ENCOUNTER — HOSPITAL ENCOUNTER (OUTPATIENT)
Dept: RADIOLOGY | Facility: HOSPITAL | Age: 74
Discharge: HOME | End: 2019-01-15
Attending: INTERNAL MEDICINE
Payer: MEDICARE

## 2019-01-15 ENCOUNTER — OFFICE VISIT (OUTPATIENT)
Dept: PULMONOLOGY | Facility: CLINIC | Age: 74
End: 2019-01-15
Payer: MEDICARE

## 2019-01-15 VITALS
HEIGHT: 67 IN | HEART RATE: 80 BPM | OXYGEN SATURATION: 96 % | BODY MASS INDEX: 41.83 KG/M2 | WEIGHT: 266.5 LBS | DIASTOLIC BLOOD PRESSURE: 76 MMHG | SYSTOLIC BLOOD PRESSURE: 116 MMHG | RESPIRATION RATE: 18 BRPM

## 2019-01-15 DIAGNOSIS — J42 CHRONIC BRONCHITIS, UNSPECIFIED CHRONIC BRONCHITIS TYPE: ICD-10-CM

## 2019-01-15 DIAGNOSIS — J32.9 SINOBRONCHITIS: ICD-10-CM

## 2019-01-15 DIAGNOSIS — G47.33 OSA ON CPAP: ICD-10-CM

## 2019-01-15 DIAGNOSIS — I27.21 PAH (PULMONARY ARTERIAL HYPERTENSION) WITH PORTAL HYPERTENSION: ICD-10-CM

## 2019-01-15 DIAGNOSIS — J40 SINOBRONCHITIS: ICD-10-CM

## 2019-01-15 DIAGNOSIS — J40 SINOBRONCHITIS: Primary | ICD-10-CM

## 2019-01-15 DIAGNOSIS — J32.9 SINOBRONCHITIS: Primary | ICD-10-CM

## 2019-01-15 DIAGNOSIS — K76.6 PAH (PULMONARY ARTERIAL HYPERTENSION) WITH PORTAL HYPERTENSION: ICD-10-CM

## 2019-01-15 LAB
BRPFT: ABNORMAL
FEF 25 75 LLN: 0.9
FEF 25 75 PRE REF: 67 %
FEF 25 75 REF: 2.14
FEV1 FVC LLN: 62
FEV1 FVC PRE REF: 96.4 %
FEV1 FVC REF: 76
FEV1 LLN: 2.01
FEV1 PRE REF: 70.2 %
FEV1 REF: 2.82
FEV6 LLN: 2.78
FEV6 PRE REF: 74.6 %
FEV6 PRE: 2.69 L (ref 2.78–4.43)
FEV6 REF: 3.6
FVC LLN: 2.73
FVC PRE REF: 72.6 %
FVC REF: 3.71
MVV LLN: 93
MVV REF: 109
PEF LLN: 5.36
PEF PRE REF: 80 %
PEF REF: 7.48
PRE FEF 25 75: 1.43 L/S (ref 0.9–3.39)
PRE FET 100: 6.14 SEC
PRE FEV1 FVC: 73.34 % (ref 62.21–90)
PRE FEV1: 1.98 L (ref 2.01–3.62)
PRE FVC: 2.7 L (ref 2.73–4.7)
PRE PEF: 5.98 L/S (ref 5.36–9.6)

## 2019-01-15 PROCEDURE — 99214 PR OFFICE/OUTPT VISIT, EST, LEVL IV, 30-39 MIN: ICD-10-PCS | Mod: 25,S$GLB,, | Performed by: INTERNAL MEDICINE

## 2019-01-15 PROCEDURE — 3078F PR MOST RECENT DIASTOLIC BLOOD PRESSURE < 80 MM HG: ICD-10-PCS | Mod: CPTII,S$GLB,, | Performed by: INTERNAL MEDICINE

## 2019-01-15 PROCEDURE — 3074F SYST BP LT 130 MM HG: CPT | Mod: CPTII,S$GLB,, | Performed by: INTERNAL MEDICINE

## 2019-01-15 PROCEDURE — 1101F PT FALLS ASSESS-DOCD LE1/YR: CPT | Mod: CPTII,S$GLB,, | Performed by: INTERNAL MEDICINE

## 2019-01-15 PROCEDURE — 70220 XR SINUSES MIN 3 VIEWS: ICD-10-PCS | Mod: 26,,, | Performed by: RADIOLOGY

## 2019-01-15 PROCEDURE — 99999 PR PBB SHADOW E&M-EST. PATIENT-LVL V: CPT | Mod: PBBFAC,,, | Performed by: INTERNAL MEDICINE

## 2019-01-15 PROCEDURE — 1101F PR PT FALLS ASSESS DOC 0-1 FALLS W/OUT INJ PAST YR: ICD-10-PCS | Mod: CPTII,S$GLB,, | Performed by: INTERNAL MEDICINE

## 2019-01-15 PROCEDURE — 99499 UNLISTED E&M SERVICE: CPT | Mod: S$GLB,,, | Performed by: INTERNAL MEDICINE

## 2019-01-15 PROCEDURE — 99214 OFFICE O/P EST MOD 30 MIN: CPT | Mod: 25,S$GLB,, | Performed by: INTERNAL MEDICINE

## 2019-01-15 PROCEDURE — 3074F PR MOST RECENT SYSTOLIC BLOOD PRESSURE < 130 MM HG: ICD-10-PCS | Mod: CPTII,S$GLB,, | Performed by: INTERNAL MEDICINE

## 2019-01-15 PROCEDURE — 71046 XR CHEST PA AND LATERAL: ICD-10-PCS | Mod: 26,,, | Performed by: RADIOLOGY

## 2019-01-15 PROCEDURE — 3078F DIAST BP <80 MM HG: CPT | Mod: CPTII,S$GLB,, | Performed by: INTERNAL MEDICINE

## 2019-01-15 PROCEDURE — 99999 PR PBB SHADOW E&M-EST. PATIENT-LVL V: ICD-10-PCS | Mod: PBBFAC,,, | Performed by: INTERNAL MEDICINE

## 2019-01-15 PROCEDURE — 71046 X-RAY EXAM CHEST 2 VIEWS: CPT | Mod: TC

## 2019-01-15 PROCEDURE — 99499 RISK ADDL DX/OHS AUDIT: ICD-10-PCS | Mod: S$GLB,,, | Performed by: INTERNAL MEDICINE

## 2019-01-15 PROCEDURE — 71046 X-RAY EXAM CHEST 2 VIEWS: CPT | Mod: 26,,, | Performed by: RADIOLOGY

## 2019-01-15 PROCEDURE — 70220 X-RAY EXAM OF SINUSES: CPT | Mod: TC

## 2019-01-15 PROCEDURE — 70220 X-RAY EXAM OF SINUSES: CPT | Mod: 26,,, | Performed by: RADIOLOGY

## 2019-01-15 RX ORDER — LEVOFLOXACIN 500 MG/1
500 TABLET, FILM COATED ORAL DAILY
Qty: 10 TABLET | Refills: 0 | Status: SHIPPED | OUTPATIENT
Start: 2019-01-15 | End: 2019-01-25

## 2019-01-15 RX ORDER — FLUTICASONE PROPIONATE 50 MCG
1 SPRAY, SUSPENSION (ML) NASAL DAILY
Qty: 1 BOTTLE | Refills: 0 | Status: SHIPPED | OUTPATIENT
Start: 2019-01-15 | End: 2023-11-29

## 2019-01-15 RX ORDER — LEVOFLOXACIN 500 MG/1
500 TABLET, FILM COATED ORAL DAILY
Qty: 10 TABLET | Refills: 0 | Status: SHIPPED | OUTPATIENT
Start: 2019-01-15 | End: 2019-01-15 | Stop reason: SDUPTHER

## 2019-01-15 RX ORDER — FLUTICASONE PROPIONATE 50 MCG
1 SPRAY, SUSPENSION (ML) NASAL DAILY
Qty: 1 BOTTLE | Refills: 0 | Status: SHIPPED | OUTPATIENT
Start: 2019-01-15 | End: 2019-01-15 | Stop reason: SDUPTHER

## 2019-01-15 NOTE — PROGRESS NOTES
Patient, Bo Chase (MRN #[id number]), presented with a recorded BMI of 41.86 kg/m^2 consistent with the definition of morbid obesity (ICD-10 E66.01). The patient's morbid obesity was monitored, evaluated, addressed and/or treated. This addendum to the medical record is made on 01/15/2019.

## 2019-01-15 NOTE — PROGRESS NOTES
Patient is unable to perform test as instructed due to excessive coughing.  Patient will reschedule.

## 2019-01-18 DIAGNOSIS — M10.472 ACUTE GOUT DUE TO OTHER SECONDARY CAUSE INVOLVING TOE OF LEFT FOOT: ICD-10-CM

## 2019-01-18 NOTE — TELEPHONE ENCOUNTER
----- Message from Yamini Sam sent at 1/18/2019  4:48 PM CST -----  Contact: [facility]  Request a call concerning the pt Colchicine medication sent on, the request was sent on 01/11/2019, can be reached at [phone]///[id number]

## 2019-01-21 ENCOUNTER — TELEPHONE (OUTPATIENT)
Dept: RHEUMATOLOGY | Facility: CLINIC | Age: 74
End: 2019-01-21

## 2019-01-21 DIAGNOSIS — N18.30 CKD (CHRONIC KIDNEY DISEASE) STAGE 3, GFR 30-59 ML/MIN: Primary | ICD-10-CM

## 2019-01-21 DIAGNOSIS — M54.16 LUMBAR RADICULITIS: Primary | ICD-10-CM

## 2019-01-21 DIAGNOSIS — M47.26 OSTEOARTHRITIS OF SPINE WITH RADICULOPATHY, LUMBAR REGION: ICD-10-CM

## 2019-01-21 RX ORDER — COLCHICINE 0.6 MG/1
TABLET ORAL
Qty: 90 TABLET | Refills: 1 | Status: SHIPPED | OUTPATIENT
Start: 2019-01-21 | End: 2020-01-09 | Stop reason: SDUPTHER

## 2019-01-21 NOTE — TELEPHONE ENCOUNTER
----- Message from Floridalma Dickens sent at 1/21/2019 11:38 AM CST -----  Pt is calling to request a referral to pain management.  Please contact pt      Pt is requesting to see Dr. [facility] fax [phone]      Thank you!

## 2019-01-22 ENCOUNTER — OFFICE VISIT (OUTPATIENT)
Dept: PAIN MEDICINE | Facility: CLINIC | Age: 74
End: 2019-01-22
Payer: MEDICARE

## 2019-01-22 VITALS
HEIGHT: 67 IN | BODY MASS INDEX: 43.43 KG/M2 | SYSTOLIC BLOOD PRESSURE: 110 MMHG | HEART RATE: 82 BPM | DIASTOLIC BLOOD PRESSURE: 69 MMHG | WEIGHT: 276.69 LBS

## 2019-01-22 DIAGNOSIS — M53.3 SACROILIAC JOINT PAIN: ICD-10-CM

## 2019-01-22 DIAGNOSIS — M25.552 LEFT HIP PAIN: Primary | ICD-10-CM

## 2019-01-22 DIAGNOSIS — M47.816 LUMBAR SPONDYLOSIS: ICD-10-CM

## 2019-01-22 DIAGNOSIS — M70.60 GREATER TROCHANTERIC BURSITIS, UNSPECIFIED LATERALITY: ICD-10-CM

## 2019-01-22 PROCEDURE — 3288F FALL RISK ASSESSMENT DOCD: CPT | Mod: CPTII,S$GLB,, | Performed by: ANESTHESIOLOGY

## 2019-01-22 PROCEDURE — 99999 PR PBB SHADOW E&M-EST. PATIENT-LVL III: ICD-10-PCS | Mod: PBBFAC,,, | Performed by: ANESTHESIOLOGY

## 2019-01-22 PROCEDURE — 1100F PR PT FALLS ASSESS DOC 2+ FALLS/FALL W/INJURY/YR: ICD-10-PCS | Mod: CPTII,S$GLB,, | Performed by: ANESTHESIOLOGY

## 2019-01-22 PROCEDURE — 99999 PR PBB SHADOW E&M-EST. PATIENT-LVL III: CPT | Mod: PBBFAC,,, | Performed by: ANESTHESIOLOGY

## 2019-01-22 PROCEDURE — 3074F SYST BP LT 130 MM HG: CPT | Mod: CPTII,S$GLB,, | Performed by: ANESTHESIOLOGY

## 2019-01-22 PROCEDURE — 3078F PR MOST RECENT DIASTOLIC BLOOD PRESSURE < 80 MM HG: ICD-10-PCS | Mod: CPTII,S$GLB,, | Performed by: ANESTHESIOLOGY

## 2019-01-22 PROCEDURE — 3074F PR MOST RECENT SYSTOLIC BLOOD PRESSURE < 130 MM HG: ICD-10-PCS | Mod: CPTII,S$GLB,, | Performed by: ANESTHESIOLOGY

## 2019-01-22 PROCEDURE — 99204 PR OFFICE/OUTPT VISIT, NEW, LEVL IV, 45-59 MIN: ICD-10-PCS | Mod: S$GLB,,, | Performed by: ANESTHESIOLOGY

## 2019-01-22 PROCEDURE — 3078F DIAST BP <80 MM HG: CPT | Mod: CPTII,S$GLB,, | Performed by: ANESTHESIOLOGY

## 2019-01-22 PROCEDURE — 3288F PR FALLS RISK ASSESSMENT DOCUMENTED: ICD-10-PCS | Mod: CPTII,S$GLB,, | Performed by: ANESTHESIOLOGY

## 2019-01-22 PROCEDURE — 1100F PTFALLS ASSESS-DOCD GE2>/YR: CPT | Mod: CPTII,S$GLB,, | Performed by: ANESTHESIOLOGY

## 2019-01-22 PROCEDURE — 99204 OFFICE O/P NEW MOD 45 MIN: CPT | Mod: S$GLB,,, | Performed by: ANESTHESIOLOGY

## 2019-01-22 NOTE — LETTER
January 22, 2019      Gloria Fang PA-C  [address]  [place] [address]           [facility]  [address]  [place] [address]  Phone: [phone]  Fax: [phone]          Patient: Bo Chase   MR Number: [id number]   YOB: 1945   Date of Visit: 1/22/2019       Dear Gloria Fang:    Thank you for referring Bo Chase to me for evaluation. Attached you will find relevant portions of my assessment and plan of care.    If you have questions, please do not hesitate to call me. I look forward to following Bo Chase along with you.    Sincerely,    Adam Pierre MD    Enclosure  CC:  No Recipients    If you would like to receive this communication electronically, please contact [email or web address] or [phone] to request more information on [employer] Link access.    For providers and/or their staff who would like to refer a patient to [facility], please contact us through our one-stop-shop provider referral line, [facility] , at [phone].    If you feel you have received this communication in error or would no longer like to receive these types of communications, please e-mail [email or web address]

## 2019-01-22 NOTE — PROGRESS NOTES
Chief Pain Complaint:  Back Pain and Hip Pain        History of Present Illness:   Bo Chase is a 73 y.o. male  who is presenting with a chief complaint of Back Pain and Hip Pain  . The patient began experiencing this problem insidiously, and the pain has been gradually worsening over the past 4 year(s). The pain is described as throbbing, cramping, aching and heavy and is located in the left grion and buttock. Pain is intermittent and lasts hours. The pain is nonradiating. The patient rates his pain a 8 out of ten and interferes with activities of daily living a 8 out of ten. Pain is exacerbated by prolonged standing, ambulation, and is improved by rest, Tramadol. Patient reports no prior trauma, no prior spinal surgery     - pertinent negatives: No fever, No chills, No weight loss, No bladder dysfunction, No bowel dysfunction, No saddle anesthesia  - pertinent positives: generalized nonspecific Lower Extremity weakness bilaterally    - medications, other therapies tried (physical therapy, injections):     >> NSAIDs, Tylenol, Tramadol, gabapentin and flexeril    >> Has previously undergone Physical Therapy    >> Has previously undergone spinal injection/s   Right L5-S1 TFESI with Dr Dhaliwal in 4/2015 with modest results     Imaging / Labs / Studies (reviewed on 1/22/2019):    Results for orders placed during the hospital encounter of 08/26/14   MRI Lumbar Spine Without Contrast    Narrative Study:   Lumbar spine MRI without contrast.    Technique:  Multiplanar non contrast enhanced images obtained on the lumbar spine.    History: Low back and right leg pain.    Findings:    Normal distal thoracic cord with normal conus at the upper L1 spinal canal.    Lumbar facets are developmentally large with short lumbar pedicles.    T12-L1 disk: Mild degenerative disk space narrowing.    L1-2   disc: Mild diffuse degenerative disk bulge with mild to moderate L1-2 central canal stenosis and mild bilateral foraminal  stenosis.    L2-3   disc: Diffuse degenerative disk bulge with moderate to severe L2-3 central canal stenosis.  There is moderate right L2-3 and mild-to-moderate left L2-3 foraminal stenosis.    L3-4   disc: Diffuse degenerative disk bulge and ligamentum flavum hypertrophy in this patient with mild bilateral L3-4 facet arthropathy.  There is severe L3-4 central canal stenosis with moderate bilateral L3-4 foraminal stenosis.    L4-5   disc: Diffuse degenerative disk bulge with severe bilateral L4-5 facet arthropathy changes.  There is mild L4-5 central canal stenosis with moderate left L4-5 and mild to moderate right L4-5 foraminal stenosis.    L5-S1 disc: Diffuse degenerative disk bulge.  There are bilateral L5 pars defects resulting in grade 1 to 2 spondylolisthesis of L5 on S1.  There is moderate left L5-S1 and mild to moderate right L5-S1 foraminal stenosis.    Impression      Degenerative bulges seen at multiple lumbar disk levels as described above in this patient with congenital facet hypertrophy and short pedicles.  There is severe bilateral L4-5 and mild bilateral L3-4 facet arthropathy.    Bilateral L5 pars defects resulting in grade 1 to 2 spondylolisthesis of L5 on S1.    There is severe L3-4, moderate to severe L2-3, mild-to-moderate L1-2 and mild L4-5 central canal stenosis.  Multilevel varying degrees of foraminal stenosis various lumbar foramina as described above.      Electronically signed by: AYAKA JORDAN MD  Date:     08/27/14  Time:    11:00        Results for orders placed during the hospital encounter of 06/09/14   X-Ray Lumbar Spine AP And Lateral    Narrative Findings: Vertebral alignment is normal.  There is narrowing of the disk spaces and  anterior osteophyte formation.  There are no compression fractures or acute abnormalities are seen.  Vacuum disk at L5-S1.  Arteriosclerotic disease in the aorta and   iliac arteries.    Impression  Advanced degenerative change in the lumbar  "spine.      Electronically signed by: LEA DC MD  Date:     06/09/14  Time:    14:42      Results for orders placed during the hospital encounter of 09/09/14   X-Ray Lumbar Spine Ap Lateral w/Flex Ext    Narrative Findings: Lumbar spine series performed with lateral flexion and extension views.  Comparison to 06/09/14.  Note again made of generalized osteopenia and multilevel degenerative change.  Minimal grade 1 anterolisthesis observed at L4-5 and L5-S1 with   little change between flexion and extension.  Stable mild anterior wedge deformity of the T12 and L1 vertebrae.  No acute findings.    Impression  As above.      Electronically signed by: KAREN MCMILLAN MD  Date:     09/09/14  Time:    10:48      Review of Systems:  CONSTITUTIONAL: patient denies any fever, chills, or weight loss  SKIN: patient denies any rash or itching  RESPIRATORY: patient denies having any shortness of breath  GASTROINTESTINAL: patient denies having any diarrhea, constipation, or bowel incontinence  GENITOURINARY: patient denies having any abnormal bladder function    MUSCULOSKELETAL:  - patient complains of the above noted pain/s (see chief pain complaint)    NEUROLOGICAL:   - pain as above  - strength in Lower extremities is intact, BILATERALLY  - sensation in Lower extremities is intact, BILATERALLY  - patient denies any loss of bowel or bladder control      PSYCHIATRIC: patient denies any change in mood    Other:  All other systems reviewed and are negative      Physical Exam:  /69 (BP Location: Right arm, Patient Position: Sitting)   Pulse 82   Ht 5' 7" (1.702 m)   Wt 125.5 kg (276 lb 10.8 oz)   BMI 43.33 kg/m²  (reviewed on 1/22/2019)  General: Alert and oriented, in no apparent distress.  Gait: normal gait.  Skin: No rashes, No discoloration, No obvious lesions  HEENT: Normocephalic, atraumatic. Pupils equal and round.  Cardiovascular: Regular rate and rhythm , no significant peripheral edema " present  Respiratory: Without audible wheezing, without use of accessory muscles of respiration.    Musculoskeletal:    Lumbar Spine    - Pain on flexion of lumbar spine Absent  - Straight Leg Raise:  Absent    - Pain on extension of lumbar spine Absent  - TTP over the lumbar facet joints Absent  - Lumbar facet loading Absent    -Pain on palpation over the SI joint  Present  - KATIE: Present      Neuro:    Strength:  LE R/L: HF: 5/5, HE: 5/5, KF: 5/5; KE: 5/5; FE: 5/5; FF: 5/5    Extremity Reflexes: Brisk and symmetric throughout.      Extremity Sensory: Sensation to pinprick and temperature symmetric. Proprioception intact.      Psych:  Mood and affect is appropriate      Assessment:    Bo Chase is a 73 y.o. year old male who is presenting with   Encounter Diagnoses   Name Primary?    Left hip pain Yes    Lumbar spondylosis     Sacroiliac joint pain     Greater trochanteric bursitis, unspecified laterality        Plan:    1. Interventional: Schedule patient for left SI joint, left hip and left GTB injection with local.    2. Pharmacologic: Tylenol PRN. Tramadol as prescribed by primary.    3. Rehabilitative: PT post injection.    4. Diagnostic: None for now.    5. Follow up: Post Injection.     20 minutes were spent in this encounter with more than 50% of the time used for counseling and review of the plan.  Imaging / studies reviewed, detailed above.  I discussed in detail the risks, benefits, and alternatives to any and all potential treatment options.  All questions and concerns were fully addressed today in clinic. Medical decision making moderate.    Thank you for the opportunity to assist in the care of this patient.    Best wishes,    Signed:    Adam Pierre MD          Disclaimer:  This note may have been prepared using voice recognition software, it may have not been extensively proofed, as such there could be errors within the text such as sound alike errors.

## 2019-01-25 ENCOUNTER — TELEPHONE (OUTPATIENT)
Dept: PAIN MEDICINE | Facility: CLINIC | Age: 74
End: 2019-01-25

## 2019-01-25 ENCOUNTER — LAB VISIT (OUTPATIENT)
Dept: LAB | Facility: HOSPITAL | Age: 74
End: 2019-01-25
Attending: INTERNAL MEDICINE
Payer: MEDICARE

## 2019-01-25 DIAGNOSIS — D75.1 ACQUIRED POLYCYTHEMIA: ICD-10-CM

## 2019-01-25 DIAGNOSIS — D75.1 POLYCYTHEMIA, SECONDARY: ICD-10-CM

## 2019-01-25 DIAGNOSIS — N18.30 CKD (CHRONIC KIDNEY DISEASE) STAGE 3, GFR 30-59 ML/MIN: ICD-10-CM

## 2019-01-25 LAB
ALBUMIN SERPL BCP-MCNC: 3.7 G/DL
ANION GAP SERPL CALC-SCNC: 14 MMOL/L
BASOPHILS # BLD AUTO: 0.03 K/UL
BASOPHILS # BLD AUTO: 0.03 K/UL
BASOPHILS NFR BLD: 0.3 %
BASOPHILS NFR BLD: 0.3 %
BUN SERPL-MCNC: 40 MG/DL
CALCIUM SERPL-MCNC: 10.6 MG/DL
CHLORIDE SERPL-SCNC: 93 MMOL/L
CO2 SERPL-SCNC: 27 MMOL/L
CREAT SERPL-MCNC: 2.1 MG/DL
DIFFERENTIAL METHOD: ABNORMAL
DIFFERENTIAL METHOD: ABNORMAL
EOSINOPHIL # BLD AUTO: 0.1 K/UL
EOSINOPHIL # BLD AUTO: 0.1 K/UL
EOSINOPHIL NFR BLD: 1.3 %
EOSINOPHIL NFR BLD: 1.3 %
ERYTHROCYTE [DISTWIDTH] IN BLOOD BY AUTOMATED COUNT: 19.4 %
ERYTHROCYTE [DISTWIDTH] IN BLOOD BY AUTOMATED COUNT: 19.4 %
EST. GFR  (AFRICAN AMERICAN): 35.3 ML/MIN/1.73 M^2
EST. GFR  (NON AFRICAN AMERICAN): 30.5 ML/MIN/1.73 M^2
GLUCOSE SERPL-MCNC: 145 MG/DL
HCT VFR BLD AUTO: 49.7 %
HCT VFR BLD AUTO: 49.7 %
HGB BLD-MCNC: 16.5 G/DL
HGB BLD-MCNC: 16.5 G/DL
LYMPHOCYTES # BLD AUTO: 1.3 K/UL
LYMPHOCYTES # BLD AUTO: 1.3 K/UL
LYMPHOCYTES NFR BLD: 13 %
LYMPHOCYTES NFR BLD: 13 %
MCH RBC QN AUTO: 27.8 PG
MCH RBC QN AUTO: 27.8 PG
MCHC RBC AUTO-ENTMCNC: 33.2 G/DL
MCHC RBC AUTO-ENTMCNC: 33.2 G/DL
MCV RBC AUTO: 84 FL
MCV RBC AUTO: 84 FL
MONOCYTES # BLD AUTO: 1.1 K/UL
MONOCYTES # BLD AUTO: 1.1 K/UL
MONOCYTES NFR BLD: 10.7 %
MONOCYTES NFR BLD: 10.7 %
NEUTROPHILS # BLD AUTO: 7.6 K/UL
NEUTROPHILS # BLD AUTO: 7.6 K/UL
NEUTROPHILS NFR BLD: 74.7 %
NEUTROPHILS NFR BLD: 74.7 %
PHOSPHATE SERPL-MCNC: 4.6 MG/DL
PLATELET # BLD AUTO: 221 K/UL
PLATELET # BLD AUTO: 221 K/UL
PMV BLD AUTO: 10 FL
PMV BLD AUTO: 10 FL
POTASSIUM SERPL-SCNC: 3.9 MMOL/L
RBC # BLD AUTO: 5.93 M/UL
RBC # BLD AUTO: 5.93 M/UL
SODIUM SERPL-SCNC: 134 MMOL/L
WBC # BLD AUTO: 10.16 K/UL
WBC # BLD AUTO: 10.16 K/UL

## 2019-01-25 PROCEDURE — 85025 COMPLETE CBC W/AUTO DIFF WBC: CPT | Mod: PO

## 2019-01-25 PROCEDURE — 36415 COLL VENOUS BLD VENIPUNCTURE: CPT | Mod: PO

## 2019-01-25 PROCEDURE — 80069 RENAL FUNCTION PANEL: CPT

## 2019-01-25 NOTE — TELEPHONE ENCOUNTER
----- Message from Martín Schrader sent at 1/25/2019  9:22 AM CST -----  Contact: pt  He's calling in regards to his scheduled procedure, pls advise, [phone] (home)

## 2019-01-28 ENCOUNTER — OFFICE VISIT (OUTPATIENT)
Dept: NEPHROLOGY | Facility: CLINIC | Age: 74
End: 2019-01-28
Payer: MEDICARE

## 2019-01-28 VITALS
BODY MASS INDEX: 43.6 KG/M2 | WEIGHT: 277.75 LBS | DIASTOLIC BLOOD PRESSURE: 78 MMHG | HEIGHT: 67 IN | HEART RATE: 84 BPM | SYSTOLIC BLOOD PRESSURE: 124 MMHG

## 2019-01-28 DIAGNOSIS — G89.4 CHRONIC PAIN SYNDROME: ICD-10-CM

## 2019-01-28 DIAGNOSIS — E83.52 HYPERCALCEMIA: ICD-10-CM

## 2019-01-28 DIAGNOSIS — I75.023 CHOLESTEROL EMBOLISM OF LOWER EXTREMITY, BILATERAL: ICD-10-CM

## 2019-01-28 DIAGNOSIS — M1A.09X0 IDIOPATHIC CHRONIC GOUT OF MULTIPLE SITES WITHOUT TOPHUS: ICD-10-CM

## 2019-01-28 DIAGNOSIS — N18.30 CKD (CHRONIC KIDNEY DISEASE) STAGE 3, GFR 30-59 ML/MIN: Primary | ICD-10-CM

## 2019-01-28 DIAGNOSIS — N25.81 SECONDARY HYPERPARATHYROIDISM OF RENAL ORIGIN: ICD-10-CM

## 2019-01-28 DIAGNOSIS — I87.2 VENOUS STASIS DERMATITIS OF BOTH LOWER EXTREMITIES: ICD-10-CM

## 2019-01-28 DIAGNOSIS — E87.6 HYPOKALEMIA: ICD-10-CM

## 2019-01-28 DIAGNOSIS — N28.1 COMPLEX RENAL CYST: ICD-10-CM

## 2019-01-28 DIAGNOSIS — R60.0 BILATERAL EDEMA OF LOWER EXTREMITY: ICD-10-CM

## 2019-01-28 DIAGNOSIS — I27.20 PULMONARY HYPERTENSION: ICD-10-CM

## 2019-01-28 DIAGNOSIS — I51.89 DIASTOLIC DYSFUNCTION: ICD-10-CM

## 2019-01-28 PROCEDURE — 99214 OFFICE O/P EST MOD 30 MIN: CPT | Mod: S$GLB,,, | Performed by: INTERNAL MEDICINE

## 2019-01-28 PROCEDURE — 99999 PR PBB SHADOW E&M-EST. PATIENT-LVL III: ICD-10-PCS | Mod: PBBFAC,,, | Performed by: INTERNAL MEDICINE

## 2019-01-28 PROCEDURE — 99214 PR OFFICE/OUTPT VISIT, EST, LEVL IV, 30-39 MIN: ICD-10-PCS | Mod: S$GLB,,, | Performed by: INTERNAL MEDICINE

## 2019-01-28 PROCEDURE — 1101F PT FALLS ASSESS-DOCD LE1/YR: CPT | Mod: CPTII,S$GLB,, | Performed by: INTERNAL MEDICINE

## 2019-01-28 PROCEDURE — 1101F PR PT FALLS ASSESS DOC 0-1 FALLS W/OUT INJ PAST YR: ICD-10-PCS | Mod: CPTII,S$GLB,, | Performed by: INTERNAL MEDICINE

## 2019-01-28 PROCEDURE — 3078F DIAST BP <80 MM HG: CPT | Mod: CPTII,S$GLB,, | Performed by: INTERNAL MEDICINE

## 2019-01-28 PROCEDURE — 3074F PR MOST RECENT SYSTOLIC BLOOD PRESSURE < 130 MM HG: ICD-10-PCS | Mod: CPTII,S$GLB,, | Performed by: INTERNAL MEDICINE

## 2019-01-28 PROCEDURE — 3074F SYST BP LT 130 MM HG: CPT | Mod: CPTII,S$GLB,, | Performed by: INTERNAL MEDICINE

## 2019-01-28 PROCEDURE — 3078F PR MOST RECENT DIASTOLIC BLOOD PRESSURE < 80 MM HG: ICD-10-PCS | Mod: CPTII,S$GLB,, | Performed by: INTERNAL MEDICINE

## 2019-01-28 PROCEDURE — 99999 PR PBB SHADOW E&M-EST. PATIENT-LVL III: CPT | Mod: PBBFAC,,, | Performed by: INTERNAL MEDICINE

## 2019-01-28 NOTE — PROGRESS NOTES
Subjective:       Patient ID: Bo Chase is a 73 y.o. White male who presents for follow-up evaluation of Chronic Kidney Disease    Hypertension   Pertinent negatives include no chest pain, headaches, neck pain, palpitations or shortness of breath.   Edema   Pertinent negatives include no abdominal pain, arthralgias, chest pain, chills, congestion, coughing, diaphoresis, fatigue, fever, headaches, joint swelling, nausea, neck pain, rash or vomiting.      Patient is a  white male who was  by profession.  Has history of chronic kidney disease for at least 5 years.  He was last seen by Dr. Prather in 2012.  Renal ultrasound at that time showed bilateral renal cysts.  Left kidney had a cyst of 2.5 cm.  Patient never had any proteinuria.  His creatinine has been fluctuating between 1.6 and 2.0.  His fluctuations have been due to diuretic therapy.  In the last 5 years his kidney function has been stable.  He has had history of coronary artery disease status post shortness of breath and dyspnea and exertion.    12/ 2014Occluded lad lcx and rca.Occluded svg to d1svg to rca patent svg to  om1 90% eccentric treated with dennis with filter wire protection.Patent lima.No complication. Dr. Joel     2014 LEANDRO : now on CPAP    6/2015 LBP s/p KAROLINA and s/p surgery laminectomy     4/2017 Renal USD CT scan of the abdomen shows extensive calcifications of the aorta    August 2017 2-D echo reviewed= PA pressure >26 mm Hg, creatinine down to 1.4, uric acid controlled, vitamin D level is 43, PTH 91, estimated GFR with Cystatin C is 50%; weight down by 10 ib from 263 to 254 ib    March 2018 patient's creatinine is up to 1.8.  Weight is up to 262 pounds.  Approximate GFR about 40%.  GFR loss is about 10% per year.  No proteinuria.  PTH has gone up from 91 in 2017-235 in March 2018.  Labwork shows severe metabolic alkalosis, severe hypokalemia, low chloride levels due to complications of diuretics. Severe Polycythemia may need  "phlebotomy     6/2018 K+ is better ; Hgb is better ; weight is higher ; GFR 37 % with Cr 1.7 ; retired and now in gym     09/2018 High Calcium; stop D; check USD and SPEP     November 2018 ultrasound done no cancer.  Negative serum protein electrophoresis    1/2019 s/p fall now pending left hips IA steroids in SI and Hip ; creatinine down off ibuprofen ; rtired now in [place] --gym and work out d/w patient dumbell    Review of Systems   Constitutional: Negative.  Negative for activity change, appetite change, chills, diaphoresis, fatigue and fever.         water aerobics ; weight training    HENT: Negative.  Negative for congestion and trouble swallowing.    Eyes: Negative.    Respiratory: Negative.  Negative for cough, chest tightness, shortness of breath and wheezing.    Cardiovascular: Positive for leg swelling. Negative for chest pain and palpitations.   Gastrointestinal: Negative.  Negative for abdominal distention, abdominal pain, nausea and vomiting.   Genitourinary: Negative.  Negative for decreased urine volume, difficulty urinating, dysuria, enuresis, flank pain, frequency, hematuria, penile swelling, scrotal swelling and urgency.   Musculoskeletal: Negative.  Negative for arthralgias, back pain, joint swelling and neck pain.   Skin: Negative for rash.   Neurological: Negative.  Negative for tremors, seizures and headaches.   Psychiatric/Behavioral: Negative.  Negative for confusion and sleep disturbance. The patient is not nervous/anxious.        Objective:   /78   Pulse 84   Ht 5' 7" (1.702 m)   Wt 126 kg (277 lb 12.5 oz)   BMI 43.51 kg/m²      Weight at home 270     gained 10 lb since last visit.  From 259-269    1/2019 277 ib       Physical Exam   Constitutional: He is oriented to person, place, and time. He appears well-developed and well-nourished. No distress.   HENT:   Head: Normocephalic.   Eyes: EOM are normal. Pupils are equal, round, and reactive to light.   Neck: Normal range of " motion. Neck supple. No JVD present. No thyromegaly present.   Cardiovascular: Normal rate, regular rhythm, S1 normal, S2 normal, normal heart sounds and intact distal pulses. PMI is not displaced. Exam reveals no gallop and no friction rub.   No murmur heard.  Cholesterol emboli ; loud P2    Pulmonary/Chest: Effort normal and breath sounds normal. No respiratory distress. He has no wheezes. He has no rales. He exhibits no tenderness.   Abdominal: He exhibits no distension and no mass. There is no hepatosplenomegaly. There is no tenderness. There is no rebound and no CVA tenderness. No hernia.   Musculoskeletal: Normal range of motion. He exhibits no edema or tenderness.   2+ edema R>L    Lymphadenopathy:     He has no cervical adenopathy.   Neurological: He is alert and oriented to person, place, and time. He has normal reflexes. He is not disoriented. He displays normal reflexes. No cranial nerve deficit. He exhibits normal muscle tone. Coordination normal.   Skin: Skin is warm and dry. No rash noted. No erythema.   Psychiatric: He has a normal mood and affect. His behavior is normal. Judgment and thought content normal.             Lab Results   Component Value Date    CREATININE 2.1 (H) 01/25/2019    BUN 40 (H) 01/25/2019     (L) 01/25/2019    K 3.9 01/25/2019    CL 93 (L) 01/25/2019    CO2 27 01/25/2019     Lab Results   Component Value Date    WBC 10.16 01/25/2019    WBC 10.16 01/25/2019    HGB 16.5 01/25/2019    HGB 16.5 01/25/2019    HCT 49.7 01/25/2019    HCT 49.7 01/25/2019    MCV 84 01/25/2019    MCV 84 01/25/2019     01/25/2019     01/25/2019     Lab Results   Component Value Date    PTH 70.0 11/15/2018    CALCIUM 10.6 (H) 01/25/2019    PHOS 4.6 (H) 01/25/2019     Lab Results   Component Value Date    URICACID 6.7 11/15/2018         Assessment:    )    1. CKD (chronic kidney disease) stage 3, GFR 30-59 ml/min    2. Bilateral edema of lower extremity    3. Secondary hyperparathyroidism  of renal origin    4. Cholesterol embolism of lower extremity, bilateral    5. Venous stasis dermatitis of both lower extremities    6. Hypokalemia    7. Idiopathic chronic gout of multiple sites without tophus    8. Pulmonary hypertension    9. Diastolic dysfunction    10. Complex renal cyst    11. Hypercalcemia    12. Chronic pain syndrome        Plan:         1.  Chronic kidney disease stage III 4/stage IV patient may have cardiorenal syndrome:  fluctuating creatinine is due to diuretics.   No heavy proteinuria.  Creatinine is better today.  GFR 25%-35% ;     2.  Chronic gout: Last attack was 2018.  Uric acid is controlled on allopurinol 450 mg     3.  Hyperparathyroidism due to chronic kidney disease stage III:  Stop vitamin-D therapy since the calcium is too high.  Consider Rayaldee at some point     4.  Extensive edema anasarca on the lower extremities with chronic venous stasis findings as well as brawny edema of the skin: Patient had an ultrasound of the legs in 2012 which showed no DVT.  Clinically patient has pulm. hypertension, metolazone 2x week + torsemide 20 mg x2-3-4 daily. D/w patient that a higher creatinine of 2.0-2.5 would be appropriate if it achieves adequate diuresis.---Cardio-renal syndrome     5.  Cholesterol emboli of the lower extremities: Extensive aortic calcifications noted on the CT scan.    aspirin to 325 mg daily. Continue Lipitor.    6.  Pulm HTN: records of CPAP and dr. Honeycutt reviewed     7.  Hypokalemia:keep  aldactone 25 mg  KCL to 40 meq daily.     8. Fup dr. Edgar for polycythemia monthly phlebotomy.  Patient had multiple complex cysts in the ultrasound done in 2017.  Status post renal ultrasound and check for the follow-up on the complex cysts           Follow-up 12 weeks      Washington Bernal MD

## 2019-02-04 ENCOUNTER — OFFICE VISIT (OUTPATIENT)
Dept: HEMATOLOGY/ONCOLOGY | Facility: CLINIC | Age: 74
End: 2019-02-04
Payer: MEDICARE

## 2019-02-04 ENCOUNTER — INFUSION (OUTPATIENT)
Dept: INFUSION THERAPY | Facility: HOSPITAL | Age: 74
End: 2019-02-04
Attending: INTERNAL MEDICINE
Payer: MEDICARE

## 2019-02-04 VITALS
HEIGHT: 67 IN | BODY MASS INDEX: 43.08 KG/M2 | DIASTOLIC BLOOD PRESSURE: 74 MMHG | WEIGHT: 274.5 LBS | SYSTOLIC BLOOD PRESSURE: 127 MMHG | TEMPERATURE: 98 F | HEART RATE: 87 BPM | OXYGEN SATURATION: 94 %

## 2019-02-04 VITALS
HEIGHT: 67 IN | SYSTOLIC BLOOD PRESSURE: 113 MMHG | RESPIRATION RATE: 20 BRPM | DIASTOLIC BLOOD PRESSURE: 70 MMHG | WEIGHT: 274.5 LBS | BODY MASS INDEX: 43.08 KG/M2 | HEART RATE: 75 BPM

## 2019-02-04 DIAGNOSIS — D75.1 ACQUIRED POLYCYTHEMIA: Primary | ICD-10-CM

## 2019-02-04 DIAGNOSIS — D75.1 HYPOXEMIC POLYCYTHEMIA: Primary | ICD-10-CM

## 2019-02-04 PROCEDURE — 99999 PR PBB SHADOW E&M-EST. PATIENT-LVL III: CPT | Mod: PBBFAC,,, | Performed by: INTERNAL MEDICINE

## 2019-02-04 PROCEDURE — 3074F SYST BP LT 130 MM HG: CPT | Mod: CPTII,S$GLB,, | Performed by: INTERNAL MEDICINE

## 2019-02-04 PROCEDURE — 3074F PR MOST RECENT SYSTOLIC BLOOD PRESSURE < 130 MM HG: ICD-10-PCS | Mod: CPTII,S$GLB,, | Performed by: INTERNAL MEDICINE

## 2019-02-04 PROCEDURE — 1100F PR PT FALLS ASSESS DOC 2+ FALLS/FALL W/INJURY/YR: ICD-10-PCS | Mod: CPTII,S$GLB,, | Performed by: INTERNAL MEDICINE

## 2019-02-04 PROCEDURE — 99999 PR PBB SHADOW E&M-EST. PATIENT-LVL III: ICD-10-PCS | Mod: PBBFAC,,, | Performed by: INTERNAL MEDICINE

## 2019-02-04 PROCEDURE — 99214 OFFICE O/P EST MOD 30 MIN: CPT | Mod: S$GLB,,, | Performed by: INTERNAL MEDICINE

## 2019-02-04 PROCEDURE — 3288F FALL RISK ASSESSMENT DOCD: CPT | Mod: CPTII,S$GLB,, | Performed by: INTERNAL MEDICINE

## 2019-02-04 PROCEDURE — 99195 PHLEBOTOMY: CPT

## 2019-02-04 PROCEDURE — 3288F PR FALLS RISK ASSESSMENT DOCUMENTED: ICD-10-PCS | Mod: CPTII,S$GLB,, | Performed by: INTERNAL MEDICINE

## 2019-02-04 PROCEDURE — 3078F DIAST BP <80 MM HG: CPT | Mod: CPTII,S$GLB,, | Performed by: INTERNAL MEDICINE

## 2019-02-04 PROCEDURE — 99214 PR OFFICE/OUTPT VISIT, EST, LEVL IV, 30-39 MIN: ICD-10-PCS | Mod: S$GLB,,, | Performed by: INTERNAL MEDICINE

## 2019-02-04 PROCEDURE — 3078F PR MOST RECENT DIASTOLIC BLOOD PRESSURE < 80 MM HG: ICD-10-PCS | Mod: CPTII,S$GLB,, | Performed by: INTERNAL MEDICINE

## 2019-02-04 PROCEDURE — 1100F PTFALLS ASSESS-DOCD GE2>/YR: CPT | Mod: CPTII,S$GLB,, | Performed by: INTERNAL MEDICINE

## 2019-02-04 RX ORDER — LIDOCAINE HYDROCHLORIDE 10 MG/ML
1 INJECTION, SOLUTION EPIDURAL; INFILTRATION; INTRACAUDAL; PERINEURAL ONCE
Status: CANCELLED | OUTPATIENT
Start: 2019-02-04 | End: 2019-02-04

## 2019-02-04 NOTE — PROGRESS NOTES
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 He is referred for polycythemia which has been present and progressive since 2012. He reports a history of CAD but denies history of DVT, CVA, or other clots. He also denies rash or pruritus. Patient diagnosed with LEANDRO over 1 year ago but did not use CPAP due to discomfort of the masks. He reports that he is attempting to obtain a new mask and give CPAP another trial.    I have reviewed and updated the HPI, ROS, PMHx, Social Hx, Family Hx and treatment history.      Today's Visit:   Patient presents for routine visit.  He continues to receive phlebotomy monthly however he missed last phlebotomy in October        HPI    Review of Systems   Constitutional: Negative for activity change, chills and diaphoresis.   HENT: Negative for congestion, drooling, ear discharge, facial swelling, hearing loss, rhinorrhea, sinus pressure and sneezing.    Eyes: Negative.    Respiratory: Negative for apnea, cough, choking, chest tightness and shortness of breath.    Cardiovascular: Positive for leg swelling. Negative for chest pain and palpitations.   Gastrointestinal: Negative for abdominal distention, abdominal pain, anal bleeding, blood in stool, constipation, diarrhea, nausea, rectal pain and vomiting.   Endocrine: Negative.    Genitourinary: Negative for difficulty urinating, dysuria, enuresis, flank pain and frequency.   Musculoskeletal: Negative for arthralgias, back pain, gait problem, joint swelling, myalgias, neck pain and neck stiffness.   Skin: Negative for color change, pallor, rash and wound.  "  Allergic/Immunologic: Negative.    Neurological: Negative for dizziness, tremors, seizures, syncope, facial asymmetry, speech difficulty, light-headedness, numbness and headaches.   Hematological: Negative for adenopathy. Does not bruise/bleed easily.   Psychiatric/Behavioral: Negative for agitation, decreased concentration, dysphoric mood and self-injury. The patient is not nervous/anxious.        Objective:       Vitals:    02/04/19 1428   BP: 127/74   Pulse: 87   Temp: 98.4 °F (36.9 °C)   SpO2: (!) 94%   Weight: 124.5 kg (274 lb 7.6 oz)   Height: 5' 7" (1.702 m)      Physical Exam   Constitutional: He is oriented to person, place, and time. Vital signs are normal. He appears well-developed and well-nourished. No distress.   HENT:   Head: Normocephalic and atraumatic.   Nose: Nose normal.   Mouth/Throat: Uvula is midline, oropharynx is clear and moist and mucous membranes are normal. Mucous membranes are not pale, not dry and not cyanotic. Normal dentition. No oropharyngeal exudate.   Eyes: Conjunctivae and EOM are normal. Pupils are equal, round, and reactive to light.   Neck: Normal range of motion. Neck supple. No tracheal deviation present. No thyromegaly present.   Cardiovascular: Normal rate, regular rhythm and intact distal pulses. Exam reveals no gallop and no friction rub.   Murmur heard.   Systolic murmur is present with a grade of 2/6.  Pulmonary/Chest: Effort normal and breath sounds normal. No stridor. No respiratory distress. He has no decreased breath sounds. He has no wheezes. He has no rhonchi. He has no rales. He exhibits no tenderness.   Abdominal: Soft. Bowel sounds are normal. He exhibits no distension and no mass. There is no tenderness. There is no guarding.   Musculoskeletal: Normal range of motion. He exhibits edema (bess lower ext edema). He exhibits no tenderness or deformity.   Lymphadenopathy:        Head (right side): No submental and no submandibular adenopathy present.        Head " (left side): No submental and no submandibular adenopathy present.     He has no axillary adenopathy.        Right: No supraclavicular adenopathy present.        Left: No supraclavicular adenopathy present.   Neurological: He is alert and oriented to person, place, and time. He displays normal reflexes. No cranial nerve deficit or sensory deficit. He exhibits normal muscle tone. Coordination normal.   Skin: Skin is warm, dry and intact. Capillary refill takes less than 2 seconds. No abrasion and no rash noted. Rash is not urticarial. He is not diaphoretic. No cyanosis. No pallor. Nails show no clubbing.   Psychiatric: He has a normal mood and affect. His behavior is normal. Judgment and thought content normal.       Lab Results   Component Value Date    WBC 10.16 01/25/2019    WBC 10.16 01/25/2019    HGB 16.5 01/25/2019    HGB 16.5 01/25/2019    HCT 49.7 01/25/2019    HCT 49.7 01/25/2019    MCV 84 01/25/2019    MCV 84 01/25/2019     01/25/2019     01/25/2019     Lab Results   Component Value Date    CREATININE 2.1 (H) 01/25/2019    BUN 40 (H) 01/25/2019     (L) 01/25/2019    K 3.9 01/25/2019    CL 93 (L) 01/25/2019    CO2 27 01/25/2019         Assessment:       Assessment:   The patient is a 70yo gentleman with polycythemia since 2012 secondary to LEANDRO. Patient's H/H significantly improved since initiating CPAP therapy, however, hematocrit is increased to 56.2.    Patient was started on phlebotomy and has tolerated the phlebotomies very well.  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

## 2019-02-04 NOTE — H&P (VIEW-ONLY)
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 He is referred for polycythemia which has been present and progressive since 2012. He reports a history of CAD but denies history of DVT, CVA, or other clots. He also denies rash or pruritus. Patient diagnosed with LEANDRO over 1 year ago but did not use CPAP due to discomfort of the masks. He reports that he is attempting to obtain a new mask and give CPAP another trial.    I have reviewed and updated the HPI, ROS, PMHx, Social Hx, Family Hx and treatment history.      Today's Visit:   Patient presents for routine visit.  He continues to receive phlebotomy monthly however he missed last phlebotomy in October        HPI    Review of Systems   Constitutional: Negative for activity change, chills and diaphoresis.   HENT: Negative for congestion, drooling, ear discharge, facial swelling, hearing loss, rhinorrhea, sinus pressure and sneezing.    Eyes: Negative.    Respiratory: Negative for apnea, cough, choking, chest tightness and shortness of breath.    Cardiovascular: Positive for leg swelling. Negative for chest pain and palpitations.   Gastrointestinal: Negative for abdominal distention, abdominal pain, anal bleeding, blood in stool, constipation, diarrhea, nausea, rectal pain and vomiting.   Endocrine: Negative.    Genitourinary: Negative for difficulty urinating, dysuria, enuresis, flank pain and frequency.   Musculoskeletal: Negative for arthralgias, back pain, gait problem, joint swelling, myalgias, neck pain and neck stiffness.   Skin: Negative for color change, pallor, rash and wound.  "  Allergic/Immunologic: Negative.    Neurological: Negative for dizziness, tremors, seizures, syncope, facial asymmetry, speech difficulty, light-headedness, numbness and headaches.   Hematological: Negative for adenopathy. Does not bruise/bleed easily.   Psychiatric/Behavioral: Negative for agitation, decreased concentration, dysphoric mood and self-injury. The patient is not nervous/anxious.        Objective:       Vitals:    02/04/19 1428   BP: 127/74   Pulse: 87   Temp: 98.4 °F (36.9 °C)   SpO2: (!) 94%   Weight: 124.5 kg (274 lb 7.6 oz)   Height: 5' 7" (1.702 m)      Physical Exam   Constitutional: He is oriented to person, place, and time. Vital signs are normal. He appears well-developed and well-nourished. No distress.   HENT:   Head: Normocephalic and atraumatic.   Nose: Nose normal.   Mouth/Throat: Uvula is midline, oropharynx is clear and moist and mucous membranes are normal. Mucous membranes are not pale, not dry and not cyanotic. Normal dentition. No oropharyngeal exudate.   Eyes: Conjunctivae and EOM are normal. Pupils are equal, round, and reactive to light.   Neck: Normal range of motion. Neck supple. No tracheal deviation present. No thyromegaly present.   Cardiovascular: Normal rate, regular rhythm and intact distal pulses. Exam reveals no gallop and no friction rub.   Murmur heard.   Systolic murmur is present with a grade of 2/6.  Pulmonary/Chest: Effort normal and breath sounds normal. No stridor. No respiratory distress. He has no decreased breath sounds. He has no wheezes. He has no rhonchi. He has no rales. He exhibits no tenderness.   Abdominal: Soft. Bowel sounds are normal. He exhibits no distension and no mass. There is no tenderness. There is no guarding.   Musculoskeletal: Normal range of motion. He exhibits edema (bess lower ext edema). He exhibits no tenderness or deformity.   Lymphadenopathy:        Head (right side): No submental and no submandibular adenopathy present.        Head " (left side): No submental and no submandibular adenopathy present.     He has no axillary adenopathy.        Right: No supraclavicular adenopathy present.        Left: No supraclavicular adenopathy present.   Neurological: He is alert and oriented to person, place, and time. He displays normal reflexes. No cranial nerve deficit or sensory deficit. He exhibits normal muscle tone. Coordination normal.   Skin: Skin is warm, dry and intact. Capillary refill takes less than 2 seconds. No abrasion and no rash noted. Rash is not urticarial. He is not diaphoretic. No cyanosis. No pallor. Nails show no clubbing.   Psychiatric: He has a normal mood and affect. His behavior is normal. Judgment and thought content normal.       Lab Results   Component Value Date    WBC 10.16 01/25/2019    WBC 10.16 01/25/2019    HGB 16.5 01/25/2019    HGB 16.5 01/25/2019    HCT 49.7 01/25/2019    HCT 49.7 01/25/2019    MCV 84 01/25/2019    MCV 84 01/25/2019     01/25/2019     01/25/2019     Lab Results   Component Value Date    CREATININE 2.1 (H) 01/25/2019    BUN 40 (H) 01/25/2019     (L) 01/25/2019    K 3.9 01/25/2019    CL 93 (L) 01/25/2019    CO2 27 01/25/2019         Assessment:       Assessment:   The patient is a 68yo gentleman with polycythemia since 2012 secondary to LEANDRO. Patient's H/H significantly improved since initiating CPAP therapy, however, hematocrit is increased to 56.2.    Patient was started on phlebotomy and has tolerated the phlebotomies very well.  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

## 2019-02-04 NOTE — DISCHARGE INSTRUCTIONS
[facility]  [address]  [address]  [phone] phone     [phone] fax  Hours of Operation: Monday- Friday 8:00am- 5:00pm  After hours phone  [phone]  Hematology / Oncology Physicians on call      Dr. Kwabena Edgar                        Please call with any concerns regarding your appointment today.      FALL PREVENTION   Falls often occur due to slipping, tripping or losing your balance. Here are ways to reduce your risk of falling again.   Was there anything that caused your fall that can be fixed, removed or replaced?   Make your home safe by keeping walkways clear of objects you may trip over.   Use non-slip pads under rugs.   Do not walk in poorly lit areas.   Do not stand on chairs or wobbly ladders.   Use caution when reaching overhead or looking upward. This position can cause a loss of balance.   Be sure your shoes fit properly, have non-slip bottoms and are in good condition.   Be cautious when going up and down stairs, curbs, and when walking on uneven sidewalks.   If your balance is poor, consider using a cane or walker.   If your fall was related to alcohol use, stop or limit alcohol intake.   If your fall was related to use of sleeping medicines, talk to your doctor about this. You may need to reduce your dosage at bedtime if you awaken during the night to go to the bathroom.   To reduce the need for nighttime bathroom trips:   Avoid drinking fluids for several hours before going to bed   Empty your bladder before going to bed   Men can keep a urinal at the bedside   © 2578-2785 [employer]it[employer], [address] [place] [address], [place] [address]. All rights reserved. This information is not intended as a substitute for professional medical care. Always follow your healthcare professional's instructions.

## 2019-02-05 NOTE — NURSING
1528  2/4/19  1 unit therapeutic phlebotomy performed via right AC per protocol.  On completion, needle removed and pressure dressing applied.  Juice offered and taken   Blood pressure  113/70  P 75 upon completion.  Pt denies any complaints of dizziness or weakness.  Pt to return to clinic on 3/15.  Patient ambulated out without difficulty with return leo't.  No adverse rxn noted.

## 2019-02-11 ENCOUNTER — TELEPHONE (OUTPATIENT)
Dept: PAIN MEDICINE | Facility: CLINIC | Age: 74
End: 2019-02-11

## 2019-02-11 NOTE — TELEPHONE ENCOUNTER
----- Message from Myra Phelps sent at 2/11/2019  3:44 PM CST -----  Contact: Patient  Type:  Needs Medical Advice    Who Called: Bo  Symptoms (please be specific): n/a  How long has patient had these symptoms:  n/a  Pharmacy name and phone #:  n/a  Would the patient rather a call back or a response via [facility]?  Call back   Best Call Back Number:  Patient would like a call back at [phone]  Additional Information: Regards to rescheduling his injections.

## 2019-02-12 ENCOUNTER — TELEPHONE (OUTPATIENT)
Dept: PAIN MEDICINE | Facility: CLINIC | Age: 74
End: 2019-02-12

## 2019-02-12 NOTE — TELEPHONE ENCOUNTER
Contacted pt. Pt would like to reschedule d/t cold and being on antibiotics. Injection rescheduled to 3/6/19 per pt's request. Orders updated. All questions answered.//lp

## 2019-02-12 NOTE — TELEPHONE ENCOUNTER
----- Message from Yg Calvillo sent at 2/12/2019 11:24 AM CST -----  Contact: pt   Pt calling to rk appt, pt has a few questions needs answered prior to rk.        ..[phone]

## 2019-03-06 ENCOUNTER — HOSPITAL ENCOUNTER (OUTPATIENT)
Facility: HOSPITAL | Age: 74
Discharge: HOME | End: 2019-03-06
Attending: ANESTHESIOLOGY | Admitting: ANESTHESIOLOGY
Payer: MEDICARE

## 2019-03-06 ENCOUNTER — TELEPHONE (OUTPATIENT)
Dept: PAIN MEDICINE | Facility: CLINIC | Age: 74
End: 2019-03-06

## 2019-03-06 VITALS
RESPIRATION RATE: 17 BRPM | BODY MASS INDEX: 43.32 KG/M2 | WEIGHT: 276 LBS | HEIGHT: 67 IN | HEART RATE: 94 BPM | OXYGEN SATURATION: 96 % | SYSTOLIC BLOOD PRESSURE: 160 MMHG | DIASTOLIC BLOOD PRESSURE: 85 MMHG

## 2019-03-06 DIAGNOSIS — M47.816 LUMBAR SPONDYLOSIS: ICD-10-CM

## 2019-03-06 DIAGNOSIS — M25.552 LEFT HIP PAIN: ICD-10-CM

## 2019-03-06 DIAGNOSIS — M53.3 SACROILIAC JOINT PAIN: ICD-10-CM

## 2019-03-06 DIAGNOSIS — M70.60 GREATER TROCHANTERIC BURSITIS, UNSPECIFIED LATERALITY: ICD-10-CM

## 2019-03-06 PROCEDURE — 20610 DRAIN/INJ JOINT/BURSA W/O US: CPT | Mod: 59,LT,, | Performed by: ANESTHESIOLOGY

## 2019-03-06 PROCEDURE — 25000003 PHARM REV CODE 250: Performed by: ANESTHESIOLOGY

## 2019-03-06 PROCEDURE — 27096 PR INJECTION,SACROILIAC JOINT: ICD-10-PCS | Mod: LT,,, | Performed by: ANESTHESIOLOGY

## 2019-03-06 PROCEDURE — 27096 INJECT SACROILIAC JOINT: CPT | Mod: LT,,, | Performed by: ANESTHESIOLOGY

## 2019-03-06 PROCEDURE — 63600175 PHARM REV CODE 636 W HCPCS: Performed by: ANESTHESIOLOGY

## 2019-03-06 PROCEDURE — 63600175 PHARM REV CODE 636 W HCPCS

## 2019-03-06 PROCEDURE — 20610 PR DRAIN/INJECT LARGE JOINT/BURSA: ICD-10-PCS | Mod: 59,LT,, | Performed by: ANESTHESIOLOGY

## 2019-03-06 RX ORDER — METHYLPREDNISOLONE ACETATE 40 MG/ML
INJECTION, SUSPENSION INTRA-ARTICULAR; INTRALESIONAL; INTRAMUSCULAR; SOFT TISSUE
Status: DISCONTINUED | OUTPATIENT
Start: 2019-03-06 | End: 2019-03-06 | Stop reason: HOSPADM

## 2019-03-06 RX ORDER — LIDOCAINE HYDROCHLORIDE 20 MG/ML
INJECTION, SOLUTION EPIDURAL; INFILTRATION; INTRACAUDAL; PERINEURAL
Status: DISCONTINUED | OUTPATIENT
Start: 2019-03-06 | End: 2019-03-06 | Stop reason: HOSPADM

## 2019-03-06 NOTE — TELEPHONE ENCOUNTER
----- Message from Balbina Boyd sent at 3/6/2019 12:02 PM CST -----  Contact: pt   Type:  Patient Returning Call    Who Called: Pt   Who Left Message for Patient:   Does the patient know what this is regarding?:   Would the patient rather a call back or a response via [facility]? phone  Best Call Back Number: [phone]  Additional Information: States he's missed several calls and is calling to see if it's the Dr's office that's calling

## 2019-03-06 NOTE — OP NOTE
PROCEDURE: Sacroiliac joint, Greater trochanteric bursa injection, and Acetabulofemoral joint injection under fluoroscopic guidance       SIDE: LEFT Sacroiliac joint injection, LEFT Acetabulofemoral joint injection and LEFT Greater trochanteric bursa injection      PROCEDURE DATE: 3/6/2019    PREOPERATIVE DIAGNOSIS: Sacroiliitis, Greater trochanteric bursitis, Acetabulofemoral osteoarthritis  POSTOPERATIVE DIAGNOSIS: Sacroiliitis, Greater trochanteric bursitis,  Acetabulofemoral osteoarthritis    PROVIDER: Adam Pierre MD  Assistant(s): none    Anesthesia: Local,  No Sedation    >> 0 mg of VERSED    >> 0 mcg of FENTANYL     INDICATION: The patient has a history of pain due to sacroiliitis and hip pain unresponsive to conservative treatments. Fluoroscopy was used to optimize visualization of needle placement and to maximize safety.    PROCEDURE DESCRIPTION: The patient was seen and identified in the preoperative area. Risks, benefits, complications, and alternatives were discussed with the patient. The patient agreed to proceed with the procedure and signed the consent. The site and side of the procedure was identified and marked.     The patient was taken to the procedural suite. The patient was positioned in prone orientation on procedure table. A time out was performed prior to any intervention. The procedure, site, side, and allergies were stated and agreed to by all present. The lumbosacral area extending to the skin overlying the femoral greater trochanter was widely prepped with ChloraPrep. The procedural site was draped in usual sterile fashion. Vital signs were closely monitored throughout this procedure.     The fluoroscopic camera was placed in contralateral oblique view and was adjusted until the anterior and posterior joint margins of the targeted sacroiliac joint aligned in linear array. The lower pole of the joint was identified, marked, and localized with 1% Lidocaine. A 25 gauge 3.5 inch spinal  needle was introduced and advanced to the joint under fluoroscopic guidance. The joint space was entered and after negative aspiration, 2 mL of injectate was posited into the joint space. The needle was then withdrawn outside of the joint space and after negative aspiration 1 mL of solution was injected outside of the joint space. The injectant solution used was comprised of 6 mL of 1% PF Lidocaine and 2 mL of Methylprednisolone (40 mg/mL). This technique was performed for the above noted joint/s.    Following Sacroiliac Joint injection, the stylet was replaced and the needle was withdrawn intact. The above noted Acetabulofemoral joint and femoral neck was then identified with fluoroscopy. The targeted site of entry was localized with 1% PF Lidocaine. The above noted spinal needle was advanced to the rostral, medial aspect of the femoral neck just outside of the joint space until the osseous interface was met.  After negative aspiration, 3 mL of the above noted solution was injected. No pain or paresthesia was noted on injection. Following injection, the stylet was replaced and the needle was withdrawn intact. This technique was used for the above noted joint/s. The skin was cleaned and bandages were applied.    Following Acetabulofemoral Joint injection, the stylet was replaced and the needle was withdrawn intact. The LEFT greater trochanter was then identified with fluoroscopy. The targeted site of entry was localized with 1% PF Lidocaine. The above noted spinal needle was advanced to the lateral border of the greater trochanter until the osseus interface was met.  After negative aspiration, 2 mL of the above noted solution was injected. No pain or paresthesia was noted on injection. Following injection, the stylet was replaced and the needle was withdrawn intact. This technique was used for the above noted greater trochanteric bursa / bursae. The skin was cleaned and bandages were applied.    Description of  Findings: Not applicable    Prosthetic devices, grafts, tissues, or devices implanted: None    Specimen Removed: No    Estimated Blood Loss: minimal    COMPLICATIONS: None    DISPOSITION / PLANS: The patient was transferred to the recovery area in a stable condition for observation. The patient was reexamined prior to discharge. There was no evidence of acute neurologic injury following the procedure.  Patient was discharged from the recovery room after meeting discharge criteria. Home discharge instructions were given to the patient by the staff.

## 2019-03-06 NOTE — PLAN OF CARE
Problem: Adult Inpatient Plan of Care  Goal: Plan of Care Review  Outcome: Outcome(s) achieved Date Met: 03/06/19  Patient d/c home in stable condition via wheelchair with ride. Verbalized understanding of d/c instructions. Patient voiced no complaints at this time. Patient stood at side of bed, walked steps with no new motor deficits. Neurologically intact.

## 2019-03-06 NOTE — DISCHARGE SUMMARY
[facility]  Discharge Note       Description of Procedure: Left Sacroiliac Joint, Acetabulofemoral Joint, and Greater Trochanteric Bursa Injection under Fluoroscopic Guidance    Procedure Date: 3/6/2019    Admit Date: 3/6/2019  Discharge Date: 3/6/2019     Attending Physician: Gabby Medina   Discharge Provider: Gabby Medina    Preoperative Diagnosis: Sacroiliitis, Greater Trochanteric Bursitis and Acetabulofemoral Osteoarthritis     Postoperative Diagnosis: as above, same as preoperative diagnosis    Discharged Condition: Stable    Hospital Course: Patient was admitted for an outpatient procedure. The procedure was tolerated well with no complications.    Final Diagnoses: Same as principal problem.    Disposition: Home, self-care.    Follow up/Patient Instructions:  Follow-up in clinic in 2-3 weeks.    Medications: No medications were prescribed today. The patient was advised to resume normal medication regimen without change.  Specific information was provided regarding restarting any anticoagulant/s.    Discharge Procedure Orders (must include Diet, Follow-up, Activity):  Light activity for the remainder of the day, resume normal activity tomorrow. Resume normal diet. Follow-up in clinic in 2-3 weeks.

## 2019-03-15 ENCOUNTER — INFUSION (OUTPATIENT)
Dept: INFUSION THERAPY | Facility: HOSPITAL | Age: 74
End: 2019-03-15
Attending: INTERNAL MEDICINE
Payer: MEDICARE

## 2019-03-15 VITALS
TEMPERATURE: 99 F | DIASTOLIC BLOOD PRESSURE: 69 MMHG | HEART RATE: 82 BPM | OXYGEN SATURATION: 95 % | RESPIRATION RATE: 18 BRPM | SYSTOLIC BLOOD PRESSURE: 113 MMHG

## 2019-03-15 DIAGNOSIS — D75.1 ACQUIRED POLYCYTHEMIA: Primary | ICD-10-CM

## 2019-03-15 PROCEDURE — 99195 PHLEBOTOMY: CPT

## 2019-03-15 RX ORDER — LIDOCAINE HYDROCHLORIDE 10 MG/ML
1 INJECTION, SOLUTION EPIDURAL; INFILTRATION; INTRACAUDAL; PERINEURAL ONCE
Status: CANCELLED | OUTPATIENT
Start: 2019-03-15 | End: 2019-03-15

## 2019-03-15 NOTE — DISCHARGE INSTRUCTIONS
[facility] [place]  [address]  [address]  [phone] phone     [phone] fax  Hours of Operation: Monday- Friday 8:00am- 5:00pm  After hours phone  [phone]  Hematology / Oncology Physicians on call      Dr. Kwabena Gardiner      Please call with any concerns regarding your appointment today.

## 2019-03-15 NOTE — NURSING
1 unit therapeutic phlebotomy performed vialeft ___AC then discarded, pressure dressing applied.  Pt accepted juice .  Blood pressure  See flow sheet ______ upon completion.  Pt denies any complaints of dizziness, lightheadedness, or faintness.  Pt to return to clinic on _____.  Patient ambulated out without difficulty.

## 2019-03-21 RX ORDER — TRAMADOL HYDROCHLORIDE 50 MG/1
TABLET ORAL
Qty: 180 TABLET | Refills: 2 | Status: SHIPPED | OUTPATIENT
Start: 2019-03-21 | End: 2019-06-17 | Stop reason: SDUPTHER

## 2019-03-21 NOTE — TELEPHONE ENCOUNTER
----- Message from Eneida Phelps sent at 3/21/2019  7:40 AM CDT -----  Contact: pt  Type:  RX Refill Request    Who Called: pt   Refill or New Rx: refill  RX Name and Strength: traMADol (ULTRAM) 50 mg tablet  How is the patient currently taking it? (ex. 1XDay):   Is this a 30 day or 90 day RX:30  Preferred Pharmacy with phone number:  [facility] [address]  [address] [address]  Phone: [phone] Fax: [phone]  Local or Mail Order: mail  order  Ordering Provider: Dr. Bernal   Would the patient rather a call back or a response via [facility]? Call back   Best Call Back Number: [phone] (home) [phone] (work)  Additional Information:

## 2019-03-25 ENCOUNTER — TELEPHONE (OUTPATIENT)
Dept: PAIN MEDICINE | Facility: CLINIC | Age: 74
End: 2019-03-25

## 2019-03-25 NOTE — TELEPHONE ENCOUNTER
----- Message from Payton Borrero sent at 3/25/2019  1:48 PM CDT -----  Contact: Pt  Type:  Sooner Apoointment Request    Caller is requesting a sooner appointment.  Caller declined first available appointment listed below.  Caller will not accept being placed on the waitlist and is requesting a message be sent to doctor.  Name of Caller:Pt  When is the first available appointment? 03/29/19 @ 10:00a.m.   Symptoms: f/u  Would the patient rather a call back or a response via [facility]? Call back  Best Call Back Number: [phone] (home) [phone] (work)  Additional Information: n/a

## 2019-03-26 ENCOUNTER — TELEPHONE (OUTPATIENT)
Dept: PAIN MEDICINE | Facility: CLINIC | Age: 74
End: 2019-03-26

## 2019-03-26 NOTE — TELEPHONE ENCOUNTER
----- Message from Vanesa Nava sent at 3/26/2019  1:33 PM CDT -----  Contact: self- [phone]  ..Type:  Sooner Apoointment Request    Caller is requesting a sooner appointment.  Caller declined first available appointment listed below.  Caller will not accept being placed on the waitlist and is requesting a message be sent to doctor.  Name of Caller:Bo Chase  When is the first available appointment?03/29 @ 10am  Symptoms:wants to be seen sooner  Would the patient rather a call back or a response via [facility]? Call back   Best Call Back Number:[phone]  Additional Information:

## 2019-03-29 ENCOUNTER — OFFICE VISIT (OUTPATIENT)
Dept: PAIN MEDICINE | Facility: CLINIC | Age: 74
End: 2019-03-29
Payer: MEDICARE

## 2019-03-29 VITALS
RESPIRATION RATE: 18 BRPM | BODY MASS INDEX: 43.32 KG/M2 | WEIGHT: 276 LBS | HEART RATE: 92 BPM | SYSTOLIC BLOOD PRESSURE: 111 MMHG | HEIGHT: 67 IN | DIASTOLIC BLOOD PRESSURE: 74 MMHG

## 2019-03-29 DIAGNOSIS — M47.816 LUMBAR SPONDYLOSIS: Primary | ICD-10-CM

## 2019-03-29 DIAGNOSIS — M53.3 SACROILIAC JOINT PAIN: ICD-10-CM

## 2019-03-29 DIAGNOSIS — M70.60 GREATER TROCHANTERIC BURSITIS, UNSPECIFIED LATERALITY: ICD-10-CM

## 2019-03-29 DIAGNOSIS — M25.551 RIGHT HIP PAIN: ICD-10-CM

## 2019-03-29 PROCEDURE — 3078F PR MOST RECENT DIASTOLIC BLOOD PRESSURE < 80 MM HG: ICD-10-PCS | Mod: CPTII,S$GLB,, | Performed by: ANESTHESIOLOGY

## 2019-03-29 PROCEDURE — 1100F PTFALLS ASSESS-DOCD GE2>/YR: CPT | Mod: CPTII,S$GLB,, | Performed by: ANESTHESIOLOGY

## 2019-03-29 PROCEDURE — 3074F SYST BP LT 130 MM HG: CPT | Mod: CPTII,S$GLB,, | Performed by: ANESTHESIOLOGY

## 2019-03-29 PROCEDURE — 99214 PR OFFICE/OUTPT VISIT, EST, LEVL IV, 30-39 MIN: ICD-10-PCS | Mod: S$GLB,,, | Performed by: ANESTHESIOLOGY

## 2019-03-29 PROCEDURE — 1100F PR PT FALLS ASSESS DOC 2+ FALLS/FALL W/INJURY/YR: ICD-10-PCS | Mod: CPTII,S$GLB,, | Performed by: ANESTHESIOLOGY

## 2019-03-29 PROCEDURE — 99214 OFFICE O/P EST MOD 30 MIN: CPT | Mod: S$GLB,,, | Performed by: ANESTHESIOLOGY

## 2019-03-29 PROCEDURE — 3074F PR MOST RECENT SYSTOLIC BLOOD PRESSURE < 130 MM HG: ICD-10-PCS | Mod: CPTII,S$GLB,, | Performed by: ANESTHESIOLOGY

## 2019-03-29 PROCEDURE — 3078F DIAST BP <80 MM HG: CPT | Mod: CPTII,S$GLB,, | Performed by: ANESTHESIOLOGY

## 2019-03-29 PROCEDURE — 99999 PR PBB SHADOW E&M-EST. PATIENT-LVL V: CPT | Mod: PBBFAC,,, | Performed by: ANESTHESIOLOGY

## 2019-03-29 PROCEDURE — 3288F FALL RISK ASSESSMENT DOCD: CPT | Mod: CPTII,S$GLB,, | Performed by: ANESTHESIOLOGY

## 2019-03-29 PROCEDURE — 3288F PR FALLS RISK ASSESSMENT DOCUMENTED: ICD-10-PCS | Mod: CPTII,S$GLB,, | Performed by: ANESTHESIOLOGY

## 2019-03-29 PROCEDURE — 99999 PR PBB SHADOW E&M-EST. PATIENT-LVL V: ICD-10-PCS | Mod: PBBFAC,,, | Performed by: ANESTHESIOLOGY

## 2019-03-29 NOTE — PROGRESS NOTES
Chief Pain Complaint:  Hip Pain    Interval History: The patient was last seen 3/6/19. At that time he underwent Left SI joint, left hip and left GTB injection. The patient reports that  he is/was better following the procedure. The changes lasted 4 weeks. The changes have continued through this visit. The pain in now on the right hip.     History of Present Illness:   Bo Chase is a 74 y.o. male  who is presenting with a chief complaint of Hip Pain  . The patient began experiencing this problem insidiously, and the pain has been gradually worsening over the past 4 year(s). The pain is described as throbbing, cramping, aching and heavy and is located in the left grion and buttock. Pain is intermittent and lasts hours. The pain is nonradiating. The patient rates his pain a 8 out of ten and interferes with activities of daily living a 8 out of ten. Pain is exacerbated by prolonged standing, ambulation, and is improved by rest, Tramadol. Patient reports no prior trauma, no prior spinal surgery     - pertinent negatives: No fever, No chills, No weight loss, No bladder dysfunction, No bowel dysfunction, No saddle anesthesia  - pertinent positives: generalized nonspecific Lower Extremity weakness bilaterally    - medications, other therapies tried (physical therapy, injections):     >> NSAIDs, Tylenol, Tramadol, gabapentin and flexeril    >> Has previously undergone Physical Therapy    >> Has previously undergone spinal injection/s   Right L5-S1 TFESI with Dr Dhaliwal in 4/2015 with modest results               Left SI joint, left hip and left GTB injection on 3/6/19 with 90% pain relief     Imaging / Labs / Studies (reviewed on 3/29/2019):    Results for orders placed during the hospital encounter of 08/26/14   MRI Lumbar Spine Without Contrast    Narrative Study:   Lumbar spine MRI without contrast.    Technique:  Multiplanar non contrast enhanced images obtained on the lumbar spine.    History: Low back and right  leg pain.    Findings:    Normal distal thoracic cord with normal conus at the upper L1 spinal canal.    Lumbar facets are developmentally large with short lumbar pedicles.    T12-L1 disk: Mild degenerative disk space narrowing.    L1-2   disc: Mild diffuse degenerative disk bulge with mild to moderate L1-2 central canal stenosis and mild bilateral foraminal stenosis.    L2-3   disc: Diffuse degenerative disk bulge with moderate to severe L2-3 central canal stenosis.  There is moderate right L2-3 and mild-to-moderate left L2-3 foraminal stenosis.    L3-4   disc: Diffuse degenerative disk bulge and ligamentum flavum hypertrophy in this patient with mild bilateral L3-4 facet arthropathy.  There is severe L3-4 central canal stenosis with moderate bilateral L3-4 foraminal stenosis.    L4-5   disc: Diffuse degenerative disk bulge with severe bilateral L4-5 facet arthropathy changes.  There is mild L4-5 central canal stenosis with moderate left L4-5 and mild to moderate right L4-5 foraminal stenosis.    L5-S1 disc: Diffuse degenerative disk bulge.  There are bilateral L5 pars defects resulting in grade 1 to 2 spondylolisthesis of L5 on S1.  There is moderate left L5-S1 and mild to moderate right L5-S1 foraminal stenosis.    Impression      Degenerative bulges seen at multiple lumbar disk levels as described above in this patient with congenital facet hypertrophy and short pedicles.  There is severe bilateral L4-5 and mild bilateral L3-4 facet arthropathy.    Bilateral L5 pars defects resulting in grade 1 to 2 spondylolisthesis of L5 on S1.    There is severe L3-4, moderate to severe L2-3, mild-to-moderate L1-2 and mild L4-5 central canal stenosis.  Multilevel varying degrees of foraminal stenosis various lumbar foramina as described above.      Electronically signed by: AYAKA JORDAN MD  Date:     08/27/14  Time:    11:00        Results for orders placed during the hospital encounter of 06/09/14   X-Ray Lumbar Spine AP And  Lateral    Narrative Findings: Vertebral alignment is normal.  There is narrowing of the disk spaces and  anterior osteophyte formation.  There are no compression fractures or acute abnormalities are seen.  Vacuum disk at L5-S1.  Arteriosclerotic disease in the aorta and   iliac arteries.    Impression  Advanced degenerative change in the lumbar spine.      Electronically signed by: LEA DC MD  Date:     06/09/14  Time:    14:42      Results for orders placed during the hospital encounter of 09/09/14   X-Ray Lumbar Spine Ap Lateral w/Flex Ext    Narrative Findings: Lumbar spine series performed with lateral flexion and extension views.  Comparison to 06/09/14.  Note again made of generalized osteopenia and multilevel degenerative change.  Minimal grade 1 anterolisthesis observed at L4-5 and L5-S1 with   little change between flexion and extension.  Stable mild anterior wedge deformity of the T12 and L1 vertebrae.  No acute findings.    Impression  As above.      Electronically signed by: KAREN MCMILLAN MD  Date:     09/09/14  Time:    10:48      Review of Systems:  CONSTITUTIONAL: patient denies any fever, chills, or weight loss  SKIN: patient denies any rash or itching  RESPIRATORY: patient denies having any shortness of breath  GASTROINTESTINAL: patient denies having any diarrhea, constipation, or bowel incontinence  GENITOURINARY: patient denies having any abnormal bladder function    MUSCULOSKELETAL:  - patient complains of the above noted pain/s (see chief pain complaint)    NEUROLOGICAL:   - pain as above  - strength in Lower extremities is intact, BILATERALLY  - sensation in Lower extremities is intact, BILATERALLY  - patient denies any loss of bowel or bladder control      PSYCHIATRIC: patient denies any change in mood    Other:  All other systems reviewed and are negative      Physical Exam:  /74 (BP Location: Left arm, Patient Position: Sitting, BP Method: X-Large (Automatic))   Pulse 92    "Resp 18   Ht 5' 7" (1.702 m)   Wt 125.2 kg (276 lb)   BMI 43.23 kg/m²  (reviewed on 3/29/2019)  General: Alert and oriented, in no apparent distress.  Gait: normal gait.  Skin: No rashes, No discoloration, No obvious lesions  HEENT: Normocephalic, atraumatic. Pupils equal and round.  Cardiovascular: Regular rate and rhythm , no significant peripheral edema present  Respiratory: Without audible wheezing, without use of accessory muscles of respiration.    Musculoskeletal:    Lumbar Spine    - Pain on flexion of lumbar spine Absent  - Straight Leg Raise:  Absent    - Pain on extension of lumbar spine Absent  - TTP over the lumbar facet joints Absent  - Lumbar facet loading Absent    -Pain on palpation over the SI joint  Present on right   - KATIE: Present right   -TTP over GTB on the Right       Neuro:    Strength:  LE R/L: HF: 5/5, HE: 5/5, KF: 5/5; KE: 5/5; FE: 5/5; FF: 5/5    Extremity Reflexes: Brisk and symmetric throughout.      Extremity Sensory: Sensation to pinprick and temperature symmetric. Proprioception intact.      Psych:  Mood and affect is appropriate      Assessment:    Bo Chase is a 74 y.o. year old male who is presenting with   Encounter Diagnoses   Name Primary?    Lumbar spondylosis Yes    Sacroiliac joint pain     Greater trochanteric bursitis, unspecified laterality     Right hip pain        Plan:    1. Interventional: Schedule patient for Right SI joint, left hip and left GTB injection with local.    2. Pharmacologic: Tylenol PRN. Tramadol as prescribed by primary.    3. Rehabilitative: PT post injection.    4. Diagnostic: None for now.    5. Follow up: Post Injection.     20 minutes were spent in this encounter with more than 50% of the time used for counseling and review of the plan.  Imaging / studies reviewed, detailed above.  I discussed in detail the risks, benefits, and alternatives to any and all potential treatment options.  All questions and concerns were fully addressed " today in clinic. Medical decision making moderate.    Thank you for the opportunity to assist in the care of this patient.    Best wishes,    Signed:    Adam Pierre MD          Disclaimer:  This note may have been prepared using voice recognition software, it may have not been extensively proofed, as such there could be errors within the text such as sound alike errors.

## 2019-04-01 ENCOUNTER — OFFICE VISIT (OUTPATIENT)
Dept: CARDIOLOGY | Facility: CLINIC | Age: 74
End: 2019-04-01
Payer: MEDICARE

## 2019-04-01 VITALS
DIASTOLIC BLOOD PRESSURE: 68 MMHG | BODY MASS INDEX: 43.32 KG/M2 | HEIGHT: 67 IN | HEART RATE: 92 BPM | SYSTOLIC BLOOD PRESSURE: 122 MMHG | WEIGHT: 276 LBS

## 2019-04-01 DIAGNOSIS — I10 ESSENTIAL HYPERTENSION: Chronic | ICD-10-CM

## 2019-04-01 DIAGNOSIS — Z95.1 S/P CABG X 4: ICD-10-CM

## 2019-04-01 DIAGNOSIS — E78.5 HYPERLIPIDEMIA WITH TARGET LDL LESS THAN 70: ICD-10-CM

## 2019-04-01 DIAGNOSIS — I25.708 CORONARY ARTERY DISEASE OF BYPASS GRAFT OF NATIVE HEART WITH STABLE ANGINA PECTORIS: Primary | Chronic | ICD-10-CM

## 2019-04-01 DIAGNOSIS — G47.33 OSA ON CPAP: ICD-10-CM

## 2019-04-01 DIAGNOSIS — I50.32 CHRONIC DIASTOLIC CONGESTIVE HEART FAILURE: ICD-10-CM

## 2019-04-01 PROCEDURE — 3078F PR MOST RECENT DIASTOLIC BLOOD PRESSURE < 80 MM HG: ICD-10-PCS | Mod: CPTII,S$GLB,, | Performed by: INTERNAL MEDICINE

## 2019-04-01 PROCEDURE — 99214 PR OFFICE/OUTPT VISIT, EST, LEVL IV, 30-39 MIN: ICD-10-PCS | Mod: S$GLB,,, | Performed by: INTERNAL MEDICINE

## 2019-04-01 PROCEDURE — 1100F PR PT FALLS ASSESS DOC 2+ FALLS/FALL W/INJURY/YR: ICD-10-PCS | Mod: CPTII,S$GLB,, | Performed by: INTERNAL MEDICINE

## 2019-04-01 PROCEDURE — 3074F PR MOST RECENT SYSTOLIC BLOOD PRESSURE < 130 MM HG: ICD-10-PCS | Mod: CPTII,S$GLB,, | Performed by: INTERNAL MEDICINE

## 2019-04-01 PROCEDURE — 3078F DIAST BP <80 MM HG: CPT | Mod: CPTII,S$GLB,, | Performed by: INTERNAL MEDICINE

## 2019-04-01 PROCEDURE — 3288F PR FALLS RISK ASSESSMENT DOCUMENTED: ICD-10-PCS | Mod: CPTII,S$GLB,, | Performed by: INTERNAL MEDICINE

## 2019-04-01 PROCEDURE — 3074F SYST BP LT 130 MM HG: CPT | Mod: CPTII,S$GLB,, | Performed by: INTERNAL MEDICINE

## 2019-04-01 PROCEDURE — 99214 OFFICE O/P EST MOD 30 MIN: CPT | Mod: S$GLB,,, | Performed by: INTERNAL MEDICINE

## 2019-04-01 PROCEDURE — 99999 PR PBB SHADOW E&M-EST. PATIENT-LVL III: ICD-10-PCS | Mod: PBBFAC,,, | Performed by: INTERNAL MEDICINE

## 2019-04-01 PROCEDURE — 3288F FALL RISK ASSESSMENT DOCD: CPT | Mod: CPTII,S$GLB,, | Performed by: INTERNAL MEDICINE

## 2019-04-01 PROCEDURE — 1100F PTFALLS ASSESS-DOCD GE2>/YR: CPT | Mod: CPTII,S$GLB,, | Performed by: INTERNAL MEDICINE

## 2019-04-01 PROCEDURE — 99999 PR PBB SHADOW E&M-EST. PATIENT-LVL III: CPT | Mod: PBBFAC,,, | Performed by: INTERNAL MEDICINE

## 2019-04-01 NOTE — PROGRESS NOTES
Subjective:   Patient ID:  Bo Chase is a 74 y.o. male who presents for follow up of Follow-up and Coronary Artery Disease      74 yo male, 4 months f/u. Last fu with Dr. Moreno in .  Hx of CAD, s/p CABGx4 1996, s/p cath 03/2012 occluded SVG to diag, HTN, HLP, morbid obesity, sleep apnea on CPAP, hypothyroidism.    [facility], planned to have LHC test but he signed out AMA.    Had LHC 12/29/2014 s/p PCI of SVG to OM1.  8/31/2016 negative nuclear stress test for ischemia.    ECho normal EF    Pt states that he f/u with cardiologist [facility] in the past two yr. Negative exerrcise stress test 8in  at [facility].   Recent Dx of melanoma on nose and right chest pain. S/p chest tumoe early .   Sleep with CPAP.  Chronic leg swelling.  In  off Metolazone by himself and continued Lasix, then gained 10 punds. Now on Torsemide 20 mg bid and Metolazone 5 mg twice a week.  Use abuteral 3 times a week.  BNP 54 and Cr stable  Off Atx one week ago.  Pt states that he is ok to have station bike, but SOB when walking and carrying bags.      Past Medical History:   Diagnosis Date    Acute coronary syndrome     CHF (congestive heart failure)     Chronic kidney disease     Coronary artery disease     Hyperlipidemia     Hypertension     Lumbar spondylosis     Sleep apnea        Past Surgical History:   Procedure Laterality Date    back surgary      CARDIAC CATHETERIZATION  3/6/2012    CORONARY ANGIOPLASTY      CORONARY ARTERY BYPASS GRAFT  1996    x4    HEART CATH-LEFT Left 12/29/2014    Performed [facility]    throid lobectomy  4/2012    umbilicial hernia         Social History     Tobacco Use    Smoking status: Never Smoker    Smokeless tobacco: Never Used   Substance Use Topics    Alcohol use: Yes     Comment: 6 BEERS A YEAR     Drug use: No       Family History   Problem Relation Age of Onset    Heart disease Father           Review of Systems   Constitution: Positive for malaise/fatigue. Negative for decreased appetite, diaphoresis, fever and night sweats.   HENT: Negative for nosebleeds.    Eyes: Negative for blurred vision and double vision.   Cardiovascular: Positive for dyspnea on exertion and leg swelling. Negative for chest pain, claudication, irregular heartbeat, near-syncope, orthopnea, palpitations, paroxysmal nocturnal dyspnea and syncope.   Respiratory: Negative for cough, shortness of breath, sleep disturbances due to breathing, snoring, sputum production and wheezing.    Endocrine: Negative for cold intolerance and polyuria.   Hematologic/Lymphatic: Does not bruise/bleed easily.   Skin: Negative for rash.   Musculoskeletal: Negative for back pain, falls, joint pain, joint swelling and neck pain.   Gastrointestinal: Negative for abdominal pain, heartburn, nausea and vomiting.   Genitourinary: Negative for dysuria, frequency and hematuria.   Neurological: Negative for difficulty with concentration, dizziness, focal weakness, headaches, light-headedness, numbness, seizures and weakness.   Psychiatric/Behavioral: Negative for depression, memory loss and substance abuse. The patient does not have insomnia.    Allergic/Immunologic: Negative for HIV exposure and hives.       Objective:   Physical Exam   Constitutional: He is oriented to person, place, and time. He appears well-nourished.   HENT:   Head: Normocephalic.   Eyes: Pupils are equal, round, and reactive to light.   Neck: Normal carotid pulses and no JVD present. Carotid bruit is not present. No thyromegaly present.   Cardiovascular: Normal rate, regular rhythm, normal heart sounds and normal pulses.  No extrasystoles are present. PMI is not displaced. Exam reveals no gallop and no S3.   No murmur heard.  Pulmonary/Chest: Breath sounds normal. No stridor. No respiratory distress.   Abdominal: Soft. Bowel sounds are normal. There is no tenderness. There is no  rebound.   Musculoskeletal: Normal range of motion.   Neurological: He is alert and oriented to person, place, and time.   Skin: Skin is intact. No rash noted.   hyperpigmentation   Psychiatric: His behavior is normal.       Lab Results   Component Value Date    CHOL 150 08/31/2016    CHOL 152 09/09/2015    CHOL 137 12/19/2014     Lab Results   Component Value Date    HDL 29 (L) 08/31/2016    HDL 28 (L) 09/09/2015    HDL 29 (L) 12/19/2014     Lab Results   Component Value Date    LDLCALC 72.8 08/31/2016    LDLCALC 82.6 09/09/2015    LDLCALC 70.8 12/19/2014     Lab Results   Component Value Date    TRIG 241 (H) 08/31/2016    TRIG 207 (H) 09/09/2015    TRIG 186 (H) 12/19/2014     Lab Results   Component Value Date    CHOLHDL 19.3 (L) 08/31/2016    CHOLHDL 18.4 (L) 09/09/2015    CHOLHDL 21.2 12/19/2014       Chemistry        Component Value Date/Time     (L) 01/25/2019 1253    K 3.9 01/25/2019 1253    CL 93 (L) 01/25/2019 1253    CO2 27 01/25/2019 1253    BUN 40 (H) 01/25/2019 1253    CREATININE 2.1 (H) 01/25/2019 1253     (H) 01/25/2019 1253        Component Value Date/Time    CALCIUM 10.6 (H) 01/25/2019 1253    ALKPHOS 77 11/08/2018 1400    AST 48 (H) 11/08/2018 1400    ALT 62 (H) 11/08/2018 1400    BILITOT 0.6 11/08/2018 1400    ESTGFRAFRICA 35.3 (A) 01/25/2019 1253    EGFRNONAA 30.5 (A) 01/25/2019 1253          Lab Results   Component Value Date    HGBA1C 6.0 08/27/2014     Lab Results   Component Value Date    TSH 1.321 07/12/2017     Lab Results   Component Value Date    INR 1.1 07/25/2016    INR 1.1 12/19/2014    INR 1.1 03/01/2012     Lab Results   Component Value Date    WBC 10.16 01/25/2019    WBC 10.16 01/25/2019    HGB 16.5 01/25/2019    HGB 16.5 01/25/2019    HCT 49.7 01/25/2019    HCT 49.7 01/25/2019    MCV 84 01/25/2019    MCV 84 01/25/2019     01/25/2019     01/25/2019     BMP  Sodium   Date Value Ref Range Status   01/25/2019 134 (L) 136 - 145 mmol/L Final     Potassium    Date Value Ref Range Status   01/25/2019 3.9 3.5 - 5.1 mmol/L Final     Chloride   Date Value Ref Range Status   01/25/2019 93 (L) 95 - 110 mmol/L Final     CO2   Date Value Ref Range Status   01/25/2019 27 23 - 29 mmol/L Final     BUN, Bld   Date Value Ref Range Status   01/25/2019 40 (H) 8 - 23 mg/dL Final     Creatinine   Date Value Ref Range Status   01/25/2019 2.1 (H) 0.5 - 1.4 mg/dL Final     Calcium   Date Value Ref Range Status   01/25/2019 10.6 (H) 8.7 - 10.5 mg/dL Final     Anion Gap   Date Value Ref Range Status   01/25/2019 14 8 - 16 mmol/L Final     eGFR if    Date Value Ref Range Status   01/25/2019 35.3 (A) >60 mL/min/1.73 m^2 Final     eGFR if non    Date Value Ref Range Status   01/25/2019 30.5 (A) >60 mL/min/1.73 m^2 Final     Comment:     Calculation used to obtain the estimated glomerular filtration  rate (eGFR) is the CKD-EPI equation.        BNP  @LABRCNTIP(BNP,BNPTRIAGEBLO)@  @LABRCNTIP(troponini)@  CrCl cannot be calculated (Patient's most recent lab result is older than the maximum 7 days allowed.).  No results found in the last 24 hours.  No results found in the last 24 hours.  No results found in the last 24 hours.    Assessment:      1. Coronary artery disease of bypass graft of native heart with stable angina pectoris    2. Chronic diastolic congestive heart failure    3. Essential hypertension    4. Hyperlipidemia with target LDL less than 70    5. S/P CABG x 4    6. LEANDRO on CPAP    7. BMI 39.0-39.9,adult      MENA due to obesity  CHfopEF compensated  On CPAP for LEANDRO  Plan:   Weight control  Continue Torsemide, Aldactone and metolazone  Continue ASA, Lipitor, BB, Imdur and hydralazine  Continue current meds.  Recommend heart-healthy diet, weight control and regular exercise.  Ion. Risk modification.   RTC in 6 months    I have reviewed all pertinent labs and cardiac studies. Plans and recommendations have been formulated under my direct supervision. All  questions answered and patient voiced understanding. Patient to continue current medications.

## 2019-04-01 NOTE — H&P (VIEW-ONLY)
Subjective:   Patient ID:  Bo Chase is a 74 y.o. male who presents for follow up of Follow-up and Coronary Artery Disease      72 yo male, 4 months f/u. Last fu with Dr. Moreno in .  Hx of CAD, s/p CABGx4 1996, s/p cath 03/2012 occluded SVG to diag, HTN, HLP, morbid obesity, sleep apnea on CPAP, hypothyroidism.    [facility], planned to have LHC test but he signed out AMA.    Had LHC 12/29/2014 s/p PCI of SVG to OM1.  8/31/2016 negative nuclear stress test for ischemia.    ECho normal EF    Pt states that he f/u with cardiologist [facility] in the past two yr. Negative exerrcise stress test 8in  at [facility].   Recent Dx of melanoma on nose and right chest pain. S/p chest tumoe early .   Sleep with CPAP.  Chronic leg swelling.  In  off Metolazone by himself and continued Lasix, then gained 10 punds. Now on Torsemide 20 mg bid and Metolazone 5 mg twice a week.  Use abuteral 3 times a week.  BNP 54 and Cr stable  Off Atx one week ago.  Pt states that he is ok to have station bike, but SOB when walking and carrying bags.      Past Medical History:   Diagnosis Date    Acute coronary syndrome     CHF (congestive heart failure)     Chronic kidney disease     Coronary artery disease     Hyperlipidemia     Hypertension     Lumbar spondylosis     Sleep apnea        Past Surgical History:   Procedure Laterality Date    back surgary      CARDIAC CATHETERIZATION  3/6/2012    CORONARY ANGIOPLASTY      CORONARY ARTERY BYPASS GRAFT  1996    x4    HEART CATH-LEFT Left 12/29/2014    Performed [facility] at [facility] C[facility] LAB    throid lobectomy  4/2012    umbilicial hernia         Social History     Tobacco Use    Smoking status: Never Smoker    Smokeless tobacco: Never Used   Substance Use Topics    Alcohol use: Yes     Comment: 6 BEERS A YEAR     Drug use: No       Family History   Problem Relation Age of Onset    Heart disease Father           Review of Systems   Constitution: Positive for malaise/fatigue. Negative for decreased appetite, diaphoresis, fever and night sweats.   HENT: Negative for nosebleeds.    Eyes: Negative for blurred vision and double vision.   Cardiovascular: Positive for dyspnea on exertion and leg swelling. Negative for chest pain, claudication, irregular heartbeat, near-syncope, orthopnea, palpitations, paroxysmal nocturnal dyspnea and syncope.   Respiratory: Negative for cough, shortness of breath, sleep disturbances due to breathing, snoring, sputum production and wheezing.    Endocrine: Negative for cold intolerance and polyuria.   Hematologic/Lymphatic: Does not bruise/bleed easily.   Skin: Negative for rash.   Musculoskeletal: Negative for back pain, falls, joint pain, joint swelling and neck pain.   Gastrointestinal: Negative for abdominal pain, heartburn, nausea and vomiting.   Genitourinary: Negative for dysuria, frequency and hematuria.   Neurological: Negative for difficulty with concentration, dizziness, focal weakness, headaches, light-headedness, numbness, seizures and weakness.   Psychiatric/Behavioral: Negative for depression, memory loss and substance abuse. The patient does not have insomnia.    Allergic/Immunologic: Negative for HIV exposure and hives.       Objective:   Physical Exam   Constitutional: He is oriented to person, place, and time. He appears well-nourished.   HENT:   Head: Normocephalic.   Eyes: Pupils are equal, round, and reactive to light.   Neck: Normal carotid pulses and no JVD present. Carotid bruit is not present. No thyromegaly present.   Cardiovascular: Normal rate, regular rhythm, normal heart sounds and normal pulses.  No extrasystoles are present. PMI is not displaced. Exam reveals no gallop and no S3.   No murmur heard.  Pulmonary/Chest: Breath sounds normal. No stridor. No respiratory distress.   Abdominal: Soft. Bowel sounds are normal. There is no tenderness. There is no  rebound.   Musculoskeletal: Normal range of motion.   Neurological: He is alert and oriented to person, place, and time.   Skin: Skin is intact. No rash noted.   hyperpigmentation   Psychiatric: His behavior is normal.       Lab Results   Component Value Date    CHOL 150 08/31/2016    CHOL 152 09/09/2015    CHOL 137 12/19/2014     Lab Results   Component Value Date    HDL 29 (L) 08/31/2016    HDL 28 (L) 09/09/2015    HDL 29 (L) 12/19/2014     Lab Results   Component Value Date    LDLCALC 72.8 08/31/2016    LDLCALC 82.6 09/09/2015    LDLCALC 70.8 12/19/2014     Lab Results   Component Value Date    TRIG 241 (H) 08/31/2016    TRIG 207 (H) 09/09/2015    TRIG 186 (H) 12/19/2014     Lab Results   Component Value Date    CHOLHDL 19.3 (L) 08/31/2016    CHOLHDL 18.4 (L) 09/09/2015    CHOLHDL 21.2 12/19/2014       Chemistry        Component Value Date/Time     (L) 01/25/2019 1253    K 3.9 01/25/2019 1253    CL 93 (L) 01/25/2019 1253    CO2 27 01/25/2019 1253    BUN 40 (H) 01/25/2019 1253    CREATININE 2.1 (H) 01/25/2019 1253     (H) 01/25/2019 1253        Component Value Date/Time    CALCIUM 10.6 (H) 01/25/2019 1253    ALKPHOS 77 11/08/2018 1400    AST 48 (H) 11/08/2018 1400    ALT 62 (H) 11/08/2018 1400    BILITOT 0.6 11/08/2018 1400    ESTGFRAFRICA 35.3 (A) 01/25/2019 1253    EGFRNONAA 30.5 (A) 01/25/2019 1253          Lab Results   Component Value Date    HGBA1C 6.0 08/27/2014     Lab Results   Component Value Date    TSH 1.321 07/12/2017     Lab Results   Component Value Date    INR 1.1 07/25/2016    INR 1.1 12/19/2014    INR 1.1 03/01/2012     Lab Results   Component Value Date    WBC 10.16 01/25/2019    WBC 10.16 01/25/2019    HGB 16.5 01/25/2019    HGB 16.5 01/25/2019    HCT 49.7 01/25/2019    HCT 49.7 01/25/2019    MCV 84 01/25/2019    MCV 84 01/25/2019     01/25/2019     01/25/2019     BMP  Sodium   Date Value Ref Range Status   01/25/2019 134 (L) 136 - 145 mmol/L Final     Potassium    Date Value Ref Range Status   01/25/2019 3.9 3.5 - 5.1 mmol/L Final     Chloride   Date Value Ref Range Status   01/25/2019 93 (L) 95 - 110 mmol/L Final     CO2   Date Value Ref Range Status   01/25/2019 27 23 - 29 mmol/L Final     BUN, Bld   Date Value Ref Range Status   01/25/2019 40 (H) 8 - 23 mg/dL Final     Creatinine   Date Value Ref Range Status   01/25/2019 2.1 (H) 0.5 - 1.4 mg/dL Final     Calcium   Date Value Ref Range Status   01/25/2019 10.6 (H) 8.7 - 10.5 mg/dL Final     Anion Gap   Date Value Ref Range Status   01/25/2019 14 8 - 16 mmol/L Final     eGFR if    Date Value Ref Range Status   01/25/2019 35.3 (A) >60 mL/min/1.73 m^2 Final     eGFR if non    Date Value Ref Range Status   01/25/2019 30.5 (A) >60 mL/min/1.73 m^2 Final     Comment:     Calculation used to obtain the estimated glomerular filtration  rate (eGFR) is the CKD-EPI equation.        BNP  @LABRCNTIP(BNP,BNPTRIAGEBLO)@  @LABRCNTIP(troponini)@  CrCl cannot be calculated (Patient's most recent lab result is older than the maximum 7 days allowed.).  No results found in the last 24 hours.  No results found in the last 24 hours.  No results found in the last 24 hours.    Assessment:      1. Coronary artery disease of bypass graft of native heart with stable angina pectoris    2. Chronic diastolic congestive heart failure    3. Essential hypertension    4. Hyperlipidemia with target LDL less than 70    5. S/P CABG x 4    6. LEANDRO on CPAP    7. BMI 39.0-39.9,adult      MENA due to obesity  CHfopEF compensated  On CPAP for LEANDRO  Plan:   Weight control  Continue Torsemide, Aldactone and metolazone  Continue ASA, Lipitor, BB, Imdur and hydralazine  Continue current meds.  Recommend heart-healthy diet, weight control and regular exercise.  Ion. Risk modification.   RTC in 6 months    I have reviewed all pertinent labs and cardiac studies. Plans and recommendations have been formulated under my direct supervision. All  questions answered and patient voiced understanding. Patient to continue current medications.

## 2019-04-08 ENCOUNTER — TELEPHONE (OUTPATIENT)
Dept: PAIN MEDICINE | Facility: CLINIC | Age: 74
End: 2019-04-08

## 2019-04-08 NOTE — TELEPHONE ENCOUNTER
----- Message from Carito Delatorre sent at 4/8/2019 11:13 AM CDT -----  Contact: self  Type:  Patient Returning Call    Who Called:pt  Who Left Message for Patient: Dr. Pierre    oes the patient know what this is regarding?:problems  Would the patient rather a call back or a response via [facility]? call    Best Call Back Number: [phone]  dditional Information: n/a

## 2019-04-10 ENCOUNTER — TELEPHONE (OUTPATIENT)
Dept: PAIN MEDICINE | Facility: CLINIC | Age: 74
End: 2019-04-10

## 2019-04-10 NOTE — TELEPHONE ENCOUNTER
----- Message from Veronica Ace sent at 4/10/2019  2:17 PM CDT -----  Contact: Pt  Please give pt a call at ..[phone] (home) [phone] (work) he states that he spoke with provider and is to be scheduled for Friday for left hip pain.

## 2019-04-10 NOTE — TELEPHONE ENCOUNTER
Contacted pt. Pt requesting work in appt with . Offered pt appt   Date: 4/12/2019 Status: [facility]   Appt Time: 8:40 AM       . Pt gladly accepted. All questions answered.//lp

## 2019-04-13 ENCOUNTER — NURSE TRIAGE (OUTPATIENT)
Dept: ADMINISTRATIVE | Facility: CLINIC | Age: 74
End: 2019-04-13

## 2019-04-13 NOTE — TELEPHONE ENCOUNTER
Reason for Disposition   Patient already left for the hospital/clinic.    Protocols used: NO CONTACT OR DUPLICATE CONTACT CALL-A-AH

## 2019-04-18 ENCOUNTER — TELEPHONE (OUTPATIENT)
Dept: RHEUMATOLOGY | Facility: CLINIC | Age: 74
End: 2019-04-18

## 2019-04-18 ENCOUNTER — TELEPHONE (OUTPATIENT)
Dept: PAIN MEDICINE | Facility: CLINIC | Age: 74
End: 2019-04-18

## 2019-04-18 DIAGNOSIS — M53.3 SACROILIAC JOINT DYSFUNCTION: Primary | ICD-10-CM

## 2019-04-18 NOTE — TELEPHONE ENCOUNTER
"Spoke with patient who states that he is having extreme pain in his left hip near his SI joint. At times he can only walk a few feet due to a pain rate being 9/10 but lying down it is any where between a 1-3/10. He recently had an Xray done and his previous physician stated that it looks like the pain is coming from his SI Joint. Patient is taking Norco 7.5-325mg but it only dulls the pain No OTC medications. No non-pharmeceutical methods tried at this time. Patient informed that Dr. Pierre is currently out of the clinic and was instructed to go to Urgent Care or ER if his pain is too much to bare. Mr. Chase states " I have been to those people before and will only give me what I already have like Norco". Please advise.   "

## 2019-04-18 NOTE — TELEPHONE ENCOUNTER
----- Message from Ya Stevenson sent at 4/18/2019 12:39 PM CDT -----  Contact: self  Type:  Needs Medical Advice    Who Called: fausto  Symptoms (please be specific): pain in hip   How long has patient had these symptoms:    Pharmacy name and phone #:      Would the patient rather a call back or a response via [facility]? call  Best Call Back Number: [phone]  Additional Information:

## 2019-04-18 NOTE — TELEPHONE ENCOUNTER
----- Message from Balbina Boyd sent at 4/18/2019  3:39 PM CDT -----  Contact: pt   Type:  Patient Returning Call    Who Called:pt   Who Left Message for Patient:nurse  Does the patient know what this is regarding?:rtn a call   Would the patient rather a call back or a response via [facility]? Phone   Best Call Back Number: [phone]  Additional Information:

## 2019-04-22 ENCOUNTER — TELEPHONE (OUTPATIENT)
Dept: CARDIOLOGY | Facility: CLINIC | Age: 74
End: 2019-04-22

## 2019-04-22 ENCOUNTER — TELEPHONE (OUTPATIENT)
Dept: PAIN MEDICINE | Facility: CLINIC | Age: 74
End: 2019-04-22

## 2019-04-22 NOTE — TELEPHONE ENCOUNTER
Contacted pt. Attempted to work pt in for injection on this Wednesday. Pt states he had I&D on foot for an abcess and on antibiotics for 7 days. Pt also on ASA 325mg and we will reach out to pt's cardiologist to clear. Injection scheduled next Wednesday 5/1/19. Pre injection instructions taught and mailed. Orders entered. Pt was able to verbalize all understanding. All questions answered.//lp

## 2019-04-22 NOTE — TELEPHONE ENCOUNTER
----- Message from Jaida Petit sent at 4/22/2019 10:57 AM CDT -----  Contact: Patient  Patient called to speak with the nurse; he has a lot of pain in his hip where he received the injection. He can be contacted at [phone].    Thanks,  Jaida

## 2019-04-23 NOTE — TELEPHONE ENCOUNTER
----- Message from Sharlene West LPN sent at 4/22/2019 12:41 PM CDT -----  Good Morning. Dr. Pierre request to perform sacroiliac joint RFA for patient. Pt is currently on Aspirin  325mg and will need to d/c 1 (one) week prior to having this injection. Please advise or contact me at ext [phone] with questions. Thanks//lp

## 2019-05-01 ENCOUNTER — HOSPITAL ENCOUNTER (OUTPATIENT)
Facility: HOSPITAL | Age: 74
Discharge: HOME | End: 2019-05-01
Attending: ANESTHESIOLOGY | Admitting: ANESTHESIOLOGY
Payer: MEDICARE

## 2019-05-01 VITALS
HEART RATE: 96 BPM | DIASTOLIC BLOOD PRESSURE: 77 MMHG | WEIGHT: 252 LBS | HEIGHT: 67 IN | SYSTOLIC BLOOD PRESSURE: 136 MMHG | RESPIRATION RATE: 17 BRPM | BODY MASS INDEX: 39.55 KG/M2 | OXYGEN SATURATION: 96 %

## 2019-05-01 DIAGNOSIS — M53.3 SACROILIAC JOINT DYSFUNCTION: Primary | ICD-10-CM

## 2019-05-01 DIAGNOSIS — R07.9 CHEST PAIN: ICD-10-CM

## 2019-05-01 PROCEDURE — 25000003 PHARM REV CODE 250

## 2019-05-01 PROCEDURE — 25000003 PHARM REV CODE 250: Performed by: ANESTHESIOLOGY

## 2019-05-01 PROCEDURE — 93041 RHYTHM ECG TRACING: CPT

## 2019-05-01 PROCEDURE — 64640 INJECTION TREATMENT OF NERVE: CPT | Mod: LT,,, | Performed by: ANESTHESIOLOGY

## 2019-05-01 PROCEDURE — 63600175 PHARM REV CODE 636 W HCPCS

## 2019-05-01 PROCEDURE — S0020 INJECTION, BUPIVICAINE HYDRO: HCPCS

## 2019-05-01 PROCEDURE — 63600175 PHARM REV CODE 636 W HCPCS: Performed by: ANESTHESIOLOGY

## 2019-05-01 PROCEDURE — 93010 ELECTROCARDIOGRAM REPORT: CPT | Mod: ,,, | Performed by: INTERNAL MEDICINE

## 2019-05-01 PROCEDURE — S0020 INJECTION, BUPIVICAINE HYDRO: HCPCS | Performed by: ANESTHESIOLOGY

## 2019-05-01 PROCEDURE — 99152 PR MOD CONSCIOUS SEDATION, SAME PHYS, 5+ YRS, FIRST 15 MIN: ICD-10-PCS | Mod: ,,, | Performed by: ANESTHESIOLOGY

## 2019-05-01 PROCEDURE — 93005 ELECTROCARDIOGRAM TRACING: CPT | Mod: 59

## 2019-05-01 PROCEDURE — 99152 MOD SED SAME PHYS/QHP 5/>YRS: CPT | Mod: ,,, | Performed by: ANESTHESIOLOGY

## 2019-05-01 PROCEDURE — 93010 EKG 12-LEAD: ICD-10-PCS | Mod: ,,, | Performed by: INTERNAL MEDICINE

## 2019-05-01 PROCEDURE — 64640 PR DESTRUCT BY NEURO AGENT; OTHER PERIPH NERVE: ICD-10-PCS | Mod: LT,,, | Performed by: ANESTHESIOLOGY

## 2019-05-01 RX ORDER — METHYLPREDNISOLONE ACETATE 40 MG/ML
INJECTION, SUSPENSION INTRA-ARTICULAR; INTRALESIONAL; INTRAMUSCULAR; SOFT TISSUE
Status: DISCONTINUED | OUTPATIENT
Start: 2019-05-01 | End: 2019-05-01 | Stop reason: HOSPADM

## 2019-05-01 RX ORDER — MIDAZOLAM HYDROCHLORIDE 1 MG/ML
INJECTION, SOLUTION INTRAMUSCULAR; INTRAVENOUS
Status: DISCONTINUED | OUTPATIENT
Start: 2019-05-01 | End: 2019-05-01 | Stop reason: HOSPADM

## 2019-05-01 RX ORDER — BUPIVACAINE HYDROCHLORIDE 5 MG/ML
INJECTION, SOLUTION EPIDURAL; INTRACAUDAL
Status: DISCONTINUED | OUTPATIENT
Start: 2019-05-01 | End: 2019-05-01 | Stop reason: HOSPADM

## 2019-05-01 RX ORDER — FENTANYL CITRATE 50 UG/ML
INJECTION, SOLUTION INTRAMUSCULAR; INTRAVENOUS
Status: DISCONTINUED | OUTPATIENT
Start: 2019-05-01 | End: 2019-05-01 | Stop reason: HOSPADM

## 2019-05-01 RX ORDER — HYDROCODONE BITARTRATE AND ACETAMINOPHEN 7.5; 325 MG/1; MG/1
1 TABLET ORAL EVERY 6 HOURS PRN
COMMUNITY
End: 2020-01-09 | Stop reason: SDUPTHER

## 2019-05-01 NOTE — OP NOTE
PROCEDURE: L5 dorsal ramus, Sacral 1, Sacral 2, and Sacral 3 lateral branch radiofrequency (RF) ablation      SIDE: Left        LEVEL:   Sacral ala (Corresponding to the L5 dorsal ramus),   Sacral 1 lateral branch,   Sacral 2 lateral brach,   Sacral 3 lateral branch     PROCEDURE DATE: 05/01/2019     PREOPERATIVE DIAGNOSIS: Sacroiliitis, Sacroiliac Joint Dysfunction  POSTOPERATIVE DIAGNOSIS: Sacroiliitis, Sacroiliac Joint Dysfunction     PROVIDER: Adam Pierre MD  Assistant(s): None     ANESTHESIA: Local, IV Sedation    >> 2 mg of VERSED    >> 75 mcg of FENTANYL      INDICATION: The patient has chronic sacroiliac joint pain unresponsive to conservative treatments. Fluoroscopy was used to optimize visualization of needle placement and to maximize safety.         PROCEDURE DESCRIPTION / TECHNIQUE:   The patient was seen and identified in the preoperative area. Risks, benefits, complications, and alternatives were discussed with the patient. The patient agreed to proceed with the procedure and signed the consent. The site and side of the procedure was identified and marked. An iv was started.     The patient was taken to the procedural suite. The patient was positioned in prone orientation on procedure table. A time out was performed prior to any intervention. The procedure, site, side, and allergies were stated and agreed to by all present. The lumbosacral area was widely prepped with ChloraPrep. The procedural site was draped in usual sterile fashion. Vital signs were closely monitored throughout this procedure. Conscious sedation was used for this procedure to decrease patient anxiety.      Cooled Radiofrequency Ablation(RFA), Lumbar: 17-gauge, 100 mm Coolief cooled radiofrequency needles were introduced to the dorsal ramus groove deep to the L5-S1 facet joint to target the left and L5 medial branch. AP and lateral views were obtained to verify proper needle location. Motor testing was performed which was negative  for inappropriate stimulation. After 1 cc of Lidocaine  2% PF was injected radiofrequency ablation was accomplished at a target 60 degrees at probe tip with adjacent target temperature of 85 degrees centigrade for 150 seconds. Then, 1 mL of a solution containing 2 mL 0.5 % Bupivacaine and 2 mL Methylprednisolone (40 mg/mL) was injected through each cannula following negative aspiration. The needles were removed intact.       Cooled Radiofrequency ablation Sacrum: 17-gauge, 100mm Coolief cooled radiofrequency needles were introduced to the body of the sacrum in a line MCC between the sacral foramen and the left Sacroiliac Joint to target the S1, S2, and S3 lateral branches. Needles were placed every 1.5 CM for a total of 3 needles. 1cc Local anesthesia with 2% lidocaine PF was injected in each site and cooled radiofreqency ablation was accomplished target 60 degrees with adjacent target temperature of 85 degrees centigrade for 150 seconds. Then, 1 mL of a solution containing 2 mL 0.50% Bupivacaine and 2 mL Methylprednisolone (40 mg/mL) was injected through each cannula following negative aspiration.. The needles were removed intact.      Description of Findings: Not applicable     Prosthetic devices, grafts, tissues, or devices implanted: None     Specimen Removed: No     Estimated Blood Loss: minimal     COMPLICATIONS: None     DISPOSITION / PLANS: The patient was transferred to the recovery area in a stable condition for observation. The patient was reexamined prior to discharge. There was no evidence of acute neurologic injury following the procedure.  Patient was discharged from the recovery room after meeting discharge criteria. Home discharge instructions were given to the patient by the staff.

## 2019-05-01 NOTE — PROGRESS NOTES
Patient reports chest pain completely resolved. Dr. Pierre at bedside, EKG obtained and cleared with Dr. Pierre by Dr. Browne (cardiologist). OK to DC home. VSS.

## 2019-05-01 NOTE — PLAN OF CARE
Problem: Adult Inpatient Plan of Care  Goal: Plan of Care Review  Outcome: Outcome(s) achieved Date Met: 05/01/19  Patient discharged home in stable condition via wheelchair with ride. Verbalized understanding of discharge instructions. Patient voiced no complaints at this time. Patient stood at side of bed, walked steps with no new motor or sensory deficits. Neurologically intact.

## 2019-05-01 NOTE — DISCHARGE SUMMARY
[facility]  Discharge Note       Description of Procedure: Left L5 dorsal ramus, Sacral 1, Sacral 2, and Sacral 3 lateral branch radiofrequency (RF) ablation under Fluoroscopic Guidance    Procedure Date: 5/1/2019    Admit Date: 5/1/2019  Discharge Date: 5/1/2019     Attending Physician: Gabby Medina   Discharge Provider: Gabby Medina    Preoperative Diagnosis: Sacroiliitis     Postoperative Diagnosis: as above, same as preoperative diagnosis    Discharged Condition: Stable    Hospital Course: Patient was admitted for an outpatient procedure. The procedure was tolerated well with no complications.    Final Diagnoses: Same as principal problem.    Disposition: Home, self-care.    Follow up/Patient Instructions:  Follow-up in clinic in 2-3 weeks.    Medications: No medications were prescribed today. The patient was advised to resume normal medication regimen without change.  Specific information was provided regarding restarting any anticoagulant/s.    Discharge Procedure Orders (must include Diet, Follow-up, Activity):  Light activity for the remainder of the day, resume normal activity tomorrow. Resume normal diet. Follow-up in clinic in 2-3 weeks.

## 2019-05-01 NOTE — PROGRESS NOTES
At completion of procedure, patient started complaining of chest pain. Patient placed on stretcher supine with MD at BS to assess patient. Patient brought to recovery. Patient weaned to RA with o2 sats 95%. Patient states that this is his norm as he has COPD. Patient states that chest pain has resolved. VS stable. Dr. Pierre at BS. 12 lead EKG in process. Patient family at BS. Update given.

## 2019-05-02 RX ORDER — POTASSIUM CHLORIDE 750 MG/1
TABLET, EXTENDED RELEASE ORAL
Qty: 540 TABLET | Refills: 1 | Status: SHIPPED | OUTPATIENT
Start: 2019-05-02 | End: 2019-06-17 | Stop reason: SDUPTHER

## 2019-05-03 ENCOUNTER — TELEPHONE (OUTPATIENT)
Dept: PAIN MEDICINE | Facility: CLINIC | Age: 74
End: 2019-05-03

## 2019-05-03 NOTE — TELEPHONE ENCOUNTER
----- Message from Jeanine Burton sent at 5/3/2019  4:00 PM CDT -----  Contact: Pt  Pt would like nurse to return call. Pt did not state reason for call. Please return call to pt at ([phone])

## 2019-05-06 ENCOUNTER — TELEPHONE (OUTPATIENT)
Dept: PAIN MEDICINE | Facility: CLINIC | Age: 74
End: 2019-05-06

## 2019-05-06 DIAGNOSIS — M25.552 LEFT HIP PAIN: Primary | ICD-10-CM

## 2019-05-06 NOTE — TELEPHONE ENCOUNTER
----- Message from Danisha Thompson MA sent at 5/6/2019  8:34 AM CDT -----  Contact: Pt      ----- Message -----  From: Adam Pierre MD  Sent: 5/4/2019   4:40 PM  To: Danisha Thompson MA    There are no levels of the SI joint RFA. I explained that some of his pain is coming from the hip joint which we did not do an RFA for. That needs to be done separately.   ----- Message -----  From: Danisha Thompson MA  Sent: 5/3/2019   4:21 PM  To: Sharlene West LPN, Adam Pierre MD    Spoke with mr khan who stated he has no relief from the injections and would like a call back I tried to explain it may take a few weeks to see results but he stated he thinks you guys ordered the wrong levels that he felt pain in hosp after sitting up from the procedure   ----- Message -----  From: Jeanine Burton  Sent: 5/3/2019   4:00 PM  To: Gabby Medina Staff    Pt would like nurse to return call. Pt did not state reason for call. Please return call to pt at ([phone])

## 2019-05-06 NOTE — TELEPHONE ENCOUNTER
Left hip injection per Dr. Pierre. Worked pt in 5/15/19. Pre injection instructions taught and mailed. Orders entered. Pt was able to verbalize all understanding. All questions answered.//lp

## 2019-05-13 ENCOUNTER — LAB VISIT (OUTPATIENT)
Dept: LAB | Facility: HOSPITAL | Age: 74
End: 2019-05-13
Attending: INTERNAL MEDICINE
Payer: MEDICARE

## 2019-05-13 ENCOUNTER — TELEPHONE (OUTPATIENT)
Dept: PAIN MEDICINE | Facility: CLINIC | Age: 74
End: 2019-05-13

## 2019-05-13 DIAGNOSIS — M1A.09X0 IDIOPATHIC CHRONIC GOUT OF MULTIPLE SITES WITHOUT TOPHUS: ICD-10-CM

## 2019-05-13 DIAGNOSIS — N18.30 CKD (CHRONIC KIDNEY DISEASE) STAGE 3, GFR 30-59 ML/MIN: ICD-10-CM

## 2019-05-13 DIAGNOSIS — I87.2 VENOUS STASIS DERMATITIS OF BOTH LOWER EXTREMITIES: ICD-10-CM

## 2019-05-13 DIAGNOSIS — N25.81 SECONDARY HYPERPARATHYROIDISM OF RENAL ORIGIN: ICD-10-CM

## 2019-05-13 DIAGNOSIS — I51.89 DIASTOLIC DYSFUNCTION: ICD-10-CM

## 2019-05-13 DIAGNOSIS — I27.20 PULMONARY HYPERTENSION: ICD-10-CM

## 2019-05-13 DIAGNOSIS — R60.0 BILATERAL EDEMA OF LOWER EXTREMITY: ICD-10-CM

## 2019-05-13 DIAGNOSIS — I75.023 CHOLESTEROL EMBOLISM OF LOWER EXTREMITY, BILATERAL: ICD-10-CM

## 2019-05-13 DIAGNOSIS — G89.4 CHRONIC PAIN SYNDROME: ICD-10-CM

## 2019-05-13 DIAGNOSIS — E83.52 HYPERCALCEMIA: ICD-10-CM

## 2019-05-13 DIAGNOSIS — E87.6 HYPOKALEMIA: ICD-10-CM

## 2019-05-13 DIAGNOSIS — N28.1 COMPLEX RENAL CYST: ICD-10-CM

## 2019-05-13 LAB
BILIRUB UR QL STRIP: NEGATIVE
CLARITY UR: CLEAR
COLOR UR: YELLOW
GLUCOSE UR QL STRIP: NEGATIVE
HGB UR QL STRIP: NEGATIVE
KETONES UR QL STRIP: NEGATIVE
LEUKOCYTE ESTERASE UR QL STRIP: NEGATIVE
NITRITE UR QL STRIP: NEGATIVE
PH UR STRIP: 7.5 [PH] (ref 5–8)
PROT UR QL STRIP: NEGATIVE
SP GR UR STRIP: 1.01 (ref 1–1.03)
URN SPEC COLLECT METH UR: NORMAL

## 2019-05-13 PROCEDURE — 81002 URINALYSIS NONAUTO W/O SCOPE: CPT | Mod: PO

## 2019-05-13 PROCEDURE — 84156 ASSAY OF PROTEIN URINE: CPT

## 2019-05-13 NOTE — TELEPHONE ENCOUNTER
----- Message from Yg Calvillo sent at 5/13/2019  2:37 PM CDT -----  Contact: pt   Pt would like cb to discuss upcoming procedure. pls return call.       ..[phone]

## 2019-05-14 LAB
CREAT UR-MCNC: 23 MG/DL (ref 23–375)
PROT UR-MCNC: <7 MG/DL (ref 0–15)
PROT/CREAT UR: NORMAL MG/G{CREAT} (ref 0–0.2)

## 2019-05-20 ENCOUNTER — PATIENT MESSAGE (OUTPATIENT)
Dept: NEPHROLOGY | Facility: CLINIC | Age: 74
End: 2019-05-20

## 2019-05-20 ENCOUNTER — TELEPHONE (OUTPATIENT)
Dept: NEPHROLOGY | Facility: CLINIC | Age: 74
End: 2019-05-20

## 2019-05-20 NOTE — TELEPHONE ENCOUNTER
----- Message from Dori Sanon sent at 5/17/2019  4:13 PM CDT -----  Contact: pt  .Type:  Sooner Apoointment Request    Caller is requesting a sooner appointment.  Caller declined first available appointment listed below.  Caller will not accept being placed on the waitlist and is requesting a message be sent to doctor.  Name of Caller: pt  When is the first available appointment? 7/16  Symptoms: ep/3mo f/u  Would the patient rather a call back or a response via [facility]?  Call back   Best Call Back Number: [phone] (home)   Additional Information:  Pt will like to come at an earlier time on 5/20 before 11:30

## 2019-05-20 NOTE — TELEPHONE ENCOUNTER
----- Message from Sharlene Benedict sent at 5/20/2019  9:56 AM CDT -----  Type:  Patient Returning Call    Who Called: Hector Soriano  Who Left Message for Patient: Dr Bernal's office  Does the patient know what this is regarding?:  Changing his appt  Would the patient rather a call back or a response via [facility]?  Call back  Best Call Back Number: [phone]  Additional Information:  Please call again//samm//ugo

## 2019-05-22 ENCOUNTER — TELEPHONE (OUTPATIENT)
Dept: PAIN MEDICINE | Facility: CLINIC | Age: 74
End: 2019-05-22

## 2019-05-22 NOTE — TELEPHONE ENCOUNTER
----- Message from Jaida Petit sent at 5/22/2019  2:41 PM CDT -----  Contact: Patient  Patient called to cancel his procedure. He can be contacted at [phone].    Thanks,  Jaida

## 2019-05-22 NOTE — TELEPHONE ENCOUNTER
Contacted pt. Pt requesting to cancel all upcoming injections as he has an infection in his leg. Injection cancelled per pt's request. All questions answered.//lp

## 2019-06-10 ENCOUNTER — PATIENT MESSAGE (OUTPATIENT)
Dept: NEPHROLOGY | Facility: CLINIC | Age: 74
End: 2019-06-10

## 2019-06-17 ENCOUNTER — OFFICE VISIT (OUTPATIENT)
Dept: NEPHROLOGY | Facility: CLINIC | Age: 74
End: 2019-06-17
Payer: MEDICARE

## 2019-06-17 VITALS
BODY MASS INDEX: 39.9 KG/M2 | SYSTOLIC BLOOD PRESSURE: 110 MMHG | WEIGHT: 254.19 LBS | HEIGHT: 67 IN | DIASTOLIC BLOOD PRESSURE: 70 MMHG | HEART RATE: 84 BPM

## 2019-06-17 DIAGNOSIS — N28.1 COMPLEX RENAL CYST: ICD-10-CM

## 2019-06-17 DIAGNOSIS — M1A.09X0 IDIOPATHIC CHRONIC GOUT OF MULTIPLE SITES WITHOUT TOPHUS: ICD-10-CM

## 2019-06-17 DIAGNOSIS — N25.81 SECONDARY HYPERPARATHYROIDISM OF RENAL ORIGIN: ICD-10-CM

## 2019-06-17 DIAGNOSIS — R60.0 BILATERAL EDEMA OF LOWER EXTREMITY: ICD-10-CM

## 2019-06-17 DIAGNOSIS — N18.30 CKD (CHRONIC KIDNEY DISEASE) STAGE 3, GFR 30-59 ML/MIN: Primary | ICD-10-CM

## 2019-06-17 DIAGNOSIS — E87.6 HYPOKALEMIA: ICD-10-CM

## 2019-06-17 PROCEDURE — 3078F PR MOST RECENT DIASTOLIC BLOOD PRESSURE < 80 MM HG: ICD-10-PCS | Mod: CPTII,S$GLB,, | Performed by: INTERNAL MEDICINE

## 2019-06-17 PROCEDURE — 99999 PR PBB SHADOW E&M-EST. PATIENT-LVL IV: ICD-10-PCS | Mod: PBBFAC,,, | Performed by: INTERNAL MEDICINE

## 2019-06-17 PROCEDURE — 1101F PT FALLS ASSESS-DOCD LE1/YR: CPT | Mod: CPTII,S$GLB,, | Performed by: INTERNAL MEDICINE

## 2019-06-17 PROCEDURE — 3078F DIAST BP <80 MM HG: CPT | Mod: CPTII,S$GLB,, | Performed by: INTERNAL MEDICINE

## 2019-06-17 PROCEDURE — 3074F PR MOST RECENT SYSTOLIC BLOOD PRESSURE < 130 MM HG: ICD-10-PCS | Mod: CPTII,S$GLB,, | Performed by: INTERNAL MEDICINE

## 2019-06-17 PROCEDURE — 1101F PR PT FALLS ASSESS DOC 0-1 FALLS W/OUT INJ PAST YR: ICD-10-PCS | Mod: CPTII,S$GLB,, | Performed by: INTERNAL MEDICINE

## 2019-06-17 PROCEDURE — 99214 OFFICE O/P EST MOD 30 MIN: CPT | Mod: S$GLB,,, | Performed by: INTERNAL MEDICINE

## 2019-06-17 PROCEDURE — 3074F SYST BP LT 130 MM HG: CPT | Mod: CPTII,S$GLB,, | Performed by: INTERNAL MEDICINE

## 2019-06-17 PROCEDURE — 99999 PR PBB SHADOW E&M-EST. PATIENT-LVL IV: CPT | Mod: PBBFAC,,, | Performed by: INTERNAL MEDICINE

## 2019-06-17 PROCEDURE — 99214 PR OFFICE/OUTPT VISIT, EST, LEVL IV, 30-39 MIN: ICD-10-PCS | Mod: S$GLB,,, | Performed by: INTERNAL MEDICINE

## 2019-06-17 RX ORDER — TRAMADOL HYDROCHLORIDE 50 MG/1
TABLET ORAL
Qty: 180 TABLET | Refills: 2 | Status: SHIPPED | OUTPATIENT
Start: 2019-06-17 | End: 2020-01-09 | Stop reason: SDUPTHER

## 2019-06-17 RX ORDER — PENTOXIFYLLINE 400 MG/1
400 TABLET, EXTENDED RELEASE ORAL
Qty: 90 TABLET | Refills: 5 | Status: SHIPPED | OUTPATIENT
Start: 2019-06-17 | End: 2020-09-03 | Stop reason: SDUPTHER

## 2019-06-17 RX ORDER — PENTOXIFYLLINE 400 MG/1
400 TABLET, EXTENDED RELEASE ORAL
COMMUNITY
End: 2019-06-17 | Stop reason: SDUPTHER

## 2019-06-17 RX ORDER — METHOCARBAMOL 750 MG/1
750 TABLET, FILM COATED ORAL 3 TIMES DAILY PRN
COMMUNITY
End: 2019-06-17 | Stop reason: SDUPTHER

## 2019-06-17 RX ORDER — LEVOTHYROXINE SODIUM 100 UG/1
100 TABLET ORAL
Qty: 90 TABLET | Refills: 2 | Status: SHIPPED | OUTPATIENT
Start: 2019-06-17 | End: 2020-03-16

## 2019-06-17 RX ORDER — METHOCARBAMOL 750 MG/1
750 TABLET, FILM COATED ORAL 3 TIMES DAILY PRN
Qty: 60 TABLET | Refills: 0 | Status: SHIPPED | OUTPATIENT
Start: 2019-06-17 | End: 2020-01-09 | Stop reason: SDUPTHER

## 2019-06-17 RX ORDER — TORSEMIDE 20 MG/1
40 TABLET ORAL DAILY
Qty: 180 TABLET | Refills: 3 | Status: SHIPPED | OUTPATIENT
Start: 2019-06-17 | End: 2019-10-09 | Stop reason: SDUPTHER

## 2019-06-17 RX ORDER — ALLOPURINOL 300 MG/1
450 TABLET ORAL DAILY
Qty: 135 TABLET | Refills: 3 | Status: SHIPPED | OUTPATIENT
Start: 2019-06-17 | End: 2021-08-09 | Stop reason: SDUPTHER

## 2019-06-17 NOTE — PROGRESS NOTES
Subjective:       Patient ID: Bo Chase is a 74 y.o. White male who presents for follow-up evaluation of Chronic Kidney Disease    Hypertension   Pertinent negatives include no chest pain, headaches, neck pain, palpitations or shortness of breath.   Edema   Pertinent negatives include no abdominal pain, arthralgias, chest pain, chills, congestion, coughing, diaphoresis, fatigue, fever, headaches, joint swelling, nausea, neck pain, rash or vomiting.      Patient is a  white male who was  by profession.  Has history of chronic kidney disease for at least 5 years.  He was last seen by Dr. Prather in 2012.  Renal ultrasound at that time showed bilateral renal cysts.  Left kidney had a cyst of 2.5 cm.  Patient never had any proteinuria.  His creatinine has been fluctuating between 1.6 and 2.0.  His fluctuations have been due to diuretic therapy.  In the last 5 years his kidney function has been stable.  He has had history of coronary artery disease status post shortness of breath and dyspnea and exertion.    12/ 2014Occluded lad lcx and rca.Occluded svg to d1svg to rca patent svg to  om1 90% eccentric treated with dennis with filter wire protection.Patent lima.No complication. Dr. Joel     2014 LEANDRO : now on CPAP    6/2015 LBP s/p KAROLINA and s/p surgery laminectomy     4/2017 Renal USD CT scan of the abdomen shows extensive calcifications of the aorta    August 2017 2-D echo reviewed= PA pressure >26 mm Hg, creatinine down to 1.4, uric acid controlled, vitamin D level is 43, PTH 91, estimated GFR with Cystatin C is 50%; weight down by 10 ib from 263 to 254 ib    March 2018 patient's creatinine is up to 1.8.  Weight is up to 262 pounds.  Approximate GFR about 40%.  GFR loss is about 10% per year.  No proteinuria.  PTH has gone up from 91 in 2017-235 in March 2018.  Labwork shows severe metabolic alkalosis, severe hypokalemia, low chloride levels due to complications of diuretics. Severe Polycythemia may need  "phlebotomy     6/2018 K+ is better ; Hgb is better ; weight is higher ; GFR 37 % with Cr 1.7 ; retired and now in gym     09/2018 High Calcium; stop D; check USD and SPEP     November 2018 ultrasound done no cancer.  Negative serum protein electrophoresis    1/2019 s/p fall now pending left hips IA steroids in SI and Hip ; creatinine down off ibuprofen ; rtired now in [place] --gym and work out d/w patient dumbell    6/2019 s/p cellulitis x2 s/p inpatient and I&D ( serratia ) ; left hip injection and Chiro for Pyriformitis     Review of Systems   Constitutional: Negative.  Negative for activity change, appetite change, chills, diaphoresis, fatigue and fever.         water aerobics ; weight training    HENT: Negative.  Negative for congestion and trouble swallowing.    Eyes: Negative.    Respiratory: Negative.  Negative for cough, chest tightness, shortness of breath and wheezing.    Cardiovascular: Positive for leg swelling. Negative for chest pain and palpitations.   Gastrointestinal: Negative.  Negative for abdominal distention, abdominal pain, nausea and vomiting.   Genitourinary: Negative.  Negative for decreased urine volume, difficulty urinating, dysuria, enuresis, flank pain, frequency, hematuria, penile swelling, scrotal swelling and urgency.   Musculoskeletal: Negative.  Negative for arthralgias, back pain, joint swelling and neck pain.   Skin: Negative for rash.   Neurological: Negative.  Negative for tremors, seizures and headaches.   Psychiatric/Behavioral: Negative.  Negative for confusion and sleep disturbance. The patient is not nervous/anxious.        Objective:   /70   Pulse 84   Ht 5' 7" (1.702 m)   Wt 115.3 kg (254 lb 3.1 oz)   BMI 39.81 kg/m²      Weight at home 270     gained 10 lb since last visit.  From 259-269    1/2019 277 ib     6/2019 254 ib = 115.3 kg       Physical Exam   Constitutional: He is oriented to person, place, and time. He appears well-developed and well-nourished. No " distress.   HENT:   Head: Normocephalic.   Eyes: Pupils are equal, round, and reactive to light. EOM are normal.   Neck: Normal range of motion. Neck supple. No JVD present. No thyromegaly present.   Cardiovascular: Normal rate, regular rhythm, S1 normal, S2 normal, normal heart sounds and intact distal pulses. PMI is not displaced. Exam reveals no gallop and no friction rub.   No murmur heard.  Cholesterol emboli ; loud P2    Pulmonary/Chest: Effort normal and breath sounds normal. No respiratory distress. He has no wheezes. He has no rales. He exhibits no tenderness.   Abdominal: He exhibits no distension and no mass. There is no hepatosplenomegaly. There is no tenderness. There is no rebound and no CVA tenderness. No hernia.   Musculoskeletal: Normal range of motion. He exhibits no edema or tenderness.   2+ edema R>L    Lymphadenopathy:     He has no cervical adenopathy.   Neurological: He is alert and oriented to person, place, and time. He has normal reflexes. He is not disoriented. He displays normal reflexes. No cranial nerve deficit. He exhibits normal muscle tone. Coordination normal.   Skin: Skin is warm and dry. No rash noted. No erythema.   Psychiatric: He has a normal mood and affect. His behavior is normal. Judgment and thought content normal.             Lab Results   Component Value Date    CREATININE 1.8 (H) 05/13/2019    BUN 44 (H) 05/13/2019     05/13/2019    K 4.0 05/13/2019    CL 93 (L) 05/13/2019    CO2 23 05/13/2019     Lab Results   Component Value Date    WBC 14.41 (H) 05/13/2019    HGB 17.6 05/13/2019    HCT 54.2 (H) 05/13/2019    MCV 85 05/13/2019     05/13/2019     Lab Results   Component Value Date    .0 (H) 05/13/2019    CALCIUM 10.0 05/13/2019    PHOS 3.5 05/13/2019     Lab Results   Component Value Date    URICACID 6.7 11/15/2018         Assessment:    )    1. CKD (chronic kidney disease) stage 3, GFR 30-59 ml/min    2. Secondary hyperparathyroidism of renal origin     3. Bilateral edema of lower extremity    4. Complex renal cyst    5. Hypokalemia        Plan:         1.  Chronic kidney disease stage III 4/stage IV patient may have cardiorenal syndrome:  fluctuating creatinine is due to diuretics.   No heavy proteinuria.  Creatinine is better today.  GFR 25%-35% ;     2.  Chronic gout: Last attack was 2018.  Uric acid is controlled on allopurinol 450 mg     3.  Hyperparathyroidism due to chronic kidney disease stage III:  Off  vitamin-D therapy since the calcium is too high.  Consider Rayaldee at some point     4.   anasarca on the lower extremities with chronic venous stasis findings as well as brawny edema of the skin: Patient had an ultrasound of the legs in 2012 which showed no DVT.  Clinically patient has pulm. hypertension, metolazone 2x week + torsemide 20 mg x2-3-4 daily. D/w patient that a higher creatinine of 2.0-2.5 would be appropriate if it achieves adequate diuresis.---    5.  Cholesterol emboli of the lower extremities: Extensive aortic calcifications noted on the CT scan.    aspirin to 325 mg daily. Continue Lipitor.    6.  Pulm HTN: records of CPAP and dr. Honeycutt reviewed     7.  Hypokalemia:keep  aldactone 25 mg  KCL to 40 meq daily.     8. Fup dr. Edgar for polycythemia monthly phlebotomy.  Patient had multiple complex cysts in the ultrasound done in 2017.  Status post renal ultrasound and check for the follow-up on the complex cysts           Follow-up  3-4 months        Washington Bernal MD

## 2019-09-03 ENCOUNTER — LAB VISIT (OUTPATIENT)
Dept: LAB | Facility: HOSPITAL | Age: 74
End: 2019-09-03
Attending: INTERNAL MEDICINE
Payer: MEDICARE

## 2019-09-03 DIAGNOSIS — R60.0 BILATERAL EDEMA OF LOWER EXTREMITY: ICD-10-CM

## 2019-09-03 DIAGNOSIS — N18.30 CKD (CHRONIC KIDNEY DISEASE) STAGE 3, GFR 30-59 ML/MIN: ICD-10-CM

## 2019-09-03 DIAGNOSIS — N28.1 COMPLEX RENAL CYST: ICD-10-CM

## 2019-09-03 DIAGNOSIS — E87.6 HYPOKALEMIA: ICD-10-CM

## 2019-09-03 DIAGNOSIS — N25.81 SECONDARY HYPERPARATHYROIDISM OF RENAL ORIGIN: ICD-10-CM

## 2019-09-03 LAB
25(OH)D3+25(OH)D2 SERPL-MCNC: 36 NG/ML (ref 30–96)
BASOPHILS # BLD AUTO: 0.05 K/UL (ref 0–0.2)
BASOPHILS NFR BLD: 0.5 % (ref 0–1.9)
DIFFERENTIAL METHOD: ABNORMAL
EOSINOPHIL # BLD AUTO: 0.1 K/UL (ref 0–0.5)
EOSINOPHIL NFR BLD: 0.7 % (ref 0–8)
ERYTHROCYTE [DISTWIDTH] IN BLOOD BY AUTOMATED COUNT: 17.5 % (ref 11.5–14.5)
HCT VFR BLD AUTO: 53.8 % (ref 40–54)
HGB BLD-MCNC: 17.1 G/DL (ref 14–18)
IMM GRANULOCYTES # BLD AUTO: 0.08 K/UL (ref 0–0.04)
IMM GRANULOCYTES NFR BLD AUTO: 0.8 % (ref 0–0.5)
LYMPHOCYTES # BLD AUTO: 1.1 K/UL (ref 1–4.8)
LYMPHOCYTES NFR BLD: 11.9 % (ref 18–48)
MCH RBC QN AUTO: 29 PG (ref 27–31)
MCHC RBC AUTO-ENTMCNC: 31.8 G/DL (ref 32–36)
MCV RBC AUTO: 91 FL (ref 82–98)
MONOCYTES # BLD AUTO: 0.6 K/UL (ref 0.3–1)
MONOCYTES NFR BLD: 6.4 % (ref 4–15)
NEUTROPHILS # BLD AUTO: 7.6 K/UL (ref 1.8–7.7)
NEUTROPHILS NFR BLD: 79.7 % (ref 38–73)
NRBC BLD-RTO: 0 /100 WBC
PLATELET # BLD AUTO: 208 K/UL (ref 150–350)
PMV BLD AUTO: 10.9 FL (ref 9.2–12.9)
RBC # BLD AUTO: 5.9 M/UL (ref 4.6–6.2)
WBC # BLD AUTO: 9.54 K/UL (ref 3.9–12.7)

## 2019-09-03 PROCEDURE — 80069 RENAL FUNCTION PANEL: CPT

## 2019-09-03 PROCEDURE — 36415 COLL VENOUS BLD VENIPUNCTURE: CPT | Mod: PO

## 2019-09-03 PROCEDURE — 82610 CYSTATIN C: CPT

## 2019-09-03 PROCEDURE — 85025 COMPLETE CBC W/AUTO DIFF WBC: CPT

## 2019-09-03 PROCEDURE — 82306 VITAMIN D 25 HYDROXY: CPT

## 2019-09-04 LAB
ALBUMIN SERPL BCP-MCNC: 3.5 G/DL (ref 3.5–5.2)
ANION GAP SERPL CALC-SCNC: 12 MMOL/L (ref 8–16)
BUN SERPL-MCNC: 30 MG/DL (ref 8–23)
CALCIUM SERPL-MCNC: 9.8 MG/DL (ref 8.7–10.5)
CHLORIDE SERPL-SCNC: 96 MMOL/L (ref 95–110)
CO2 SERPL-SCNC: 28 MMOL/L (ref 23–29)
CREAT SERPL-MCNC: 1.8 MG/DL (ref 0.5–1.4)
EST. GFR  (AFRICAN AMERICAN): 42.2 ML/MIN/1.73 M^2
EST. GFR  (NON AFRICAN AMERICAN): 36.5 ML/MIN/1.73 M^2
GLUCOSE SERPL-MCNC: 197 MG/DL (ref 70–110)
PHOSPHATE SERPL-MCNC: 2.7 MG/DL (ref 2.7–4.5)
POTASSIUM SERPL-SCNC: 3.5 MMOL/L (ref 3.5–5.1)
SODIUM SERPL-SCNC: 136 MMOL/L (ref 136–145)

## 2019-09-05 LAB
CYSTATIN C SERPL-MCNC: 1.6 MG/L (ref 0.82–1.53)
GFR/BSA.PRED SERPLBLD CYS-BASED-ARV: 40 ML/MIN/BSA

## 2019-09-10 ENCOUNTER — TELEPHONE (OUTPATIENT)
Dept: NEPHROLOGY | Facility: CLINIC | Age: 74
End: 2019-09-10

## 2019-09-10 NOTE — TELEPHONE ENCOUNTER
He refused to do any more labs .----- Message from Washington Bernal MD sent at 9/10/2019  2:01 PM CDT -----  Contact: Self      ----- Message -----  From: Ya Stevenson  Sent: 9/10/2019   1:24 PM  To: Gabe LAINEZ Staff    Type:  Sooner Apoointment Request    Caller is requesting a sooner appointment.  Caller declined first available appointment listed below.  Caller will not accept being placed on the waitlist and is requesting a message be sent to doctor.  Name of Caller:Bo  When is the first available appointment?11/05/19  Symptoms:f/u  Would the patient rather a call back or a response via [facility]? call  Best Call Back Number:[phone]  Additional Information:

## 2019-09-10 NOTE — TELEPHONE ENCOUNTER
----- Message from Washington Bernal MD sent at 9/10/2019  2:01 PM CDT -----  Contact: Self      ----- Message -----  From: Ya Stevenson  Sent: 9/10/2019   1:24 PM  To: Gabe LAINEZ Staff    Type:  Sooner Apoointment Request    Caller is requesting a sooner appointment.  Caller declined first available appointment listed below.  Caller will not accept being placed on the waitlist and is requesting a message be sent to doctor.  Name of Caller:Bo  When is the first available appointment?11/05/19  Symptoms:f/u  Would the patient rather a call back or a response via [facility]? call  Best Call Back Number:[phone]  Additional Information:

## 2019-10-03 RX ORDER — SPIRONOLACTONE 25 MG/1
25 TABLET ORAL DAILY
Qty: 90 TABLET | Refills: 3 | Status: SHIPPED | OUTPATIENT
Start: 2019-10-03 | End: 2019-10-09 | Stop reason: SDUPTHER

## 2019-10-03 RX ORDER — POTASSIUM CHLORIDE 750 MG/1
TABLET, EXTENDED RELEASE ORAL
Qty: 540 TABLET | Refills: 1 | Status: SHIPPED | OUTPATIENT
Start: 2019-10-03 | End: 2020-01-09 | Stop reason: SDUPTHER

## 2019-10-09 ENCOUNTER — OFFICE VISIT (OUTPATIENT)
Dept: NEPHROLOGY | Facility: CLINIC | Age: 74
End: 2019-10-09
Payer: MEDICARE

## 2019-10-09 VITALS
HEIGHT: 67 IN | SYSTOLIC BLOOD PRESSURE: 130 MMHG | WEIGHT: 257.06 LBS | BODY MASS INDEX: 40.35 KG/M2 | HEART RATE: 100 BPM | DIASTOLIC BLOOD PRESSURE: 70 MMHG

## 2019-10-09 DIAGNOSIS — I75.023 CHOLESTEROL EMBOLISM OF LOWER EXTREMITY, BILATERAL: ICD-10-CM

## 2019-10-09 DIAGNOSIS — R60.0 BILATERAL EDEMA OF LOWER EXTREMITY: ICD-10-CM

## 2019-10-09 DIAGNOSIS — I27.20 PULMONARY HYPERTENSION: ICD-10-CM

## 2019-10-09 DIAGNOSIS — E83.52 HYPERCALCEMIA: ICD-10-CM

## 2019-10-09 DIAGNOSIS — N25.81 SECONDARY HYPERPARATHYROIDISM OF RENAL ORIGIN: ICD-10-CM

## 2019-10-09 DIAGNOSIS — E87.6 HYPOKALEMIA DUE TO INADEQUATE POTASSIUM INTAKE: ICD-10-CM

## 2019-10-09 DIAGNOSIS — N18.30 CKD (CHRONIC KIDNEY DISEASE) STAGE 3, GFR 30-59 ML/MIN: Primary | ICD-10-CM

## 2019-10-09 DIAGNOSIS — E87.6 HYPOKALEMIA: ICD-10-CM

## 2019-10-09 DIAGNOSIS — M1A.09X0 IDIOPATHIC CHRONIC GOUT OF MULTIPLE SITES WITHOUT TOPHUS: ICD-10-CM

## 2019-10-09 DIAGNOSIS — N28.1 COMPLEX RENAL CYST: ICD-10-CM

## 2019-10-09 DIAGNOSIS — I51.89 DIASTOLIC DYSFUNCTION: ICD-10-CM

## 2019-10-09 PROCEDURE — 3078F DIAST BP <80 MM HG: CPT | Mod: CPTII,S$GLB,, | Performed by: INTERNAL MEDICINE

## 2019-10-09 PROCEDURE — 99999 PR PBB SHADOW E&M-EST. PATIENT-LVL IV: CPT | Mod: PBBFAC,,, | Performed by: INTERNAL MEDICINE

## 2019-10-09 PROCEDURE — 99999 PR PBB SHADOW E&M-EST. PATIENT-LVL IV: ICD-10-PCS | Mod: PBBFAC,,, | Performed by: INTERNAL MEDICINE

## 2019-10-09 PROCEDURE — 3075F SYST BP GE 130 - 139MM HG: CPT | Mod: CPTII,S$GLB,, | Performed by: INTERNAL MEDICINE

## 2019-10-09 PROCEDURE — 3075F PR MOST RECENT SYSTOLIC BLOOD PRESS GE 130-139MM HG: ICD-10-PCS | Mod: CPTII,S$GLB,, | Performed by: INTERNAL MEDICINE

## 2019-10-09 PROCEDURE — 3078F PR MOST RECENT DIASTOLIC BLOOD PRESSURE < 80 MM HG: ICD-10-PCS | Mod: CPTII,S$GLB,, | Performed by: INTERNAL MEDICINE

## 2019-10-09 PROCEDURE — 1101F PR PT FALLS ASSESS DOC 0-1 FALLS W/OUT INJ PAST YR: ICD-10-PCS | Mod: CPTII,S$GLB,, | Performed by: INTERNAL MEDICINE

## 2019-10-09 PROCEDURE — 99214 PR OFFICE/OUTPT VISIT, EST, LEVL IV, 30-39 MIN: ICD-10-PCS | Mod: S$GLB,,, | Performed by: INTERNAL MEDICINE

## 2019-10-09 PROCEDURE — 1101F PT FALLS ASSESS-DOCD LE1/YR: CPT | Mod: CPTII,S$GLB,, | Performed by: INTERNAL MEDICINE

## 2019-10-09 PROCEDURE — 99214 OFFICE O/P EST MOD 30 MIN: CPT | Mod: S$GLB,,, | Performed by: INTERNAL MEDICINE

## 2019-10-09 RX ORDER — TORSEMIDE 20 MG/1
40 TABLET ORAL DAILY
Qty: 180 TABLET | Refills: 3 | Status: SHIPPED | OUTPATIENT
Start: 2019-10-09 | End: 2020-07-17 | Stop reason: SDUPTHER

## 2019-10-09 RX ORDER — ASCORBIC ACID 500 MG
500 TABLET ORAL DAILY
COMMUNITY

## 2019-10-09 RX ORDER — POTASSIUM CHLORIDE 750 MG/1
TABLET, EXTENDED RELEASE ORAL
Qty: 360 TABLET | Refills: 1 | Status: SHIPPED | OUTPATIENT
Start: 2019-10-09 | End: 2020-01-09 | Stop reason: SDUPTHER

## 2019-10-09 RX ORDER — HYDRALAZINE HYDROCHLORIDE 25 MG/1
25 TABLET, FILM COATED ORAL 2 TIMES DAILY
COMMUNITY
End: 2020-01-17 | Stop reason: ALTCHOICE

## 2019-10-09 RX ORDER — SPIRONOLACTONE 25 MG/1
25 TABLET ORAL DAILY
Qty: 90 TABLET | Refills: 3 | Status: SHIPPED | OUTPATIENT
Start: 2019-10-09 | End: 2020-12-07 | Stop reason: SDUPTHER

## 2019-10-09 RX ORDER — CYANOCOBALAMIN (VITAMIN B-12) 1000MCG/ML
DROPS ORAL
COMMUNITY
End: 2020-01-09 | Stop reason: SDUPTHER

## 2019-10-09 RX ORDER — ASPIRIN 81 MG/1
81 TABLET ORAL DAILY
COMMUNITY
End: 2020-01-09 | Stop reason: SDUPTHER

## 2019-10-09 RX ORDER — VITAMIN E 268 MG
400 CAPSULE ORAL DAILY
COMMUNITY
End: 2019-12-19

## 2019-11-12 ENCOUNTER — INFUSION (OUTPATIENT)
Dept: INFUSION THERAPY | Facility: HOSPITAL | Age: 74
End: 2019-11-12
Attending: INTERNAL MEDICINE
Payer: MEDICARE

## 2019-11-12 ENCOUNTER — OFFICE VISIT (OUTPATIENT)
Dept: HEMATOLOGY/ONCOLOGY | Facility: CLINIC | Age: 74
End: 2019-11-12
Payer: MEDICARE

## 2019-11-12 VITALS
HEART RATE: 74 BPM | TEMPERATURE: 98 F | HEIGHT: 67 IN | DIASTOLIC BLOOD PRESSURE: 76 MMHG | BODY MASS INDEX: 42.32 KG/M2 | OXYGEN SATURATION: 95 % | SYSTOLIC BLOOD PRESSURE: 115 MMHG | WEIGHT: 269.63 LBS

## 2019-11-12 VITALS — HEART RATE: 67 BPM | SYSTOLIC BLOOD PRESSURE: 113 MMHG | DIASTOLIC BLOOD PRESSURE: 71 MMHG

## 2019-11-12 DIAGNOSIS — D75.1 POLYCYTHEMIA, SECONDARY: Primary | ICD-10-CM

## 2019-11-12 DIAGNOSIS — G47.33 OSA (OBSTRUCTIVE SLEEP APNEA): ICD-10-CM

## 2019-11-12 DIAGNOSIS — D75.1 ACQUIRED POLYCYTHEMIA: Primary | ICD-10-CM

## 2019-11-12 LAB
BASOPHILS # BLD AUTO: 0.06 K/UL (ref 0–0.2)
BASOPHILS NFR BLD: 0.5 % (ref 0–1.9)
DIFFERENTIAL METHOD: ABNORMAL
EOSINOPHIL # BLD AUTO: 0.2 K/UL (ref 0–0.5)
EOSINOPHIL NFR BLD: 1.4 % (ref 0–8)
ERYTHROCYTE [DISTWIDTH] IN BLOOD BY AUTOMATED COUNT: 18.3 % (ref 11.5–14.5)
HCT VFR BLD AUTO: 52.6 % (ref 40–54)
HGB BLD-MCNC: 17.5 G/DL (ref 14–18)
IMM GRANULOCYTES # BLD AUTO: 0.12 K/UL (ref 0–0.04)
IMM GRANULOCYTES NFR BLD AUTO: 1 % (ref 0–0.5)
LYMPHOCYTES # BLD AUTO: 1.5 K/UL (ref 1–4.8)
LYMPHOCYTES NFR BLD: 12.6 % (ref 18–48)
MCH RBC QN AUTO: 30.2 PG (ref 27–31)
MCHC RBC AUTO-ENTMCNC: 33.3 G/DL (ref 32–36)
MCV RBC AUTO: 91 FL (ref 82–98)
MONOCYTES # BLD AUTO: 0.8 K/UL (ref 0.3–1)
MONOCYTES NFR BLD: 7.1 % (ref 4–15)
NEUTROPHILS # BLD AUTO: 9.1 K/UL (ref 1.8–7.7)
NEUTROPHILS NFR BLD: 77.4 % (ref 38–73)
NRBC BLD-RTO: 0 /100 WBC
PLATELET # BLD AUTO: 194 K/UL (ref 150–350)
PMV BLD AUTO: 9.9 FL (ref 9.2–12.9)
RBC # BLD AUTO: 5.8 M/UL (ref 4.6–6.2)
WBC # BLD AUTO: 11.77 K/UL (ref 3.9–12.7)

## 2019-11-12 PROCEDURE — 36415 COLL VENOUS BLD VENIPUNCTURE: CPT

## 2019-11-12 PROCEDURE — 3078F DIAST BP <80 MM HG: CPT | Mod: CPTII,S$GLB,, | Performed by: INTERNAL MEDICINE

## 2019-11-12 PROCEDURE — 3074F SYST BP LT 130 MM HG: CPT | Mod: CPTII,S$GLB,, | Performed by: INTERNAL MEDICINE

## 2019-11-12 PROCEDURE — 3074F PR MOST RECENT SYSTOLIC BLOOD PRESSURE < 130 MM HG: ICD-10-PCS | Mod: CPTII,S$GLB,, | Performed by: INTERNAL MEDICINE

## 2019-11-12 PROCEDURE — 99214 OFFICE O/P EST MOD 30 MIN: CPT | Mod: S$GLB,,, | Performed by: INTERNAL MEDICINE

## 2019-11-12 PROCEDURE — 99195 PHLEBOTOMY: CPT

## 2019-11-12 PROCEDURE — 99999 PR PBB SHADOW E&M-EST. PATIENT-LVL III: ICD-10-PCS | Mod: PBBFAC,,, | Performed by: INTERNAL MEDICINE

## 2019-11-12 PROCEDURE — 3078F PR MOST RECENT DIASTOLIC BLOOD PRESSURE < 80 MM HG: ICD-10-PCS | Mod: CPTII,S$GLB,, | Performed by: INTERNAL MEDICINE

## 2019-11-12 PROCEDURE — 1101F PR PT FALLS ASSESS DOC 0-1 FALLS W/OUT INJ PAST YR: ICD-10-PCS | Mod: CPTII,S$GLB,, | Performed by: INTERNAL MEDICINE

## 2019-11-12 PROCEDURE — 1101F PT FALLS ASSESS-DOCD LE1/YR: CPT | Mod: CPTII,S$GLB,, | Performed by: INTERNAL MEDICINE

## 2019-11-12 PROCEDURE — 99999 PR PBB SHADOW E&M-EST. PATIENT-LVL III: CPT | Mod: PBBFAC,,, | Performed by: INTERNAL MEDICINE

## 2019-11-12 PROCEDURE — 99214 PR OFFICE/OUTPT VISIT, EST, LEVL IV, 30-39 MIN: ICD-10-PCS | Mod: S$GLB,,, | Performed by: INTERNAL MEDICINE

## 2019-11-12 RX ORDER — LIDOCAINE HYDROCHLORIDE 10 MG/ML
1 INJECTION, SOLUTION EPIDURAL; INFILTRATION; INTRACAUDAL; PERINEURAL ONCE
Status: CANCELLED | OUTPATIENT
Start: 2019-12-12

## 2019-11-12 RX ORDER — ASPIRIN 325 MG
325 TABLET ORAL DAILY
COMMUNITY
End: 2020-01-17 | Stop reason: DRUGHIGH

## 2019-11-12 RX ORDER — LIDOCAINE HYDROCHLORIDE 10 MG/ML
1 INJECTION, SOLUTION EPIDURAL; INFILTRATION; INTRACAUDAL; PERINEURAL ONCE
Status: CANCELLED | OUTPATIENT
Start: 2019-11-12

## 2019-11-12 NOTE — NURSING
One unit therapeutic phlebotomy performed via left A/C without difficulty.  Upon completion, needle dc'd, pressure dressing applied, and blood discarded in appropriate container.   Juice accepted.  B/P 113/72, pulse 67.  Pt denies weakness/dizziness.  Pt discharged with next appt scheduled.

## 2019-11-12 NOTE — PROGRESS NOTES
Subjective:      DATE OF VISIT: 11/12/2019   ?   ?   Patient ID:?Bo Chase is a 74 y.o. male.?? MR#: [id number]   ?   PRIMARY PROVIDER: Dr. Edgar, transferring to Dr. Diaz    ?   CHIEF COMPLAINT:  Polycythemia?????   ?   DIAGNOSIS:  Secondary polycythemia  ?   CURRENT TREATMENT:  Phlebotomy, aspirin (takes 325mg daily)      HPI    Today I have the pleasure of meeting Mr. Chase a 74-year-old previously followed by my colleague Dr. Edgar.    I reviewed his past medical history, workup and treatment which is notable for the following:    Mr. Chase has a history of CAD status post CABG, LEANDRO on CPAP who is been followed in hematology clinic for polycythemia.  Workup has included JAK2 V6 17 F mutation negative.  He has not had bone marrow biopsy.    He had been receiving periodic phlebotomy however due to medical issues in 2018 including hospitalization at [facility] for abscess in the ankle and orthopedic issues he has not followed up since then received interval phlebotomy since last follow-up in 2/019.  He denies known history of arterial or venous thrombosis, pruritus, erythromelalgia, fever, chills, night sweats or unintentional weight loss.    Review of systems:  ?   A comprehensive 14-point review of systems was reviewed with patient and was negative other than as specified above.   ?     Objective:      Physical Exam      ?   Vitals:    11/12/19 1057   BP: 115/76   Pulse: 74   Temp: 97.9 °F (36.6 °C)      ?   ECOG:?0   General appearance: Generally well appearing, in no acute distress.   Head, eyes, ears, nose, and throat: Oropharynx clear with moist mucous membranes.   Cardiovascular: Regular rate and rhythm, S1, S2, no audible murmurs.   Respiratory: Lungs clear to auscultation bilaterally.   Abdomen: nontender, nondistended.   Extremities: Warm, with 1+ symmetric edema in ankles.  Neurologic: Alert and oriented. Grossly normal strength, coordination, and gait.   Skin: No rashes, ecchymoses or petechial  lesion.   Psychiatric: normal mood and affect, conversant and appropriate    ?   Laboratory:  ?   No visits with results within 1 Day(s) from this visit.   Latest known visit with results is:   Lab Visit on 09/03/2019   Component Date Value Ref Range Status    Cystatin C 09/03/2019 1.60* 0.82 - 1.53 mg/L Final    eGFR by Cystatin C 09/03/2019 40  >60 mL/min/BSA Final    WBC 09/03/2019 9.54  3.90 - 12.70 K/uL Final    RBC 09/03/2019 5.90  4.60 - 6.20 M/uL Final    Hemoglobin 09/03/2019 17.1  14.0 - 18.0 g/dL Final    Hematocrit 09/03/2019 53.8  40.0 - 54.0 % Final    Mean Corpuscular Volume 09/03/2019 91  82 - 98 fL Final    Mean Corpuscular Hemoglobin 09/03/2019 29.0  27.0 - 31.0 pg Final    Mean Corpuscular Hemoglobin Conc 09/03/2019 31.8* 32.0 - 36.0 g/dL Final    RDW 09/03/2019 17.5* 11.5 - 14.5 % Final    Platelets 09/03/2019 208  150 - 350 K/uL Final    MPV 09/03/2019 10.9  9.2 - 12.9 fL Final    Immature Granulocytes 09/03/2019 0.8* 0.0 - 0.5 % Final    Gran # (ANC) 09/03/2019 7.6  1.8 - 7.7 K/uL Final    Immature Grans (Abs) 09/03/2019 0.08* 0.00 - 0.04 K/uL Final    Lymph # 09/03/2019 1.1  1.0 - 4.8 K/uL Final    Mono # 09/03/2019 0.6  0.3 - 1.0 K/uL Final    Eos # 09/03/2019 0.1  0.0 - 0.5 K/uL Final    Baso # 09/03/2019 0.05  0.00 - 0.20 K/uL Final    nRBC 09/03/2019 0  0 /100 WBC Final    Gran% 09/03/2019 79.7* 38.0 - 73.0 % Final    Lymph% 09/03/2019 11.9* 18.0 - 48.0 % Final    Mono% 09/03/2019 6.4  4.0 - 15.0 % Final    Eosinophil% 09/03/2019 0.7  0.0 - 8.0 % Final    Basophil% 09/03/2019 0.5  0.0 - 1.9 % Final    Differential Method 09/03/2019 Automated   Final    Glucose 09/03/2019 197* 70 - 110 mg/dL Final    Sodium 09/03/2019 136  136 - 145 mmol/L Final    Potassium 09/03/2019 3.5  3.5 - 5.1 mmol/L Final    Chloride 09/03/2019 96  95 - 110 mmol/L Final    CO2 09/03/2019 28  23 - 29 mmol/L Final    BUN, Bld 09/03/2019 30* 8 - 23 mg/dL Final    Calcium 09/03/2019 9.8   8.7 - 10.5 mg/dL Final    Creatinine 09/03/2019 1.8* 0.5 - 1.4 mg/dL Final    Albumin 09/03/2019 3.5  3.5 - 5.2 g/dL Final    Phosphorus 09/03/2019 2.7  2.7 - 4.5 mg/dL Final    eGFR if  09/03/2019 42.2* >60 mL/min/1.73 m^2 Final    eGFR if non African American 09/03/2019 36.5* >60 mL/min/1.73 m^2 Final    Anion Gap 09/03/2019 12  8 - 16 mmol/L Final    Vit D, 25-Hydroxy 09/03/2019 36  30 - 96 ng/mL Final      ?   ?   Hemoglobin   Date Value Ref Range Status   11/12/2019 17.5 14.0 - 18.0 g/dL Final   09/03/2019 17.1 14.0 - 18.0 g/dL Final   05/13/2019 17.6 14.0 - 18.0 g/dL Final   01/25/2019 16.5 14.0 - 18.0 g/dL Final   01/25/2019 16.5 14.0 - 18.0 g/dL Final   11/15/2018 16.8 14.0 - 18.0 g/dL Final   11/08/2018 16.8 14.0 - 18.0 g/dL Final   09/11/2018 17.9 14.0 - 18.0 g/dL Final   08/23/2018 17.6 14.0 - 18.0 g/dL Final   06/14/2018 18.7 (H) 14.0 - 18.0 g/dL Final     Hematocrit   Date Value Ref Range Status   11/12/2019 52.6 40.0 - 54.0 % Final   09/03/2019 53.8 40.0 - 54.0 % Final   05/13/2019 54.2 (H) 40.0 - 54.0 % Final   01/25/2019 49.7 40.0 - 54.0 % Final   01/25/2019 49.7 40.0 - 54.0 % Final   11/15/2018 50.5 40.0 - 54.0 % Final   11/08/2018 49.5 40.0 - 54.0 % Final   09/11/2018 53.1 40.0 - 54.0 % Final   08/23/2018 50.4 40.0 - 54.0 % Final   06/14/2018 53.2 40.0 - 54.0 % Final       Assessment/Plan:       1. Polycythemia, secondary    2. LEANDRO (obstructive sleep apnea)          Plan:     # Polycythemia:  Suspected secondary related to LEANDRO on CPAP.  Elevated erythropoietin level to 21 in 2016 and JAK2 V617 F mutation negative in 2014.  He has not had bone marrow biopsy.  Recommended phlebotomy (hematocrit today 53) to achieve hematocrit around 45.  Will arrange for phlebotomy today and revisit in 6 weeks.  He is to continue on aspirin prophylaxis and takes 325 mg for other indication.      Follow-Up:   Six weeks with repeat CBC prior and possible phlebotomy.

## 2019-11-12 NOTE — NURSING
CBC obtained from right A/C via venipuncture, 2 patient identifiers obtained, labeled and sent to lab.  Dressing applied.  Site healthy.  Awaiting results for possible therapeutic phlebotomy.

## 2019-11-12 NOTE — DISCHARGE INSTRUCTIONS
[facility]  [address] [address]  [address] [address]  [phone] phone     [phone] fax  Hours of Operation: Monday- Friday 8:00am- 5:00pm  After hours phone  [phone]  Hematology / Oncology Physicians on call      LUIZ Cedillo Dr., Dr., Dr., Dr., NP Sydney Prescott, NP Tyesha Taylor, NP    Please call with any concerns regarding your appointment today.

## 2019-12-19 ENCOUNTER — INFUSION (OUTPATIENT)
Dept: INFUSION THERAPY | Facility: HOSPITAL | Age: 74
End: 2019-12-19
Attending: INTERNAL MEDICINE
Payer: MEDICARE

## 2019-12-19 ENCOUNTER — LAB VISIT (OUTPATIENT)
Dept: LAB | Facility: HOSPITAL | Age: 74
End: 2019-12-19
Attending: INTERNAL MEDICINE
Payer: MEDICARE

## 2019-12-19 ENCOUNTER — OFFICE VISIT (OUTPATIENT)
Dept: HEMATOLOGY/ONCOLOGY | Facility: CLINIC | Age: 74
End: 2019-12-19
Payer: MEDICARE

## 2019-12-19 VITALS
RESPIRATION RATE: 18 BRPM | WEIGHT: 264.56 LBS | HEIGHT: 67 IN | OXYGEN SATURATION: 95 % | HEART RATE: 70 BPM | BODY MASS INDEX: 41.52 KG/M2 | DIASTOLIC BLOOD PRESSURE: 72 MMHG | TEMPERATURE: 99 F | SYSTOLIC BLOOD PRESSURE: 110 MMHG

## 2019-12-19 VITALS — SYSTOLIC BLOOD PRESSURE: 101 MMHG | HEART RATE: 77 BPM | OXYGEN SATURATION: 96 % | DIASTOLIC BLOOD PRESSURE: 65 MMHG

## 2019-12-19 DIAGNOSIS — D75.1 POLYCYTHEMIA, SECONDARY: ICD-10-CM

## 2019-12-19 DIAGNOSIS — G47.33 OSA ON CPAP: ICD-10-CM

## 2019-12-19 DIAGNOSIS — G47.33 OSA (OBSTRUCTIVE SLEEP APNEA): ICD-10-CM

## 2019-12-19 DIAGNOSIS — D75.1 ACQUIRED POLYCYTHEMIA: ICD-10-CM

## 2019-12-19 DIAGNOSIS — N18.30 CHRONIC KIDNEY DISEASE, STAGE III (MODERATE): ICD-10-CM

## 2019-12-19 DIAGNOSIS — D75.1 ACQUIRED POLYCYTHEMIA: Primary | ICD-10-CM

## 2019-12-19 DIAGNOSIS — D75.1 POLYCYTHEMIA, SECONDARY: Primary | ICD-10-CM

## 2019-12-19 LAB
BASOPHILS # BLD AUTO: 0.05 K/UL (ref 0–0.2)
BASOPHILS NFR BLD: 0.5 % (ref 0–1.9)
DIFFERENTIAL METHOD: ABNORMAL
EOSINOPHIL # BLD AUTO: 0.1 K/UL (ref 0–0.5)
EOSINOPHIL NFR BLD: 1.1 % (ref 0–8)
ERYTHROCYTE [DISTWIDTH] IN BLOOD BY AUTOMATED COUNT: 16 % (ref 11.5–14.5)
HCT VFR BLD AUTO: 51.7 % (ref 40–54)
HGB BLD-MCNC: 17.1 G/DL (ref 14–18)
IMM GRANULOCYTES # BLD AUTO: 0.05 K/UL (ref 0–0.04)
IMM GRANULOCYTES NFR BLD AUTO: 0.5 % (ref 0–0.5)
LYMPHOCYTES # BLD AUTO: 1.4 K/UL (ref 1–4.8)
LYMPHOCYTES NFR BLD: 14.3 % (ref 18–48)
MCH RBC QN AUTO: 30.3 PG (ref 27–31)
MCHC RBC AUTO-ENTMCNC: 33.1 G/DL (ref 32–36)
MCV RBC AUTO: 92 FL (ref 82–98)
MONOCYTES # BLD AUTO: 0.9 K/UL (ref 0.3–1)
MONOCYTES NFR BLD: 8.6 % (ref 4–15)
NEUTROPHILS # BLD AUTO: 7.5 K/UL (ref 1.8–7.7)
NEUTROPHILS NFR BLD: 75 % (ref 38–73)
NRBC BLD-RTO: 0 /100 WBC
PLATELET # BLD AUTO: 206 K/UL (ref 150–350)
PMV BLD AUTO: 9.7 FL (ref 9.2–12.9)
RBC # BLD AUTO: 5.64 M/UL (ref 4.6–6.2)
WBC # BLD AUTO: 9.96 K/UL (ref 3.9–12.7)

## 2019-12-19 PROCEDURE — 99195 PHLEBOTOMY: CPT

## 2019-12-19 PROCEDURE — 3078F DIAST BP <80 MM HG: CPT | Mod: CPTII,S$GLB,, | Performed by: INTERNAL MEDICINE

## 2019-12-19 PROCEDURE — 3078F PR MOST RECENT DIASTOLIC BLOOD PRESSURE < 80 MM HG: ICD-10-PCS | Mod: CPTII,S$GLB,, | Performed by: INTERNAL MEDICINE

## 2019-12-19 PROCEDURE — 3074F SYST BP LT 130 MM HG: CPT | Mod: CPTII,S$GLB,, | Performed by: INTERNAL MEDICINE

## 2019-12-19 PROCEDURE — 1159F PR MEDICATION LIST DOCUMENTED IN MEDICAL RECORD: ICD-10-PCS | Mod: S$GLB,,, | Performed by: INTERNAL MEDICINE

## 2019-12-19 PROCEDURE — 99214 OFFICE O/P EST MOD 30 MIN: CPT | Mod: S$GLB,,, | Performed by: INTERNAL MEDICINE

## 2019-12-19 PROCEDURE — 99999 PR PBB SHADOW E&M-EST. PATIENT-LVL IV: ICD-10-PCS | Mod: PBBFAC,,, | Performed by: INTERNAL MEDICINE

## 2019-12-19 PROCEDURE — 1101F PT FALLS ASSESS-DOCD LE1/YR: CPT | Mod: CPTII,S$GLB,, | Performed by: INTERNAL MEDICINE

## 2019-12-19 PROCEDURE — 1101F PR PT FALLS ASSESS DOC 0-1 FALLS W/OUT INJ PAST YR: ICD-10-PCS | Mod: CPTII,S$GLB,, | Performed by: INTERNAL MEDICINE

## 2019-12-19 PROCEDURE — 36415 COLL VENOUS BLD VENIPUNCTURE: CPT

## 2019-12-19 PROCEDURE — 99999 PR PBB SHADOW E&M-EST. PATIENT-LVL IV: CPT | Mod: PBBFAC,,, | Performed by: INTERNAL MEDICINE

## 2019-12-19 PROCEDURE — 3074F PR MOST RECENT SYSTOLIC BLOOD PRESSURE < 130 MM HG: ICD-10-PCS | Mod: CPTII,S$GLB,, | Performed by: INTERNAL MEDICINE

## 2019-12-19 PROCEDURE — 1159F MED LIST DOCD IN RCRD: CPT | Mod: S$GLB,,, | Performed by: INTERNAL MEDICINE

## 2019-12-19 PROCEDURE — 99214 PR OFFICE/OUTPT VISIT, EST, LEVL IV, 30-39 MIN: ICD-10-PCS | Mod: S$GLB,,, | Performed by: INTERNAL MEDICINE

## 2019-12-19 PROCEDURE — 1126F AMNT PAIN NOTED NONE PRSNT: CPT | Mod: S$GLB,,, | Performed by: INTERNAL MEDICINE

## 2019-12-19 PROCEDURE — 1126F PR PAIN SEVERITY QUANTIFIED, NO PAIN PRESENT: ICD-10-PCS | Mod: S$GLB,,, | Performed by: INTERNAL MEDICINE

## 2019-12-19 RX ORDER — LIDOCAINE HYDROCHLORIDE 10 MG/ML
1 INJECTION, SOLUTION EPIDURAL; INFILTRATION; INTRACAUDAL; PERINEURAL ONCE
Status: CANCELLED | OUTPATIENT
Start: 2019-12-26

## 2019-12-19 RX ORDER — METFORMIN HYDROCHLORIDE 500 MG/1
TABLET ORAL
COMMUNITY
Start: 2019-12-07 | End: 2020-01-09 | Stop reason: SDUPTHER

## 2019-12-19 RX ORDER — DOCUSATE SODIUM 100 MG
CAPSULE ORAL
COMMUNITY
Start: 2019-09-17 | End: 2020-01-09 | Stop reason: SDUPTHER

## 2019-12-19 RX ORDER — VITAMIN E 268 MG
CAPSULE ORAL
COMMUNITY
Start: 2019-09-17 | End: 2021-08-09

## 2019-12-19 RX ORDER — ASCORBIC ACID 125 MG
5 TABLET,CHEWABLE ORAL
COMMUNITY
End: 2020-01-09 | Stop reason: SDUPTHER

## 2019-12-19 NOTE — NURSING
...1 unit therapeutic phlebotomy performed via_left__AC then discarded, pressure dressing applied.  Pt refused juice or snack.  Blood pressure 101/65 ______ upon completion.  Pt denies any complaints of dizziness, lightheadedness, or faintness.  Patient ambulated out without difficulty.

## 2019-12-19 NOTE — PROGRESS NOTES
Subjective:      DATE OF VISIT: 12/19/2019   ?   ?   Patient ID:?Bo Chase is a 74 y.o. male.?? MR#: [id number]   ?   PRIMARY PROVIDER: Dr. Diaz  ?   CHIEF COMPLAINT:  Polycythemia?????   ?   DIAGNOSIS:  Secondary polycythemia  ?   CURRENT TREATMENT:  Phlebotomy, aspirin (takes 325mg daily)      HPI    Today I have the pleasure of seeing in follow-up Mr. Chase.  He continues to do well working out regularly with aerobics and weight is although he has not had successful weight loss. He continues to use CPAP nightly for his obstructive sleep apnea.  No rash, pruritus, edema/thrombosis, chest pain shortness of breath, fever, chills, night sweats or unintentional weight loss.    Review of systems:  ?   A comprehensive 14-point review of systems was reviewed with patient and was negative other than as specified above.   ?     Objective:      Physical Exam      ?   Vitals:    12/19/19 1355   BP: 110/72   Pulse: 70   Resp: 18   Temp: 99 °F (37.2 °C)      ?   ECOG:?0   General appearance: Generally well appearing, in no acute distress.   Head, eyes, ears, nose, and throat: Oropharynx clear with moist mucous membranes.   Cardiovascular: Regular rate and rhythm, S1, S2, no audible murmurs.   Respiratory: Lungs clear to auscultation bilaterally.   Abdomen: nontender, nondistended.   Extremities: Warm, with 1+ symmetric edema in ankles.  Neurologic: Alert and oriented. Grossly normal strength, coordination, and gait.   Skin: No rashes, ecchymoses or petechial lesion.   Psychiatric: normal mood and affect, conversant and appropriate    ?   Laboratory:  ?   Lab Visit on 12/19/2019   Component Date Value Ref Range Status    WBC 12/19/2019 9.96  3.90 - 12.70 K/uL Final    RBC 12/19/2019 5.64  4.60 - 6.20 M/uL Final    Hemoglobin 12/19/2019 17.1  14.0 - 18.0 g/dL Final    Hematocrit 12/19/2019 51.7  40.0 - 54.0 % Final    Mean Corpuscular Volume 12/19/2019 92  82 - 98 fL Final    Mean Corpuscular Hemoglobin 12/19/2019  30.3  27.0 - 31.0 pg Final    Mean Corpuscular Hemoglobin Conc 12/19/2019 33.1  32.0 - 36.0 g/dL Final    RDW 12/19/2019 16.0* 11.5 - 14.5 % Final    Platelets 12/19/2019 206  150 - 350 K/uL Final    MPV 12/19/2019 9.7  9.2 - 12.9 fL Final    Immature Granulocytes 12/19/2019 0.5  0.0 - 0.5 % Final    Gran # (ANC) 12/19/2019 7.5  1.8 - 7.7 K/uL Final    Immature Grans (Abs) 12/19/2019 0.05* 0.00 - 0.04 K/uL Final    Lymph # 12/19/2019 1.4  1.0 - 4.8 K/uL Final    Mono # 12/19/2019 0.9  0.3 - 1.0 K/uL Final    Eos # 12/19/2019 0.1  0.0 - 0.5 K/uL Final    Baso # 12/19/2019 0.05  0.00 - 0.20 K/uL Final    nRBC 12/19/2019 0  0 /100 WBC Final    Gran% 12/19/2019 75.0* 38.0 - 73.0 % Final    Lymph% 12/19/2019 14.3* 18.0 - 48.0 % Final    Mono% 12/19/2019 8.6  4.0 - 15.0 % Final    Eosinophil% 12/19/2019 1.1  0.0 - 8.0 % Final    Basophil% 12/19/2019 0.5  0.0 - 1.9 % Final    Differential Method 12/19/2019 Automated   Final      ?   ?   Hemoglobin   Date Value Ref Range Status   12/19/2019 17.1 14.0 - 18.0 g/dL Final   11/12/2019 17.5 14.0 - 18.0 g/dL Final   09/03/2019 17.1 14.0 - 18.0 g/dL Final   05/13/2019 17.6 14.0 - 18.0 g/dL Final   01/25/2019 16.5 14.0 - 18.0 g/dL Final   01/25/2019 16.5 14.0 - 18.0 g/dL Final   11/15/2018 16.8 14.0 - 18.0 g/dL Final   11/08/2018 16.8 14.0 - 18.0 g/dL Final   09/11/2018 17.9 14.0 - 18.0 g/dL Final   08/23/2018 17.6 14.0 - 18.0 g/dL Final     Hematocrit   Date Value Ref Range Status   12/19/2019 51.7 40.0 - 54.0 % Final   11/12/2019 52.6 40.0 - 54.0 % Final   09/03/2019 53.8 40.0 - 54.0 % Final   05/13/2019 54.2 (H) 40.0 - 54.0 % Final   01/25/2019 49.7 40.0 - 54.0 % Final   01/25/2019 49.7 40.0 - 54.0 % Final   11/15/2018 50.5 40.0 - 54.0 % Final   11/08/2018 49.5 40.0 - 54.0 % Final   09/11/2018 53.1 40.0 - 54.0 % Final   08/23/2018 50.4 40.0 - 54.0 % Final       Assessment/Plan:       1. Polycythemia, secondary    2. LEANDRO (obstructive sleep apnea)    3. Acquired  polycythemia    4. Chronic kidney disease, stage III (moderate)    5. LEANDRO on CPAP          Plan:     # Polycythemia:  Suspected secondary related to LEANDRO on CPAP.  Elevated erythropoietin level to 21 in 2016 and JAK2 V617 F mutation negative in 2014.  He has not had bone marrow biopsy.  Recommended phlebotomy (hematocrit today 53) to achieve hematocrit around 45.  Will arrange for phlebotomy today and revisit in 6 weeks.  He is to continue on aspirin prophylaxis and takes 325 mg for other indication.    # CKD stage 3:  Stable per labs in September 2019, not repeated today, continue monitor and follow-up with PCP.    # LEANDRO on CPAP:  Encouraged continue use of CPAP, using nightly      Follow-Up:   - phlebotomy today and in 3 weeks  - RV in 6 weeks with RV and labs prior

## 2020-01-09 ENCOUNTER — TELEPHONE (OUTPATIENT)
Dept: HEMATOLOGY/ONCOLOGY | Facility: CLINIC | Age: 75
End: 2020-01-09

## 2020-01-09 ENCOUNTER — INFUSION (OUTPATIENT)
Dept: INFUSION THERAPY | Facility: HOSPITAL | Age: 75
End: 2020-01-09
Attending: INTERNAL MEDICINE
Payer: MEDICARE

## 2020-01-09 VITALS
TEMPERATURE: 100 F | DIASTOLIC BLOOD PRESSURE: 70 MMHG | RESPIRATION RATE: 18 BRPM | SYSTOLIC BLOOD PRESSURE: 118 MMHG | OXYGEN SATURATION: 94 % | HEART RATE: 66 BPM

## 2020-01-09 DIAGNOSIS — D75.1 ACQUIRED POLYCYTHEMIA: Primary | ICD-10-CM

## 2020-01-09 PROCEDURE — 99195 PHLEBOTOMY: CPT

## 2020-01-09 RX ORDER — METOLAZONE 5 MG/1
5 TABLET ORAL
COMMUNITY
End: 2020-07-17

## 2020-01-09 RX ORDER — ASCORBIC ACID 125 MG
5 TABLET,CHEWABLE ORAL
COMMUNITY
End: 2021-05-12

## 2020-01-09 RX ORDER — POTASSIUM CHLORIDE 750 MG/1
10 TABLET, EXTENDED RELEASE ORAL
COMMUNITY
End: 2020-08-27

## 2020-01-09 RX ORDER — TRAMADOL HYDROCHLORIDE 50 MG/1
50 TABLET ORAL
COMMUNITY
Start: 2019-06-17 | End: 2020-02-25

## 2020-01-09 RX ORDER — METFORMIN HYDROCHLORIDE 500 MG/1
TABLET ORAL
COMMUNITY
Start: 2019-12-07 | End: 2021-05-12

## 2020-01-09 RX ORDER — DOCUSATE SODIUM 100 MG/1
100 CAPSULE, LIQUID FILLED ORAL
COMMUNITY
Start: 2019-09-17

## 2020-01-09 RX ORDER — LIDOCAINE HYDROCHLORIDE 10 MG/ML
1 INJECTION, SOLUTION EPIDURAL; INFILTRATION; INTRACAUDAL; PERINEURAL ONCE
Status: CANCELLED | OUTPATIENT
Start: 2020-01-12

## 2020-01-09 RX ORDER — METOPROLOL TARTRATE 25 MG/1
1 TABLET, FILM COATED ORAL
COMMUNITY
Start: 2016-02-29 | End: 2020-12-07 | Stop reason: SDUPTHER

## 2020-01-09 NOTE — DISCHARGE INSTRUCTIONS
[facility]  [address]  [address]  [phone] phone     [phone] fax  Hours of Operation: Monday- Friday 8:00am- 5:00pm  After hours phone  [phone]  Hematology / Oncology Physicians on call      LUIZ Cedillo Dr., Dr., Dr., Dr., NP Sydney Prescott, NP Tyesha Taylor, NP    Please call with any concerns regarding your appointment today.

## 2020-01-09 NOTE — NURSING
One unit therapeutic phlebotomy performed via right A/C without difficulty.  Upon completion, needle dc'd, pressure dressing applied, and blood discarded in appropriate container.   Juice/snack refused.  B/P 100/60, pulse 64.  Pt denies weakness/dizziness.  Pt discharged with next appt scheduled.

## 2020-01-10 ENCOUNTER — TELEPHONE (OUTPATIENT)
Dept: HEMATOLOGY/ONCOLOGY | Facility: CLINIC | Age: 75
End: 2020-01-10

## 2020-01-10 NOTE — TELEPHONE ENCOUNTER
----- Message from Myra Phelps sent at 1/9/2020  4:29 PM CST -----  Contact: Patient   Patient returned call regards to getting more labs added, Please call him at [phone].    Thanks  Td

## 2020-01-13 DIAGNOSIS — I25.708 CORONARY ARTERY DISEASE OF BYPASS GRAFT OF NATIVE HEART WITH STABLE ANGINA PECTORIS: Primary | Chronic | ICD-10-CM

## 2020-01-16 ENCOUNTER — TELEPHONE (OUTPATIENT)
Dept: CARDIOLOGY | Facility: CLINIC | Age: 75
End: 2020-01-16

## 2020-01-16 NOTE — TELEPHONE ENCOUNTER
----- Message from Elva Golden sent at 1/16/2020 10:22 AM CST -----  Contact: self [phone]  States that he is calling to get orders for labs to check his cholesterol levels. States that it has been about 3 years since it was checked last. Please call back at [phone]//thank you acc

## 2020-01-16 NOTE — TELEPHONE ENCOUNTER
Patient contacted the office to get lab orders before appointment . The patient was informed that his visit should come to his visit on 10/17/2020 to request lipid panel blood test.           ----- Message from Janet Kim sent at 1/16/2020 12:52 PM CST -----  Contact: Pt  Type:  Patient Returning Call    Who Called:PT  Who Left Message for Patient:ISAAC  Does the patient know what this is regarding?:YES  Would the patient rather a call back or a response via [facility]? CALL BACK  Best Call Back Number:[phone]  Additional Information:

## 2020-01-17 ENCOUNTER — LAB VISIT (OUTPATIENT)
Dept: LAB | Facility: HOSPITAL | Age: 75
End: 2020-01-17
Attending: INTERNAL MEDICINE
Payer: MEDICARE

## 2020-01-17 ENCOUNTER — CLINICAL SUPPORT (OUTPATIENT)
Dept: CARDIOLOGY | Facility: CLINIC | Age: 75
End: 2020-01-17
Payer: MEDICARE

## 2020-01-17 ENCOUNTER — TELEPHONE (OUTPATIENT)
Dept: CARDIOLOGY | Facility: CLINIC | Age: 75
End: 2020-01-17

## 2020-01-17 ENCOUNTER — OFFICE VISIT (OUTPATIENT)
Dept: CARDIOLOGY | Facility: CLINIC | Age: 75
End: 2020-01-17
Payer: MEDICARE

## 2020-01-17 VITALS
SYSTOLIC BLOOD PRESSURE: 110 MMHG | OXYGEN SATURATION: 93 % | BODY MASS INDEX: 39.85 KG/M2 | DIASTOLIC BLOOD PRESSURE: 74 MMHG | WEIGHT: 254.44 LBS | HEART RATE: 78 BPM

## 2020-01-17 DIAGNOSIS — I25.708 CORONARY ARTERY DISEASE OF BYPASS GRAFT OF NATIVE HEART WITH STABLE ANGINA PECTORIS: Primary | Chronic | ICD-10-CM

## 2020-01-17 DIAGNOSIS — I25.708 CORONARY ARTERY DISEASE OF BYPASS GRAFT OF NATIVE HEART WITH STABLE ANGINA PECTORIS: Chronic | ICD-10-CM

## 2020-01-17 DIAGNOSIS — Z95.5 S/P CORONARY ARTERY STENT PLACEMENT: ICD-10-CM

## 2020-01-17 DIAGNOSIS — I10 ESSENTIAL HYPERTENSION: Chronic | ICD-10-CM

## 2020-01-17 DIAGNOSIS — I50.32 CHRONIC DIASTOLIC CONGESTIVE HEART FAILURE: ICD-10-CM

## 2020-01-17 DIAGNOSIS — E78.5 HYPERLIPIDEMIA WITH TARGET LDL LESS THAN 70: ICD-10-CM

## 2020-01-17 DIAGNOSIS — Z95.1 S/P CABG X 4: ICD-10-CM

## 2020-01-17 LAB
CHOLEST SERPL-MCNC: 117 MG/DL (ref 120–199)
CHOLEST/HDLC SERPL: 4.3 {RATIO} (ref 2–5)
HDLC SERPL-MCNC: 27 MG/DL (ref 40–75)
HDLC SERPL: 23.1 % (ref 20–50)
LDLC SERPL CALC-MCNC: 56.4 MG/DL (ref 63–159)
NONHDLC SERPL-MCNC: 90 MG/DL
TRIGL SERPL-MCNC: 168 MG/DL (ref 30–150)

## 2020-01-17 PROCEDURE — 36415 COLL VENOUS BLD VENIPUNCTURE: CPT

## 2020-01-17 PROCEDURE — 1101F PT FALLS ASSESS-DOCD LE1/YR: CPT | Mod: CPTII,S$GLB,, | Performed by: INTERNAL MEDICINE

## 2020-01-17 PROCEDURE — 99999 PR PBB SHADOW E&M-EST. PATIENT-LVL III: ICD-10-PCS | Mod: PBBFAC,,, | Performed by: INTERNAL MEDICINE

## 2020-01-17 PROCEDURE — 3078F PR MOST RECENT DIASTOLIC BLOOD PRESSURE < 80 MM HG: ICD-10-PCS | Mod: CPTII,S$GLB,, | Performed by: INTERNAL MEDICINE

## 2020-01-17 PROCEDURE — 1101F PR PT FALLS ASSESS DOC 0-1 FALLS W/OUT INJ PAST YR: ICD-10-PCS | Mod: CPTII,S$GLB,, | Performed by: INTERNAL MEDICINE

## 2020-01-17 PROCEDURE — 99214 OFFICE O/P EST MOD 30 MIN: CPT | Mod: 25,S$GLB,, | Performed by: INTERNAL MEDICINE

## 2020-01-17 PROCEDURE — 3078F DIAST BP <80 MM HG: CPT | Mod: CPTII,S$GLB,, | Performed by: INTERNAL MEDICINE

## 2020-01-17 PROCEDURE — 93000 ELECTROCARDIOGRAM COMPLETE: CPT | Mod: S$GLB,,, | Performed by: INTERNAL MEDICINE

## 2020-01-17 PROCEDURE — 93000 EKG 12-LEAD: ICD-10-PCS | Mod: S$GLB,,, | Performed by: INTERNAL MEDICINE

## 2020-01-17 PROCEDURE — 1159F MED LIST DOCD IN RCRD: CPT | Mod: S$GLB,,, | Performed by: INTERNAL MEDICINE

## 2020-01-17 PROCEDURE — 3074F SYST BP LT 130 MM HG: CPT | Mod: CPTII,S$GLB,, | Performed by: INTERNAL MEDICINE

## 2020-01-17 PROCEDURE — 99999 PR PBB SHADOW E&M-EST. PATIENT-LVL III: CPT | Mod: PBBFAC,,, | Performed by: INTERNAL MEDICINE

## 2020-01-17 PROCEDURE — 3074F PR MOST RECENT SYSTOLIC BLOOD PRESSURE < 130 MM HG: ICD-10-PCS | Mod: CPTII,S$GLB,, | Performed by: INTERNAL MEDICINE

## 2020-01-17 PROCEDURE — 99214 PR OFFICE/OUTPT VISIT, EST, LEVL IV, 30-39 MIN: ICD-10-PCS | Mod: 25,S$GLB,, | Performed by: INTERNAL MEDICINE

## 2020-01-17 PROCEDURE — 1159F PR MEDICATION LIST DOCUMENTED IN MEDICAL RECORD: ICD-10-PCS | Mod: S$GLB,,, | Performed by: INTERNAL MEDICINE

## 2020-01-17 PROCEDURE — 80061 LIPID PANEL: CPT

## 2020-01-17 RX ORDER — CALCIUM CITRATE/VITAMIN D3 200MG-6.25
TABLET ORAL
COMMUNITY
Start: 2019-12-20 | End: 2020-03-26

## 2020-01-17 RX ORDER — BLOOD-GLUCOSE METER
EACH MISCELLANEOUS
COMMUNITY
Start: 2019-12-20

## 2020-01-17 RX ORDER — LOSARTAN POTASSIUM 50 MG/1
50 TABLET ORAL DAILY
Qty: 30 TABLET | Refills: 11 | Status: SHIPPED | OUTPATIENT
Start: 2020-01-17 | End: 2020-01-30 | Stop reason: SDUPTHER

## 2020-01-17 RX ORDER — ASPIRIN 81 MG/1
81 TABLET ORAL DAILY
Start: 2020-01-17 | End: 2021-05-12

## 2020-01-17 NOTE — PROGRESS NOTES
Subjective:   Patient ID:  Bo Chase is a 74 y.o. male who presents for follow up of Follow-up (6 month f/u)      73 yo male, 6 months f/u.   PMH CAD, s/p CABGx4 1996, s/p last PCI SVG to OM1, in 2014, LIMA patent and  SVG to diag, Dx of DM in 2019, CHFpEF, HTN, HLP, morbid obesity, sleep apnea on CPAP, hypothyroidism. Chronic lower back pain.  8/31/2016 negative nuclear stress test for ischemia.    ECho normal EF  Pt states that he f/u with cardiologist at [facility] in the past two yr. Negative exerrcise stress test 8in  at [facility].   Recent Dx of melanoma on nose and right chest pain. S/p chest tumor early .   Sleep with CPAP.  Chronic leg swelling.  BNP 54 and Cr stable  Pt states that he is ok to have station bike, but SOB when walking and carrying bags.  Decreased Torsemide 20 mg in AM abnd 10 mg in AM and Metolazone 5 mg twice a week.  No chest pain.   Asa 325 mg given by Dr Bernal nephrology      Past Medical History:   Diagnosis Date    Acute coronary syndrome     CHF (congestive heart failure)     Chronic kidney disease     Coronary artery disease     Hyperlipidemia     Hypertension     Lumbar spondylosis     Sleep apnea        Past Surgical History:   Procedure Laterality Date    back surgary      CARDIAC CATHETERIZATION  3/6/2012    CORONARY ANGIOPLASTY      CORONARY ARTERY BYPASS GRAFT  1996    x4    throid lobectomy  4/2012    umbilicial hernia         Social History     Tobacco Use    Smoking status: Never Smoker    Smokeless tobacco: Never Used   Substance Use Topics    Alcohol use: Yes     Comment: 6 BEERS A YEAR     Drug use: No       Family History   Problem Relation Age of Onset    Heart disease Father          Review of Systems   Constitution: Positive for malaise/fatigue. Negative for decreased appetite, diaphoresis, fever and night sweats.   HENT: Negative for nosebleeds.    Eyes: Negative for blurred vision and double vision.   Cardiovascular: Positive for  dyspnea on exertion and leg swelling. Negative for chest pain, claudication, irregular heartbeat, near-syncope, orthopnea, palpitations, paroxysmal nocturnal dyspnea and syncope.   Respiratory: Negative for cough, shortness of breath, sleep disturbances due to breathing, snoring, sputum production and wheezing.    Endocrine: Negative for cold intolerance and polyuria.   Hematologic/Lymphatic: Does not bruise/bleed easily.   Skin: Negative for rash.   Musculoskeletal: Negative for back pain, falls, joint pain, joint swelling and neck pain.   Gastrointestinal: Negative for abdominal pain, heartburn, nausea and vomiting.   Genitourinary: Negative for dysuria, frequency and hematuria.   Neurological: Negative for difficulty with concentration, dizziness, focal weakness, headaches, light-headedness, numbness, seizures and weakness.   Psychiatric/Behavioral: Negative for depression, memory loss and substance abuse. The patient does not have insomnia.    Allergic/Immunologic: Negative for HIV exposure and hives.       Objective:   Physical Exam   Constitutional: He is oriented to person, place, and time. He appears well-nourished.   HENT:   Head: Normocephalic.   Eyes: Pupils are equal, round, and reactive to light.   Neck: Normal carotid pulses and no JVD present. Carotid bruit is not present. No thyromegaly present.   Cardiovascular: Normal rate, regular rhythm and normal pulses.  No extrasystoles are present. PMI is not displaced. Exam reveals no gallop and no S3.   Murmur (ESM on URSB and midLSB) heard.  Pulmonary/Chest: Breath sounds normal. No stridor. No respiratory distress.   Abdominal: Soft. Bowel sounds are normal. There is no tenderness. There is no rebound.   Musculoskeletal: Normal range of motion.   Neurological: He is alert and oriented to person, place, and time.   Skin: Skin is intact. No rash noted.   Hyperpigmentation on BLE   Psychiatric: His behavior is normal.       Lab Results   Component Value Date     CHOL 150 08/31/2016    CHOL 152 09/09/2015    CHOL 137 12/19/2014     Lab Results   Component Value Date    HDL 29 (L) 08/31/2016    HDL 28 (L) 09/09/2015    HDL 29 (L) 12/19/2014     Lab Results   Component Value Date    LDLCALC 72.8 08/31/2016    LDLCALC 82.6 09/09/2015    LDLCALC 70.8 12/19/2014     Lab Results   Component Value Date    TRIG 241 (H) 08/31/2016    TRIG 207 (H) 09/09/2015    TRIG 186 (H) 12/19/2014     Lab Results   Component Value Date    CHOLHDL 19.3 (L) 08/31/2016    CHOLHDL 18.4 (L) 09/09/2015    CHOLHDL 21.2 12/19/2014       Chemistry        Component Value Date/Time     09/03/2019 0948    K 3.5 09/03/2019 0948    CL 96 09/03/2019 0948    CO2 28 09/03/2019 0948    BUN 30 (H) 09/03/2019 0948    CREATININE 1.8 (H) 09/03/2019 0948     (H) 09/03/2019 0948        Component Value Date/Time    CALCIUM 9.8 09/03/2019 0948    ALKPHOS 77 11/08/2018 1400    AST 48 (H) 11/08/2018 1400    ALT 62 (H) 11/08/2018 1400    BILITOT 0.6 11/08/2018 1400    ESTGFRAFRICA 42.2 (A) 09/03/2019 0948    EGFRNONAA 36.5 (A) 09/03/2019 0948          Lab Results   Component Value Date    HGBA1C 6.0 08/27/2014     Lab Results   Component Value Date    TSH 1.321 07/12/2017     Lab Results   Component Value Date    INR 1.1 07/25/2016    INR 1.1 12/19/2014    INR 1.1 03/01/2012     Lab Results   Component Value Date    WBC 9.96 12/19/2019    HGB 17.1 12/19/2019    HCT 51.7 12/19/2019    MCV 92 12/19/2019     12/19/2019     BMP  Sodium   Date Value Ref Range Status   09/03/2019 136 136 - 145 mmol/L Final     Potassium   Date Value Ref Range Status   09/03/2019 3.5 3.5 - 5.1 mmol/L Final     Chloride   Date Value Ref Range Status   09/03/2019 96 95 - 110 mmol/L Final     CO2   Date Value Ref Range Status   09/03/2019 28 23 - 29 mmol/L Final     BUN, Bld   Date Value Ref Range Status   09/03/2019 30 (H) 8 - 23 mg/dL Final     Creatinine   Date Value Ref Range Status   09/03/2019 1.8 (H) 0.5 - 1.4 mg/dL Final      Calcium   Date Value Ref Range Status   09/03/2019 9.8 8.7 - 10.5 mg/dL Final     Anion Gap   Date Value Ref Range Status   09/03/2019 12 8 - 16 mmol/L Final     eGFR if    Date Value Ref Range Status   09/03/2019 42.2 (A) >60 mL/min/1.73 m^2 Final     eGFR if non    Date Value Ref Range Status   09/03/2019 36.5 (A) >60 mL/min/1.73 m^2 Final     Comment:     Calculation used to obtain the estimated glomerular filtration  rate (eGFR) is the CKD-EPI equation.        BNP  @LABRCNTIP(BNP,BNPTRIAGEBLO)@  @LABRCNTIP(troponini)@  CrCl cannot be calculated (Patient's most recent lab result is older than the maximum 7 days allowed.).  No results found in the last 24 hours.  No results found in the last 24 hours.  No results found in the last 24 hours.    Assessment:      1. Coronary artery disease of bypass graft of native heart with stable angina pectoris    2. S/P CABG x 4    3. S/P coronary artery stent placement    4. Chronic diastolic congestive heart failure    5. Essential hypertension    6. Hyperlipidemia with target LDL less than 70      BP and A1c wnl  No CP   CHFpEF compensated FC II  LDL pending     Plan:   Decrease ASA to 81 mg  D/c hydralazine   Add Losartan 50 mg daily  Continue Lipitor 40 mg metoprolol and Aldactone  Counseled DASH  Recommend heart-healthy diet, weight control and regular exercise.  Ion. Risk modification.   RTC in 6 months    I have reviewed all pertinent labs and cardiac studies. Plans and recommendations have been formulated under my direct supervision. All questions answered and patient voiced understanding. Patient to continue current medications.

## 2020-01-20 NOTE — PROGRESS NOTES
Patient, Bo Chase (MRN #[id number]), presented with a recorded BMI of 39.85 kg/m^2 and a documented comorbidity(s):  - Hypertension  - Hyperlipidemia  - Atrial Fibrillation  to which the severe obesity is a contributing factor. This is consistent with the definition of severe obesity (BMI 35.0-39.9) with comorbidity (ICD-10 E66.01, Z68.35). The patient's severe obesity was monitored, evaluated, addressed and/or treated. This addendum to the medical record is made on 01/20/2020.

## 2020-01-30 ENCOUNTER — LAB VISIT (OUTPATIENT)
Dept: LAB | Facility: HOSPITAL | Age: 75
End: 2020-01-30
Attending: INTERNAL MEDICINE
Payer: MEDICARE

## 2020-01-30 ENCOUNTER — OFFICE VISIT (OUTPATIENT)
Dept: HEMATOLOGY/ONCOLOGY | Facility: CLINIC | Age: 75
End: 2020-01-30
Payer: MEDICARE

## 2020-01-30 ENCOUNTER — TELEPHONE (OUTPATIENT)
Dept: CARDIOLOGY | Facility: CLINIC | Age: 75
End: 2020-01-30

## 2020-01-30 VITALS
BODY MASS INDEX: 40.69 KG/M2 | HEIGHT: 67 IN | WEIGHT: 259.25 LBS | DIASTOLIC BLOOD PRESSURE: 50 MMHG | HEART RATE: 75 BPM | OXYGEN SATURATION: 96 % | TEMPERATURE: 99 F | SYSTOLIC BLOOD PRESSURE: 86 MMHG

## 2020-01-30 DIAGNOSIS — D75.1 POLYCYTHEMIA, SECONDARY: Primary | ICD-10-CM

## 2020-01-30 DIAGNOSIS — D75.1 POLYCYTHEMIA, SECONDARY: ICD-10-CM

## 2020-01-30 DIAGNOSIS — G47.33 OSA ON CPAP: ICD-10-CM

## 2020-01-30 LAB
BASOPHILS # BLD AUTO: 0.06 K/UL (ref 0–0.2)
BASOPHILS NFR BLD: 0.7 % (ref 0–1.9)
DIFFERENTIAL METHOD: ABNORMAL
EOSINOPHIL # BLD AUTO: 0.1 K/UL (ref 0–0.5)
EOSINOPHIL NFR BLD: 1.3 % (ref 0–8)
ERYTHROCYTE [DISTWIDTH] IN BLOOD BY AUTOMATED COUNT: 16 % (ref 11.5–14.5)
HCT VFR BLD AUTO: 47.2 % (ref 40–54)
HGB BLD-MCNC: 15.1 G/DL (ref 14–18)
IMM GRANULOCYTES # BLD AUTO: 0.04 K/UL (ref 0–0.04)
IMM GRANULOCYTES NFR BLD AUTO: 0.5 % (ref 0–0.5)
LYMPHOCYTES # BLD AUTO: 1.4 K/UL (ref 1–4.8)
LYMPHOCYTES NFR BLD: 16.4 % (ref 18–48)
MCH RBC QN AUTO: 30 PG (ref 27–31)
MCHC RBC AUTO-ENTMCNC: 32 G/DL (ref 32–36)
MCV RBC AUTO: 94 FL (ref 82–98)
MONOCYTES # BLD AUTO: 0.9 K/UL (ref 0.3–1)
MONOCYTES NFR BLD: 10.2 % (ref 4–15)
NEUTROPHILS # BLD AUTO: 6 K/UL (ref 1.8–7.7)
NEUTROPHILS NFR BLD: 70.9 % (ref 38–73)
NRBC BLD-RTO: 0 /100 WBC
PLATELET # BLD AUTO: 227 K/UL (ref 150–350)
PMV BLD AUTO: 10.3 FL (ref 9.2–12.9)
RBC # BLD AUTO: 5.04 M/UL (ref 4.6–6.2)
WBC # BLD AUTO: 8.45 K/UL (ref 3.9–12.7)

## 2020-01-30 PROCEDURE — 3078F PR MOST RECENT DIASTOLIC BLOOD PRESSURE < 80 MM HG: ICD-10-PCS | Mod: CPTII,S$GLB,, | Performed by: INTERNAL MEDICINE

## 2020-01-30 PROCEDURE — 1159F MED LIST DOCD IN RCRD: CPT | Mod: S$GLB,,, | Performed by: INTERNAL MEDICINE

## 2020-01-30 PROCEDURE — 1126F AMNT PAIN NOTED NONE PRSNT: CPT | Mod: S$GLB,,, | Performed by: INTERNAL MEDICINE

## 2020-01-30 PROCEDURE — 3074F PR MOST RECENT SYSTOLIC BLOOD PRESSURE < 130 MM HG: ICD-10-PCS | Mod: CPTII,S$GLB,, | Performed by: INTERNAL MEDICINE

## 2020-01-30 PROCEDURE — 1101F PR PT FALLS ASSESS DOC 0-1 FALLS W/OUT INJ PAST YR: ICD-10-PCS | Mod: CPTII,S$GLB,, | Performed by: INTERNAL MEDICINE

## 2020-01-30 PROCEDURE — 99213 OFFICE O/P EST LOW 20 MIN: CPT | Mod: S$GLB,,, | Performed by: INTERNAL MEDICINE

## 2020-01-30 PROCEDURE — 36415 COLL VENOUS BLD VENIPUNCTURE: CPT

## 2020-01-30 PROCEDURE — 3074F SYST BP LT 130 MM HG: CPT | Mod: CPTII,S$GLB,, | Performed by: INTERNAL MEDICINE

## 2020-01-30 PROCEDURE — 99999 PR PBB SHADOW E&M-EST. PATIENT-LVL V: ICD-10-PCS | Mod: PBBFAC,,, | Performed by: INTERNAL MEDICINE

## 2020-01-30 PROCEDURE — 1126F PR PAIN SEVERITY QUANTIFIED, NO PAIN PRESENT: ICD-10-PCS | Mod: S$GLB,,, | Performed by: INTERNAL MEDICINE

## 2020-01-30 PROCEDURE — 99999 PR PBB SHADOW E&M-EST. PATIENT-LVL V: CPT | Mod: PBBFAC,,, | Performed by: INTERNAL MEDICINE

## 2020-01-30 PROCEDURE — 1159F PR MEDICATION LIST DOCUMENTED IN MEDICAL RECORD: ICD-10-PCS | Mod: S$GLB,,, | Performed by: INTERNAL MEDICINE

## 2020-01-30 PROCEDURE — 99213 PR OFFICE/OUTPT VISIT, EST, LEVL III, 20-29 MIN: ICD-10-PCS | Mod: S$GLB,,, | Performed by: INTERNAL MEDICINE

## 2020-01-30 PROCEDURE — 1101F PT FALLS ASSESS-DOCD LE1/YR: CPT | Mod: CPTII,S$GLB,, | Performed by: INTERNAL MEDICINE

## 2020-01-30 PROCEDURE — 3078F DIAST BP <80 MM HG: CPT | Mod: CPTII,S$GLB,, | Performed by: INTERNAL MEDICINE

## 2020-01-30 RX ORDER — LOSARTAN POTASSIUM 25 MG/1
25 TABLET ORAL DAILY
Qty: 30 TABLET | Refills: 5 | Status: SHIPPED | OUTPATIENT
Start: 2020-01-30 | End: 2020-10-19

## 2020-01-30 NOTE — PROGRESS NOTES
Patient, Bo Chase (MRN #[id number]), presented with a recorded BMI of 40.61 kg/m^2 consistent with the definition of morbid obesity (ICD-10 E66.01). The patient's morbid obesity was monitored, evaluated, addressed and/or treated. This addendum to the medical record is made on 01/30/2020.

## 2020-01-30 NOTE — TELEPHONE ENCOUNTER
The patient has been notified of this information and all questions answered. Patient verbalized understanding and will call back with any further questions.

## 2020-01-30 NOTE — PROGRESS NOTES
Subjective:      DATE OF VISIT: 1/30/2020   ?   ?   Patient ID:?Bo Chase is a 74 y.o. male.?? MR#: [id number]   ?   PRIMARY PROVIDER: Dr. Diaz  ?   CHIEF COMPLAINT:  Polycythemia?????   ?   DIAGNOSIS:  Secondary polycythemia  ?   CURRENT TREATMENT:  Phlebotomy, aspirin (takes 325mg daily)      HPI    Today I have the pleasure of seeing in follow-up Mr. Chase.  Since last visit he has tried to eat a plant based diet and notes improvement in how he feels and weight. No rash, pruritus, edema/thrombosis, chest pain shortness of breath, fever, chills, night sweats or unintentional weight loss.    Review of systems:  ?   A comprehensive 14-point review of systems was reviewed with patient and was negative other than as specified above.   ?     Objective:      Physical Exam      ?   Vitals:    01/30/20 1340   BP: (!) 86/50   Pulse:    Temp:       ?   ECOG:?0   General appearance: Generally well appearing, in no acute distress.   Head, eyes, ears, nose, and throat: Oropharynx clear with moist mucous membranes.   Cardiovascular: Regular rate and rhythm, S1, S2, no audible murmurs.   Respiratory: Lungs clear to auscultation bilaterally.   Abdomen: nontender, nondistended.   Extremities: Warm, with 1+ symmetric edema in ankles.  Neurologic: Alert and oriented. Grossly normal strength, coordination, and gait.   Skin: No rashes, ecchymoses or petechial lesion.   Psychiatric: normal mood and affect, conversant and appropriate    ?   Laboratory:  ?   Lab Visit on 01/30/2020   Component Date Value Ref Range Status    WBC 01/30/2020 8.45  3.90 - 12.70 K/uL Final    RBC 01/30/2020 5.04  4.60 - 6.20 M/uL Final    Hemoglobin 01/30/2020 15.1  14.0 - 18.0 g/dL Final    Hematocrit 01/30/2020 47.2  40.0 - 54.0 % Final    Mean Corpuscular Volume 01/30/2020 94  82 - 98 fL Final    Mean Corpuscular Hemoglobin 01/30/2020 30.0  27.0 - 31.0 pg Final    Mean Corpuscular Hemoglobin Conc 01/30/2020 32.0  32.0 - 36.0 g/dL Final    RDW  01/30/2020 16.0* 11.5 - 14.5 % Final    Platelets 01/30/2020 227  150 - 350 K/uL Final    MPV 01/30/2020 10.3  9.2 - 12.9 fL Final    Immature Granulocytes 01/30/2020 0.5  0.0 - 0.5 % Final    Gran # (ANC) 01/30/2020 6.0  1.8 - 7.7 K/uL Final    Immature Grans (Abs) 01/30/2020 0.04  0.00 - 0.04 K/uL Final    Lymph # 01/30/2020 1.4  1.0 - 4.8 K/uL Final    Mono # 01/30/2020 0.9  0.3 - 1.0 K/uL Final    Eos # 01/30/2020 0.1  0.0 - 0.5 K/uL Final    Baso # 01/30/2020 0.06  0.00 - 0.20 K/uL Final    nRBC 01/30/2020 0  0 /100 WBC Final    Gran% 01/30/2020 70.9  38.0 - 73.0 % Final    Lymph% 01/30/2020 16.4* 18.0 - 48.0 % Final    Mono% 01/30/2020 10.2  4.0 - 15.0 % Final    Eosinophil% 01/30/2020 1.3  0.0 - 8.0 % Final    Basophil% 01/30/2020 0.7  0.0 - 1.9 % Final    Differential Method 01/30/2020 Automated   Final      ?   ?   Hemoglobin   Date Value Ref Range Status   01/30/2020 15.1 14.0 - 18.0 g/dL Final   12/19/2019 17.1 14.0 - 18.0 g/dL Final   11/12/2019 17.5 14.0 - 18.0 g/dL Final   09/03/2019 17.1 14.0 - 18.0 g/dL Final   05/13/2019 17.6 14.0 - 18.0 g/dL Final   01/25/2019 16.5 14.0 - 18.0 g/dL Final   01/25/2019 16.5 14.0 - 18.0 g/dL Final   11/15/2018 16.8 14.0 - 18.0 g/dL Final   11/08/2018 16.8 14.0 - 18.0 g/dL Final   09/11/2018 17.9 14.0 - 18.0 g/dL Final     Hematocrit   Date Value Ref Range Status   01/30/2020 47.2 40.0 - 54.0 % Final   12/19/2019 51.7 40.0 - 54.0 % Final   11/12/2019 52.6 40.0 - 54.0 % Final   09/03/2019 53.8 40.0 - 54.0 % Final   05/13/2019 54.2 (H) 40.0 - 54.0 % Final   01/25/2019 49.7 40.0 - 54.0 % Final   01/25/2019 49.7 40.0 - 54.0 % Final   11/15/2018 50.5 40.0 - 54.0 % Final   11/08/2018 49.5 40.0 - 54.0 % Final   09/11/2018 53.1 40.0 - 54.0 % Final       Assessment/Plan:       1. Polycythemia, secondary    2. LEANDRO on CPAP          Plan:     # Polycythemia:  Suspected secondary related to LEANDRO on CPAP.  Elevated erythropoietin level to 21 in 2016 and JAK2 V617 F  mutation negative in 2014.  He has not had bone marrow biopsy.  Recommended phlebotomy for hematocrit goal less than 55.  Will hold phlebotomy today and recheck in 6 weeks.  He is to continue on aspirin prophylaxis and takes 325 mg for other indication.    # LEANDRO on CPAP:  Encouraged continue use of CPAP, using nightly      Follow-Up:   - no phlebotomy needed today  - RV in 2 months with RV and labs prior

## 2020-01-31 ENCOUNTER — TELEPHONE (OUTPATIENT)
Dept: CARDIOLOGY | Facility: CLINIC | Age: 75
End: 2020-01-31

## 2020-02-03 RX ORDER — TRAMADOL HYDROCHLORIDE 50 MG/1
TABLET ORAL
Qty: 90 TABLET | OUTPATIENT
Start: 2020-02-03

## 2020-02-12 NOTE — TELEPHONE ENCOUNTER
----- Message from Vivian Gupta sent at 2/12/2020  4:17 PM CST -----  Contact: pt  Pt called to get a refill of traMtraMADol (ULTRAM) 50 mg tablet  And can be reached at [phone]  [facility] - [facility] [address]  [address] [address]  Phone: [phone] Fax: [phone]      Thanks,  Vivian Gupta

## 2020-02-13 ENCOUNTER — TELEPHONE (OUTPATIENT)
Dept: NEPHROLOGY | Facility: CLINIC | Age: 75
End: 2020-02-13

## 2020-02-13 RX ORDER — TRAMADOL HYDROCHLORIDE 50 MG/1
50 TABLET ORAL
OUTPATIENT
Start: 2020-02-13

## 2020-02-13 NOTE — TELEPHONE ENCOUNTER
L/m that dr watts must se him face to face before he orders any pain meds. He has appt 2/2/5../2/13/2020/1017/sf

## 2020-02-17 ENCOUNTER — TELEPHONE (OUTPATIENT)
Dept: NEPHROLOGY | Facility: CLINIC | Age: 75
End: 2020-02-17

## 2020-02-17 NOTE — TELEPHONE ENCOUNTER
----- Message from Washington Bernal MD sent at 2/15/2020  2:36 PM CST -----  Contact: Patient   He has appointment face to face for me to fill this medicine     I cannot do it on phone nor without proper documentation       ----- Message -----  From: Bozena Singh LPN  Sent: 2/14/2020   5:01 PM CST  To: Washington Bernal MD        ----- Message -----  From: Myra Phelps  Sent: 2/14/2020  10:38 AM CST  To: Gabe LAINEZ Staff    Patient is calling to get something called in for pain, Please call him at [phone].    [facility] - [place], [place] - [address]  [address] [address]  Phone: [phone] Fax: [phone]    Thanks  Td

## 2020-02-17 NOTE — TELEPHONE ENCOUNTER
"Called to offer him an appt with dr taveras.as dr watts is not in clinic this week. he declined said he will get another dr to write pain meds." 2/17/2020/1700/sf  "

## 2020-02-17 NOTE — TELEPHONE ENCOUNTER
----- Message from Balbina Boyd sent at 2/17/2020  4:31 PM CST -----  Contact: Pt   Type:  Sooner Apoointment Request    Caller is requesting a sooner appointment.  Caller declined first available appointment listed below.  Caller will not accept being placed on the waitlist and is requesting a message be sent to doctor.  Name of Caller: pt   When is the first available appointment?02/25  Symptoms: follow up / med refill   Would the patient rather a call back or a response via [facility]? Phone   Best Call Back Number: [phone]  Additional Information: pt was told to come in for a med refill

## 2020-02-20 ENCOUNTER — LAB VISIT (OUTPATIENT)
Dept: LAB | Facility: HOSPITAL | Age: 75
End: 2020-02-20
Attending: INTERNAL MEDICINE
Payer: MEDICARE

## 2020-02-20 DIAGNOSIS — R60.0 BILATERAL EDEMA OF LOWER EXTREMITY: ICD-10-CM

## 2020-02-20 DIAGNOSIS — I27.20 PULMONARY HYPERTENSION: ICD-10-CM

## 2020-02-20 DIAGNOSIS — N18.30 CKD (CHRONIC KIDNEY DISEASE) STAGE 3, GFR 30-59 ML/MIN: ICD-10-CM

## 2020-02-20 DIAGNOSIS — N28.1 COMPLEX RENAL CYST: ICD-10-CM

## 2020-02-20 DIAGNOSIS — E83.52 HYPERCALCEMIA: ICD-10-CM

## 2020-02-20 DIAGNOSIS — M1A.09X0 IDIOPATHIC CHRONIC GOUT OF MULTIPLE SITES WITHOUT TOPHUS: ICD-10-CM

## 2020-02-20 DIAGNOSIS — N25.81 SECONDARY HYPERPARATHYROIDISM OF RENAL ORIGIN: ICD-10-CM

## 2020-02-20 DIAGNOSIS — I51.89 DIASTOLIC DYSFUNCTION: ICD-10-CM

## 2020-02-20 DIAGNOSIS — E87.6 HYPOKALEMIA: ICD-10-CM

## 2020-02-20 DIAGNOSIS — I75.023 CHOLESTEROL EMBOLISM OF LOWER EXTREMITY, BILATERAL: ICD-10-CM

## 2020-02-20 LAB
BILIRUB UR QL STRIP: NEGATIVE
CLARITY UR: CLEAR
COLOR UR: YELLOW
CREAT UR-MCNC: 47 MG/DL (ref 23–375)
GLUCOSE UR QL STRIP: NEGATIVE
HGB UR QL STRIP: NEGATIVE
KETONES UR QL STRIP: NEGATIVE
LEUKOCYTE ESTERASE UR QL STRIP: NEGATIVE
NITRITE UR QL STRIP: NEGATIVE
PH UR STRIP: 7 [PH] (ref 5–8)
PROT UR QL STRIP: NEGATIVE
PROT UR-MCNC: <7 MG/DL (ref 0–15)
PROT/CREAT UR: NORMAL MG/G{CREAT} (ref 0–0.2)
SP GR UR STRIP: 1.02 (ref 1–1.03)
URN SPEC COLLECT METH UR: NORMAL

## 2020-02-20 PROCEDURE — 81002 URINALYSIS NONAUTO W/O SCOPE: CPT | Mod: PO

## 2020-02-20 PROCEDURE — 84156 ASSAY OF PROTEIN URINE: CPT

## 2020-02-25 ENCOUNTER — OFFICE VISIT (OUTPATIENT)
Dept: NEPHROLOGY | Facility: CLINIC | Age: 75
End: 2020-02-25
Payer: MEDICARE

## 2020-02-25 VITALS
BODY MASS INDEX: 41.42 KG/M2 | HEIGHT: 67 IN | SYSTOLIC BLOOD PRESSURE: 112 MMHG | WEIGHT: 263.88 LBS | HEART RATE: 66 BPM | DIASTOLIC BLOOD PRESSURE: 67 MMHG

## 2020-02-25 DIAGNOSIS — M16.12 ARTHRITIS OF LEFT HIP: ICD-10-CM

## 2020-02-25 DIAGNOSIS — M1A.09X0 IDIOPATHIC CHRONIC GOUT OF MULTIPLE SITES WITHOUT TOPHUS: ICD-10-CM

## 2020-02-25 DIAGNOSIS — N18.30 CKD (CHRONIC KIDNEY DISEASE) STAGE 3, GFR 30-59 ML/MIN: Primary | ICD-10-CM

## 2020-02-25 DIAGNOSIS — E83.52 HYPERCALCEMIA: ICD-10-CM

## 2020-02-25 DIAGNOSIS — I75.023 CHOLESTEROL EMBOLISM OF LOWER EXTREMITY, BILATERAL: ICD-10-CM

## 2020-02-25 DIAGNOSIS — N28.1 COMPLEX RENAL CYST: ICD-10-CM

## 2020-02-25 DIAGNOSIS — N25.81 SECONDARY HYPERPARATHYROIDISM OF RENAL ORIGIN: ICD-10-CM

## 2020-02-25 DIAGNOSIS — E87.6 HYPOKALEMIA: ICD-10-CM

## 2020-02-25 DIAGNOSIS — R60.0 BILATERAL EDEMA OF LOWER EXTREMITY: ICD-10-CM

## 2020-02-25 DIAGNOSIS — I51.89 DIASTOLIC DYSFUNCTION: ICD-10-CM

## 2020-02-25 DIAGNOSIS — I27.20 PULMONARY HYPERTENSION: ICD-10-CM

## 2020-02-25 PROCEDURE — 3074F SYST BP LT 130 MM HG: CPT | Mod: CPTII,S$GLB,, | Performed by: INTERNAL MEDICINE

## 2020-02-25 PROCEDURE — 99214 PR OFFICE/OUTPT VISIT, EST, LEVL IV, 30-39 MIN: ICD-10-PCS | Mod: S$GLB,,, | Performed by: INTERNAL MEDICINE

## 2020-02-25 PROCEDURE — 3078F PR MOST RECENT DIASTOLIC BLOOD PRESSURE < 80 MM HG: ICD-10-PCS | Mod: CPTII,S$GLB,, | Performed by: INTERNAL MEDICINE

## 2020-02-25 PROCEDURE — 1159F PR MEDICATION LIST DOCUMENTED IN MEDICAL RECORD: ICD-10-PCS | Mod: S$GLB,,, | Performed by: INTERNAL MEDICINE

## 2020-02-25 PROCEDURE — 1125F PR PAIN SEVERITY QUANTIFIED, PAIN PRESENT: ICD-10-PCS | Mod: S$GLB,,, | Performed by: INTERNAL MEDICINE

## 2020-02-25 PROCEDURE — 99999 PR PBB SHADOW E&M-EST. PATIENT-LVL IV: ICD-10-PCS | Mod: PBBFAC,,, | Performed by: INTERNAL MEDICINE

## 2020-02-25 PROCEDURE — 99214 OFFICE O/P EST MOD 30 MIN: CPT | Mod: S$GLB,,, | Performed by: INTERNAL MEDICINE

## 2020-02-25 PROCEDURE — 1125F AMNT PAIN NOTED PAIN PRSNT: CPT | Mod: S$GLB,,, | Performed by: INTERNAL MEDICINE

## 2020-02-25 PROCEDURE — 1101F PT FALLS ASSESS-DOCD LE1/YR: CPT | Mod: CPTII,S$GLB,, | Performed by: INTERNAL MEDICINE

## 2020-02-25 PROCEDURE — 3078F DIAST BP <80 MM HG: CPT | Mod: CPTII,S$GLB,, | Performed by: INTERNAL MEDICINE

## 2020-02-25 PROCEDURE — 1159F MED LIST DOCD IN RCRD: CPT | Mod: S$GLB,,, | Performed by: INTERNAL MEDICINE

## 2020-02-25 PROCEDURE — 1101F PR PT FALLS ASSESS DOC 0-1 FALLS W/OUT INJ PAST YR: ICD-10-PCS | Mod: CPTII,S$GLB,, | Performed by: INTERNAL MEDICINE

## 2020-02-25 PROCEDURE — 3074F PR MOST RECENT SYSTOLIC BLOOD PRESSURE < 130 MM HG: ICD-10-PCS | Mod: CPTII,S$GLB,, | Performed by: INTERNAL MEDICINE

## 2020-02-25 PROCEDURE — 99999 PR PBB SHADOW E&M-EST. PATIENT-LVL IV: CPT | Mod: PBBFAC,,, | Performed by: INTERNAL MEDICINE

## 2020-02-25 RX ORDER — TRAMADOL HYDROCHLORIDE 50 MG/1
50 TABLET ORAL EVERY 12 HOURS PRN
Qty: 60 TABLET | Refills: 4 | Status: SHIPPED | OUTPATIENT
Start: 2020-02-25 | End: 2020-03-26

## 2020-02-25 NOTE — PROGRESS NOTES
Subjective:       Patient ID: Bo Chase is a 75 y.o. White male who presents for follow-up evaluation of Chronic Kidney Disease    Hypertension   Pertinent negatives include no chest pain, headaches, neck pain, palpitations or shortness of breath.   Edema   Pertinent negatives include no abdominal pain, arthralgias, chest pain, chills, congestion, coughing, diaphoresis, fatigue, fever, headaches, joint swelling, nausea, neck pain, rash or vomiting.      Patient is a  white male who was  by profession.  Has history of chronic kidney disease for at least 5 years.  He was last seen by Dr. Prather in 2012.  Renal ultrasound at that time showed bilateral renal cysts.  Left kidney had a cyst of 2.5 cm.  Patient never had any proteinuria.  His creatinine has been fluctuating between 1.6 and 2.0.  His fluctuations have been due to diuretic therapy.  In the last 5 years his kidney function has been stable.  He has had history of coronary artery disease status post shortness of breath and dyspnea and exertion.    12/ 2014Occluded lad lcx and rca.Occluded svg to d1svg to rca patent svg to  om1 90% eccentric treated with dennis with filter wire protection.Patent lima.No complication. Dr. Joel     2014 LEANDRO : now on CPAP    6/2015 LBP s/p KAROLINA and s/p surgery laminectomy     4/2017 Renal USD CT scan of the abdomen shows extensive calcifications of the aorta    August 2017 2-D echo reviewed= PA pressure >26 mm Hg, creatinine down to 1.4, uric acid controlled, vitamin D level is 43, PTH 91, estimated GFR with Cystatin C is 50%; weight down by 10 ib from 263 to 254 ib    March 2018 patient's creatinine is up to 1.8.  Weight is up to 262 pounds.  Approximate GFR about 40%.  GFR loss is about 10% per year.  No proteinuria.  PTH has gone up from 91 in 2017-235 in March 2018.  Labwork shows severe metabolic alkalosis, severe hypokalemia, low chloride levels due to complications of diuretics. Severe Polycythemia may need  "phlebotomy     6/2018 K+ is better ; Hgb is better ; weight is higher ; GFR 37 % with Cr 1.7 ; retired and now in gym     09/2018 High Calcium; stop D; check USD and SPEP     November 2018 ultrasound done no cancer.  Negative serum protein electrophoresis    1/2019 s/p fall now pending left hips IA steroids in SI and Hip ; creatinine down off ibuprofen ; rtired now in [place] --gym and work out d/w patient dumbell    6/2019 s/p cellulitis x2 s/p inpatient and I&D ( serratia ) ; left hip injection and Chiro for Pyriformitis     Review of Systems   Constitutional: Negative.  Negative for activity change, appetite change, chills, diaphoresis, fatigue and fever.         water aerobics ; weight training    HENT: Negative.  Negative for congestion and trouble swallowing.    Eyes: Negative.    Respiratory: Negative.  Negative for cough, chest tightness, shortness of breath and wheezing.    Cardiovascular: Positive for leg swelling. Negative for chest pain and palpitations.   Gastrointestinal: Negative.  Negative for abdominal distention, abdominal pain, nausea and vomiting.   Genitourinary: Negative.  Negative for decreased urine volume, difficulty urinating, dysuria, enuresis, flank pain, frequency, hematuria, penile swelling, scrotal swelling and urgency.   Musculoskeletal: Negative.  Negative for arthralgias, back pain, joint swelling and neck pain.   Skin: Negative for rash.   Neurological: Negative.  Negative for tremors, seizures and headaches.   Psychiatric/Behavioral: Negative.  Negative for confusion and sleep disturbance. The patient is not nervous/anxious.        Objective:   /67   Pulse 66   Ht 5' 7" (1.702 m)   Wt 119.7 kg (263 lb 14.3 oz)   BMI 41.33 kg/m²      Weight at home 270     gained 10 lb since last visit.  From 259-269    1/2019 277 ib     6/2019 254 ib = 115.3 kg     10/2019 257 = 116.6    2/2020 263 ib   Physical Exam   Constitutional: He is oriented to person, place, and time. He appears " well-developed and well-nourished. No distress.   HENT:   Head: Normocephalic.   Eyes: Pupils are equal, round, and reactive to light. EOM are normal.   Neck: Normal range of motion. Neck supple. No JVD present. No thyromegaly present.   Cardiovascular: Normal rate, regular rhythm, S1 normal, S2 normal, normal heart sounds and intact distal pulses. PMI is not displaced. Exam reveals no gallop and no friction rub.   No murmur heard.  Cholesterol emboli ; loud P2    Pulmonary/Chest: Effort normal and breath sounds normal. No respiratory distress. He has no wheezes. He has no rales. He exhibits no tenderness.   Abdominal: He exhibits no distension and no mass. There is no hepatosplenomegaly. There is no tenderness. There is no rebound and no CVA tenderness. No hernia.   Musculoskeletal: Normal range of motion. He exhibits no edema or tenderness.   2+ edema R>L    Lymphadenopathy:     He has no cervical adenopathy.   Neurological: He is alert and oriented to person, place, and time. He has normal reflexes. He is not disoriented. He displays normal reflexes. No cranial nerve deficit. He exhibits normal muscle tone. Coordination normal.   Skin: Skin is warm and dry. No rash noted. No erythema.   Psychiatric: He has a normal mood and affect. His behavior is normal. Judgment and thought content normal.        Both kidneys looked and appeared normal without any hydronephrosis.  Renals cyst left kidney unchanged ; small cysts right kidney unchanged         Lab Results   Component Value Date    CREATININE 1.8 (H) 02/20/2020    BUN 23 02/20/2020     02/20/2020    K 4.1 02/20/2020     02/20/2020    CO2 32 (H) 02/20/2020     Lab Results   Component Value Date    WBC 8.99 02/20/2020    HGB 15.4 02/20/2020    HCT 50.0 02/20/2020    MCV 95 02/20/2020     02/20/2020     Lab Results   Component Value Date    .0 (H) 02/20/2020    CALCIUM 9.2 02/20/2020    PHOS 3.4 02/20/2020     Lab Results   Component Value  Date    URICACID 4.1 02/20/2020         Assessment:    )    1. CKD (chronic kidney disease) stage 3, GFR 30-59 ml/min    2. Bilateral edema of lower extremity    3. Secondary hyperparathyroidism of renal origin    4. Complex renal cyst    5. Hypokalemia    6. Idiopathic chronic gout of multiple sites without tophus    7. Cholesterol embolism of lower extremity, bilateral    8. Pulmonary hypertension    9. Diastolic dysfunction    10. Hypercalcemia    11. Arthritis of left hip        Plan:         1.  Chronic kidney disease stage III  patient may have cardiorenal syndrome:  fluctuating creatinine is due to diuretics.   No heavy proteinuria.  Creatinine is stable today.  GFR 35-40% ;     2.  Chronic gout: Last attack was 2018.   on allopurinol 450 mg uric acid is well controlled at 4.1    3.  Hyperparathyroidism due to chronic kidney disease stage III:  Off  vitamin-D therapy since the calcium is too high.  Consider Rayaldee at some point no vitamin-D is needed at this time.  Vitamin-D level was normal in September 2019.    4.   anasarca on the lower extremities with chronic venous stasis findings as well as brawny edema of the skin: Patient had an ultrasound of the legs in 2012 which showed no DVT.  Clinically patient has pulm. hypertension, metolazone 1-2x week + torsemide 20 mg x2  daily.  Cardiology records reviewed    5.  Cholesterol emboli of the lower extremities: Extensive aortic calcifications noted on the CT scan.    aspirin to 325 mg daily. Continue Lipitor.    6.  Pulm HTN: records of CPAP and dr. Elaine in Cardiology department  reviewed     7.  Hypokalemia:keep  aldactone 25 mg  KCL to 40 meq daily.     8.  Continued follow-up with Hematology for polycythemia monthly phlebotomy.  Records of Hematology- reviewed     9.  Acquired renal cysts: Patient had multiple complex cysts in the ultrasound done in 2017 and 2018 .  Status post (POCUS) renal ultrasound and check for the follow-up on the complex  cysts in 2020 on fup     10. Hip Arthiritis: will need long term tramadol which I will prescribe     I have reconciled with the patient's narcotic prescriptions on [place].  I have complied with all the narcotic prescription rules and regulations as outlined by the [facility] board.             Follow-up  4 months        Washington Bernal MD

## 2020-03-10 ENCOUNTER — PATIENT MESSAGE (OUTPATIENT)
Dept: PULMONOLOGY | Facility: CLINIC | Age: 75
End: 2020-03-10

## 2020-03-12 ENCOUNTER — PATIENT MESSAGE (OUTPATIENT)
Dept: HEMATOLOGY/ONCOLOGY | Facility: CLINIC | Age: 75
End: 2020-03-12

## 2020-03-12 ENCOUNTER — PATIENT MESSAGE (OUTPATIENT)
Dept: PULMONOLOGY | Facility: CLINIC | Age: 75
End: 2020-03-12

## 2020-03-13 ENCOUNTER — TELEPHONE (OUTPATIENT)
Dept: RADIOLOGY | Facility: HOSPITAL | Age: 75
End: 2020-03-13

## 2020-03-16 ENCOUNTER — PATIENT MESSAGE (OUTPATIENT)
Dept: PULMONOLOGY | Facility: CLINIC | Age: 75
End: 2020-03-16

## 2020-03-16 ENCOUNTER — PATIENT MESSAGE (OUTPATIENT)
Dept: NEPHROLOGY | Facility: CLINIC | Age: 75
End: 2020-03-16

## 2020-03-16 RX ORDER — LEVOTHYROXINE SODIUM 100 UG/1
TABLET ORAL
Qty: 90 TABLET | Refills: 2 | Status: SHIPPED | OUTPATIENT
Start: 2020-03-16 | End: 2020-12-07 | Stop reason: SDUPTHER

## 2020-03-26 ENCOUNTER — PATIENT MESSAGE (OUTPATIENT)
Dept: PULMONOLOGY | Facility: CLINIC | Age: 75
End: 2020-03-26

## 2020-03-26 ENCOUNTER — LAB VISIT (OUTPATIENT)
Dept: LAB | Facility: HOSPITAL | Age: 75
End: 2020-03-26
Attending: INTERNAL MEDICINE
Payer: MEDICARE

## 2020-03-26 ENCOUNTER — OFFICE VISIT (OUTPATIENT)
Dept: HEMATOLOGY/ONCOLOGY | Facility: CLINIC | Age: 75
End: 2020-03-26
Payer: MEDICARE

## 2020-03-26 VITALS
OXYGEN SATURATION: 99 % | RESPIRATION RATE: 18 BRPM | DIASTOLIC BLOOD PRESSURE: 72 MMHG | WEIGHT: 260.81 LBS | BODY MASS INDEX: 40.93 KG/M2 | HEIGHT: 67 IN | HEART RATE: 67 BPM | TEMPERATURE: 97 F | SYSTOLIC BLOOD PRESSURE: 117 MMHG

## 2020-03-26 DIAGNOSIS — G47.33 OSA ON CPAP: ICD-10-CM

## 2020-03-26 DIAGNOSIS — M15.9 PRIMARY OSTEOARTHRITIS INVOLVING MULTIPLE JOINTS: ICD-10-CM

## 2020-03-26 DIAGNOSIS — D75.1 POLYCYTHEMIA, SECONDARY: ICD-10-CM

## 2020-03-26 DIAGNOSIS — G47.33 OSA ON CPAP: Primary | ICD-10-CM

## 2020-03-26 DIAGNOSIS — D75.1 POLYCYTHEMIA, SECONDARY: Primary | ICD-10-CM

## 2020-03-26 LAB
BASOPHILS # BLD AUTO: 0.06 K/UL (ref 0–0.2)
BASOPHILS NFR BLD: 0.5 % (ref 0–1.9)
DIFFERENTIAL METHOD: ABNORMAL
EOSINOPHIL # BLD AUTO: 0.2 K/UL (ref 0–0.5)
EOSINOPHIL NFR BLD: 1.5 % (ref 0–8)
ERYTHROCYTE [DISTWIDTH] IN BLOOD BY AUTOMATED COUNT: 17.1 % (ref 11.5–14.5)
HCT VFR BLD AUTO: 53.8 % (ref 40–54)
HGB BLD-MCNC: 16.9 G/DL (ref 14–18)
IMM GRANULOCYTES # BLD AUTO: 0.06 K/UL (ref 0–0.04)
IMM GRANULOCYTES NFR BLD AUTO: 0.5 % (ref 0–0.5)
LYMPHOCYTES # BLD AUTO: 1.6 K/UL (ref 1–4.8)
LYMPHOCYTES NFR BLD: 14.5 % (ref 18–48)
MCH RBC QN AUTO: 28.7 PG (ref 27–31)
MCHC RBC AUTO-ENTMCNC: 31.4 G/DL (ref 32–36)
MCV RBC AUTO: 91 FL (ref 82–98)
MONOCYTES # BLD AUTO: 0.8 K/UL (ref 0.3–1)
MONOCYTES NFR BLD: 7.2 % (ref 4–15)
NEUTROPHILS # BLD AUTO: 8.3 K/UL (ref 1.8–7.7)
NEUTROPHILS NFR BLD: 75.8 % (ref 38–73)
NRBC BLD-RTO: 0 /100 WBC
PLATELET # BLD AUTO: 219 K/UL (ref 150–350)
PMV BLD AUTO: 10.2 FL (ref 9.2–12.9)
RBC # BLD AUTO: 5.89 M/UL (ref 4.6–6.2)
WBC # BLD AUTO: 11.02 K/UL (ref 3.9–12.7)

## 2020-03-26 PROCEDURE — 1126F AMNT PAIN NOTED NONE PRSNT: CPT | Mod: S$GLB,,, | Performed by: INTERNAL MEDICINE

## 2020-03-26 PROCEDURE — 1101F PR PT FALLS ASSESS DOC 0-1 FALLS W/OUT INJ PAST YR: ICD-10-PCS | Mod: CPTII,S$GLB,, | Performed by: INTERNAL MEDICINE

## 2020-03-26 PROCEDURE — 99213 PR OFFICE/OUTPT VISIT, EST, LEVL III, 20-29 MIN: ICD-10-PCS | Mod: S$GLB,,, | Performed by: INTERNAL MEDICINE

## 2020-03-26 PROCEDURE — 1159F PR MEDICATION LIST DOCUMENTED IN MEDICAL RECORD: ICD-10-PCS | Mod: S$GLB,,, | Performed by: INTERNAL MEDICINE

## 2020-03-26 PROCEDURE — 1159F MED LIST DOCD IN RCRD: CPT | Mod: S$GLB,,, | Performed by: INTERNAL MEDICINE

## 2020-03-26 PROCEDURE — 99213 OFFICE O/P EST LOW 20 MIN: CPT | Mod: S$GLB,,, | Performed by: INTERNAL MEDICINE

## 2020-03-26 PROCEDURE — 99999 PR PBB SHADOW E&M-EST. PATIENT-LVL IV: ICD-10-PCS | Mod: PBBFAC,,, | Performed by: INTERNAL MEDICINE

## 2020-03-26 PROCEDURE — 99999 PR PBB SHADOW E&M-EST. PATIENT-LVL IV: CPT | Mod: PBBFAC,,, | Performed by: INTERNAL MEDICINE

## 2020-03-26 PROCEDURE — 1126F PR PAIN SEVERITY QUANTIFIED, NO PAIN PRESENT: ICD-10-PCS | Mod: S$GLB,,, | Performed by: INTERNAL MEDICINE

## 2020-03-26 PROCEDURE — 3074F SYST BP LT 130 MM HG: CPT | Mod: CPTII,S$GLB,, | Performed by: INTERNAL MEDICINE

## 2020-03-26 PROCEDURE — 36415 COLL VENOUS BLD VENIPUNCTURE: CPT

## 2020-03-26 PROCEDURE — 3078F DIAST BP <80 MM HG: CPT | Mod: CPTII,S$GLB,, | Performed by: INTERNAL MEDICINE

## 2020-03-26 PROCEDURE — 3078F PR MOST RECENT DIASTOLIC BLOOD PRESSURE < 80 MM HG: ICD-10-PCS | Mod: CPTII,S$GLB,, | Performed by: INTERNAL MEDICINE

## 2020-03-26 PROCEDURE — 1101F PT FALLS ASSESS-DOCD LE1/YR: CPT | Mod: CPTII,S$GLB,, | Performed by: INTERNAL MEDICINE

## 2020-03-26 PROCEDURE — 3074F PR MOST RECENT SYSTOLIC BLOOD PRESSURE < 130 MM HG: ICD-10-PCS | Mod: CPTII,S$GLB,, | Performed by: INTERNAL MEDICINE

## 2020-03-26 RX ORDER — LOSARTAN POTASSIUM 25 MG/1
12.5 TABLET ORAL
COMMUNITY
Start: 2020-01-30 | End: 2020-03-26

## 2020-03-26 RX ORDER — DEXTROSE 4 G
TABLET,CHEWABLE ORAL
COMMUNITY
Start: 2019-12-20 | End: 2020-03-26

## 2020-03-26 NOTE — PROGRESS NOTES
Orders Placed This Encounter   Procedures    HME - OTHER     PLEASE GIVE LOANER 10-20 cm or change his machine to APAP 10-20     Order Specific Question:   Type of Equipment:     Answer:   CPAP     Order Specific Question:   Height:     Answer:   5'7''     Order Specific Question:   Weight:     Answer:   260lb

## 2020-03-26 NOTE — PROGRESS NOTES
Subjective:      DATE OF VISIT: 3/26/2020   ?   ?   Patient ID:?Bo Chase is a 75 y.o. male.?? MR#: [id number]   ?   PRIMARY PROVIDER: Dr. Diaz  ?   CHIEF COMPLAINT:  Polycythemia?????   ?   DIAGNOSIS:  Secondary polycythemia  ?   CURRENT TREATMENT:  Phlebotomy, aspirin (takes 325mg daily)      HPI    Today I have the pleasure of seeing in follow-up Mr. Chase.  He is awaiting new CPAP machine with higher pressure dosing and is encouraged this will help with his control of obstructive sleep apnea.  He has had difficulty to losing weight especially now gym is closed with COVID-19 concerns; he would like to get a bike.  He has difficulty walking and doing other exercise due to osteoarthritis.  He denies any notable fatigue other than on exertion at baseline, no fever, chills, night sweats or unintentional weight loss.      Review of systems:  ?   A comprehensive 14-point review of systems was reviewed with patient and was negative other than as specified above.   ?     Objective:      Physical Exam      ?   Vitals:    03/26/20 1319   BP: 117/72   Pulse: 67   Resp: 18   Temp: 97.1 °F (36.2 °C)      ?   ECOG:?0   General appearance: Generally well appearing, in no acute distress.   Head, eyes, ears, nose, and throat: moist mucous membranes, mild ruddiness of face.   Respiratory:  Normal work of breathing  Abdomen:  Obese, nontender, nondistended.   Extremities: Warm, without edema.   Neurologic: Alert and oriented. Grossly normal strength, coordination, and gait.   Skin: No rashes, ecchymoses or petechial lesion.   Psychiatric:  Normal mood and affect.        Laboratory:  ?   Lab Visit on 03/26/2020   Component Date Value Ref Range Status    WBC 03/26/2020 11.02  3.90 - 12.70 K/uL Final    RBC 03/26/2020 5.89  4.60 - 6.20 M/uL Final    Hemoglobin 03/26/2020 16.9  14.0 - 18.0 g/dL Final    Hematocrit 03/26/2020 53.8  40.0 - 54.0 % Final    Mean Corpuscular Volume 03/26/2020 91  82 - 98 fL Final    Mean  Corpuscular Hemoglobin 03/26/2020 28.7  27.0 - 31.0 pg Final    Mean Corpuscular Hemoglobin Conc 03/26/2020 31.4* 32.0 - 36.0 g/dL Final    RDW 03/26/2020 17.1* 11.5 - 14.5 % Final    Platelets 03/26/2020 219  150 - 350 K/uL Final    MPV 03/26/2020 10.2  9.2 - 12.9 fL Final    Immature Granulocytes 03/26/2020 0.5  0.0 - 0.5 % Final    Gran # (ANC) 03/26/2020 8.3* 1.8 - 7.7 K/uL Final    Immature Grans (Abs) 03/26/2020 0.06* 0.00 - 0.04 K/uL Final    Lymph # 03/26/2020 1.6  1.0 - 4.8 K/uL Final    Mono # 03/26/2020 0.8  0.3 - 1.0 K/uL Final    Eos # 03/26/2020 0.2  0.0 - 0.5 K/uL Final    Baso # 03/26/2020 0.06  0.00 - 0.20 K/uL Final    nRBC 03/26/2020 0  0 /100 WBC Final    Gran% 03/26/2020 75.8* 38.0 - 73.0 % Final    Lymph% 03/26/2020 14.5* 18.0 - 48.0 % Final    Mono% 03/26/2020 7.2  4.0 - 15.0 % Final    Eosinophil% 03/26/2020 1.5  0.0 - 8.0 % Final    Basophil% 03/26/2020 0.5  0.0 - 1.9 % Final    Differential Method 03/26/2020 Automated   Final      ?   ?   Hemoglobin   Date Value Ref Range Status   03/26/2020 16.9 14.0 - 18.0 g/dL Final   02/20/2020 15.4 14.0 - 18.0 g/dL Final   01/30/2020 15.1 14.0 - 18.0 g/dL Final   12/19/2019 17.1 14.0 - 18.0 g/dL Final   11/12/2019 17.5 14.0 - 18.0 g/dL Final   09/03/2019 17.1 14.0 - 18.0 g/dL Final   05/13/2019 17.6 14.0 - 18.0 g/dL Final   01/25/2019 16.5 14.0 - 18.0 g/dL Final   01/25/2019 16.5 14.0 - 18.0 g/dL Final   11/15/2018 16.8 14.0 - 18.0 g/dL Final     Hematocrit   Date Value Ref Range Status   03/26/2020 53.8 40.0 - 54.0 % Final   02/20/2020 50.0 40.0 - 54.0 % Final   01/30/2020 47.2 40.0 - 54.0 % Final   12/19/2019 51.7 40.0 - 54.0 % Final   11/12/2019 52.6 40.0 - 54.0 % Final   09/03/2019 53.8 40.0 - 54.0 % Final   05/13/2019 54.2 (H) 40.0 - 54.0 % Final   01/25/2019 49.7 40.0 - 54.0 % Final   01/25/2019 49.7 40.0 - 54.0 % Final   11/15/2018 50.5 40.0 - 54.0 % Final       Assessment/Plan:       1. Polycythemia, secondary    2. LEANDRO on  CPAP    3. BMI 39.0-39.9,adult    4. Primary osteoarthritis involving multiple joints          Plan:     # Polycythemia:  secondary polycythemia to LEANDRO on CPAP.  Elevated erythropoietin level to 21 in 2016 and JAK2 V617 F mutation negative in 2014.  He has not had bone marrow biopsy.  Without diagnosis of polycythemia vera does not require strict hematocrit goal less than 45.  Primary treatment is of underlying etiology and I have encouraged him to follow-up with pulmonology with new CPAP machine as well as lifestyle intervention with weight loss.  Due to cardiopulmonary conditions would be reasonable to phlebotomize for hematocrit over 55. He would prefer to donate blood to the [facility] and recommended no more frequently than quarterly.  We will recheck CBC in 3 months. He is to continue on aspirin prophylaxis 325 mg for other indication.    # LEANDRO on CPAP:  Encouraged continue use of CPAP, using nightly, follow-up with pulmonology on increased pressure setting.    # Obesity:  Encouraged lifestyle intervention and weight loss, he will try bike riding which is possible given osteoarthritis and now unable to go to gym with COVID-19 concern.      Follow-Up:   - no phlebotomy needed today  - RV in 3 months with RV and labs prior

## 2020-03-27 ENCOUNTER — TELEPHONE (OUTPATIENT)
Dept: PULMONOLOGY | Facility: HOSPITAL | Age: 75
End: 2020-03-27

## 2020-03-27 NOTE — TELEPHONE ENCOUNTER
----- Message from Amparo Barth, RRT sent at 3/27/2020  3:01 PM CDT -----  Patient was given a loaner machine on a  APAP [id number].  He stated he has an appt with you next month.  He was instructed to bring in the loaner machine so that a download can be done.    Thanks    Amparo

## 2020-03-31 ENCOUNTER — PATIENT MESSAGE (OUTPATIENT)
Dept: HEMATOLOGY/ONCOLOGY | Facility: CLINIC | Age: 75
End: 2020-03-31

## 2020-03-31 ENCOUNTER — PATIENT MESSAGE (OUTPATIENT)
Dept: PULMONOLOGY | Facility: CLINIC | Age: 75
End: 2020-03-31

## 2020-04-07 ENCOUNTER — TELEPHONE (OUTPATIENT)
Dept: PULMONOLOGY | Facility: CLINIC | Age: 75
End: 2020-04-07

## 2020-04-07 DIAGNOSIS — G47.33 OSA ON CPAP: Primary | ICD-10-CM

## 2020-04-07 NOTE — TELEPHONE ENCOUNTER
Telephoned to update with patient, since did not connect for telemed visit. States having difficulty and he does not want to do telemed visit and his battery is low on phone. Before we could finish conversation his phone disconnected. Suspect his low battery life ended call.   Advised Liya of patient request for no telemed visit.

## 2020-04-15 ENCOUNTER — TELEPHONE (OUTPATIENT)
Dept: PULMONOLOGY | Facility: CLINIC | Age: 75
End: 2020-04-15

## 2020-04-15 ENCOUNTER — OFFICE VISIT (OUTPATIENT)
Dept: PULMONOLOGY | Facility: CLINIC | Age: 75
End: 2020-04-15
Payer: MEDICARE

## 2020-04-15 DIAGNOSIS — G47.33 OSA ON CPAP: Primary | ICD-10-CM

## 2020-04-15 PROCEDURE — 99442 PR PHYSICIAN TELEPHONE EVALUATION 11-20 MIN: CPT | Mod: 95,,, | Performed by: NURSE PRACTITIONER

## 2020-04-15 PROCEDURE — 99442 PR PHYSICIAN TELEPHONE EVALUATION 11-20 MIN: ICD-10-PCS | Mod: 95,,, | Performed by: NURSE PRACTITIONER

## 2020-04-15 NOTE — PROGRESS NOTES
"The patient location is: Home  The chief complaint leading to consultation is: Sleep Apnea  Visit type: Virtual visit with synchronous audio only  Total time spent with patient: 10:40 - 1100. ( 20 minutes).  Each patient to whom he or she provides medical services by telemedicine is:  (1) informed of the relationship between the physician and patient and the respective role of any other health care provider with respect to management of the patient; and (2) notified that he or she may decline to receive medical services by telemedicine and may withdraw from such care at any time.    Subjective:      Patient ID: Bo Chase is a 75 y.o. male.     Chief Complaint: Sleep Apnea    HPI: Bo Chase engaged in audio visit related to he wants his old Galilea Remstar Plus C flex CPAP machine changed from CPAP 10 to CPAP 14 cm.  States his current machine is working fine just needs pressure increased he finds he needs more air than presently set CPAP 10 cm   He states he met with Amparo in past and told needed Dr Order to change.   Spoke with Amparo today and arranged same day appointment, today 1pm to make changes. He missed an appointment last week to turn in loaner CPAP machine and make change to his remstar.   Dr. Honeycutt had placed patient on loaner auto CPAP to determine optimal pressure and patient states he did not like what he called "the new machine" and only wanted to increase his pressure on old machine to CPAP 14 cm.   He reports mask provided with "new CPAP machine" is well tolerated and prefers over his prior nasal mask.  I advised Amparo at this time of patient request and new orders for nasal mask.  No worth patient stated his irritation and only wanting his machine setting changed and new supplies was reason he was calling.      Previous Report Reviewed: lab reports and office notes     Past Medical History: The following portions of the patient's history were reviewed and updated as appropriate: "   He  has a past surgical history that includes throid lobectomy (4/2012); umbilicial hernia; Coronary artery bypass graft (1996); Cardiac catheterization (3/6/2012); Coronary angioplasty; and back surgary.  His family history includes Heart disease in his father.  He  reports that he has never smoked. He has never used smokeless tobacco. He reports that he drinks alcohol. He reports that he does not use drugs.  He has a current medication list which includes the following prescription(s): albuterol, allopurinol, ascorbic acid (vitamin c), aspirin, atorvastatin, docusate sodium, fluticasone propionate, isosorbide mononitrate, levothyroxine, losartan, melatonin, metformin, metolazone, metoprolol tartrate, multivitamin, pentoxifylline, potassium chloride, spironolactone, torsemide, true metrix go glucose meter, and vitamin e.  He is allergic to cat/feline products; ciprofloxacin; latex; and sulfa (sulfonamide antibiotics)..    The following portions of the patient's history were reviewed and updated as appropriate: allergies, current medications, past family history, past medical history, past social history, past surgical history and problem list.    Review of Systems   Constitutional: Negative.    Respiratory: Negative for cough, shortness of breath and wheezing.    Gastrointestinal: Negative for nausea and vomiting.      Objective:   There were no vitals taken for this visit.  Physical Exam   Psychiatric:   Mood is aggravated.      Personal Diagnostic Review  CPAP download  CPAP 10 cm        Assessment:     1. LEANDRO on CPAP        Orders Placed This Encounter   Procedures    CPAP/BIPAP SUPPLIES     Benefits and compliant  90 day supply. 4 refills  HME: [facility]     Order Specific Question:   Type of mask:     Answer:   Nasal     Order Specific Question:   Headgear?     Answer:   Yes     Order Specific Question:   Tubing?     Answer:   Yes     Order Specific Question:   Humidifier chamber?     Answer:   Yes     Order  "Specific Question:   Chin strap?     Answer:   Yes     Order Specific Question:   Filters?     Answer:   Yes     Order Specific Question:   Cushions?     Answer:   Yes     Order Specific Question:   Length of need (1-99 months):     Answer:   99    HME - OTHER     Please change pressure on patient's Galilea Remstar Plus C flex CPAP machine to CPAP 14 cm     Order Specific Question:   Type of Equipment:     Answer:   cpap     Order Specific Question:   Height:     Answer:   5'7"     Order Specific Question:   Weight:     Answer:   260 lbs     Plan:     Problem List Items Addressed This Visit     LEANDRO on CPAP - Primary     4/15/2020 per patient request changed from CPAP 10 to CPAP 14 cm  Has a Galilea Remstar Plus C flex CPAP machine         Relevant Orders    CPAP/BIPAP SUPPLIES    HME - OTHER         (DME) - [facility]      Follow up in about 1 year (around 4/15/2021) for CPAP 1 year compliance download/supply order.    "

## 2020-04-15 NOTE — ASSESSMENT & PLAN NOTE
4/15/2020 per patient request changed from CPAP 10 to CPAP 14 cm  Has a Galilea Remstar Plus C flex CPAP machine

## 2020-04-15 NOTE — TELEPHONE ENCOUNTER
----- Message from Isabella Puente sent at 4/15/2020 10:25 AM CDT -----  Contact: Pt   Pt called and stated he is waiting for his appt via phone with Laure. He can be reached at [phone].    Thanks,  TF

## 2020-06-01 ENCOUNTER — TELEPHONE (OUTPATIENT)
Dept: RADIOLOGY | Facility: HOSPITAL | Age: 75
End: 2020-06-01

## 2020-06-02 ENCOUNTER — HOSPITAL ENCOUNTER (OUTPATIENT)
Dept: RADIOLOGY | Facility: HOSPITAL | Age: 75
Discharge: HOME | End: 2020-06-02
Attending: INTERNAL MEDICINE
Payer: MEDICARE

## 2020-06-02 ENCOUNTER — HOSPITAL ENCOUNTER (OUTPATIENT)
Dept: RADIOLOGY | Facility: HOSPITAL | Age: 75
Discharge: HOME | End: 2020-06-02
Attending: PHYSICIAN ASSISTANT
Payer: MEDICARE

## 2020-06-02 DIAGNOSIS — N28.1 COMPLEX RENAL CYST: ICD-10-CM

## 2020-06-02 DIAGNOSIS — M81.0 SENILE OSTEOPOROSIS: ICD-10-CM

## 2020-06-02 PROCEDURE — 76770 US EXAM ABDO BACK WALL COMP: CPT | Mod: TC,PO

## 2020-06-02 PROCEDURE — 76770 US RETROPERITONEAL COMPLETE: ICD-10-PCS | Mod: 26,,, | Performed by: RADIOLOGY

## 2020-06-02 PROCEDURE — 76770 US EXAM ABDO BACK WALL COMP: CPT | Mod: 26,,, | Performed by: RADIOLOGY

## 2020-06-02 PROCEDURE — 77080 DXA BONE DENSITY AXIAL: CPT | Mod: 26,,, | Performed by: RADIOLOGY

## 2020-06-02 PROCEDURE — 77080 DEXA BONE DENSITY SPINE HIP: ICD-10-PCS | Mod: 26,,, | Performed by: RADIOLOGY

## 2020-06-02 PROCEDURE — 77080 DXA BONE DENSITY AXIAL: CPT | Mod: TC,PO

## 2020-07-17 ENCOUNTER — OFFICE VISIT (OUTPATIENT)
Dept: CARDIOLOGY | Facility: CLINIC | Age: 75
End: 2020-07-17
Payer: MEDICARE

## 2020-07-17 VITALS
DIASTOLIC BLOOD PRESSURE: 80 MMHG | WEIGHT: 263.44 LBS | SYSTOLIC BLOOD PRESSURE: 122 MMHG | OXYGEN SATURATION: 95 % | BODY MASS INDEX: 41.26 KG/M2 | HEART RATE: 83 BPM

## 2020-07-17 DIAGNOSIS — G47.33 OSA ON CPAP: ICD-10-CM

## 2020-07-17 DIAGNOSIS — Z95.1 S/P CABG X 4: ICD-10-CM

## 2020-07-17 DIAGNOSIS — I10 ESSENTIAL HYPERTENSION: Chronic | ICD-10-CM

## 2020-07-17 DIAGNOSIS — Z95.5 S/P CORONARY ARTERY STENT PLACEMENT: ICD-10-CM

## 2020-07-17 DIAGNOSIS — I25.708 CORONARY ARTERY DISEASE OF BYPASS GRAFT OF NATIVE HEART WITH STABLE ANGINA PECTORIS: Primary | Chronic | ICD-10-CM

## 2020-07-17 DIAGNOSIS — E78.5 HYPERLIPIDEMIA WITH TARGET LDL LESS THAN 70: ICD-10-CM

## 2020-07-17 DIAGNOSIS — I50.32 CHRONIC DIASTOLIC CONGESTIVE HEART FAILURE: ICD-10-CM

## 2020-07-17 PROCEDURE — 1101F PT FALLS ASSESS-DOCD LE1/YR: CPT | Mod: CPTII,S$GLB,, | Performed by: INTERNAL MEDICINE

## 2020-07-17 PROCEDURE — 3074F PR MOST RECENT SYSTOLIC BLOOD PRESSURE < 130 MM HG: ICD-10-PCS | Mod: CPTII,S$GLB,, | Performed by: INTERNAL MEDICINE

## 2020-07-17 PROCEDURE — 3079F DIAST BP 80-89 MM HG: CPT | Mod: CPTII,S$GLB,, | Performed by: INTERNAL MEDICINE

## 2020-07-17 PROCEDURE — 99999 PR PBB SHADOW E&M-EST. PATIENT-LVL IV: CPT | Mod: PBBFAC,,, | Performed by: INTERNAL MEDICINE

## 2020-07-17 PROCEDURE — 1159F PR MEDICATION LIST DOCUMENTED IN MEDICAL RECORD: ICD-10-PCS | Mod: S$GLB,,, | Performed by: INTERNAL MEDICINE

## 2020-07-17 PROCEDURE — 3079F PR MOST RECENT DIASTOLIC BLOOD PRESSURE 80-89 MM HG: ICD-10-PCS | Mod: CPTII,S$GLB,, | Performed by: INTERNAL MEDICINE

## 2020-07-17 PROCEDURE — 1101F PR PT FALLS ASSESS DOC 0-1 FALLS W/OUT INJ PAST YR: ICD-10-PCS | Mod: CPTII,S$GLB,, | Performed by: INTERNAL MEDICINE

## 2020-07-17 PROCEDURE — 3074F SYST BP LT 130 MM HG: CPT | Mod: CPTII,S$GLB,, | Performed by: INTERNAL MEDICINE

## 2020-07-17 PROCEDURE — 3008F PR BODY MASS INDEX (BMI) DOCUMENTED: ICD-10-PCS | Mod: CPTII,S$GLB,, | Performed by: INTERNAL MEDICINE

## 2020-07-17 PROCEDURE — 99214 OFFICE O/P EST MOD 30 MIN: CPT | Mod: S$GLB,,, | Performed by: INTERNAL MEDICINE

## 2020-07-17 PROCEDURE — 3008F BODY MASS INDEX DOCD: CPT | Mod: CPTII,S$GLB,, | Performed by: INTERNAL MEDICINE

## 2020-07-17 PROCEDURE — 99214 PR OFFICE/OUTPT VISIT, EST, LEVL IV, 30-39 MIN: ICD-10-PCS | Mod: S$GLB,,, | Performed by: INTERNAL MEDICINE

## 2020-07-17 PROCEDURE — 99999 PR PBB SHADOW E&M-EST. PATIENT-LVL IV: ICD-10-PCS | Mod: PBBFAC,,, | Performed by: INTERNAL MEDICINE

## 2020-07-17 PROCEDURE — 1159F MED LIST DOCD IN RCRD: CPT | Mod: S$GLB,,, | Performed by: INTERNAL MEDICINE

## 2020-07-17 RX ORDER — TRAMADOL HYDROCHLORIDE 50 MG/1
50 TABLET ORAL
COMMUNITY
Start: 2019-06-17 | End: 2020-07-31 | Stop reason: SDUPTHER

## 2020-07-17 RX ORDER — TORSEMIDE 20 MG/1
20 TABLET ORAL 2 TIMES DAILY
Qty: 180 TABLET | Refills: 3 | Status: SHIPPED | OUTPATIENT
Start: 2020-07-17 | End: 2020-12-07 | Stop reason: SDUPTHER

## 2020-07-17 RX ORDER — NITROGLYCERIN 0.4 MG/1
0.4 TABLET SUBLINGUAL
COMMUNITY
Start: 2016-01-19 | End: 2021-10-29 | Stop reason: SDUPTHER

## 2020-07-17 RX ORDER — METOLAZONE 2.5 MG/1
2.5 TABLET ORAL
Qty: 10 TABLET | Refills: 3 | Status: SHIPPED | OUTPATIENT
Start: 2020-07-20 | End: 2021-05-12

## 2020-07-17 NOTE — PROGRESS NOTES
Subjective:   Patient ID:  Bo Chase is a 75 y.o. male who presents for follow up of Chest Pain      75 yo male, 6 months f/u.   PMH CAD, s/p CABGx4 1996, s/p last PCI SVG to OM1, in 2014, LIMA patent and  SVG to diag, Dx of DM in 2019 after steroids injection now A1c wnl, , CHFpEF, HTN, HLP, morbid obesity, sleep apnea on CPAP, hypothyroidism. Chronic lower back pain.  8/31/2016 negative nuclear stress test for ischemia.    ECho normal EF and mod LVH  Pt states that he f/u with cardiologist at [facility] in the past two yr. Negative exerrcise stress test 8in  at [facility].   Recent Dx of melanoma on nose and right chest pain. S/p chest tumor early .   Sleep with CPAP.  Chronic leg swelling.  BNP 54 and Cr stable    Pt states that he is ok to have station bike.  Worsening SOB when walking and carrying bags  Taking Torsemide 20 mg in AM abnd 10 mg in PM and Metolazone 5 mg once a week.  No chest pain.   Asa 325 mg given by Dr Bernal nephrology  No gym exercise after COVID 19 pandemic  BP LDL and BS controlled        Past Medical History:   Diagnosis Date    Acute coronary syndrome     CHF (congestive heart failure)     Chronic kidney disease     Coronary artery disease     Heart murmur     Hyperlipidemia     Hypertension     Lumbar spondylosis     Sleep apnea        Past Surgical History:   Procedure Laterality Date    back surgary      CARDIAC CATHETERIZATION  3/6/2012    CORONARY ANGIOPLASTY      CORONARY ARTERY BYPASS GRAFT  1996    x4    throid lobectomy  4/2012    umbilicial hernia         Social History     Tobacco Use    Smoking status: Never Smoker    Smokeless tobacco: Never Used   Substance Use Topics    Alcohol use: Yes     Comment: 6 BEERS A YEAR     Drug use: No       Family History   Problem Relation Age of Onset    Heart disease Father          Review of Systems   Constitution: Positive for malaise/fatigue. Negative for decreased appetite, diaphoresis, fever and night  sweats.   HENT: Negative for nosebleeds.    Eyes: Negative for blurred vision and double vision.   Cardiovascular: Positive for dyspnea on exertion and leg swelling. Negative for chest pain, claudication, irregular heartbeat, near-syncope, orthopnea, palpitations, paroxysmal nocturnal dyspnea and syncope.   Respiratory: Negative for cough, shortness of breath, sleep disturbances due to breathing, snoring, sputum production and wheezing.    Endocrine: Negative for cold intolerance and polyuria.   Hematologic/Lymphatic: Does not bruise/bleed easily.   Skin: Negative for rash.   Musculoskeletal: Negative for back pain, falls, joint pain, joint swelling and neck pain.   Gastrointestinal: Negative for abdominal pain, heartburn, nausea and vomiting.   Genitourinary: Negative for dysuria, frequency and hematuria.   Neurological: Negative for difficulty with concentration, dizziness, focal weakness, headaches, light-headedness, numbness, seizures and weakness.   Psychiatric/Behavioral: Negative for depression, memory loss and substance abuse. The patient does not have insomnia.    Allergic/Immunologic: Negative for HIV exposure and hives.       Objective:   Physical Exam   Constitutional: He is oriented to person, place, and time. He appears well-nourished.   HENT:   Head: Normocephalic.   Eyes: Pupils are equal, round, and reactive to light.   Neck: Normal carotid pulses and no JVD present. Carotid bruit is not present. No thyromegaly present.   Cardiovascular: Normal rate, regular rhythm and normal pulses.  No extrasystoles are present. PMI is not displaced. Exam reveals no gallop and no S3.   Murmur (ESM on URSB and midLSB) heard.  Pulmonary/Chest: Breath sounds normal. No stridor. No respiratory distress.   Abdominal: Soft. Bowel sounds are normal. There is no abdominal tenderness. There is no rebound.   Musculoskeletal: Normal range of motion.   Neurological: He is alert and oriented to person, place, and time.   Skin:  Skin is intact. No rash noted.   Hyperpigmentation on BLE   Psychiatric: His behavior is normal.       Lab Results   Component Value Date    CHOL 117 (L) 01/17/2020    CHOL 150 08/31/2016    CHOL 152 09/09/2015     Lab Results   Component Value Date    HDL 27 (L) 01/17/2020    HDL 29 (L) 08/31/2016    HDL 28 (L) 09/09/2015     Lab Results   Component Value Date    LDLCALC 56.4 (L) 01/17/2020    LDLCALC 72.8 08/31/2016    LDLCALC 82.6 09/09/2015     Lab Results   Component Value Date    TRIG 168 (H) 01/17/2020    TRIG 241 (H) 08/31/2016    TRIG 207 (H) 09/09/2015     Lab Results   Component Value Date    CHOLHDL 23.1 01/17/2020    CHOLHDL 19.3 (L) 08/31/2016    CHOLHDL 18.4 (L) 09/09/2015       Chemistry        Component Value Date/Time     02/20/2020 1507    K 4.1 02/20/2020 1507     02/20/2020 1507    CO2 32 (H) 02/20/2020 1507    BUN 23 02/20/2020 1507    CREATININE 1.8 (H) 02/20/2020 1507    GLU 85 02/20/2020 1507        Component Value Date/Time    CALCIUM 9.2 02/20/2020 1507    ALKPHOS 77 11/08/2018 1400    AST 48 (H) 11/08/2018 1400    ALT 62 (H) 11/08/2018 1400    BILITOT 0.6 11/08/2018 1400    ESTGFRAFRICA 41.9 (A) 02/20/2020 1507    EGFRNONAA 36.3 (A) 02/20/2020 1507          Lab Results   Component Value Date    HGBA1C 6.0 08/27/2014     Lab Results   Component Value Date    TSH 1.321 07/12/2017     Lab Results   Component Value Date    INR 1.1 07/25/2016    INR 1.1 12/19/2014    INR 1.1 03/01/2012     Lab Results   Component Value Date    WBC 11.02 03/26/2020    HGB 16.9 03/26/2020    HCT 53.8 03/26/2020    MCV 91 03/26/2020     03/26/2020     BMP  Sodium   Date Value Ref Range Status   02/20/2020 141 136 - 145 mmol/L Final     Potassium   Date Value Ref Range Status   02/20/2020 4.1 3.5 - 5.1 mmol/L Final     Chloride   Date Value Ref Range Status   02/20/2020 101 95 - 110 mmol/L Final     CO2   Date Value Ref Range Status   02/20/2020 32 (H) 23 - 29 mmol/L Final     BUN, Bld   Date  Value Ref Range Status   02/20/2020 23 8 - 23 mg/dL Final     Creatinine   Date Value Ref Range Status   02/20/2020 1.8 (H) 0.5 - 1.4 mg/dL Final     Calcium   Date Value Ref Range Status   02/20/2020 9.2 8.7 - 10.5 mg/dL Final     Anion Gap   Date Value Ref Range Status   02/20/2020 8 8 - 16 mmol/L Final     eGFR if    Date Value Ref Range Status   02/20/2020 41.9 (A) >60 mL/min/1.73 m^2 Final     eGFR if non    Date Value Ref Range Status   02/20/2020 36.3 (A) >60 mL/min/1.73 m^2 Final     Comment:     Calculation used to obtain the estimated glomerular filtration  rate (eGFR) is the CKD-EPI equation.        BNP  @LABRCNTIP(BNP,BNPTRIAGEBLO)@  @LABRCNTIP(troponini)@  CrCl cannot be calculated (Patient's most recent lab result is older than the maximum 7 days allowed.).  No results found in the last 24 hours.  No results found in the last 24 hours.  No results found in the last 24 hours.    Assessment:      1. Coronary artery disease of bypass graft of native heart with stable angina pectoris    2. Chronic diastolic congestive heart failure    3. Essential hypertension    4. Hyperlipidemia with target LDL less than 70    5. S/P CABG x 4    6. S/P coronary artery stent placement    7. BMI 39.0-39.9,adult    8. LEANDRO on CPAP        Plan:   Increase Torsemide from 20 mg in AM and 10 mg In PM to 20 mg bid  Change Metolazone from 5 mg once a week to 2.5 mg twice a week  Check BMP in 3 weeks  Continue Aldactone, Losartan Lopressor, ASA statin  Counseled DASH  Check Lipid profile in 6 months  Recommend heart-healthy diet, weight control and regular exercise.  Ion. Risk modification.   RTC in 6 months    I have reviewed all pertinent labs and cardiac studies independently. Plans and recommendations have been formulated under my direct supervision. All questions answered and patient voiced understanding. Patient to continue current medications.   Return sooner for concerns or questions.   If  symptoms persist go to the ED

## 2020-07-24 ENCOUNTER — TELEPHONE (OUTPATIENT)
Dept: HEMATOLOGY/ONCOLOGY | Facility: CLINIC | Age: 75
End: 2020-07-24

## 2020-07-24 NOTE — TELEPHONE ENCOUNTER
Nurse called pt and notified him of his lab before kovtun visit. He has labs in [place] prior to k[place]tun visit in . Nurse will adjust scheduled on Monday for pt.

## 2020-07-24 NOTE — TELEPHONE ENCOUNTER
----- Message from Nancie Hewitt MA sent at 7/24/2020  2:31 PM CDT -----  Regarding: FW: lab  orders  Contact: Patient    ----- Message -----  From: Neha Barrera  Sent: 7/24/2020   2:13 PM CDT  To: [facility] Staff  Subject: lab  orders                                      Patient states that he normally has labs before seeing Dr Diaz, a Hemocrit (?), but there are no orders, please call him back at [phone]

## 2020-07-31 ENCOUNTER — PATIENT MESSAGE (OUTPATIENT)
Dept: RHEUMATOLOGY | Facility: CLINIC | Age: 75
End: 2020-07-31

## 2020-07-31 ENCOUNTER — PATIENT MESSAGE (OUTPATIENT)
Dept: NEPHROLOGY | Facility: CLINIC | Age: 75
End: 2020-07-31

## 2020-07-31 RX ORDER — TRAMADOL HYDROCHLORIDE 50 MG/1
50 TABLET ORAL
Status: CANCELLED | OUTPATIENT
Start: 2020-07-31

## 2020-07-31 RX ORDER — TRAMADOL HYDROCHLORIDE 50 MG/1
50 TABLET ORAL EVERY 12 HOURS PRN
Qty: 180 TABLET | Refills: 2 | Status: SHIPPED | OUTPATIENT
Start: 2020-07-31 | End: 2020-12-01 | Stop reason: SDUPTHER

## 2020-08-06 ENCOUNTER — INFUSION (OUTPATIENT)
Dept: INFUSION THERAPY | Facility: HOSPITAL | Age: 75
End: 2020-08-06
Attending: INTERNAL MEDICINE
Payer: MEDICARE

## 2020-08-06 ENCOUNTER — OFFICE VISIT (OUTPATIENT)
Dept: HEMATOLOGY/ONCOLOGY | Facility: CLINIC | Age: 75
End: 2020-08-06
Payer: MEDICARE

## 2020-08-06 ENCOUNTER — OFFICE VISIT (OUTPATIENT)
Dept: RHEUMATOLOGY | Facility: CLINIC | Age: 75
End: 2020-08-06
Payer: MEDICARE

## 2020-08-06 VITALS
HEART RATE: 74 BPM | TEMPERATURE: 98 F | RESPIRATION RATE: 18 BRPM | DIASTOLIC BLOOD PRESSURE: 75 MMHG | OXYGEN SATURATION: 95 % | SYSTOLIC BLOOD PRESSURE: 110 MMHG

## 2020-08-06 VITALS
DIASTOLIC BLOOD PRESSURE: 72 MMHG | HEIGHT: 67 IN | SYSTOLIC BLOOD PRESSURE: 121 MMHG | HEART RATE: 93 BPM | WEIGHT: 265.44 LBS | BODY MASS INDEX: 41.66 KG/M2

## 2020-08-06 VITALS
TEMPERATURE: 99 F | DIASTOLIC BLOOD PRESSURE: 64 MMHG | SYSTOLIC BLOOD PRESSURE: 101 MMHG | OXYGEN SATURATION: 96 % | HEIGHT: 67 IN | HEART RATE: 84 BPM | BODY MASS INDEX: 40.81 KG/M2 | WEIGHT: 260 LBS

## 2020-08-06 DIAGNOSIS — D75.1 HYPOXEMIC POLYCYTHEMIA: ICD-10-CM

## 2020-08-06 DIAGNOSIS — G47.33 OSA ON CPAP: ICD-10-CM

## 2020-08-06 DIAGNOSIS — M15.9 PRIMARY OSTEOARTHRITIS INVOLVING MULTIPLE JOINTS: ICD-10-CM

## 2020-08-06 DIAGNOSIS — N18.4 STAGE 4 CHRONIC KIDNEY DISEASE: ICD-10-CM

## 2020-08-06 DIAGNOSIS — Z51.81 MEDICATION MONITORING ENCOUNTER: ICD-10-CM

## 2020-08-06 DIAGNOSIS — M1A.09X0 IDIOPATHIC CHRONIC GOUT OF MULTIPLE SITES WITHOUT TOPHUS: Primary | ICD-10-CM

## 2020-08-06 DIAGNOSIS — D75.1 ACQUIRED POLYCYTHEMIA: Primary | ICD-10-CM

## 2020-08-06 DIAGNOSIS — D75.1 POLYCYTHEMIA, SECONDARY: Primary | ICD-10-CM

## 2020-08-06 PROCEDURE — 3008F BODY MASS INDEX DOCD: CPT | Mod: CPTII,S$GLB,, | Performed by: PHYSICIAN ASSISTANT

## 2020-08-06 PROCEDURE — 99999 PR PBB SHADOW E&M-EST. PATIENT-LVL V: ICD-10-PCS | Mod: PBBFAC,,, | Performed by: PHYSICIAN ASSISTANT

## 2020-08-06 PROCEDURE — 3074F PR MOST RECENT SYSTOLIC BLOOD PRESSURE < 130 MM HG: ICD-10-PCS | Mod: CPTII,S$GLB,, | Performed by: PHYSICIAN ASSISTANT

## 2020-08-06 PROCEDURE — 3008F BODY MASS INDEX DOCD: CPT | Mod: CPTII,S$GLB,, | Performed by: INTERNAL MEDICINE

## 2020-08-06 PROCEDURE — 1101F PR PT FALLS ASSESS DOC 0-1 FALLS W/OUT INJ PAST YR: ICD-10-PCS | Mod: CPTII,S$GLB,, | Performed by: PHYSICIAN ASSISTANT

## 2020-08-06 PROCEDURE — 3074F PR MOST RECENT SYSTOLIC BLOOD PRESSURE < 130 MM HG: ICD-10-PCS | Mod: CPTII,S$GLB,, | Performed by: INTERNAL MEDICINE

## 2020-08-06 PROCEDURE — 1159F PR MEDICATION LIST DOCUMENTED IN MEDICAL RECORD: ICD-10-PCS | Mod: S$GLB,,, | Performed by: INTERNAL MEDICINE

## 2020-08-06 PROCEDURE — 99999 PR PBB SHADOW E&M-EST. PATIENT-LVL IV: CPT | Mod: PBBFAC,,, | Performed by: INTERNAL MEDICINE

## 2020-08-06 PROCEDURE — 99195 PHLEBOTOMY: CPT

## 2020-08-06 PROCEDURE — 3078F DIAST BP <80 MM HG: CPT | Mod: CPTII,S$GLB,, | Performed by: INTERNAL MEDICINE

## 2020-08-06 PROCEDURE — 1159F MED LIST DOCD IN RCRD: CPT | Mod: S$GLB,,, | Performed by: INTERNAL MEDICINE

## 2020-08-06 PROCEDURE — 3074F SYST BP LT 130 MM HG: CPT | Mod: CPTII,S$GLB,, | Performed by: INTERNAL MEDICINE

## 2020-08-06 PROCEDURE — 1101F PT FALLS ASSESS-DOCD LE1/YR: CPT | Mod: CPTII,S$GLB,, | Performed by: PHYSICIAN ASSISTANT

## 2020-08-06 PROCEDURE — 1125F PR PAIN SEVERITY QUANTIFIED, PAIN PRESENT: ICD-10-PCS | Mod: S$GLB,,, | Performed by: PHYSICIAN ASSISTANT

## 2020-08-06 PROCEDURE — 1125F AMNT PAIN NOTED PAIN PRSNT: CPT | Mod: S$GLB,,, | Performed by: PHYSICIAN ASSISTANT

## 2020-08-06 PROCEDURE — 3008F PR BODY MASS INDEX (BMI) DOCUMENTED: ICD-10-PCS | Mod: CPTII,S$GLB,, | Performed by: INTERNAL MEDICINE

## 2020-08-06 PROCEDURE — 3078F PR MOST RECENT DIASTOLIC BLOOD PRESSURE < 80 MM HG: ICD-10-PCS | Mod: CPTII,S$GLB,, | Performed by: PHYSICIAN ASSISTANT

## 2020-08-06 PROCEDURE — 3078F PR MOST RECENT DIASTOLIC BLOOD PRESSURE < 80 MM HG: ICD-10-PCS | Mod: CPTII,S$GLB,, | Performed by: INTERNAL MEDICINE

## 2020-08-06 PROCEDURE — 1101F PR PT FALLS ASSESS DOC 0-1 FALLS W/OUT INJ PAST YR: ICD-10-PCS | Mod: CPTII,S$GLB,, | Performed by: INTERNAL MEDICINE

## 2020-08-06 PROCEDURE — 99214 OFFICE O/P EST MOD 30 MIN: CPT | Mod: S$GLB,,, | Performed by: PHYSICIAN ASSISTANT

## 2020-08-06 PROCEDURE — 99214 PR OFFICE/OUTPT VISIT, EST, LEVL IV, 30-39 MIN: ICD-10-PCS | Mod: S$GLB,,, | Performed by: PHYSICIAN ASSISTANT

## 2020-08-06 PROCEDURE — 1126F AMNT PAIN NOTED NONE PRSNT: CPT | Mod: S$GLB,,, | Performed by: INTERNAL MEDICINE

## 2020-08-06 PROCEDURE — 1126F PR PAIN SEVERITY QUANTIFIED, NO PAIN PRESENT: ICD-10-PCS | Mod: S$GLB,,, | Performed by: INTERNAL MEDICINE

## 2020-08-06 PROCEDURE — 3008F PR BODY MASS INDEX (BMI) DOCUMENTED: ICD-10-PCS | Mod: CPTII,S$GLB,, | Performed by: PHYSICIAN ASSISTANT

## 2020-08-06 PROCEDURE — 99214 OFFICE O/P EST MOD 30 MIN: CPT | Mod: S$GLB,,, | Performed by: INTERNAL MEDICINE

## 2020-08-06 PROCEDURE — 1101F PT FALLS ASSESS-DOCD LE1/YR: CPT | Mod: CPTII,S$GLB,, | Performed by: INTERNAL MEDICINE

## 2020-08-06 PROCEDURE — 3074F SYST BP LT 130 MM HG: CPT | Mod: CPTII,S$GLB,, | Performed by: PHYSICIAN ASSISTANT

## 2020-08-06 PROCEDURE — 99214 PR OFFICE/OUTPT VISIT, EST, LEVL IV, 30-39 MIN: ICD-10-PCS | Mod: S$GLB,,, | Performed by: INTERNAL MEDICINE

## 2020-08-06 PROCEDURE — 99999 PR PBB SHADOW E&M-EST. PATIENT-LVL V: CPT | Mod: PBBFAC,,, | Performed by: PHYSICIAN ASSISTANT

## 2020-08-06 PROCEDURE — 3078F DIAST BP <80 MM HG: CPT | Mod: CPTII,S$GLB,, | Performed by: PHYSICIAN ASSISTANT

## 2020-08-06 PROCEDURE — 1159F MED LIST DOCD IN RCRD: CPT | Mod: S$GLB,,, | Performed by: PHYSICIAN ASSISTANT

## 2020-08-06 PROCEDURE — 99999 PR PBB SHADOW E&M-EST. PATIENT-LVL IV: ICD-10-PCS | Mod: PBBFAC,,, | Performed by: INTERNAL MEDICINE

## 2020-08-06 PROCEDURE — 1159F PR MEDICATION LIST DOCUMENTED IN MEDICAL RECORD: ICD-10-PCS | Mod: S$GLB,,, | Performed by: PHYSICIAN ASSISTANT

## 2020-08-06 RX ORDER — AMOXICILLIN 500 MG
1 CAPSULE ORAL DAILY
COMMUNITY
End: 2023-11-29

## 2020-08-06 RX ORDER — LIDOCAINE HYDROCHLORIDE 10 MG/ML
1 INJECTION, SOLUTION EPIDURAL; INFILTRATION; INTRACAUDAL; PERINEURAL ONCE
Status: CANCELLED | OUTPATIENT
Start: 2020-08-12

## 2020-08-06 NOTE — NURSING
...1 unit therapeutic phlebotomy performed via_right __AC then discarded, pressure dressing applied.  Pt refused juice or snack.  Blood pressure __110/ 65____ upon completion.  Pt denies any complaints of dizziness, lightheadedness, or faintness.   Patient ambulated out without difficulty.

## 2020-08-06 NOTE — PROGRESS NOTES
Subjective:      DATE OF VISIT: 8/6/2020   ?   ?   Patient ID:?Bo Chase is a 75 y.o. male.?? MR#: [id number]   ?   PRIMARY PROVIDER: Dr. Diaz  ?   CHIEF COMPLAINT:  Polycythemia?????   ?   DIAGNOSIS:  Secondary polycythemia  ?   CURRENT TREATMENT:  Phlebotomy, aspirin (takes 325mg daily)      HPI    Today I have the pleasure of seeing in follow-up Mr. Chase. Since last visit he has had recalibration of CPAP machine which has been working well and sleep improved. He continues on ASA 325mg without bleeding issues. Chronic lower extremity edema unchanged. Chronic hip pain stable.       Review of systems:  ?   A comprehensive 14-point review of systems was reviewed with patient and was negative other than as specified above.   ?     Objective:      Physical Exam      ?   Vitals:    08/06/20 1142   BP: 101/64   Pulse: 84   Temp: 98.6 °F (37 °C)      ?   ECOG:?0   General appearance: Generally well appearing, in no acute distress.   Head, eyes, ears, nose, and throat: moist mucous membranes, mild ruddiness of face.   Respiratory:  Normal work of breathing  Abdomen:  Obese, nontender, nondistended.   Extremities: Warm, with stable chronic stasis changes and 1+ edema symmetric bilaterally  Neurologic: Alert and oriented. Grossly normal strength, coordination, and gait.   Skin: No rashes, ecchymoses or petechial lesion.   Psychiatric:  Normal mood and affect.        Laboratory:  ?   Lab Visit on 08/06/2020   Component Date Value Ref Range Status    Sodium 08/06/2020 138  136 - 145 mmol/L Final    Potassium 08/06/2020 3.2* 3.5 - 5.1 mmol/L Final    Chloride 08/06/2020 97  95 - 110 mmol/L Final    CO2 08/06/2020 26  23 - 29 mmol/L Final    Glucose 08/06/2020 142* 70 - 110 mg/dL Final    BUN, Bld 08/06/2020 43* 8 - 23 mg/dL Final    Creatinine 08/06/2020 2.0* 0.5 - 1.4 mg/dL Final    Calcium 08/06/2020 9.8  8.7 - 10.5 mg/dL Final    Total Protein 08/06/2020 7.4  6.0 - 8.4 g/dL Final    Albumin 08/06/2020 3.8   3.5 - 5.2 g/dL Final    Total Bilirubin 08/06/2020 0.7  0.1 - 1.0 mg/dL Final    Alkaline Phosphatase 08/06/2020 82  55 - 135 U/L Final    AST 08/06/2020 29  10 - 40 U/L Final    ALT 08/06/2020 41  10 - 44 U/L Final    Anion Gap 08/06/2020 15  8 - 16 mmol/L Final    eGFR if  08/06/2020 37* >60 mL/min/1.73 m^2 Final    eGFR if non African American 08/06/2020 32* >60 mL/min/1.73 m^2 Final    Uric Acid 08/06/2020 6.0  3.4 - 7.0 mg/dL Final    Uric Acid 08/06/2020 6.0  3.4 - 7.0 mg/dL Final    Uric Acid 08/06/2020 6.0  3.4 - 7.0 mg/dL Final    WBC 08/06/2020 11.15  3.90 - 12.70 K/uL Final    RBC 08/06/2020 6.17  4.60 - 6.20 M/uL Final    Hemoglobin 08/06/2020 19.0* 14.0 - 18.0 g/dL Final    Hematocrit 08/06/2020 55.9* 40.0 - 54.0 % Final    Mean Corpuscular Volume 08/06/2020 91  82 - 98 fL Final    Mean Corpuscular Hemoglobin 08/06/2020 30.8  27.0 - 31.0 pg Final    Mean Corpuscular Hemoglobin Conc 08/06/2020 34.0  32.0 - 36.0 g/dL Final    RDW 08/06/2020 17.7* 11.5 - 14.5 % Final    Platelets 08/06/2020 192  150 - 350 K/uL Final    MPV 08/06/2020 9.6  9.2 - 12.9 fL Final    Immature Granulocytes 08/06/2020 0.7* 0.0 - 0.5 % Final    Gran # (ANC) 08/06/2020 8.8* 1.8 - 7.7 K/uL Final    Immature Grans (Abs) 08/06/2020 0.08* 0.00 - 0.04 K/uL Final    Lymph # 08/06/2020 1.4  1.0 - 4.8 K/uL Final    Mono # 08/06/2020 0.8  0.3 - 1.0 K/uL Final    Eos # 08/06/2020 0.1  0.0 - 0.5 K/uL Final    Baso # 08/06/2020 0.07  0.00 - 0.20 K/uL Final    nRBC 08/06/2020 0  0 /100 WBC Final    Gran% 08/06/2020 79.1* 38.0 - 73.0 % Final    Lymph% 08/06/2020 12.5* 18.0 - 48.0 % Final    Mono% 08/06/2020 6.9  4.0 - 15.0 % Final    Eosinophil% 08/06/2020 0.9  0.0 - 8.0 % Final    Basophil% 08/06/2020 0.6  0.0 - 1.9 % Final    Differential Method 08/06/2020 Automated   Final    WBC 08/06/2020 11.15  3.90 - 12.70 K/uL Final    RBC 08/06/2020 6.17  4.60 - 6.20 M/uL Final    Hemoglobin 08/06/2020  19.0* 14.0 - 18.0 g/dL Final    Hematocrit 08/06/2020 55.9* 40.0 - 54.0 % Final    Mean Corpuscular Volume 08/06/2020 91  82 - 98 fL Final    Mean Corpuscular Hemoglobin 08/06/2020 30.8  27.0 - 31.0 pg Final    Mean Corpuscular Hemoglobin Conc 08/06/2020 34.0  32.0 - 36.0 g/dL Final    RDW 08/06/2020 17.7* 11.5 - 14.5 % Final    Platelets 08/06/2020 192  150 - 350 K/uL Final    MPV 08/06/2020 9.6  9.2 - 12.9 fL Final    Immature Granulocytes 08/06/2020 0.7* 0.0 - 0.5 % Final    Gran # (ANC) 08/06/2020 8.8* 1.8 - 7.7 K/uL Final    Immature Grans (Abs) 08/06/2020 0.08* 0.00 - 0.04 K/uL Final    Lymph # 08/06/2020 1.4  1.0 - 4.8 K/uL Final    Mono # 08/06/2020 0.8  0.3 - 1.0 K/uL Final    Eos # 08/06/2020 0.1  0.0 - 0.5 K/uL Final    Baso # 08/06/2020 0.07  0.00 - 0.20 K/uL Final    nRBC 08/06/2020 0  0 /100 WBC Final    Gran% 08/06/2020 79.1* 38.0 - 73.0 % Final    Lymph% 08/06/2020 12.5* 18.0 - 48.0 % Final    Mono% 08/06/2020 6.9  4.0 - 15.0 % Final    Eosinophil% 08/06/2020 0.9  0.0 - 8.0 % Final    Basophil% 08/06/2020 0.6  0.0 - 1.9 % Final    Differential Method 08/06/2020 Automated   Final   Lab Visit on 08/06/2020   Component Date Value Ref Range Status    Specimen UA 08/06/2020 Urine, Clean Catch   Final    Color, UA 08/06/2020 Yellow  Yellow, Straw, Landy Final    Appearance, UA 08/06/2020 Clear  Clear Final    pH, UA 08/06/2020 7.0  5.0 - 8.0 Final    Specific [place], UA 08/06/2020 1.010  1.005 - 1.030 Final    Protein, UA 08/06/2020 Negative  Negative Final    Glucose, UA 08/06/2020 Negative  Negative Final    Ketones, UA 08/06/2020 Negative  Negative Final    Bilirubin (UA) 08/06/2020 Negative  Negative Final    Occult Blood UA 08/06/2020 Negative  Negative Final    Nitrite, UA 08/06/2020 Negative  Negative Final    Leukocytes, UA 08/06/2020 Negative  Negative Final      ?   ?   Hemoglobin   Date Value Ref Range Status   08/06/2020 19.0 (H) 14.0 - 18.0 g/dL Final    08/06/2020 19.0 (H) 14.0 - 18.0 g/dL Final   03/26/2020 16.9 14.0 - 18.0 g/dL Final   02/20/2020 15.4 14.0 - 18.0 g/dL Final   01/30/2020 15.1 14.0 - 18.0 g/dL Final   12/19/2019 17.1 14.0 - 18.0 g/dL Final   11/12/2019 17.5 14.0 - 18.0 g/dL Final   09/03/2019 17.1 14.0 - 18.0 g/dL Final   05/13/2019 17.6 14.0 - 18.0 g/dL Final   01/25/2019 16.5 14.0 - 18.0 g/dL Final   01/25/2019 16.5 14.0 - 18.0 g/dL Final     Hematocrit   Date Value Ref Range Status   08/06/2020 55.9 (H) 40.0 - 54.0 % Final   08/06/2020 55.9 (H) 40.0 - 54.0 % Final   03/26/2020 53.8 40.0 - 54.0 % Final   02/20/2020 50.0 40.0 - 54.0 % Final   01/30/2020 47.2 40.0 - 54.0 % Final   12/19/2019 51.7 40.0 - 54.0 % Final   11/12/2019 52.6 40.0 - 54.0 % Final   09/03/2019 53.8 40.0 - 54.0 % Final   05/13/2019 54.2 (H) 40.0 - 54.0 % Final   01/25/2019 49.7 40.0 - 54.0 % Final   01/25/2019 49.7 40.0 - 54.0 % Final       Assessment/Plan:       1. Polycythemia, secondary    2. BMI 39.0-39.9,adult    3. LEANDRO on CPAP    4. Hypoxemic polycythemia          Plan:     # Polycythemia:  secondary polycythemia to LEANDRO on CPAP.  Elevated erythropoietin level to 21 in 2016 and JAK2 V617 F mutation negative in 2014.  He has not had bone marrow biopsy. CPAP recently well managed yet persistent polycythemia. I did recommend repeat epo testing and extending ASIF testing which he is amenable to, with possible BMBx especially is results less c/w secondary cause. He continues on ASA35. No s/sx of thrombotic event or constitutional symptoms.   - recommended phlebotomy today for hcg>55.     # LEANDRO on CPAP:  Encouraged continue use of CPAP, using nightly    # Obesity:  Encouraged lifestyle intervention and weight loss      Follow-Up:   - phlebotomy today  - additional epo and ASIF labs  - RV in 1 months with RV and labs prior

## 2020-08-06 NOTE — PROGRESS NOTES
Subjective:       Patient ID: Bo Chase is a 75 y.o. male.    Chief Complaint: Gout, Hip Pain (right), and Results (dexa scan)      Bo is for rheum follow up. OA, gout, ckd and Osteoporosis    We follow him regularly for Gout multiarticular involvmeent affecting  bilateral feet and  left elbow in the past. He is  currently on allopurinol 450 mg daily and colchicine only prn. Last uric acid level 4.0 at goal. Now at 6.0  No attacks.     Prior compression fracture DEXA showed osteopenia treated treatment fosamax Q 14 days (started 9/2015)  dexa 5/2017 stable with mod fx risk spine increased 4%. Due to decline in renal function, fosamax stopped. repeat dexa 7/2020- spine and hip scores fall in the normal BMD rangge  No need to trt  Last vit D level was good, Ca wnl    Currently his Biggest issue is Left hip pain. Has been persistent for almost 3-4 yrs now, the right hip not an issue currently. Left hip -anterior groin pain is  Worse with walking and weightbearing.  2017 [facility] hip x-ray showed severe djd right hip and mod djd left hip both had progressed from 2014  Lumbar X-ray also some ddd/djd, laminectomy and decompression surgery several years ago  Did si joint, lumbar and hip injection w/dr gunter which helped a lot  Had recurrence of pain when tried to climb in truck high off the ground; aggravated things  Took some time and w/seeing chiropractor pain did get better; dx w/piriformis   He is not having radicular pain currently is just mostly left anterior groin pain, some lateral and posterior pain but most concentrated in the front  Limited motion in the left hip as well     Saw ortho a few years back but recommended holding off on surgery til x-ray and symptoms worsned  The past in interfering w/his quality of life  Unable to exercise; had lost weight, gained some back    Today pain 8/10. Left anterior groin.   Tramadol 50 mg bid for pain relief  Cannot take nsaids due to ckd  Not taking tylenol  "      Osteoarthritis  Associated symptoms include arthralgias. Pertinent negatives include no abdominal pain, chest pain, chills, congestion, coughing, fatigue, fever, joint swelling, nausea, numbness, rash, vomiting or weakness.   Hip Pain  Associated symptoms include arthralgias. Pertinent negatives include no abdominal pain, chest pain, chills, congestion, coughing, fatigue, fever, joint swelling, nausea, numbness, rash, vomiting or weakness.     Review of Systems   Constitutional: Negative.  Negative for chills, fatigue and fever.   HENT: Negative.  Negative for congestion, mouth sores and trouble swallowing.    Eyes: Negative.  Negative for photophobia, redness and visual disturbance.   Respiratory: Negative.  Negative for cough, shortness of breath and wheezing.    Cardiovascular: Negative.  Negative for chest pain and leg swelling.   Gastrointestinal: Negative.  Negative for abdominal distention, abdominal pain, diarrhea, nausea and vomiting.   Genitourinary: Negative.  Negative for dysuria, flank pain, frequency and urgency.   Musculoskeletal: Positive for arthralgias and gait problem. Negative for back pain and joint swelling.   Skin: Negative.  Negative for rash.   Neurological: Negative for dizziness, weakness and numbness.         Objective:     /72   Pulse 93   Ht 5' 7" (1.702 m)   Wt 120.4 kg (265 lb 6.9 oz)   BMI 41.57 kg/m²      Physical Exam   Constitutional: He is oriented to person, place, and time and well-developed, well-nourished, and in no distress. No distress.   HENT:   Head: Normocephalic and atraumatic.   Right Ear: External ear normal.   Left Ear: External ear normal.   Mouth/Throat: No oropharyngeal exudate.   Eyes: Conjunctivae and EOM are normal. Pupils are equal, round, and reactive to light. No scleral icterus.   Neck: Neck supple. No thyromegaly present.   Cardiovascular: Normal rate, regular rhythm and normal heart sounds.    No murmur heard.  Pulmonary/Chest: Effort " normal and breath sounds normal. No respiratory distress.   Abdominal: Soft. Bowel sounds are normal.   Lymphadenopathy:     He has no cervical adenopathy.   Neurological: He is alert and oriented to person, place, and time. No cranial nerve deficit. He exhibits normal muscle tone. Gait normal. Coordination normal.   Skin: Skin is warm and dry.     Musculoskeletal: Tenderness present. No edema.      Comments: Left hip has limited flexion, er and ir  All painful  Neg slr  Mild ttp left gt bursa  Mild ttp right gt bursa    Neg SLR bilaterally              Recent Results (from the past 1008 hour(s))   Comprehensive metabolic panel    Collection Time: 08/06/20 11:20 AM   Result Value Ref Range    Sodium 138 136 - 145 mmol/L    Potassium 3.2 (L) 3.5 - 5.1 mmol/L    Chloride 97 95 - 110 mmol/L    CO2 26 23 - 29 mmol/L    Glucose 142 (H) 70 - 110 mg/dL    BUN, Bld 43 (H) 8 - 23 mg/dL    Creatinine 2.0 (H) 0.5 - 1.4 mg/dL    Calcium 9.8 8.7 - 10.5 mg/dL    Total Protein 7.4 6.0 - 8.4 g/dL    Albumin 3.8 3.5 - 5.2 g/dL    Total Bilirubin 0.7 0.1 - 1.0 mg/dL    Alkaline Phosphatase 82 55 - 135 U/L    AST 29 10 - 40 U/L    ALT 41 10 - 44 U/L    Anion Gap 15 8 - 16 mmol/L    eGFR if African American 37 (A) >60 mL/min/1.73 m^2    eGFR if non African American 32 (A) >60 mL/min/1.73 m^2   Uric acid    Collection Time: 08/06/20 11:20 AM   Result Value Ref Range    Uric Acid 6.0 3.4 - 7.0 mg/dL   Uric acid    Collection Time: 08/06/20 11:20 AM   Result Value Ref Range    Uric Acid 6.0 3.4 - 7.0 mg/dL   Uric acid    Collection Time: 08/06/20 11:20 AM   Result Value Ref Range    Uric Acid 6.0 3.4 - 7.0 mg/dL   CBC auto differential    Collection Time: 08/06/20 11:20 AM   Result Value Ref Range    WBC 11.15 3.90 - 12.70 K/uL    RBC 6.17 4.60 - 6.20 M/uL    Hemoglobin 19.0 (H) 14.0 - 18.0 g/dL    Hematocrit 55.9 (H) 40.0 - 54.0 %    Mean Corpuscular Volume 91 82 - 98 fL    Mean Corpuscular Hemoglobin 30.8 27.0 - 31.0 pg    Mean  Corpuscular Hemoglobin Conc 34.0 32.0 - 36.0 g/dL    RDW 17.7 (H) 11.5 - 14.5 %    Platelets 192 150 - 350 K/uL    MPV 9.6 9.2 - 12.9 fL    Immature Granulocytes 0.7 (H) 0.0 - 0.5 %    Gran # (ANC) 8.8 (H) 1.8 - 7.7 K/uL    Immature Grans (Abs) 0.08 (H) 0.00 - 0.04 K/uL    Lymph # 1.4 1.0 - 4.8 K/uL    Mono # 0.8 0.3 - 1.0 K/uL    Eos # 0.1 0.0 - 0.5 K/uL    Baso # 0.07 0.00 - 0.20 K/uL    nRBC 0 0 /100 WBC    Gran% 79.1 (H) 38.0 - 73.0 %    Lymph% 12.5 (L) 18.0 - 48.0 %    Mono% 6.9 4.0 - 15.0 %    Eosinophil% 0.9 0.0 - 8.0 %    Basophil% 0.6 0.0 - 1.9 %    Differential Method Automated    CBC auto differential    Collection Time: 08/06/20 11:20 AM   Result Value Ref Range    WBC 11.15 3.90 - 12.70 K/uL    RBC 6.17 4.60 - 6.20 M/uL    Hemoglobin 19.0 (H) 14.0 - 18.0 g/dL    Hematocrit 55.9 (H) 40.0 - 54.0 %    Mean Corpuscular Volume 91 82 - 98 fL    Mean Corpuscular Hemoglobin 30.8 27.0 - 31.0 pg    Mean Corpuscular Hemoglobin Conc 34.0 32.0 - 36.0 g/dL    RDW 17.7 (H) 11.5 - 14.5 %    Platelets 192 150 - 350 K/uL    MPV 9.6 9.2 - 12.9 fL    Immature Granulocytes 0.7 (H) 0.0 - 0.5 %    Gran # (ANC) 8.8 (H) 1.8 - 7.7 K/uL    Immature Grans (Abs) 0.08 (H) 0.00 - 0.04 K/uL    Lymph # 1.4 1.0 - 4.8 K/uL    Mono # 0.8 0.3 - 1.0 K/uL    Eos # 0.1 0.0 - 0.5 K/uL    Baso # 0.07 0.00 - 0.20 K/uL    nRBC 0 0 /100 WBC    Gran% 79.1 (H) 38.0 - 73.0 %    Lymph% 12.5 (L) 18.0 - 48.0 %    Mono% 6.9 4.0 - 15.0 %    Eosinophil% 0.9 0.0 - 8.0 %    Basophil% 0.6 0.0 - 1.9 %    Differential Method Automated    Urinalysis    Collection Time: 08/06/20 11:53 AM   Result Value Ref Range    Specimen UA Urine, Clean Catch     Color, UA Yellow Yellow, Straw, Landy    Appearance, UA Clear Clear    pH, UA 7.0 5.0 - 8.0    Specific Gravity, UA 1.010 1.005 - 1.030    Protein, UA Negative Negative    Glucose, UA Negative Negative    Ketones, UA Negative Negative    Bilirubin (UA) Negative Negative    Occult Blood UA Negative Negative    Nitrite,  UA Negative Negative    Leukocytes, UA Negative Negative        Ref. Range 9/11/2018 11:31 9/3/2019 09:48   Vit D, 25-Hydroxy Latest Ref Range: 30 - 96 ng/mL 57 36       Results for orders placed during the hospital encounter of 06/02/20   DXA Bone Density Spine And Hip    Narrative EXAMINATION:  DEXA BONE DENSITY SPINE HIP    CLINICAL HISTORY:  suspected osteoporosis; Age-related osteoporosis without current pathological fracture    TECHNIQUE:  DXA scanning was performed over the left hip and lumbar spine.  Review of the images confirms satisfactory positioning and technique.    COMPARISON:  05/12/2017 cannot be compared    FINDINGS:  The L1 to L4 vertebral bone mineral density is equal to 1.523 g/cm squared with a T score of 2.9.    The left femoral neck bone mineral density is equal to 0.956 g/cm squared with a T score of -0.6.      Impression No evidence of significant bone density loss    Consider FDA approved medical therapies in postmenopausal women and men aged 50 years and older, based on the following:    *A hip or vertebral (clinical or morphometric) fracture  *T score less than or equal to -2.5 at the femoral neck or spine after appropriate evaluation to exclude secondary causes.  *Low bone mass -- also known as osteopenia (T score between -1.0 and -2.5 at the femoral neck or spine) and a 10 year probability of hip fracture greater than or equal to 3% or a 10 year probability of major osteoporosis-related fracture greater than or equal to 20% based on the [place]-adapted WHO algorithm.  *Clinicians judgment and/or patient preference may indicate treatment for people with 10 year fracture probabilities is above or below these levels.      Electronically signed by: Dae Bedolla DO  Date:    06/02/2020  Time:    11:31       DEXA: I have reviewed radiologist report. I have  also independently reviewed all  Images and BMD scores  From current and prior dexa scan.  The diagnostic/imaging results available in the  medical record have been utilized in my evaluation and management recommendations today        9/12/14 DEXA total femur T score -0.8, femur neck -1.2, spine 1.0 osteopenia      2017 [facility] Hip Findings: The bony pelvis is intact.  There is moderate to severe joint space narrowing involving the right hip with subchondral cystic changes noted within the femoral head and subchondral sclerosis noted on either side of the joint.  The degenerative changes are progressive since the 2014 exam.  There is more mild to moderate degenerative change involving the left hip.  Phleboliths noted within the right hemipelvis    9/9/14 lumbar spine x-ray  Findings: Lumbar spine series performed with lateral flexion and extension views. Comparison to 06/09/14. Note again made of generalized osteopenia and multilevel degenerative change. Minimal grade 1 anterolisthesis observed at L4-5 and L5-S1 with   little change between flexion and extension. Stable mild anterior wedge deformity of the T12 and L1 vertebrae. No acute findings.    Assessment:       1. Idiopathic chronic gout of multiple sites without tophus    2. Stage 4 chronic kidney disease    3. Primary osteoarthritis involving multiple joints    4. Medication monitoring encounter        1.  Osteoarthritis bilateral hips left >Right -- left symptomatic - ultrasound guided hip injection no help,  needs BINTA   Will get back to ortho      2.  Idiopathic gout of multiple sites with optimal uric acid level on allopurinol 450 mg daily and colchicine prn  Uric acid 6.0    3. Lumbar spondylosis to have laminectomy and decompression surgery 6/24/14- no radicular issues     4. Normal BMD on dexa 7/2020  Prior  Osteopenia with T12-L1 wedge deformity noted on x-ray will follow- on prevention fosamax Q 14 days X 2.5 years, stable dexa 2017- mod fx-  Fosamax stopped 2017  Ca and Vit D level wnl  CKD Stage III-IV           Plan:            For now will keep allopurinol to 450 mg daily  Uloric will be  our next option if any gout attacks occur     colcrys only prn- no more than 1 tab 2 X weekly prn  Avoid nsaids      No need for any OP trt  2020- DXA Normal - repeat dexa 3-5 years  Maintain adequate Ca and vit D levels (followed by renal)      rtc 12 mon nato with cmp and uric acid       call with any questions, changes, or concerns.

## 2020-08-11 ENCOUNTER — OFFICE VISIT (OUTPATIENT)
Dept: NEPHROLOGY | Facility: CLINIC | Age: 75
End: 2020-08-11
Payer: MEDICARE

## 2020-08-11 VITALS
SYSTOLIC BLOOD PRESSURE: 130 MMHG | BODY MASS INDEX: 41.77 KG/M2 | WEIGHT: 266.13 LBS | DIASTOLIC BLOOD PRESSURE: 70 MMHG | HEART RATE: 80 BPM | HEIGHT: 67 IN

## 2020-08-11 DIAGNOSIS — E83.52 HYPERCALCEMIA: ICD-10-CM

## 2020-08-11 DIAGNOSIS — G47.33 OSA (OBSTRUCTIVE SLEEP APNEA): ICD-10-CM

## 2020-08-11 DIAGNOSIS — N25.81 SECONDARY HYPERPARATHYROIDISM OF RENAL ORIGIN: ICD-10-CM

## 2020-08-11 DIAGNOSIS — I75.023 CHOLESTEROL EMBOLISM OF LOWER EXTREMITY, BILATERAL: ICD-10-CM

## 2020-08-11 DIAGNOSIS — M1A.09X0 IDIOPATHIC CHRONIC GOUT OF MULTIPLE SITES WITHOUT TOPHUS: ICD-10-CM

## 2020-08-11 DIAGNOSIS — M25.512 ACUTE PAIN OF LEFT SHOULDER: ICD-10-CM

## 2020-08-11 DIAGNOSIS — I27.20 PULMONARY HYPERTENSION: ICD-10-CM

## 2020-08-11 DIAGNOSIS — E87.6 HYPOKALEMIA: ICD-10-CM

## 2020-08-11 DIAGNOSIS — I51.89 DIASTOLIC DYSFUNCTION: ICD-10-CM

## 2020-08-11 DIAGNOSIS — N28.1 COMPLEX RENAL CYST: ICD-10-CM

## 2020-08-11 DIAGNOSIS — R60.0 BILATERAL EDEMA OF LOWER EXTREMITY: ICD-10-CM

## 2020-08-11 DIAGNOSIS — N18.30 CKD (CHRONIC KIDNEY DISEASE) STAGE 3, GFR 30-59 ML/MIN: Primary | ICD-10-CM

## 2020-08-11 PROCEDURE — 99215 PR OFFICE/OUTPT VISIT, EST, LEVL V, 40-54 MIN: ICD-10-PCS | Mod: S$GLB,,, | Performed by: INTERNAL MEDICINE

## 2020-08-11 PROCEDURE — 99999 PR PBB SHADOW E&M-EST. PATIENT-LVL V: ICD-10-PCS | Mod: PBBFAC,,, | Performed by: INTERNAL MEDICINE

## 2020-08-11 PROCEDURE — 3078F DIAST BP <80 MM HG: CPT | Mod: CPTII,S$GLB,, | Performed by: INTERNAL MEDICINE

## 2020-08-11 PROCEDURE — 3075F SYST BP GE 130 - 139MM HG: CPT | Mod: CPTII,S$GLB,, | Performed by: INTERNAL MEDICINE

## 2020-08-11 PROCEDURE — 3078F PR MOST RECENT DIASTOLIC BLOOD PRESSURE < 80 MM HG: ICD-10-PCS | Mod: CPTII,S$GLB,, | Performed by: INTERNAL MEDICINE

## 2020-08-11 PROCEDURE — 3075F PR MOST RECENT SYSTOLIC BLOOD PRESS GE 130-139MM HG: ICD-10-PCS | Mod: CPTII,S$GLB,, | Performed by: INTERNAL MEDICINE

## 2020-08-11 PROCEDURE — 3008F PR BODY MASS INDEX (BMI) DOCUMENTED: ICD-10-PCS | Mod: CPTII,S$GLB,, | Performed by: INTERNAL MEDICINE

## 2020-08-11 PROCEDURE — 1101F PR PT FALLS ASSESS DOC 0-1 FALLS W/OUT INJ PAST YR: ICD-10-PCS | Mod: CPTII,S$GLB,, | Performed by: INTERNAL MEDICINE

## 2020-08-11 PROCEDURE — 3008F BODY MASS INDEX DOCD: CPT | Mod: CPTII,S$GLB,, | Performed by: INTERNAL MEDICINE

## 2020-08-11 PROCEDURE — 1159F MED LIST DOCD IN RCRD: CPT | Mod: S$GLB,,, | Performed by: INTERNAL MEDICINE

## 2020-08-11 PROCEDURE — 1101F PT FALLS ASSESS-DOCD LE1/YR: CPT | Mod: CPTII,S$GLB,, | Performed by: INTERNAL MEDICINE

## 2020-08-11 PROCEDURE — 99215 OFFICE O/P EST HI 40 MIN: CPT | Mod: S$GLB,,, | Performed by: INTERNAL MEDICINE

## 2020-08-11 PROCEDURE — 99999 PR PBB SHADOW E&M-EST. PATIENT-LVL V: CPT | Mod: PBBFAC,,, | Performed by: INTERNAL MEDICINE

## 2020-08-11 PROCEDURE — 1159F PR MEDICATION LIST DOCUMENTED IN MEDICAL RECORD: ICD-10-PCS | Mod: S$GLB,,, | Performed by: INTERNAL MEDICINE

## 2020-08-11 RX ORDER — MODAFINIL 100 MG/1
200 TABLET ORAL DAILY
Qty: 60 TABLET | Refills: 2 | Status: SHIPPED | OUTPATIENT
Start: 2020-08-11 | End: 2020-09-24

## 2020-08-11 NOTE — PATIENT INSTRUCTIONS
Modafinil tablets  What is this medicine?  MODAFINIL (ruthy DAF i nil) is used to treat excessive sleepiness caused by certain sleep disorders. This includes narcolepsy, sleep apnea, and shift work sleep disorder.  How should I use this medicine?  Take this medicine by mouth with a glass of water. Follow the directions on the prescription label. Take your doses at regular intervals. Do not take your medicine more often than directed. Do not stop taking except on your doctor's advice.  A special MedGuide will be given to you by the pharmacist with each prescription and refill. Be sure to read this information carefully each time.  Talk to your pediatrician regarding the use of this medicine in children. This medicine is not approved for use in children.  What side effects may I notice from receiving this medicine?  Side effects that you should report to your doctor or health care professional as soon as possible:  · allergic reactions like skin rash, itching or hives, swelling of the face, lips, or tongue  · anxiety  · breathing problems  · chest pain  · fast, irregular heartbeat  · hallucinations  · increased blood pressure  · redness, blistering, peeling or loosening of the skin, including inside the mouth  · sore throat, fever, or chills  · suicidal thoughts or other mood changes  · tremors  · vomiting  Side effects that usually do not require medical attention (report to your doctor or health care professional if they continue or are bothersome):  · headache  · nausea, diarrhea, or stomach upset  · nervousness  · trouble sleeping  What may interact with this medicine?  Do not take this medicine with any of the following medications:  · amphetamine or dextroamphetamine  · dexmethylphenidate or methylphenidate  · medicines called MAO Inhibitors like Nardil, Parnate, Marplan, Eldepryl  · pemoline  · procarbazine  This medicine may also interact with the following medications:  · antifungal medicines like  itraconazole or ketoconazole  · barbiturates like phenobarbital  · birth control pills or other hormone-containing birth control devices or implants  · carbamazepine  · cyclosporine  · diazepam  · medicines for depression, anxiety, or psychotic disturbances  · phenytoin  · propranolol  · triazolam  · warfarin  What if I miss a dose?  If you miss a dose, take it as soon as you can. If it is almost time for your next dose, take only that dose. Do not take double or extra doses.  Where should I keep my medicine?  Keep out of the reach of children. This medicine can be abused. Keep your medicine in a safe place to protect it from theft. Do not share this medicine with anyone. Selling or giving away this medicine is dangerous and against the law.  This medicine may cause accidental overdose and death if taken by other adults, children, or pets. Mix any unused medicine with a substance like cat litter or coffee grounds. Then throw the medicine away in a sealed container like a sealed bag or a coffee can with a lid. Do not use the medicine after the expiration date.  Store at room temperature between 20 and 25 degrees C (68 and 77 degrees F).  What should I tell my health care provider before I take this medicine?  They need to know if you have any of these conditions:  · history of depression, brandon, or other mental disorder  · kidney disease  · liver disease  · an unusual or allergic reaction to modafinil, other medicines, foods, dyes, or preservatives  · pregnant or trying to get pregnant  · breast-feeding  What should I watch for while using this medicine?  Visit your doctor or health care professional for regular checks on your progress. The full effects of this medicine may not be seen right away.  This medicine may affect your concentration, function, or may hide signs that you are tired. You may get dizzy. Do not drive, use machinery, or do anything that needs mental alertness until you know how this drug affects  you. Alcohol can make you more dizzy and may interfere with your response to this medicine or your alertness. Avoid alcoholic drinks.  Birth control pills may not work properly while you are taking this medicine. Talk to your doctor about using an extra method of birth control.  It is unknown if the effects of this medicine will be increased by the use of caffeine. Caffeine is available in many foods, beverages, and medications. Ask your doctor if you should limit or change your intake of caffeine-containing products while on this medicine.  NOTE:This sheet is a summary. It may not cover all possible information. If you have questions about this medicine, talk to your doctor, pharmacist, or health care provider. Copyright© 2017 [employer]

## 2020-08-11 NOTE — PROGRESS NOTES
Subjective:       Patient ID: Bo Chase is a 75 y.o. White male who presents for follow-up evaluation of Chronic Kidney Disease    Hypertension  Pertinent negatives include no chest pain, headaches, neck pain, palpitations or shortness of breath.   Edema  Pertinent negatives include no abdominal pain, arthralgias, chest pain, chills, congestion, coughing, diaphoresis, fatigue, fever, headaches, joint swelling, nausea, neck pain, rash or vomiting.      Patient is a  white male who was  by profession.  Has history of chronic kidney disease for at least 5 years.  He was last seen by Dr. Prather in 2012.  Renal ultrasound at that time showed bilateral renal cysts.  Left kidney had a cyst of 2.5 cm.  Patient never had any proteinuria.  His creatinine has been fluctuating between 1.6 and 2.0.  His fluctuations have been due to diuretic therapy.  In the last 5 years his kidney function has been stable.  He has had history of coronary artery disease status post shortness of breath and dyspnea and exertion.    12/ 2014Occluded lad lcx and rca.Occluded svg to d1svg to rca patent svg to  om1 90% eccentric treated with dennis with filter wire protection.Patent lima.No complication. Dr. Joel     2014 LEANDRO : now on CPAP    6/2015 LBP s/p KAROLINA and s/p surgery laminectomy     4/2017 Renal USD CT scan of the abdomen shows extensive calcifications of the aorta    August 2017 2-D echo reviewed= PA pressure >26 mm Hg, creatinine down to 1.4, uric acid controlled, vitamin D level is 43, PTH 91, estimated GFR with Cystatin C is 50%; weight down by 10 ib from 263 to 254 ib    March 2018 patient's creatinine is up to 1.8.  Weight is up to 262 pounds.  Approximate GFR about 40%.  GFR loss is about 10% per year.  No proteinuria.  PTH has gone up from 91 in 2017-235 in March 2018.  Labwork shows severe metabolic alkalosis, severe hypokalemia, low chloride levels due to complications of diuretics. Severe Polycythemia may need phlebotomy  "    6/2018 K+ is better ; Hgb is better ; weight is higher ; GFR 37 % with Cr 1.7 ; retired and now in gym     09/2018 High Calcium; stop D; check USD and SPEP     November 2018 ultrasound done no cancer.  Negative serum protein electrophoresis    1/2019 s/p fall now pending left hips IA steroids in SI and Hip ; creatinine down off ibuprofen ; rtired now in [place] --gym and work out d/w patient [place]ell    6/2019 s/p cellulitis x2 s/p inpatient and I&D ( serratia ) ; left hip injection and Chiro for Pyriformitis     August 2020 creatinine 2.0.  Status post phlebotomy for polycythemia under care of Dr. Diaz.    Review of Systems   Constitutional: Negative.  Negative for activity change, appetite change, chills, diaphoresis, fatigue and fever.         water aerobics ; weight training    HENT: Negative.  Negative for congestion and trouble swallowing.    Eyes: Negative.    Respiratory: Negative.  Negative for cough, chest tightness, shortness of breath and wheezing.    Cardiovascular: Positive for leg swelling. Negative for chest pain and palpitations.   Gastrointestinal: Negative.  Negative for abdominal distention, abdominal pain, nausea and vomiting.   Genitourinary: Negative.  Negative for decreased urine volume, difficulty urinating, dysuria, enuresis, flank pain, frequency, hematuria, penile swelling, scrotal swelling and urgency.   Musculoskeletal: Negative.  Negative for arthralgias, back pain, joint swelling and neck pain.   Skin: Negative for rash.   Neurological: Negative.  Negative for tremors, seizures and headaches.   Psychiatric/Behavioral: Negative.  Negative for confusion and sleep disturbance. The patient is not nervous/anxious.        Objective:   /70 (BP Location: Left arm)   Pulse 80   Ht 5' 7" (1.702 m)   Wt 120.7 kg (266 lb 1.5 oz)   BMI 41.68 kg/m²      Weight at home 270     gained 10 lb since last visit.  From 259-269    1/2019 277 ib     6/2019 254 ib = 115.3 kg     10/2019 257 = " 116.6    2/2020 263 ib     August 2020 266 lb  Physical Exam  Constitutional:       General: He is not in acute distress.     Appearance: He is well-developed.   HENT:      Head: Normocephalic.   Eyes:      Pupils: Pupils are equal, round, and reactive to light.   Neck:      Musculoskeletal: Normal range of motion and neck supple.      Thyroid: No thyromegaly.      Vascular: No JVD.   Cardiovascular:      Rate and Rhythm: Normal rate and regular rhythm.      Chest Wall: PMI is not displaced.      Heart sounds: Normal heart sounds, S1 normal and S2 normal. No murmur. No friction rub. No gallop.       Comments: Cholesterol emboli ; loud P2   Pulmonary:      Effort: Pulmonary effort is normal. No respiratory distress.      Breath sounds: Normal breath sounds. No wheezing or rales.   Chest:      Chest wall: No tenderness.   Abdominal:      General: There is no distension.      Palpations: There is no mass.      Tenderness: There is no abdominal tenderness. There is no rebound.      Hernia: No hernia is present.   Musculoskeletal: Normal range of motion.         General: No tenderness.      Comments: 2+ edema R>L    Lymphadenopathy:      Cervical: No cervical adenopathy.   Skin:     General: Skin is warm and dry.      Findings: No erythema or rash.   Neurological:      Mental Status: He is alert and oriented to person, place, and time. He is not disoriented.      Cranial Nerves: No cranial nerve deficit.      Motor: No abnormal muscle tone.      Coordination: Coordination normal.      Deep Tendon Reflexes: Reflexes are normal and symmetric. Reflexes normal.   Psychiatric:         Behavior: Behavior normal.         Thought Content: Thought content normal.         Judgment: Judgment normal.        Left shoulder / fibro / myofascial pain       Lab Results   Component Value Date    CREATININE 2.0 (H) 08/06/2020    BUN 43 (H) 08/06/2020     08/06/2020    K 3.2 (L) 08/06/2020    CL 97 08/06/2020    CO2 26 08/06/2020      Lab Results   Component Value Date    WBC 11.15 08/06/2020    WBC 11.15 08/06/2020    HGB 19.0 (H) 08/06/2020    HGB 19.0 (H) 08/06/2020    HCT 55.9 (H) 08/06/2020    HCT 55.9 (H) 08/06/2020    MCV 91 08/06/2020    MCV 91 08/06/2020     08/06/2020     08/06/2020     Lab Results   Component Value Date    PTH 88.0 (H) 08/06/2020    PTH 88.0 (H) 08/06/2020    PTH 88.0 (H) 08/06/2020    CALCIUM 9.8 08/06/2020    PHOS 3.4 02/20/2020     Lab Results   Component Value Date    URICACID 6.0 08/06/2020    URICACID 6.0 08/06/2020    URICACID 6.0 08/06/2020         Assessment:    )    1. CKD (chronic kidney disease) stage 3, GFR 30-59 ml/min    2. Bilateral edema of lower extremity    3. Secondary hyperparathyroidism of renal origin    4. Complex renal cyst    5. Hypokalemia    6. Idiopathic chronic gout of multiple sites without tophus    7. Cholesterol embolism of lower extremity, bilateral    8. Pulmonary hypertension    9. Diastolic dysfunction    10. Hypercalcemia        Plan:         1.  Chronic kidney disease stage III  patient may have cardiorenal syndrome:  fluctuating creatinine is due to diuretics.   No heavy proteinuria.  Creatinine is stable today.  GFR 35-40% ;     2.  Chronic gout: Last attack was 2018.   on allopurinol 450 mg uric acid is well controlled at 6 followed by Rheumatology.    3.  Hyperparathyroidism due to chronic kidney disease stage III:  Off  vitamin-D therapy since the calcium is too high.  Consider Rayaldee at some point no vitamin-D is needed at this time.  Vitamin-D level was normal in September 2019.    4.   anasarca on the lower extremities with chronic venous stasis findings as well as brawny edema of the skin: Patient had an ultrasound of the legs in 2012 which showed no DVT.  Clinically patient has pulm. hypertension, metolazone 1-2x week + torsemide 20 mg x2  daily.  Followed by Cardiology for    5.  Cholesterol emboli of the lower extremities: Extensive aortic  calcifications noted on the CT scan.    aspirin to 325 mg daily. Continue Lipitor.    6.  Pulm HTN and LEANDRO: records of CPAP and dr. Elaine in Cardiology department  Reviewed.  Polycythemia reviewed elevated erythropoietin.  Followed by Dr. Diaz. Try Provigil     7.  Hypokalemia:keep  aldactone 25 mg  KCL to 40 meq daily.  Will try Inspra 50 mg instead of Aldactone d/w patient    8.  Continued follow-up with Hematology for polycythemia monthly phlebotomy.  Records of Hematology- reviewed     9.  Acquired renal cysts: Patient had multiple complex cysts in the ultrasound done in 2017 and 2018 .  Status post  renal ultrasound June of 2020    10. Hip Arthiritis: will need long term tramadol which I will prescribe.  Last prescription July 2020    I have reconciled with the patient's narcotic prescriptions on [place] PMP [place].  I have complied with all the narcotic prescription rules and regulations as outlined by the [facility] board.    11. Left shoulder / fibro / myofascial pain : PT conuslt , warm compresses          Follow-up  4 -6 months        Washington Beranl MD

## 2020-08-14 ENCOUNTER — TELEPHONE (OUTPATIENT)
Dept: PHARMACY | Facility: CLINIC | Age: 75
End: 2020-08-14

## 2020-08-14 NOTE — TELEPHONE ENCOUNTER
Good Morning,     The prior authorization for Bo Chase's Modafinil  prescription has been APPROVED FROM 1/1/2020 TO 12/31/2020 with copayment of $99.00.       Patient has been notified of the decision on 8/14/2020 and patient states he he really cannot afford the copayment of $99.00.  Gave him the option of paying cash off the insurance and he will do so, as it is cheaper.    If there are any additional questions or concerns, please contact me.    Thank You!   Rea Darnell CPhT, B.A  Patient Care Advocate   [facility]  Phone: [phone] Ext [phone]  Fax: [phone]

## 2020-09-03 RX ORDER — PENTOXIFYLLINE 400 MG/1
400 TABLET, EXTENDED RELEASE ORAL
Qty: 90 TABLET | Refills: 5 | Status: SHIPPED | OUTPATIENT
Start: 2020-09-03 | End: 2021-02-18 | Stop reason: SDUPTHER

## 2020-09-08 ENCOUNTER — OFFICE VISIT (OUTPATIENT)
Dept: HEMATOLOGY/ONCOLOGY | Facility: CLINIC | Age: 75
End: 2020-09-08
Payer: MEDICARE

## 2020-09-08 ENCOUNTER — LAB VISIT (OUTPATIENT)
Dept: LAB | Facility: HOSPITAL | Age: 75
End: 2020-09-08
Attending: INTERNAL MEDICINE
Payer: MEDICARE

## 2020-09-08 ENCOUNTER — INFUSION (OUTPATIENT)
Dept: INFUSION THERAPY | Facility: HOSPITAL | Age: 75
End: 2020-09-08
Attending: INTERNAL MEDICINE
Payer: MEDICARE

## 2020-09-08 VITALS
HEIGHT: 67 IN | SYSTOLIC BLOOD PRESSURE: 116 MMHG | WEIGHT: 272.06 LBS | BODY MASS INDEX: 42.7 KG/M2 | TEMPERATURE: 99 F | OXYGEN SATURATION: 95 % | HEART RATE: 82 BPM | DIASTOLIC BLOOD PRESSURE: 70 MMHG

## 2020-09-08 VITALS
DIASTOLIC BLOOD PRESSURE: 69 MMHG | OXYGEN SATURATION: 95 % | HEART RATE: 73 BPM | SYSTOLIC BLOOD PRESSURE: 111 MMHG | RESPIRATION RATE: 16 BRPM | TEMPERATURE: 98 F

## 2020-09-08 DIAGNOSIS — G47.33 OSA ON CPAP: ICD-10-CM

## 2020-09-08 DIAGNOSIS — D75.1 POLYCYTHEMIA, SECONDARY: ICD-10-CM

## 2020-09-08 DIAGNOSIS — D75.1 POLYCYTHEMIA, SECONDARY: Primary | ICD-10-CM

## 2020-09-08 DIAGNOSIS — D75.1 ACQUIRED POLYCYTHEMIA: Primary | ICD-10-CM

## 2020-09-08 LAB
BASOPHILS # BLD AUTO: 0.04 K/UL (ref 0–0.2)
BASOPHILS NFR BLD: 0.4 % (ref 0–1.9)
DIFFERENTIAL METHOD: ABNORMAL
EOSINOPHIL # BLD AUTO: 0.1 K/UL (ref 0–0.5)
EOSINOPHIL NFR BLD: 1.2 % (ref 0–8)
ERYTHROCYTE [DISTWIDTH] IN BLOOD BY AUTOMATED COUNT: 15.8 % (ref 11.5–14.5)
HCT VFR BLD AUTO: 52.2 % (ref 40–54)
HGB BLD-MCNC: 17.6 G/DL (ref 14–18)
IMM GRANULOCYTES # BLD AUTO: 0.08 K/UL (ref 0–0.04)
IMM GRANULOCYTES NFR BLD AUTO: 0.9 % (ref 0–0.5)
LYMPHOCYTES # BLD AUTO: 1.3 K/UL (ref 1–4.8)
LYMPHOCYTES NFR BLD: 14.2 % (ref 18–48)
MCH RBC QN AUTO: 31.3 PG (ref 27–31)
MCHC RBC AUTO-ENTMCNC: 33.7 G/DL (ref 32–36)
MCV RBC AUTO: 93 FL (ref 82–98)
MONOCYTES # BLD AUTO: 0.7 K/UL (ref 0.3–1)
MONOCYTES NFR BLD: 7.3 % (ref 4–15)
NEUTROPHILS # BLD AUTO: 7 K/UL (ref 1.8–7.7)
NEUTROPHILS NFR BLD: 76 % (ref 38–73)
NRBC BLD-RTO: 0 /100 WBC
PLATELET # BLD AUTO: 178 K/UL (ref 150–350)
PMV BLD AUTO: 9.8 FL (ref 9.2–12.9)
RBC # BLD AUTO: 5.62 M/UL (ref 4.6–6.2)
WBC # BLD AUTO: 9.2 K/UL (ref 3.9–12.7)

## 2020-09-08 PROCEDURE — 99195 PHLEBOTOMY: CPT

## 2020-09-08 PROCEDURE — 3288F PR FALLS RISK ASSESSMENT DOCUMENTED: ICD-10-PCS | Mod: CPTII,S$GLB,, | Performed by: INTERNAL MEDICINE

## 2020-09-08 PROCEDURE — 99999 PR PBB SHADOW E&M-EST. PATIENT-LVL V: CPT | Mod: PBBFAC,,, | Performed by: INTERNAL MEDICINE

## 2020-09-08 PROCEDURE — 1159F PR MEDICATION LIST DOCUMENTED IN MEDICAL RECORD: ICD-10-PCS | Mod: S$GLB,,, | Performed by: INTERNAL MEDICINE

## 2020-09-08 PROCEDURE — 3074F SYST BP LT 130 MM HG: CPT | Mod: CPTII,S$GLB,, | Performed by: INTERNAL MEDICINE

## 2020-09-08 PROCEDURE — 99214 OFFICE O/P EST MOD 30 MIN: CPT | Mod: S$GLB,,, | Performed by: INTERNAL MEDICINE

## 2020-09-08 PROCEDURE — 1125F PR PAIN SEVERITY QUANTIFIED, PAIN PRESENT: ICD-10-PCS | Mod: S$GLB,,, | Performed by: INTERNAL MEDICINE

## 2020-09-08 PROCEDURE — 1100F PTFALLS ASSESS-DOCD GE2>/YR: CPT | Mod: CPTII,S$GLB,, | Performed by: INTERNAL MEDICINE

## 2020-09-08 PROCEDURE — 1159F MED LIST DOCD IN RCRD: CPT | Mod: S$GLB,,, | Performed by: INTERNAL MEDICINE

## 2020-09-08 PROCEDURE — 36415 COLL VENOUS BLD VENIPUNCTURE: CPT

## 2020-09-08 PROCEDURE — 85025 COMPLETE CBC W/AUTO DIFF WBC: CPT

## 2020-09-08 PROCEDURE — 99214 PR OFFICE/OUTPT VISIT, EST, LEVL IV, 30-39 MIN: ICD-10-PCS | Mod: S$GLB,,, | Performed by: INTERNAL MEDICINE

## 2020-09-08 PROCEDURE — 3008F PR BODY MASS INDEX (BMI) DOCUMENTED: ICD-10-PCS | Mod: CPTII,S$GLB,, | Performed by: INTERNAL MEDICINE

## 2020-09-08 PROCEDURE — 1100F PR PT FALLS ASSESS DOC 2+ FALLS/FALL W/INJURY/YR: ICD-10-PCS | Mod: CPTII,S$GLB,, | Performed by: INTERNAL MEDICINE

## 2020-09-08 PROCEDURE — 99999 PR PBB SHADOW E&M-EST. PATIENT-LVL V: ICD-10-PCS | Mod: PBBFAC,,, | Performed by: INTERNAL MEDICINE

## 2020-09-08 PROCEDURE — 3074F PR MOST RECENT SYSTOLIC BLOOD PRESSURE < 130 MM HG: ICD-10-PCS | Mod: CPTII,S$GLB,, | Performed by: INTERNAL MEDICINE

## 2020-09-08 PROCEDURE — 1125F AMNT PAIN NOTED PAIN PRSNT: CPT | Mod: S$GLB,,, | Performed by: INTERNAL MEDICINE

## 2020-09-08 PROCEDURE — 3078F DIAST BP <80 MM HG: CPT | Mod: CPTII,S$GLB,, | Performed by: INTERNAL MEDICINE

## 2020-09-08 PROCEDURE — 3288F FALL RISK ASSESSMENT DOCD: CPT | Mod: CPTII,S$GLB,, | Performed by: INTERNAL MEDICINE

## 2020-09-08 PROCEDURE — 3078F PR MOST RECENT DIASTOLIC BLOOD PRESSURE < 80 MM HG: ICD-10-PCS | Mod: CPTII,S$GLB,, | Performed by: INTERNAL MEDICINE

## 2020-09-08 PROCEDURE — 3008F BODY MASS INDEX DOCD: CPT | Mod: CPTII,S$GLB,, | Performed by: INTERNAL MEDICINE

## 2020-09-08 RX ORDER — LIDOCAINE HYDROCHLORIDE 10 MG/ML
1 INJECTION, SOLUTION EPIDURAL; INFILTRATION; INTRACAUDAL; PERINEURAL ONCE
Status: CANCELLED | OUTPATIENT
Start: 2020-09-12

## 2020-09-08 NOTE — DISCHARGE INSTRUCTIONS
[facility]  [address] [address]  [address]  [phone] phone     [phone] fax  Hours of Operation: Monday- Friday 8:00am- 5:00pm  After hours phone  [phone]  Hematology / Oncology Physicians on call      Dr. Kwabena Gardiner      Please call with any concerns regarding your appointment today.        FALL PREVENTION   Falls often occur due to slipping, tripping or losing your balance. Here are ways to reduce your risk of falling again.   Was there anything that caused your fall that can be fixed, removed or replaced?   Make your home safe by keeping walkways clear of objects you may trip over.   Use non-slip pads under rugs.   Do not walk in poorly lit areas.   Do not stand on chairs or wobbly ladders.   Use caution when reaching overhead or looking upward. This position can cause a loss of balance.   Be sure your shoes fit properly, have non-slip bottoms and are in good condition.   Be cautious when going up and down stairs, curbs, and when walking on uneven sidewalks.   If your balance is poor, consider using a cane or walker.   If your fall was related to alcohol use, stop or limit alcohol intake.   If your fall was related to use of sleeping medicines, talk to your doctor about this. You may need to reduce your dosage at bedtime if you awaken during the night to go to the bathroom.   To reduce the need for nighttime bathroom trips:   Avoid drinking fluids for several hours before going to bed   Empty your bladder before going to bed   Men can keep a urinal at the bedside   © 6375-9023 [employer]it[employer], [address] [place] [address], [place] [address]. All rights reserved. This information is not intended as a substitute for professional medical care. Always follow your healthcare professional's instructions.      WAYS TO HELP PREVENT INFECTION         WASH YOUR HANDS OFTEN DURING THE DAY, ESPECIALLY BEFORE YOU EAT,  AFTER USING THE BATHROOM, AND AFTER TOUCHING ANIMALS     STAY AWAY FROM PEOPLE WHO HAVE ILLNESSES YOU CAN CATCH; SUCH AS COLDS, FLU, CHICKEN POX     TRY TO AVOID CROWDS     STAY AWAY FROM CHILDREN WHO RECENTLY HAVE RECEIVED LIVE VIRUS VACCINES     MAINTAIN GOOD MOUTH CARE     DO NOT SQUEEZE OR SCRATCH PIMPLES     CLEAN CUTS & SCRAPES RIGHT AWAY AND DAILY UNTIL HEALED WITH WARM WATER, SOAP & AN ANTISEPTIC     AVOID CONTACT WITH LITTER BOXES, BIRD CAGES, & FISH TANKS     AVOID STANDING WATER, IE., BIRD BATHS, FLOWER POTS/VASES, OR HUMIDIFIERS     WEAR GLOVES WHEN GARDENING OR CLEANING UP AFTER OTHERS, ESPECIALLY BABIES & SMALL CHILDREN     DO NOT EAT RAW FISH, SEAFOOD, MEAT, OR EGGS

## 2020-09-08 NOTE — NURSING
1218  9/8/20  Therapeutic phlebotomy x 1/2 unit per protocol via right AC.  On completion, needle removed and pressure dsg applied.  No adverse rxn noted and pt tolerated well.  Juice offered and taken by pt.  B/P 111/69, P 73.  No c/o's of weakness or dizziness.  Pt ambulates from unit without difficulty with return leo't.

## 2020-09-08 NOTE — PROGRESS NOTES
Subjective:      DATE OF VISIT: 9/8/2020   ?   ?   Patient ID:?Bo Chase is a 75 y.o. male.?? MR#: [id number]   ?   PRIMARY PROVIDER: Dr. Diaz  ?   CHIEF COMPLAINT:  Polycythemia?????   ?   DIAGNOSIS:  Secondary polycythemia  ?   CURRENT TREATMENT:  Phlebotomy, aspirin (takes 325mg daily)      HPI    Today I have the pleasure of seeing in follow-up Mr. Chase.  He continues to use CPAP.  Continues on aspirin 325 mg daily.  He notes unsuccessful and weight loss.      Review of systems:  ?   A comprehensive 14-point review of systems was reviewed with patient and was negative other than as specified above.   ?     Objective:      Physical Exam      ?   Vitals:    09/08/20 1133   BP: 116/70   Pulse: 82   Temp: 99.4 °F (37.4 °C)      ?   ECOG:?0   General appearance: Generally well appearing, in no acute distress.   Head, eyes, ears, nose, and throat: moist mucous membranes, mild ruddiness of face, mildly improved since last visit a month ago.   Respiratory:  Clear to auscultation bilaterally  Cardiovascular:  Grade 2 systolic ejection murmur, regular rate and rhythm  Abdomen:  Obese, nontender, nondistended.   Extremities: Warm, with stable chronic stasis changes and trace edema symmetric bilaterally  Neurologic: Alert and oriented. Grossly normal strength, coordination, and gait.   Skin: No rashes, ecchymoses or petechial lesion.   Psychiatric:  Normal mood and affect.        Laboratory:  ?   Lab Visit on 09/08/2020   Component Date Value Ref Range Status    WBC 09/08/2020 9.20  3.90 - 12.70 K/uL Final    RBC 09/08/2020 5.62  4.60 - 6.20 M/uL Final    Hemoglobin 09/08/2020 17.6  14.0 - 18.0 g/dL Final    Hematocrit 09/08/2020 52.2  40.0 - 54.0 % Final    Mean Corpuscular Volume 09/08/2020 93  82 - 98 fL Final    Mean Corpuscular Hemoglobin 09/08/2020 31.3* 27.0 - 31.0 pg Final    Mean Corpuscular Hemoglobin Conc 09/08/2020 33.7  32.0 - 36.0 g/dL Final    RDW 09/08/2020 15.8* 11.5 - 14.5 % Final     Platelets 09/08/2020 178  150 - 350 K/uL Final    MPV 09/08/2020 9.8  9.2 - 12.9 fL Final    Immature Granulocytes 09/08/2020 0.9* 0.0 - 0.5 % Final    Gran # (ANC) 09/08/2020 7.0  1.8 - 7.7 K/uL Final    Immature Grans (Abs) 09/08/2020 0.08* 0.00 - 0.04 K/uL Final    Lymph # 09/08/2020 1.3  1.0 - 4.8 K/uL Final    Mono # 09/08/2020 0.7  0.3 - 1.0 K/uL Final    Eos # 09/08/2020 0.1  0.0 - 0.5 K/uL Final    Baso # 09/08/2020 0.04  0.00 - 0.20 K/uL Final    nRBC 09/08/2020 0  0 /100 WBC Final    Gran% 09/08/2020 76.0* 38.0 - 73.0 % Final    Lymph% 09/08/2020 14.2* 18.0 - 48.0 % Final    Mono% 09/08/2020 7.3  4.0 - 15.0 % Final    Eosinophil% 09/08/2020 1.2  0.0 - 8.0 % Final    Basophil% 09/08/2020 0.4  0.0 - 1.9 % Final    Differential Method 09/08/2020 Automated   Final      ?   ?   Hemoglobin   Date Value Ref Range Status   09/08/2020 17.6 14.0 - 18.0 g/dL Final   08/06/2020 19.0 (H) 14.0 - 18.0 g/dL Final   08/06/2020 19.0 (H) 14.0 - 18.0 g/dL Final   03/26/2020 16.9 14.0 - 18.0 g/dL Final   02/20/2020 15.4 14.0 - 18.0 g/dL Final   01/30/2020 15.1 14.0 - 18.0 g/dL Final   12/19/2019 17.1 14.0 - 18.0 g/dL Final   11/12/2019 17.5 14.0 - 18.0 g/dL Final   09/03/2019 17.1 14.0 - 18.0 g/dL Final   05/13/2019 17.6 14.0 - 18.0 g/dL Final     Hematocrit   Date Value Ref Range Status   09/08/2020 52.2 40.0 - 54.0 % Final   08/06/2020 55.9 (H) 40.0 - 54.0 % Final   08/06/2020 55.9 (H) 40.0 - 54.0 % Final   03/26/2020 53.8 40.0 - 54.0 % Final   02/20/2020 50.0 40.0 - 54.0 % Final   01/30/2020 47.2 40.0 - 54.0 % Final   12/19/2019 51.7 40.0 - 54.0 % Final   11/12/2019 52.6 40.0 - 54.0 % Final   09/03/2019 53.8 40.0 - 54.0 % Final   05/13/2019 54.2 (H) 40.0 - 54.0 % Final       Assessment/Plan:       No diagnosis found.      Plan:     # Polycythemia:  secondary polycythemia to LEANDRO on CPAP.  Elevated erythropoietin level to 21 in 2016 and JAK2 V617 F mutation negative in 2014.  He has not had bone marrow biopsy.  CPAP recently well managed yet persistent polycythemia. I did recommend repeat epo testing continues to be elevated 18.6.  JAK2 Exon 12 mutation negative.  He continues on aspirin.  and extending ASIF testing which he is amenable to, with possible BMBx especially is results less c/w secondary cause. He continues on ASA35. No s/sx of thrombotic event or constitutional symptoms.    - recommended phlebotomy 0.5 unit given improvement hematocrit 52.    # LEANDRO on CPAP:  Encouraged continue use of CPAP, using nightly    # Obesity:  Encouraged lifestyle intervention and weight loss      Follow-Up:   - phlebotomy today, 0.5 unit  - RV in 1 months with RV and labs prior

## 2020-09-09 ENCOUNTER — TELEPHONE (OUTPATIENT)
Dept: PULMONOLOGY | Facility: CLINIC | Age: 75
End: 2020-09-09

## 2020-09-09 NOTE — TELEPHONE ENCOUNTER
----- Message from Mayito Kinsey sent at 9/9/2020 11:17 AM CDT -----  Contact: self  Pt is requesting a new CPAP Machine and cannot wait until his appt. In November. Please call back at [phone].    Thanks  Zs

## 2020-09-16 ENCOUNTER — OFFICE VISIT (OUTPATIENT)
Dept: PULMONOLOGY | Facility: CLINIC | Age: 75
End: 2020-09-16
Payer: MEDICARE

## 2020-09-16 ENCOUNTER — HOSPITAL ENCOUNTER (EMERGENCY)
Facility: HOSPITAL | Age: 75
Discharge: HOME | End: 2020-09-16
Attending: EMERGENCY MEDICINE
Payer: MEDICARE

## 2020-09-16 VITALS
RESPIRATION RATE: 20 BRPM | BODY MASS INDEX: 42.87 KG/M2 | HEART RATE: 79 BPM | DIASTOLIC BLOOD PRESSURE: 84 MMHG | OXYGEN SATURATION: 95 % | WEIGHT: 273.13 LBS | HEIGHT: 67 IN | SYSTOLIC BLOOD PRESSURE: 110 MMHG

## 2020-09-16 VITALS
BODY MASS INDEX: 43.32 KG/M2 | DIASTOLIC BLOOD PRESSURE: 72 MMHG | TEMPERATURE: 99 F | SYSTOLIC BLOOD PRESSURE: 115 MMHG | OXYGEN SATURATION: 97 % | HEIGHT: 67 IN | WEIGHT: 276 LBS | HEART RATE: 76 BPM | RESPIRATION RATE: 18 BRPM

## 2020-09-16 DIAGNOSIS — I50.32 CHRONIC DIASTOLIC CONGESTIVE HEART FAILURE: ICD-10-CM

## 2020-09-16 DIAGNOSIS — R10.9 RIGHT FLANK PAIN: Primary | ICD-10-CM

## 2020-09-16 DIAGNOSIS — N18.4 STAGE 4 CHRONIC KIDNEY DISEASE: ICD-10-CM

## 2020-09-16 DIAGNOSIS — E66.01 MORBID OBESITY WITH BMI OF 40.0-44.9, ADULT: ICD-10-CM

## 2020-09-16 DIAGNOSIS — G47.33 OSA ON CPAP: Primary | ICD-10-CM

## 2020-09-16 DIAGNOSIS — I10 ESSENTIAL HYPERTENSION: Chronic | ICD-10-CM

## 2020-09-16 DIAGNOSIS — R10.9 ACUTE RIGHT FLANK PAIN: ICD-10-CM

## 2020-09-16 DIAGNOSIS — Z95.5 S/P CORONARY ARTERY STENT PLACEMENT: ICD-10-CM

## 2020-09-16 LAB
ALBUMIN SERPL BCP-MCNC: 3.8 G/DL (ref 3.5–5.2)
ALP SERPL-CCNC: 70 U/L (ref 55–135)
ALT SERPL W/O P-5'-P-CCNC: 54 U/L (ref 10–44)
ANION GAP SERPL CALC-SCNC: 10 MMOL/L (ref 8–16)
AST SERPL-CCNC: 42 U/L (ref 10–40)
BASOPHILS # BLD AUTO: 0.06 K/UL (ref 0–0.2)
BASOPHILS NFR BLD: 0.6 % (ref 0–1.9)
BILIRUB SERPL-MCNC: 1.1 MG/DL (ref 0.1–1)
BILIRUB UR QL STRIP: NEGATIVE
BUN SERPL-MCNC: 24 MG/DL (ref 8–23)
CALCIUM SERPL-MCNC: 9.5 MG/DL (ref 8.7–10.5)
CHLORIDE SERPL-SCNC: 101 MMOL/L (ref 95–110)
CLARITY UR: CLEAR
CO2 SERPL-SCNC: 25 MMOL/L (ref 23–29)
COLOR UR: YELLOW
CREAT SERPL-MCNC: 1.7 MG/DL (ref 0.5–1.4)
DIFFERENTIAL METHOD: ABNORMAL
EOSINOPHIL # BLD AUTO: 0.1 K/UL (ref 0–0.5)
EOSINOPHIL NFR BLD: 1.3 % (ref 0–8)
ERYTHROCYTE [DISTWIDTH] IN BLOOD BY AUTOMATED COUNT: 15.9 % (ref 11.5–14.5)
EST. GFR  (AFRICAN AMERICAN): 45 ML/MIN/1.73 M^2
EST. GFR  (NON AFRICAN AMERICAN): 39 ML/MIN/1.73 M^2
GLUCOSE SERPL-MCNC: 94 MG/DL (ref 70–110)
GLUCOSE UR QL STRIP: NEGATIVE
HCT VFR BLD AUTO: 52.4 % (ref 40–54)
HGB BLD-MCNC: 17.3 G/DL (ref 14–18)
HGB UR QL STRIP: NEGATIVE
IMM GRANULOCYTES # BLD AUTO: 0.11 K/UL (ref 0–0.04)
IMM GRANULOCYTES NFR BLD AUTO: 1.1 % (ref 0–0.5)
KETONES UR QL STRIP: NEGATIVE
LEUKOCYTE ESTERASE UR QL STRIP: NEGATIVE
LIPASE SERPL-CCNC: 28 U/L (ref 4–60)
LYMPHOCYTES # BLD AUTO: 1.6 K/UL (ref 1–4.8)
LYMPHOCYTES NFR BLD: 16.2 % (ref 18–48)
MCH RBC QN AUTO: 31.3 PG (ref 27–31)
MCHC RBC AUTO-ENTMCNC: 33 G/DL (ref 32–36)
MCV RBC AUTO: 95 FL (ref 82–98)
MONOCYTES # BLD AUTO: 0.8 K/UL (ref 0.3–1)
MONOCYTES NFR BLD: 7.9 % (ref 4–15)
NEUTROPHILS # BLD AUTO: 7.3 K/UL (ref 1.8–7.7)
NEUTROPHILS NFR BLD: 72.9 % (ref 38–73)
NITRITE UR QL STRIP: NEGATIVE
NRBC BLD-RTO: 0 /100 WBC
PH UR STRIP: 6 [PH] (ref 5–8)
PLATELET # BLD AUTO: 194 K/UL (ref 150–350)
PMV BLD AUTO: 9.7 FL (ref 9.2–12.9)
POTASSIUM SERPL-SCNC: 4.9 MMOL/L (ref 3.5–5.1)
PROT SERPL-MCNC: 7.2 G/DL (ref 6–8.4)
PROT UR QL STRIP: NEGATIVE
RBC # BLD AUTO: 5.53 M/UL (ref 4.6–6.2)
SODIUM SERPL-SCNC: 136 MMOL/L (ref 136–145)
SP GR UR STRIP: 1.01 (ref 1–1.03)
URN SPEC COLLECT METH UR: NORMAL
UROBILINOGEN UR STRIP-ACNC: NEGATIVE EU/DL
WBC # BLD AUTO: 9.99 K/UL (ref 3.9–12.7)

## 2020-09-16 PROCEDURE — 1159F PR MEDICATION LIST DOCUMENTED IN MEDICAL RECORD: ICD-10-PCS | Mod: S$GLB,,, | Performed by: NURSE PRACTITIONER

## 2020-09-16 PROCEDURE — 99214 OFFICE O/P EST MOD 30 MIN: CPT | Mod: S$GLB,,, | Performed by: NURSE PRACTITIONER

## 2020-09-16 PROCEDURE — 96374 THER/PROPH/DIAG INJ IV PUSH: CPT

## 2020-09-16 PROCEDURE — 99999 PR PBB SHADOW E&M-EST. PATIENT-LVL V: CPT | Mod: PBBFAC,,, | Performed by: NURSE PRACTITIONER

## 2020-09-16 PROCEDURE — 3079F PR MOST RECENT DIASTOLIC BLOOD PRESSURE 80-89 MM HG: ICD-10-PCS | Mod: CPTII,S$GLB,, | Performed by: NURSE PRACTITIONER

## 2020-09-16 PROCEDURE — 1100F PTFALLS ASSESS-DOCD GE2>/YR: CPT | Mod: CPTII,S$GLB,, | Performed by: NURSE PRACTITIONER

## 2020-09-16 PROCEDURE — 85025 COMPLETE CBC W/AUTO DIFF WBC: CPT

## 2020-09-16 PROCEDURE — 3288F FALL RISK ASSESSMENT DOCD: CPT | Mod: CPTII,S$GLB,, | Performed by: NURSE PRACTITIONER

## 2020-09-16 PROCEDURE — 3074F PR MOST RECENT SYSTOLIC BLOOD PRESSURE < 130 MM HG: ICD-10-PCS | Mod: CPTII,S$GLB,, | Performed by: NURSE PRACTITIONER

## 2020-09-16 PROCEDURE — 3008F BODY MASS INDEX DOCD: CPT | Mod: CPTII,S$GLB,, | Performed by: NURSE PRACTITIONER

## 2020-09-16 PROCEDURE — 96375 TX/PRO/DX INJ NEW DRUG ADDON: CPT

## 2020-09-16 PROCEDURE — 83690 ASSAY OF LIPASE: CPT

## 2020-09-16 PROCEDURE — 81003 URINALYSIS AUTO W/O SCOPE: CPT

## 2020-09-16 PROCEDURE — 63600175 PHARM REV CODE 636 W HCPCS: Performed by: EMERGENCY MEDICINE

## 2020-09-16 PROCEDURE — 99284 EMERGENCY DEPT VISIT MOD MDM: CPT | Mod: 25

## 2020-09-16 PROCEDURE — 3074F SYST BP LT 130 MM HG: CPT | Mod: CPTII,S$GLB,, | Performed by: NURSE PRACTITIONER

## 2020-09-16 PROCEDURE — 80053 COMPREHEN METABOLIC PANEL: CPT

## 2020-09-16 PROCEDURE — 3079F DIAST BP 80-89 MM HG: CPT | Mod: CPTII,S$GLB,, | Performed by: NURSE PRACTITIONER

## 2020-09-16 PROCEDURE — 99214 PR OFFICE/OUTPT VISIT, EST, LEVL IV, 30-39 MIN: ICD-10-PCS | Mod: S$GLB,,, | Performed by: NURSE PRACTITIONER

## 2020-09-16 PROCEDURE — 3008F PR BODY MASS INDEX (BMI) DOCUMENTED: ICD-10-PCS | Mod: CPTII,S$GLB,, | Performed by: NURSE PRACTITIONER

## 2020-09-16 PROCEDURE — 99999 PR PBB SHADOW E&M-EST. PATIENT-LVL V: ICD-10-PCS | Mod: PBBFAC,,, | Performed by: NURSE PRACTITIONER

## 2020-09-16 PROCEDURE — 1159F MED LIST DOCD IN RCRD: CPT | Mod: S$GLB,,, | Performed by: NURSE PRACTITIONER

## 2020-09-16 PROCEDURE — 1100F PR PT FALLS ASSESS DOC 2+ FALLS/FALL W/INJURY/YR: ICD-10-PCS | Mod: CPTII,S$GLB,, | Performed by: NURSE PRACTITIONER

## 2020-09-16 PROCEDURE — 3288F PR FALLS RISK ASSESSMENT DOCUMENTED: ICD-10-PCS | Mod: CPTII,S$GLB,, | Performed by: NURSE PRACTITIONER

## 2020-09-16 RX ORDER — HYDROCODONE BITARTRATE AND ACETAMINOPHEN 10; 325 MG/1; MG/1
1 TABLET ORAL EVERY 4 HOURS PRN
Qty: 24 TABLET | Refills: 0 | Status: SHIPPED | OUTPATIENT
Start: 2020-09-16 | End: 2021-05-12

## 2020-09-16 RX ORDER — HYDROCODONE BITARTRATE AND ACETAMINOPHEN 5; 325 MG/1; MG/1
1 TABLET ORAL EVERY 6 HOURS PRN
Qty: 18 TABLET | Refills: 0 | Status: SHIPPED | OUTPATIENT
Start: 2020-09-16 | End: 2020-09-16 | Stop reason: DRUGHIGH

## 2020-09-16 RX ORDER — ONDANSETRON 2 MG/ML
4 INJECTION INTRAMUSCULAR; INTRAVENOUS
Status: COMPLETED | OUTPATIENT
Start: 2020-09-16 | End: 2020-09-16

## 2020-09-16 RX ORDER — MORPHINE SULFATE 4 MG/ML
4 INJECTION, SOLUTION INTRAMUSCULAR; INTRAVENOUS
Status: COMPLETED | OUTPATIENT
Start: 2020-09-16 | End: 2020-09-16

## 2020-09-16 RX ORDER — METHYLPREDNISOLONE 4 MG/1
TABLET ORAL
Qty: 1 PACKAGE | Refills: 0 | Status: SHIPPED | OUTPATIENT
Start: 2020-09-16 | End: 2020-10-07

## 2020-09-16 RX ORDER — KETOROLAC TROMETHAMINE 30 MG/ML
15 INJECTION, SOLUTION INTRAMUSCULAR; INTRAVENOUS
Status: COMPLETED | OUTPATIENT
Start: 2020-09-16 | End: 2020-09-16

## 2020-09-16 RX ADMIN — ONDANSETRON 4 MG: 2 INJECTION INTRAMUSCULAR; INTRAVENOUS at 05:09

## 2020-09-16 RX ADMIN — MORPHINE SULFATE 4 MG: 4 INJECTION, SOLUTION INTRAMUSCULAR; INTRAVENOUS at 05:09

## 2020-09-16 RX ADMIN — KETOROLAC TROMETHAMINE 15 MG: 30 INJECTION, SOLUTION INTRAMUSCULAR at 05:09

## 2020-09-16 NOTE — ASSESSMENT & PLAN NOTE
Malfunction of current CPAP machine, making noise at night and leaking water over past 6 weeks. Needs replacement CPAP machine.   Diagnosotic PSG 12/18/2012 split night PSG showed the presence of severe LEANDRO, with an AHI of 62.6 event/hour, a SaO2 hipolito of 85%.  Order for auto CPAP 14 - 20 cm   Nasal mask   Consideration for over night oximetry on CPAP after initial download/optimal control obstructive sleep apnea vs repeat PAP titration.   Patient would like to move forward with replacement CPAP machine at this time and PAP titration if needed in future.

## 2020-09-16 NOTE — Clinical Note
Seeing you in follow up, needed Tuesday at [facility]. Consider Over night oximetry vs PAP re-titration after initial dwld on replacement CPAP machine.

## 2020-09-16 NOTE — ASSESSMENT & PLAN NOTE
Encouraged calorie reduction and 30 minutes of exercise daily. Discussed impact of obesity on general health.  16 lb weight gain since March 2020

## 2020-09-16 NOTE — PROGRESS NOTES
Subjective:      Patient ID: Bo Chase is a 75 y.o. male.    Chief Complaint: Sleep Apnea    HPI: Bo Chase presents to clinic for follow up for LEANDRO with CPAP complaince assessment, malfunction of CPAP machine obtained in 2014. Diagnosed in 2012.   He is on CPAP of 14 cmH2O pressure no apneic index available on current machine  He is compliant with CPAP use. Patient will need to bring in machine for download. 100% use reported.   Patient reports benefit from CPAP use.  No lapse in CPAP therapy.    Patient reports complaint malfunction of current CPAP machine, making noise at night and leaking water over past 6 weeks. Needs replacement CPAP machine.   Diagnosotic PSG 12/18/2012 split night PSG showed the presence of severe LEANDRO, with an AHI of 62.6 event/hour, a SaO2 hipolito of 85%.  Nasal wisp mask is tolerated.   Craig 5  Patient request setting no lower than CPAP 14 cm, orders replacement CPAP machine Auto CPAP 14-20 cm.     Previous Report Reviewed: lab reports and office notes     Past Medical History: The following portions of the patient's history were reviewed and updated as appropriate:   He  has a past surgical history that includes throid lobectomy (4/2012); umbilicial hernia; Coronary artery bypass graft (1996); Cardiac catheterization (3/6/2012); Coronary angioplasty; and back surgary.  His family history includes Heart disease in his father.  He  reports that he has never smoked. He has never used smokeless tobacco. He reports current alcohol use. He reports that he does not use drugs.  He has a current medication list which includes the following prescription(s): albuterol, allopurinol, ascorbic acid (vitamin c), aspirin, atorvastatin, docusate sodium, fluticasone propionate, isosorbide mononitrate, levothyroxine, losartan, melatonin, metformin, metolazone, metoprolol tartrate, modafinil, multivitamin, nitroglycerin, fish oil-omega-3 fatty acids, pentoxifylline, potassium chloride,  "spironolactone, torsemide, tramadol, true metrix go glucose meter, vitamin e, and allopurinol.  He is allergic to cat/feline products; ciprofloxacin; latex; and sulfa (sulfonamide antibiotics)..    The following portions of the patient's history were reviewed and updated as appropriate: allergies, current medications, past family history, past medical history, past social history, past surgical history and problem list.    Review of Systems   Constitutional: Negative for fever, chills, weight loss, weight gain, activity change, appetite change, fatigue and night sweats.   HENT: Negative for postnasal drip, rhinorrhea, sinus pressure, voice change and congestion.    Eyes: Negative for redness and itching.   Respiratory: Negative for snoring, cough, sputum production, chest tightness, shortness of breath, wheezing, orthopnea, asthma nighttime symptoms, dyspnea on extertion, use of rescue inhaler and somnolence.    Cardiovascular: Negative.  Negative for chest pain, palpitations and leg swelling.   Genitourinary: Negative for difficulty urinating and hematuria.   Endocrine: Negative for cold intolerance and heat intolerance.    Musculoskeletal: Negative for arthralgias, gait problem, joint swelling and myalgias.   Skin: Negative.    Gastrointestinal: Negative for nausea, vomiting, abdominal pain and acid reflux.   Neurological: Negative for dizziness, weakness, light-headedness and headaches.   Hematological: Negative for adenopathy. No excessive bruising.   All other systems reviewed and are negative.     Objective:   /84   Pulse 79   Resp 20   Ht 5' 7" (1.702 m)   Wt 123.9 kg (273 lb 2.4 oz)   SpO2 95%   BMI 42.78 kg/m²   Physical Exam  Vitals signs and nursing note reviewed.   Constitutional:       General: He is awake.      Appearance: Normal appearance. He is well-developed and well-groomed. He is morbidly obese.   Neurological:      Mental Status: He is alert.   Psychiatric:         Behavior: Behavior " is cooperative.       Personal Diagnostic Review  CPAP download  CPAP 14 cm  Pending download, need machine for download.    Assessment:     1. LEANDRO on CPAP    2. Stage 4 chronic kidney disease    3. S/P coronary artery stent placement    4. Acute right flank pain    5. Essential hypertension    6. Chronic diastolic congestive heart failure    7. Morbid obesity with BMI of 40.0-44.9, adult      Orders Placed This Encounter   Procedures    CPAP FOR HOME USE     Malfunction of current CPAP machine, making noise at night and leaking water over past 6 weeks. Needs replacement CPAP machine.   Diagnosotic PSG 12/18/2012 split night PSG showed the presence of severe LEANDRO, with an AHI of 62.6 event/hour, a SaO2 hipolito of 85%.     Order Specific Question:   Type:     Answer:   Auto CPAP     Order Specific Question:   Auto CPAP pressure setting range (cmH20):     Answer:   14 - 20     Order Specific Question:   Length of need (1-99 months):     Answer:   99     Order Specific Question:   Humidification:     Answer:   Heated     Order Specific Question:   Type of mask:     Answer:   Nasal     Order Specific Question:   Headgear?     Answer:   Yes     Order Specific Question:   Tubing?     Answer:   Yes     Order Specific Question:   Humidifier chamber?     Answer:   Yes     Order Specific Question:   Chin strap?     Answer:   Yes     Order Specific Question:   Filters?     Answer:   Yes     Order Specific Question:   Cushions?     Answer:   Yes     Plan:     Problem List Items Addressed This Visit     Stage 4 chronic kidney disease    S/P coronary artery stent placement     Managed by Cardiology         LEANDRO on CPAP - Primary     Malfunction of current CPAP machine, making noise at night and leaking water over past 6 weeks. Needs replacement CPAP machine.   Diagnosotic PSG 12/18/2012 split night PSG showed the presence of severe LEANDRO, with an AHI of 62.6 event/hour, a SaO2 hipolito of 85%.  Order for auto CPAP 14 - 20 cm   Nasal mask    Consideration for over night oximetry on CPAP after initial download/optimal control obstructive sleep apnea vs repeat PAP titration.   Patient would like to move forward with replacement CPAP machine at this time and PAP titration if needed in future.          Relevant Orders    CPAP FOR HOME USE    Morbid obesity with BMI of 40.0-44.9, adult     Encouraged calorie reduction and 30 minutes of exercise daily. Discussed impact of obesity on general health.  16 lb weight gain since March 2020          HTN (hypertension) (Chronic)     Managed by PCP Dr. Kym Luciano         Chronic diastolic congestive heart failure     Managed by Cardiology           Other Visit Diagnoses     Acute right flank pain        Patient reports to presentation he is going to go to the ER after pul visit, 10 days persisting right flank pain. no fever.          (DME) - [facility]  Reviewed therapeutic goals for positive airway pressure therapy Auto CPAP  Ideal is usage 100% of nights for 6 - 8 hours per night. Minimum usage is 70% of night for at least 4 hours per night used.     Follow up in about 12 weeks (around 12/9/2020) for CPAP compliance dwld after replacing cpap machine.    TIME SPENT WITH PATIENT: Time spent:25 minutes in face to face  discussion concerning diagnosis, prognosis, review of lab and test results, benefits of treatment as well as management of disease, counseling of patient. Greater than half the time spent was used for coordination of care and counseling of patient.

## 2020-09-16 NOTE — ED PROVIDER NOTES
"SCRIBE #1 NOTE: I, Demetria Aponte, am scribing for, and in the presence of, Mark Butts Do, MD. I have scribed the entire note.       History     Chief Complaint   Patient presents with    Flank Pain     x10 days on right side. denies n/v/d/constipation. denies dysuria or hematuria, "SIGNIFICANT PAIN only"     Review of patient's allergies indicates:   Allergen Reactions    Cat/feline products Itching    Ciprofloxacin Other (See Comments)     Foggy, drowsy  Other reaction(s): Other (See Comments), Other (See Comments)    Latex      Other reaction(s): Swelling    Sulfa (sulfonamide antibiotics)      Other reaction(s): Flushing (skin)         History of Present Illness     HPI    9/16/2020, 5:05 PM  History obtained from the patient      History of Present Illness: Bo Chase is a 75 y.o. male patient with a PMHx of CHF, CAD, HLD, HTN who presents to the Emergency Department for evaluation of new, worsening RLQ pain radiating to R groin which onset gradually 10 days ago. Symptoms are intermittent and moderate in severity. No mitigating or exacerbating factors reported. Associated sxs include nausea. Patient denies any fever, chills, vomiting, testicular pain, scrotum pain, HA, dizziness, constipation, blood in stool, dysuria, hematuria, and all other sxs at this time. No prior Tx included. Pt reports moving heavy boxes 3 days ago. No further complaints or concerns at this time.       Arrival mode: Personal vehicle     PCP: Kym Luciano MD        Past Medical History:  Past Medical History:   Diagnosis Date    Acute coronary syndrome     CHF (congestive heart failure)     Chronic kidney disease     Coronary artery disease     Heart murmur     Hyperlipidemia     Hypertension     Lumbar spondylosis     Sleep apnea        Past Surgical History:  Past Surgical History:   Procedure Laterality Date    back surgary      CARDIAC CATHETERIZATION  3/6/2012    CORONARY ANGIOPLASTY      CORONARY ARTERY BYPASS " GRAFT  1996    x4    throid lobectomy  4/2012    umbilicial hernia           Family History:  Family History   Problem Relation Age of Onset    Heart disease Father        Social History:  Social History     Tobacco Use    Smoking status: Never Smoker    Smokeless tobacco: Never Used   Substance and Sexual Activity    Alcohol use: Yes     Comment: 6 BEERS A YEAR     Drug use: No    Sexual activity: Unknown        Review of Systems     Review of Systems   Constitutional: Negative for chills and fever.   HENT: Negative for sore throat.    Respiratory: Negative for shortness of breath.    Cardiovascular: Negative for chest pain.   Gastrointestinal: Positive for abdominal pain (RLQ radiating to R groin) and nausea. Negative for blood in stool, constipation and vomiting.   Genitourinary: Negative for dysuria, hematuria and testicular pain.        (-) scrotal pain   Musculoskeletal: Negative for back pain.   Skin: Negative for rash.   Neurological: Negative for dizziness, weakness and headaches.   Hematological: Does not bruise/bleed easily.   All other systems reviewed and are negative.       Physical Exam     Initial Vitals [09/16/20 1645]   BP Pulse Resp Temp SpO2   111/67 76 18 99.1 °F (37.3 °C) 96 %      MAP       --          Physical Exam  Nursing Notes and Vital Signs Reviewed.  Constitutional: Patient is in mild distress. Obese.  Head: Atraumatic. Normocephalic.  Eyes: PERRL. EOM intact. Conjunctivae are not pale. No scleral icterus.  ENT: Mucous membranes are moist. Oropharynx is clear and symmetric.    Neck: Supple. Full ROM. No lymphadenopathy.  Cardiovascular: Regular rate. Regular rhythm. No murmurs, rubs, or gallops. Distal pulses are 2+ and symmetric.  Pulmonary/Chest: No respiratory distress. Clear to auscultation bilaterally. No wheezing or rales.  Abdominal: Soft and non-distended.  There is RLQ tenderness.  No rebound, guarding, or rigidity. Good bowel sounds.  Genitourinary: No CVA  "tenderness  Musculoskeletal: Moves all extremities. No obvious deformities. No edema. No calf tenderness.  Skin: Warm and dry. No Zoster  Neurological:  Alert, awake, and appropriate.  Normal speech.  No acute focal neurological deficits are appreciated.  Psychiatric: Normal affect. Good eye contact. Appropriate in content.     ED Course   Procedures  ED Vital Signs:  Vitals:    09/16/20 1645 09/16/20 1733 09/16/20 1913   BP: 111/67  115/72   Pulse: 76     Resp: 18 18 18   Temp: 99.1 °F (37.3 °C)  99 °F (37.2 °C)   TempSrc: Oral  Oral   SpO2: 96%  97%   Weight: 125.2 kg (276 lb)     Height: 5' 7" (1.702 m)         Abnormal Lab Results:  Labs Reviewed   CBC W/ AUTO DIFFERENTIAL - Abnormal; Notable for the following components:       Result Value    Mean Corpuscular Hemoglobin 31.3 (*)     RDW 15.9 (*)     Immature Granulocytes 1.1 (*)     Immature Grans (Abs) 0.11 (*)     Lymph% 16.2 (*)     All other components within normal limits   COMPREHENSIVE METABOLIC PANEL - Abnormal; Notable for the following components:    BUN, Bld 24 (*)     Creatinine 1.7 (*)     Total Bilirubin 1.1 (*)     AST 42 (*)     ALT 54 (*)     eGFR if  45 (*)     eGFR if non  39 (*)     All other components within normal limits   LIPASE   URINALYSIS, REFLEX TO URINE CULTURE    Narrative:     Specimen Source->Urine        All Lab Results:  Results for orders placed or performed during the hospital encounter of 09/16/20   CBC W/ AUTO DIFFERENTIAL   Result Value Ref Range    WBC 9.99 3.90 - 12.70 K/uL    RBC 5.53 4.60 - 6.20 M/uL    Hemoglobin 17.3 14.0 - 18.0 g/dL    Hematocrit 52.4 40.0 - 54.0 %    Mean Corpuscular Volume 95 82 - 98 fL    Mean Corpuscular Hemoglobin 31.3 (H) 27.0 - 31.0 pg    Mean Corpuscular Hemoglobin Conc 33.0 32.0 - 36.0 g/dL    RDW 15.9 (H) 11.5 - 14.5 %    Platelets 194 150 - 350 K/uL    MPV 9.7 9.2 - 12.9 fL    Immature Granulocytes 1.1 (H) 0.0 - 0.5 %    Gran # (ANC) 7.3 1.8 - 7.7 K/uL    " Immature Grans (Abs) 0.11 (H) 0.00 - 0.04 K/uL    Lymph # 1.6 1.0 - 4.8 K/uL    Mono # 0.8 0.3 - 1.0 K/uL    Eos # 0.1 0.0 - 0.5 K/uL    Baso # 0.06 0.00 - 0.20 K/uL    nRBC 0 0 /100 WBC    Gran% 72.9 38.0 - 73.0 %    Lymph% 16.2 (L) 18.0 - 48.0 %    Mono% 7.9 4.0 - 15.0 %    Eosinophil% 1.3 0.0 - 8.0 %    Basophil% 0.6 0.0 - 1.9 %    Differential Method Automated    Comp. Metabolic Panel   Result Value Ref Range    Sodium 136 136 - 145 mmol/L    Potassium 4.9 3.5 - 5.1 mmol/L    Chloride 101 95 - 110 mmol/L    CO2 25 23 - 29 mmol/L    Glucose 94 70 - 110 mg/dL    BUN, Bld 24 (H) 8 - 23 mg/dL    Creatinine 1.7 (H) 0.5 - 1.4 mg/dL    Calcium 9.5 8.7 - 10.5 mg/dL    Total Protein 7.2 6.0 - 8.4 g/dL    Albumin 3.8 3.5 - 5.2 g/dL    Total Bilirubin 1.1 (H) 0.1 - 1.0 mg/dL    Alkaline Phosphatase 70 55 - 135 U/L    AST 42 (H) 10 - 40 U/L    ALT 54 (H) 10 - 44 U/L    Anion Gap 10 8 - 16 mmol/L    eGFR if African American 45 (A) >60 mL/min/1.73 m^2    eGFR if non African American 39 (A) >60 mL/min/1.73 m^2   Lipase   Result Value Ref Range    Lipase 28 4 - 60 U/L   Urinalysis, Reflex to Urine Culture Urine, Clean Catch    Specimen: Urine   Result Value Ref Range    Specimen UA Urine, Clean Catch     Color, UA Yellow Yellow, Straw, Landy    Appearance, UA Clear Clear    pH, UA 6.0 5.0 - 8.0    Specific Gravity, UA 1.015 1.005 - 1.030    Protein, UA Negative Negative    Glucose, UA Negative Negative    Ketones, UA Negative Negative    Bilirubin (UA) Negative Negative    Occult Blood UA Negative Negative    Nitrite, UA Negative Negative    Urobilinogen, UA Negative <2.0 EU/dL    Leukocytes, UA Negative Negative         Imaging Results:  Imaging Results          CT Renal Stone Study ABD Pelvis WO (Final result)  Result time 09/16/20 18:11:34    Final result by David Starkey MD (09/16/20 18:11:34)                 Impression:      No acute abnormality identified.  No bowel obstruction, intra-abdominal abscess, free fluid, or  free air.      Electronically signed by: David Starkey  Date:    09/16/2020  Time:    18:11             Narrative:    EXAMINATION:  CT RENAL STONE STUDY ABD PELVIS WO    CLINICAL HISTORY:  Flank pain, kidney stone suspected;    TECHNIQUE:  Low dose axial images, sagittal and coronal reformations were obtained from the lung bases to the pubic symphysis.  Contrast was not administered.    All CT scans at this location are performed using dose modulation techniques as appropriate to a performed exam including the following: Automated exposure control; adjustment of the mA and/or kV according to patient size.    COMPARISON:  04/12/2017    FINDINGS:  Heart: Normal in size. No pericardial effusion.    Lung Bases: Well aerated, without consolidation or pleural fluid.    Liver: Fatty infiltration of the liver.    Gallbladder: Cholelithiasis without evidence of cholecystitis.    Bile Ducts: No evidence of dilated ducts.    Pancreas: No mass or peripancreatic fat stranding.    Spleen: Unremarkable.    Adrenals: Unchanged benign 2.9 cm right adrenal adenoma.    Kidneys/ Ureters: Unchanged left lower pole exophytic simple renal cyst.  Exophytic right lower pole simple renal cyst.    Bladder: No evidence of wall thickening.    Reproductive organs: Unremarkable.    GI Tract/Mesentery: No evidence of bowel obstruction or inflammation.  Descending colonic and sigmoid diverticulosis without evidence of diverticulitis.    Peritoneal Space: No ascites. No free air.    Retroperitoneum: No lymphadenopathy    Abdominal wall: Unremarkable.    Vasculature: Aortoiliac  atherosclerotic calcification. No aneurysm.    Bones: Bone-on-bone bilateral hip joint space narrowing.  Discogenic degenerative change lumbar spine.  Lower lumbar facet arthropathy.                                          The Emergency Provider reviewed the vital signs and test results, which are outlined above.     ED Discussion     6:43 PM: Reassessed pt at this time.  Discussed with pt all pertinent ED information and results. Discussed pt dx and plan of tx. Gave pt all f/u and return to the ED instructions. All questions and concerns were addressed at this time. Pt expresses understanding of information and instructions, and is comfortable with plan to discharge. Pt is stable for discharge.    I discussed with patient and/or family/caretaker that evaluation in the ED does not suggest any emergent or life threatening medical conditions requiring immediate intervention beyond what was provided in the ED, and I believe patient is safe for discharge.  Regardless, an unremarkable evaluation in the ED does not preclude the development or presence of a serious of life threatening condition. As such, patient was instructed to return immediately for any worsening or change in current symptoms.             Medical Decision Making:   Clinical Tests:   Lab Tests: Ordered and Reviewed  Radiological Study: Ordered and Reviewed           ED Medication(s):  Medications   ondansetron injection 4 mg (4 mg Intravenous Given 9/16/20 1733)   morphine injection 4 mg (4 mg Intravenous Given 9/16/20 1733)   ketorolac injection 15 mg (15 mg Intravenous Given 9/16/20 1732)       Discharge Medication List as of 9/16/2020  6:47 PM      START taking these medications    Details   methylPREDNISolone (MEDROL DOSEPACK) 4 mg tablet Take as directed, Print      HYDROcodone-acetaminophen (NORCO) 5-325 mg per tablet Take 1 tablet by mouth every 6 (six) hours as needed., Starting Wed 9/16/2020, Print             Follow-up Information     Kym Luciano MD In 2 days.    Specialty: Family Medicine  Contact information:  [address] [facility] [address]  [place] [address]  [phone]                       Scribe Attestation:   Scribe #1: I performed the above scribed service and the documentation accurately describes the services I performed. I attest to the accuracy of the note.     Attending:   Physician Attestation  Statement for Scribe #1: I, Mark Butts Do, MD, personally performed the services described in this documentation, as scribed by Demetria Aponte, in my presence, and it is both accurate and complete.           Clinical Impression       ICD-10-CM ICD-9-CM   1. Right flank pain  R10.9 789.09       Disposition:   Disposition: Discharged  Condition: Stable         Mark Butts Do, MD  09/17/20 0023

## 2020-10-11 ENCOUNTER — PATIENT MESSAGE (OUTPATIENT)
Dept: PULMONOLOGY | Facility: CLINIC | Age: 75
End: 2020-10-11

## 2020-10-16 ENCOUNTER — OFFICE VISIT (OUTPATIENT)
Dept: HEMATOLOGY/ONCOLOGY | Facility: CLINIC | Age: 75
End: 2020-10-16
Payer: MEDICARE

## 2020-10-16 ENCOUNTER — INFUSION (OUTPATIENT)
Dept: INFUSION THERAPY | Facility: HOSPITAL | Age: 75
End: 2020-10-16
Attending: INTERNAL MEDICINE
Payer: MEDICARE

## 2020-10-16 ENCOUNTER — OFFICE VISIT (OUTPATIENT)
Dept: CARDIOLOGY | Facility: CLINIC | Age: 75
End: 2020-10-16
Payer: MEDICARE

## 2020-10-16 ENCOUNTER — LAB VISIT (OUTPATIENT)
Dept: LAB | Facility: HOSPITAL | Age: 75
End: 2020-10-16
Attending: INTERNAL MEDICINE
Payer: MEDICARE

## 2020-10-16 VITALS
WEIGHT: 270.5 LBS | BODY MASS INDEX: 42.37 KG/M2 | SYSTOLIC BLOOD PRESSURE: 110 MMHG | DIASTOLIC BLOOD PRESSURE: 64 MMHG | HEART RATE: 60 BPM | OXYGEN SATURATION: 97 %

## 2020-10-16 VITALS
HEIGHT: 67 IN | BODY MASS INDEX: 42.32 KG/M2 | SYSTOLIC BLOOD PRESSURE: 132 MMHG | DIASTOLIC BLOOD PRESSURE: 80 MMHG | WEIGHT: 269.63 LBS | OXYGEN SATURATION: 95 % | HEART RATE: 85 BPM | TEMPERATURE: 99 F

## 2020-10-16 VITALS
SYSTOLIC BLOOD PRESSURE: 102 MMHG | RESPIRATION RATE: 18 BRPM | DIASTOLIC BLOOD PRESSURE: 69 MMHG | HEART RATE: 74 BPM | OXYGEN SATURATION: 96 %

## 2020-10-16 DIAGNOSIS — E78.5 HYPERLIPIDEMIA WITH TARGET LDL LESS THAN 70: ICD-10-CM

## 2020-10-16 DIAGNOSIS — G47.33 OSA ON CPAP: ICD-10-CM

## 2020-10-16 DIAGNOSIS — N18.31 STAGE 3A CHRONIC KIDNEY DISEASE: ICD-10-CM

## 2020-10-16 DIAGNOSIS — D75.1 POLYCYTHEMIA, SECONDARY: ICD-10-CM

## 2020-10-16 DIAGNOSIS — I10 ESSENTIAL HYPERTENSION: Chronic | ICD-10-CM

## 2020-10-16 DIAGNOSIS — D75.1 ACQUIRED POLYCYTHEMIA: Primary | ICD-10-CM

## 2020-10-16 DIAGNOSIS — I25.708 CORONARY ARTERY DISEASE OF BYPASS GRAFT OF NATIVE HEART WITH STABLE ANGINA PECTORIS: Primary | Chronic | ICD-10-CM

## 2020-10-16 DIAGNOSIS — D75.1 POLYCYTHEMIA, SECONDARY: Primary | ICD-10-CM

## 2020-10-16 DIAGNOSIS — E66.01 MORBID OBESITY WITH BMI OF 40.0-44.9, ADULT: ICD-10-CM

## 2020-10-16 DIAGNOSIS — Z95.1 S/P CABG X 4: ICD-10-CM

## 2020-10-16 DIAGNOSIS — I50.32 CHRONIC DIASTOLIC CONGESTIVE HEART FAILURE: ICD-10-CM

## 2020-10-16 LAB
BASOPHILS # BLD AUTO: 0.03 K/UL (ref 0–0.2)
BASOPHILS NFR BLD: 0.4 % (ref 0–1.9)
DIFFERENTIAL METHOD: ABNORMAL
EOSINOPHIL # BLD AUTO: 0.1 K/UL (ref 0–0.5)
EOSINOPHIL NFR BLD: 1.5 % (ref 0–8)
ERYTHROCYTE [DISTWIDTH] IN BLOOD BY AUTOMATED COUNT: 15.9 % (ref 11.5–14.5)
HCT VFR BLD AUTO: 54.9 % (ref 40–54)
HGB BLD-MCNC: 18.6 G/DL (ref 14–18)
IMM GRANULOCYTES # BLD AUTO: 0.07 K/UL (ref 0–0.04)
IMM GRANULOCYTES NFR BLD AUTO: 0.8 % (ref 0–0.5)
LYMPHOCYTES # BLD AUTO: 1.1 K/UL (ref 1–4.8)
LYMPHOCYTES NFR BLD: 13.1 % (ref 18–48)
MCH RBC QN AUTO: 31.7 PG (ref 27–31)
MCHC RBC AUTO-ENTMCNC: 33.9 G/DL (ref 32–36)
MCV RBC AUTO: 94 FL (ref 82–98)
MONOCYTES # BLD AUTO: 0.6 K/UL (ref 0.3–1)
MONOCYTES NFR BLD: 7.5 % (ref 4–15)
NEUTROPHILS # BLD AUTO: 6.3 K/UL (ref 1.8–7.7)
NEUTROPHILS NFR BLD: 76.7 % (ref 38–73)
NRBC BLD-RTO: 0 /100 WBC
PLATELET # BLD AUTO: 165 K/UL (ref 150–350)
PMV BLD AUTO: 9.5 FL (ref 9.2–12.9)
RBC # BLD AUTO: 5.86 M/UL (ref 4.6–6.2)
WBC # BLD AUTO: 8.25 K/UL (ref 3.9–12.7)

## 2020-10-16 PROCEDURE — 3288F PR FALLS RISK ASSESSMENT DOCUMENTED: ICD-10-PCS | Mod: CPTII,S$GLB,, | Performed by: INTERNAL MEDICINE

## 2020-10-16 PROCEDURE — 3008F PR BODY MASS INDEX (BMI) DOCUMENTED: ICD-10-PCS | Mod: CPTII,S$GLB,, | Performed by: INTERNAL MEDICINE

## 2020-10-16 PROCEDURE — 3288F FALL RISK ASSESSMENT DOCD: CPT | Mod: CPTII,S$GLB,, | Performed by: INTERNAL MEDICINE

## 2020-10-16 PROCEDURE — 99214 PR OFFICE/OUTPT VISIT, EST, LEVL IV, 30-39 MIN: ICD-10-PCS | Mod: S$GLB,,, | Performed by: INTERNAL MEDICINE

## 2020-10-16 PROCEDURE — 1100F PR PT FALLS ASSESS DOC 2+ FALLS/FALL W/INJURY/YR: ICD-10-PCS | Mod: CPTII,S$GLB,, | Performed by: INTERNAL MEDICINE

## 2020-10-16 PROCEDURE — 99214 OFFICE O/P EST MOD 30 MIN: CPT | Mod: S$GLB,,, | Performed by: INTERNAL MEDICINE

## 2020-10-16 PROCEDURE — 99999 PR PBB SHADOW E&M-EST. PATIENT-LVL IV: ICD-10-PCS | Mod: PBBFAC,,, | Performed by: INTERNAL MEDICINE

## 2020-10-16 PROCEDURE — 3078F PR MOST RECENT DIASTOLIC BLOOD PRESSURE < 80 MM HG: ICD-10-PCS | Mod: CPTII,S$GLB,, | Performed by: INTERNAL MEDICINE

## 2020-10-16 PROCEDURE — 3008F BODY MASS INDEX DOCD: CPT | Mod: CPTII,S$GLB,, | Performed by: INTERNAL MEDICINE

## 2020-10-16 PROCEDURE — 1125F PR PAIN SEVERITY QUANTIFIED, PAIN PRESENT: ICD-10-PCS | Mod: S$GLB,,, | Performed by: INTERNAL MEDICINE

## 2020-10-16 PROCEDURE — 3075F PR MOST RECENT SYSTOLIC BLOOD PRESS GE 130-139MM HG: ICD-10-PCS | Mod: CPTII,S$GLB,, | Performed by: INTERNAL MEDICINE

## 2020-10-16 PROCEDURE — 1159F PR MEDICATION LIST DOCUMENTED IN MEDICAL RECORD: ICD-10-PCS | Mod: S$GLB,,, | Performed by: INTERNAL MEDICINE

## 2020-10-16 PROCEDURE — 99999 PR PBB SHADOW E&M-EST. PATIENT-LVL IV: CPT | Mod: PBBFAC,,, | Performed by: INTERNAL MEDICINE

## 2020-10-16 PROCEDURE — 3079F DIAST BP 80-89 MM HG: CPT | Mod: CPTII,S$GLB,, | Performed by: INTERNAL MEDICINE

## 2020-10-16 PROCEDURE — 1125F AMNT PAIN NOTED PAIN PRSNT: CPT | Mod: S$GLB,,, | Performed by: INTERNAL MEDICINE

## 2020-10-16 PROCEDURE — 36415 COLL VENOUS BLD VENIPUNCTURE: CPT

## 2020-10-16 PROCEDURE — 1159F MED LIST DOCD IN RCRD: CPT | Mod: S$GLB,,, | Performed by: INTERNAL MEDICINE

## 2020-10-16 PROCEDURE — 85025 COMPLETE CBC W/AUTO DIFF WBC: CPT

## 2020-10-16 PROCEDURE — 99195 PHLEBOTOMY: CPT

## 2020-10-16 PROCEDURE — 1100F PTFALLS ASSESS-DOCD GE2>/YR: CPT | Mod: CPTII,S$GLB,, | Performed by: INTERNAL MEDICINE

## 2020-10-16 PROCEDURE — 3075F SYST BP GE 130 - 139MM HG: CPT | Mod: CPTII,S$GLB,, | Performed by: INTERNAL MEDICINE

## 2020-10-16 PROCEDURE — 3079F PR MOST RECENT DIASTOLIC BLOOD PRESSURE 80-89 MM HG: ICD-10-PCS | Mod: CPTII,S$GLB,, | Performed by: INTERNAL MEDICINE

## 2020-10-16 PROCEDURE — 3074F PR MOST RECENT SYSTOLIC BLOOD PRESSURE < 130 MM HG: ICD-10-PCS | Mod: CPTII,S$GLB,, | Performed by: INTERNAL MEDICINE

## 2020-10-16 PROCEDURE — 99999 PR PBB SHADOW E&M-EST. PATIENT-LVL V: ICD-10-PCS | Mod: PBBFAC,,, | Performed by: INTERNAL MEDICINE

## 2020-10-16 PROCEDURE — 3074F SYST BP LT 130 MM HG: CPT | Mod: CPTII,S$GLB,, | Performed by: INTERNAL MEDICINE

## 2020-10-16 PROCEDURE — 99999 PR PBB SHADOW E&M-EST. PATIENT-LVL V: CPT | Mod: PBBFAC,,, | Performed by: INTERNAL MEDICINE

## 2020-10-16 PROCEDURE — 3078F DIAST BP <80 MM HG: CPT | Mod: CPTII,S$GLB,, | Performed by: INTERNAL MEDICINE

## 2020-10-16 RX ORDER — LIDOCAINE HYDROCHLORIDE 10 MG/ML
1 INJECTION, SOLUTION EPIDURAL; INFILTRATION; INTRACAUDAL; PERINEURAL ONCE
Status: CANCELLED | OUTPATIENT
Start: 2020-10-23

## 2020-10-16 NOTE — PROGRESS NOTES
Subjective:      DATE OF VISIT: 10/16/2020   ?   ?   Patient ID:?Bo Chase is a 75 y.o. male.?? MR#: [id number]   ?   PRIMARY PROVIDER: Dr. Diaz  ?   CHIEF COMPLAINT:  Polycythemia?????   ?   DIAGNOSIS:  Secondary polycythemia  ?   CURRENT TREATMENT:  Phlebotomy, aspirin (takes 325mg daily)      HPI    Today I have the pleasure of seeing in follow-up Mr. Chase is doing well with new CPAP machine received recently.  He is having continued osteoarthritis pain in bilateral hips.  Limited exercise and no weight loss.  No issues with new edema/concern for thrombosis, pain, fever, chills, night sweats.    Review of systems:  ?   A comprehensive 14-point review of systems was reviewed with patient and was negative other than as specified above.   ?     Objective:      Physical Exam      ?   Vitals:    10/16/20 1053   BP: 132/80   Pulse: 85   Temp: 99.4 °F (37.4 °C)      ?   ECOG:?0   General appearance: Generally well appearing, in no acute distress.   Head, eyes, ears, nose, and throat: moist mucous membranes, mild ruddiness of face, relatively stable.  Respiratory:  Clear to auscultation bilaterally  Cardiovascular:  Grade 2 systolic ejection murmur, regular rate and rhythm  Abdomen:  Obese, nontender, nondistended.   Extremities: Warm, with stable chronic stasis changes and trace edema symmetric bilaterally  Neurologic: Alert and oriented. Grossly normal strength, coordination, and gait.   Skin: No rashes, ecchymoses or petechial lesion.   Psychiatric:  Normal mood and affect.        Laboratory:  ?   Lab Visit on 10/16/2020   Component Date Value Ref Range Status    WBC 10/16/2020 8.25  3.90 - 12.70 K/uL Final    RBC 10/16/2020 5.86  4.60 - 6.20 M/uL Final    Hemoglobin 10/16/2020 18.6* 14.0 - 18.0 g/dL Final    Hematocrit 10/16/2020 54.9* 40.0 - 54.0 % Final    Mean Corpuscular Volume 10/16/2020 94  82 - 98 fL Final    Mean Corpuscular Hemoglobin 10/16/2020 31.7* 27.0 - 31.0 pg Final    Mean Corpuscular  Hemoglobin Conc 10/16/2020 33.9  32.0 - 36.0 g/dL Final    RDW 10/16/2020 15.9* 11.5 - 14.5 % Final    Platelets 10/16/2020 165  150 - 350 K/uL Final    MPV 10/16/2020 9.5  9.2 - 12.9 fL Final    Immature Granulocytes 10/16/2020 0.8* 0.0 - 0.5 % Final    Gran # (ANC) 10/16/2020 6.3  1.8 - 7.7 K/uL Final    Immature Grans (Abs) 10/16/2020 0.07* 0.00 - 0.04 K/uL Final    Lymph # 10/16/2020 1.1  1.0 - 4.8 K/uL Final    Mono # 10/16/2020 0.6  0.3 - 1.0 K/uL Final    Eos # 10/16/2020 0.1  0.0 - 0.5 K/uL Final    Baso # 10/16/2020 0.03  0.00 - 0.20 K/uL Final    nRBC 10/16/2020 0  0 /100 WBC Final    Gran% 10/16/2020 76.7* 38.0 - 73.0 % Final    Lymph% 10/16/2020 13.1* 18.0 - 48.0 % Final    Mono% 10/16/2020 7.5  4.0 - 15.0 % Final    Eosinophil% 10/16/2020 1.5  0.0 - 8.0 % Final    Basophil% 10/16/2020 0.4  0.0 - 1.9 % Final    Differential Method 10/16/2020 Automated   Final      ?   ?   Hemoglobin   Date Value Ref Range Status   10/16/2020 18.6 (H) 14.0 - 18.0 g/dL Final   09/16/2020 17.3 14.0 - 18.0 g/dL Final   09/08/2020 17.6 14.0 - 18.0 g/dL Final   08/06/2020 19.0 (H) 14.0 - 18.0 g/dL Final   08/06/2020 19.0 (H) 14.0 - 18.0 g/dL Final   03/26/2020 16.9 14.0 - 18.0 g/dL Final   02/20/2020 15.4 14.0 - 18.0 g/dL Final   01/30/2020 15.1 14.0 - 18.0 g/dL Final   12/19/2019 17.1 14.0 - 18.0 g/dL Final   11/12/2019 17.5 14.0 - 18.0 g/dL Final     Hematocrit   Date Value Ref Range Status   10/16/2020 54.9 (H) 40.0 - 54.0 % Final   09/16/2020 52.4 40.0 - 54.0 % Final   09/08/2020 52.2 40.0 - 54.0 % Final   08/06/2020 55.9 (H) 40.0 - 54.0 % Final   08/06/2020 55.9 (H) 40.0 - 54.0 % Final   03/26/2020 53.8 40.0 - 54.0 % Final   02/20/2020 50.0 40.0 - 54.0 % Final   01/30/2020 47.2 40.0 - 54.0 % Final   12/19/2019 51.7 40.0 - 54.0 % Final   11/12/2019 52.6 40.0 - 54.0 % Final       Assessment/Plan:       1. Polycythemia, secondary    2. LEANDRO on CPAP    3. BMI 39.0-39.9,adult          Plan:     # Polycythemia:  elevated EPO and [id number], exon 12-15 negative.  secondary polycythemia to LEANDRO on CPAP.   - recommended weight loss, continued use of CPAP and treatment with ASA35 and phlebotomy prn.    - recommended phlebotomy 1  unit given increased hematocrit 52-55 today.    Follow-Up:   - phlebotomy today, 1 unit  - RV in 6 weeks with RV and CBC same day prior

## 2020-10-16 NOTE — PROGRESS NOTES
Subjective:   Patient ID:  Bo Chase is a 75 y.o. male who presents for follow up of No chief complaint on file.      76 yo male, 6 months f/u.   PMH CAD, s/p CABGx4 1996, s/p last PCI SVG to OM1, in 2014, LIMA patent and  SVG to diag, Dx of DM in 2019 after steroids injection now A1c wnl, , CHFpEF, HTN, HLP, morbid obesity, sleep apnea on CPAP, hypothyroidism. Chronic lower back pain.  8/31/2016 negative nuclear stress test for ischemia.    ECho normal EF and mod LVH  Pt states that he f/u with [facility]ologist [facility] in the past two yr. Negative exerrcise stress test in  at [facility].   Recent Dx of melanoma on nose and right chest pain. S/p chest tumor early .   Sleep with CPAP.  Chronic leg swelling.  BNP 54 and Cr stable    Pt states that no exercise due to pandemic  Worsening SOB when walking and carrying bags  Taking Torsemide 20 mg bid and Metolazone 5 mg once a week.  No chest pain.   Asa 325 mg given by Dr Bernal nephrology  No gym exercise after COVID 19 pandemic  BP LDL and BS controlled        Past Medical History:   Diagnosis Date    Acute coronary syndrome     CHF (congestive heart failure)     Chronic kidney disease     Coronary artery disease     Heart murmur     Hyperlipidemia     Hypertension     Lumbar spondylosis     Sleep apnea        Past Surgical History:   Procedure Laterality Date    back surgary      CARDIAC CATHETERIZATION  3/6/2012    CORONARY ANGIOPLASTY      CORONARY ARTERY BYPASS GRAFT  1996    x4    throid lobectomy  4/2012    umbilicial hernia         Social History     Tobacco Use    Smoking status: Never Smoker    Smokeless tobacco: Never Used   Substance Use Topics    Alcohol use: Yes     Comment: 6 BEERS A YEAR     Drug use: No       Family History   Problem Relation Age of Onset    Heart disease Father          Review of Systems   Constitution: Positive for malaise/fatigue. Negative for decreased appetite, diaphoresis, fever and night sweats.    HENT: Negative for nosebleeds.    Eyes: Negative for blurred vision and double vision.   Cardiovascular: Positive for dyspnea on exertion and leg swelling. Negative for chest pain, claudication, irregular heartbeat, near-syncope, orthopnea, palpitations, paroxysmal nocturnal dyspnea and syncope.   Respiratory: Negative for cough, shortness of breath, sleep disturbances due to breathing, snoring, sputum production and wheezing.    Endocrine: Negative for cold intolerance and polyuria.   Hematologic/Lymphatic: Does not bruise/bleed easily.   Skin: Negative for rash.   Musculoskeletal: Negative for back pain, falls, joint pain, joint swelling and neck pain.   Gastrointestinal: Negative for abdominal pain, heartburn, nausea and vomiting.   Genitourinary: Negative for dysuria, frequency and hematuria.   Neurological: Negative for difficulty with concentration, dizziness, focal weakness, headaches, light-headedness, numbness, seizures and weakness.   Psychiatric/Behavioral: Negative for depression, memory loss and substance abuse. The patient does not have insomnia.    Allergic/Immunologic: Negative for HIV exposure and hives.       Objective:   Physical Exam   Constitutional: He is oriented to person, place, and time. He appears well-nourished.   HENT:   Head: Normocephalic.   Eyes: Pupils are equal, round, and reactive to light.   Neck: Normal carotid pulses and no JVD present. Carotid bruit is not present. No thyromegaly present.   Cardiovascular: Normal rate, regular rhythm and normal pulses.  No extrasystoles are present. PMI is not displaced. Exam reveals no gallop and no S3.   Murmur (ESM on URSB and midLSB) heard.  Pulmonary/Chest: Breath sounds normal. No stridor. No respiratory distress.   Abdominal: Soft. Bowel sounds are normal. There is no abdominal tenderness. There is no rebound.   Musculoskeletal: Normal range of motion.   Neurological: He is alert and oriented to person, place, and time.   Skin: Skin is  intact. No rash noted.   Hyperpigmentation on BLE   Psychiatric: His behavior is normal.       Lab Results   Component Value Date    CHOL 117 (L) 01/17/2020    CHOL 150 08/31/2016    CHOL 152 09/09/2015     Lab Results   Component Value Date    HDL 27 (L) 01/17/2020    HDL 29 (L) 08/31/2016    HDL 28 (L) 09/09/2015     Lab Results   Component Value Date    LDLCALC 56.4 (L) 01/17/2020    LDLCALC 72.8 08/31/2016    LDLCALC 82.6 09/09/2015     Lab Results   Component Value Date    TRIG 168 (H) 01/17/2020    TRIG 241 (H) 08/31/2016    TRIG 207 (H) 09/09/2015     Lab Results   Component Value Date    CHOLHDL 23.1 01/17/2020    CHOLHDL 19.3 (L) 08/31/2016    CHOLHDL 18.4 (L) 09/09/2015       Chemistry        Component Value Date/Time     09/16/2020 1721    K 4.9 09/16/2020 1721     09/16/2020 1721    CO2 25 09/16/2020 1721    BUN 24 (H) 09/16/2020 1721    CREATININE 1.7 (H) 09/16/2020 1721    GLU 94 09/16/2020 1721        Component Value Date/Time    CALCIUM 9.5 09/16/2020 1721    ALKPHOS 70 09/16/2020 1721    AST 42 (H) 09/16/2020 1721    ALT 54 (H) 09/16/2020 1721    BILITOT 1.1 (H) 09/16/2020 1721    ESTGFRAFRICA 45 (A) 09/16/2020 1721    EGFRNONAA 39 (A) 09/16/2020 1721          Lab Results   Component Value Date    HGBA1C 6.4 (H) 09/20/2020     Lab Results   Component Value Date    TSH 1.321 07/12/2017     Lab Results   Component Value Date    INR 1.1 07/25/2016    INR 1.1 12/19/2014    INR 1.1 03/01/2012     Lab Results   Component Value Date    WBC 8.25 10/16/2020    HGB 18.6 (H) 10/16/2020    HCT 54.9 (H) 10/16/2020    MCV 94 10/16/2020     10/16/2020     BMP  Sodium   Date Value Ref Range Status   09/16/2020 136 136 - 145 mmol/L Final     Potassium   Date Value Ref Range Status   09/16/2020 4.9 3.5 - 5.1 mmol/L Final     Chloride   Date Value Ref Range Status   09/16/2020 101 95 - 110 mmol/L Final     CO2   Date Value Ref Range Status   09/16/2020 25 23 - 29 mmol/L Final     BUN, Bld   Date  Value Ref Range Status   09/16/2020 24 (H) 8 - 23 mg/dL Final     Creatinine   Date Value Ref Range Status   09/16/2020 1.7 (H) 0.5 - 1.4 mg/dL Final     Calcium   Date Value Ref Range Status   09/16/2020 9.5 8.7 - 10.5 mg/dL Final     Anion Gap   Date Value Ref Range Status   09/16/2020 10 8 - 16 mmol/L Final     eGFR if    Date Value Ref Range Status   09/16/2020 45 (A) >60 mL/min/1.73 m^2 Final     eGFR if non    Date Value Ref Range Status   09/16/2020 39 (A) >60 mL/min/1.73 m^2 Final     Comment:     Calculation used to obtain the estimated glomerular filtration  rate (eGFR) is the CKD-EPI equation.        BNP  @LABRCNTIP(BNP,BNPTRIAGEBLO)@  @LABRCNTIP(troponini)@  CrCl cannot be calculated (Patient's most recent lab result is older than the maximum 7 days allowed.).  No results found in the last 24 hours.  No results found in the last 24 hours.  No results found in the last 24 hours.    Assessment:      1. Coronary artery disease of bypass graft of native heart with stable angina pectoris    2. Chronic diastolic congestive heart failure    3. Essential hypertension    4. Hyperlipidemia with target LDL less than 70    5. S/P CABG x 4    6. Stage 3a chronic kidney disease    7. Morbid obesity with BMI of 40.0-44.9, adult        Plan:   CONTINUE TORSEMIDE 20 MG BID AND METOLAZONE 2.5 MG TWICE A WEEK  Daily weight. Please call the office if gain 3 pounds in 1 day or 5 pounds in 1 week.  Fluid restriction 1.5 liters a day  Na< 2 gm  CONTINUE ASa lipitor, lopressor losartan imdur and Aldactone   D/c KCL and repeat BMP I n 4 weeks for K level  Counseled DASH  Check Lipid profile in 6 months  Recommend heart-healthy diet, weight control and regular exercise.  Ion. Risk modification.   RTC in 6 months    I have reviewed all pertinent labs and cardiac studies independently. Plans and recommendations have been formulated under my direct supervision. All questions answered and patient  voiced understanding.   If symptoms persist go to the ED

## 2020-10-16 NOTE — NURSING
1 unit therapeutic phlebotomy performed via right AC then discarded, pressure dressing applied. Pt drank OJ during and after phleb. BP  102/69 upon completion. Pt denies any c/o dizziness, lightheadedness or faintness. {t ambulated out of clinic without difficulty and in stable condition.

## 2020-10-17 ENCOUNTER — PATIENT MESSAGE (OUTPATIENT)
Dept: HEMATOLOGY/ONCOLOGY | Facility: CLINIC | Age: 75
End: 2020-10-17

## 2020-10-20 ENCOUNTER — TELEPHONE (OUTPATIENT)
Dept: CARDIOLOGY | Facility: CLINIC | Age: 75
End: 2020-10-20

## 2020-10-20 NOTE — TELEPHONE ENCOUNTER
----- Message from Lior Elaine MD sent at 10/19/2020  5:45 PM CDT -----  D/c KCL Repeat BMP in 4 weeks

## 2020-10-21 ENCOUNTER — PATIENT MESSAGE (OUTPATIENT)
Dept: CARDIOLOGY | Facility: CLINIC | Age: 75
End: 2020-10-21

## 2020-11-11 ENCOUNTER — LAB VISIT (OUTPATIENT)
Dept: LAB | Facility: HOSPITAL | Age: 75
End: 2020-11-11
Attending: INTERNAL MEDICINE
Payer: MEDICARE

## 2020-11-11 DIAGNOSIS — I75.023 CHOLESTEROL EMBOLISM OF LOWER EXTREMITY, BILATERAL: ICD-10-CM

## 2020-11-11 DIAGNOSIS — R60.0 BILATERAL EDEMA OF LOWER EXTREMITY: ICD-10-CM

## 2020-11-11 DIAGNOSIS — N28.1 COMPLEX RENAL CYST: ICD-10-CM

## 2020-11-11 DIAGNOSIS — E83.52 HYPERCALCEMIA: ICD-10-CM

## 2020-11-11 DIAGNOSIS — N18.30 CKD (CHRONIC KIDNEY DISEASE) STAGE 3, GFR 30-59 ML/MIN: ICD-10-CM

## 2020-11-11 DIAGNOSIS — E87.6 HYPOKALEMIA: ICD-10-CM

## 2020-11-11 DIAGNOSIS — N25.81 SECONDARY HYPERPARATHYROIDISM OF RENAL ORIGIN: ICD-10-CM

## 2020-11-11 DIAGNOSIS — M1A.09X0 IDIOPATHIC CHRONIC GOUT OF MULTIPLE SITES WITHOUT TOPHUS: ICD-10-CM

## 2020-11-11 DIAGNOSIS — I51.89 DIASTOLIC DYSFUNCTION: ICD-10-CM

## 2020-11-11 DIAGNOSIS — I27.20 PULMONARY HYPERTENSION: ICD-10-CM

## 2020-11-11 DIAGNOSIS — G47.33 OSA (OBSTRUCTIVE SLEEP APNEA): ICD-10-CM

## 2020-11-11 LAB
ALBUMIN SERPL BCP-MCNC: 3.7 G/DL (ref 3.5–5.2)
ANION GAP SERPL CALC-SCNC: 13 MMOL/L (ref 8–16)
BASOPHILS # BLD AUTO: ABNORMAL K/UL (ref 0–0.2)
BASOPHILS NFR BLD: 0 % (ref 0–1.9)
BUN SERPL-MCNC: 32 MG/DL (ref 8–23)
CALCIUM SERPL-MCNC: 9.6 MG/DL (ref 8.7–10.5)
CHLORIDE SERPL-SCNC: 91 MMOL/L (ref 95–110)
CO2 SERPL-SCNC: 28 MMOL/L (ref 23–29)
CREAT SERPL-MCNC: 1.7 MG/DL (ref 0.5–1.4)
DIFFERENTIAL METHOD: ABNORMAL
EOSINOPHIL # BLD AUTO: ABNORMAL K/UL (ref 0–0.5)
EOSINOPHIL NFR BLD: 0 % (ref 0–8)
ERYTHROCYTE [DISTWIDTH] IN BLOOD BY AUTOMATED COUNT: 14.9 % (ref 11.5–14.5)
EST. GFR  (AFRICAN AMERICAN): 44.9 ML/MIN/1.73 M^2
EST. GFR  (NON AFRICAN AMERICAN): 38.9 ML/MIN/1.73 M^2
GLUCOSE SERPL-MCNC: 130 MG/DL (ref 70–110)
HCT VFR BLD AUTO: 52 % (ref 40–54)
HGB BLD-MCNC: 18.2 G/DL (ref 14–18)
IMM GRANULOCYTES # BLD AUTO: ABNORMAL K/UL (ref 0–0.04)
IMM GRANULOCYTES NFR BLD AUTO: ABNORMAL % (ref 0–0.5)
LYMPHOCYTES # BLD AUTO: ABNORMAL K/UL (ref 1–4.8)
LYMPHOCYTES NFR BLD: 15 % (ref 18–48)
MCH RBC QN AUTO: 32 PG (ref 27–31)
MCHC RBC AUTO-ENTMCNC: 35 G/DL (ref 32–36)
MCV RBC AUTO: 92 FL (ref 82–98)
MONOCYTES # BLD AUTO: ABNORMAL K/UL (ref 0.3–1)
MONOCYTES NFR BLD: 7 % (ref 4–15)
NEUTROPHILS NFR BLD: 74 % (ref 38–73)
NEUTS BAND NFR BLD MANUAL: 4 %
NRBC BLD-RTO: 0 /100 WBC
PHOSPHATE SERPL-MCNC: 3.7 MG/DL (ref 2.7–4.5)
PLATELET # BLD AUTO: 193 K/UL (ref 150–350)
PLATELET BLD QL SMEAR: ABNORMAL
PMV BLD AUTO: 9.8 FL (ref 9.2–12.9)
POTASSIUM SERPL-SCNC: 3.7 MMOL/L (ref 3.5–5.1)
PTH-INTACT SERPL-MCNC: 134 PG/ML (ref 9–77)
RBC # BLD AUTO: 5.68 M/UL (ref 4.6–6.2)
SODIUM SERPL-SCNC: 132 MMOL/L (ref 136–145)
WBC # BLD AUTO: 11.33 K/UL (ref 3.9–12.7)

## 2020-11-11 PROCEDURE — 80069 RENAL FUNCTION PANEL: CPT

## 2020-11-11 PROCEDURE — 82610 CYSTATIN C: CPT

## 2020-11-11 PROCEDURE — 83970 ASSAY OF PARATHORMONE: CPT

## 2020-11-11 PROCEDURE — 85007 BL SMEAR W/DIFF WBC COUNT: CPT | Mod: PO

## 2020-11-11 PROCEDURE — 36415 COLL VENOUS BLD VENIPUNCTURE: CPT | Mod: PO

## 2020-11-11 PROCEDURE — 85027 COMPLETE CBC AUTOMATED: CPT | Mod: PO

## 2020-11-13 LAB
CYSTATIN C SERPL-MCNC: 1.79 MG/L (ref 0.82–1.53)
GFR/BSA.PRED SERPLBLD CYS-BASED-ARV: 34 ML/MIN/BSA

## 2020-11-27 ENCOUNTER — LAB VISIT (OUTPATIENT)
Dept: LAB | Facility: HOSPITAL | Age: 75
End: 2020-11-27
Attending: INTERNAL MEDICINE
Payer: MEDICARE

## 2020-11-27 ENCOUNTER — OFFICE VISIT (OUTPATIENT)
Dept: HEMATOLOGY/ONCOLOGY | Facility: CLINIC | Age: 75
End: 2020-11-27
Payer: MEDICARE

## 2020-11-27 VITALS
WEIGHT: 272.25 LBS | OXYGEN SATURATION: 95 % | TEMPERATURE: 97 F | SYSTOLIC BLOOD PRESSURE: 86 MMHG | DIASTOLIC BLOOD PRESSURE: 51 MMHG | HEART RATE: 85 BPM | HEIGHT: 67 IN | BODY MASS INDEX: 42.73 KG/M2

## 2020-11-27 DIAGNOSIS — D75.1 HYPOXEMIC POLYCYTHEMIA: ICD-10-CM

## 2020-11-27 DIAGNOSIS — M25.552 LEFT HIP PAIN: ICD-10-CM

## 2020-11-27 DIAGNOSIS — G47.33 OSA ON CPAP: ICD-10-CM

## 2020-11-27 DIAGNOSIS — D75.1 POLYCYTHEMIA, SECONDARY: ICD-10-CM

## 2020-11-27 DIAGNOSIS — R03.1 BLOOD PRESSURE LOWER THAN PRIOR MEASUREMENT: ICD-10-CM

## 2020-11-27 DIAGNOSIS — G47.33 OSA (OBSTRUCTIVE SLEEP APNEA): ICD-10-CM

## 2020-11-27 DIAGNOSIS — D75.1 POLYCYTHEMIA, SECONDARY: Primary | ICD-10-CM

## 2020-11-27 LAB
BASOPHILS # BLD AUTO: 0.08 K/UL (ref 0–0.2)
BASOPHILS NFR BLD: 0.7 % (ref 0–1.9)
DIFFERENTIAL METHOD: ABNORMAL
EOSINOPHIL # BLD AUTO: 0.1 K/UL (ref 0–0.5)
EOSINOPHIL NFR BLD: 1.1 % (ref 0–8)
ERYTHROCYTE [DISTWIDTH] IN BLOOD BY AUTOMATED COUNT: 15.5 % (ref 11.5–14.5)
HCT VFR BLD AUTO: 52.4 % (ref 40–54)
HGB BLD-MCNC: 17.7 G/DL (ref 14–18)
IMM GRANULOCYTES # BLD AUTO: 0.11 K/UL (ref 0–0.04)
IMM GRANULOCYTES NFR BLD AUTO: 1 % (ref 0–0.5)
LYMPHOCYTES # BLD AUTO: 1.6 K/UL (ref 1–4.8)
LYMPHOCYTES NFR BLD: 14 % (ref 18–48)
MCH RBC QN AUTO: 31.4 PG (ref 27–31)
MCHC RBC AUTO-ENTMCNC: 33.8 G/DL (ref 32–36)
MCV RBC AUTO: 93 FL (ref 82–98)
MONOCYTES # BLD AUTO: 0.9 K/UL (ref 0.3–1)
MONOCYTES NFR BLD: 8 % (ref 4–15)
NEUTROPHILS # BLD AUTO: 8.4 K/UL (ref 1.8–7.7)
NEUTROPHILS NFR BLD: 75.2 % (ref 38–73)
NRBC BLD-RTO: 0 /100 WBC
PLATELET # BLD AUTO: 187 K/UL (ref 150–350)
PMV BLD AUTO: 9.5 FL (ref 9.2–12.9)
RBC # BLD AUTO: 5.64 M/UL (ref 4.6–6.2)
WBC # BLD AUTO: 11.12 K/UL (ref 3.9–12.7)

## 2020-11-27 PROCEDURE — 99214 PR OFFICE/OUTPT VISIT, EST, LEVL IV, 30-39 MIN: ICD-10-PCS | Mod: S$GLB,,, | Performed by: INTERNAL MEDICINE

## 2020-11-27 PROCEDURE — 3078F DIAST BP <80 MM HG: CPT | Mod: CPTII,S$GLB,, | Performed by: INTERNAL MEDICINE

## 2020-11-27 PROCEDURE — 99999 PR PBB SHADOW E&M-EST. PATIENT-LVL V: ICD-10-PCS | Mod: PBBFAC,,, | Performed by: INTERNAL MEDICINE

## 2020-11-27 PROCEDURE — 1126F PR PAIN SEVERITY QUANTIFIED, NO PAIN PRESENT: ICD-10-PCS | Mod: S$GLB,,, | Performed by: INTERNAL MEDICINE

## 2020-11-27 PROCEDURE — 3008F BODY MASS INDEX DOCD: CPT | Mod: CPTII,S$GLB,, | Performed by: INTERNAL MEDICINE

## 2020-11-27 PROCEDURE — 1101F PT FALLS ASSESS-DOCD LE1/YR: CPT | Mod: CPTII,S$GLB,, | Performed by: INTERNAL MEDICINE

## 2020-11-27 PROCEDURE — 3008F PR BODY MASS INDEX (BMI) DOCUMENTED: ICD-10-PCS | Mod: CPTII,S$GLB,, | Performed by: INTERNAL MEDICINE

## 2020-11-27 PROCEDURE — 1126F AMNT PAIN NOTED NONE PRSNT: CPT | Mod: S$GLB,,, | Performed by: INTERNAL MEDICINE

## 2020-11-27 PROCEDURE — 1159F PR MEDICATION LIST DOCUMENTED IN MEDICAL RECORD: ICD-10-PCS | Mod: S$GLB,,, | Performed by: INTERNAL MEDICINE

## 2020-11-27 PROCEDURE — 3288F FALL RISK ASSESSMENT DOCD: CPT | Mod: CPTII,S$GLB,, | Performed by: INTERNAL MEDICINE

## 2020-11-27 PROCEDURE — 3074F SYST BP LT 130 MM HG: CPT | Mod: CPTII,S$GLB,, | Performed by: INTERNAL MEDICINE

## 2020-11-27 PROCEDURE — 3074F PR MOST RECENT SYSTOLIC BLOOD PRESSURE < 130 MM HG: ICD-10-PCS | Mod: CPTII,S$GLB,, | Performed by: INTERNAL MEDICINE

## 2020-11-27 PROCEDURE — 99999 PR PBB SHADOW E&M-EST. PATIENT-LVL V: CPT | Mod: PBBFAC,,, | Performed by: INTERNAL MEDICINE

## 2020-11-27 PROCEDURE — 1101F PR PT FALLS ASSESS DOC 0-1 FALLS W/OUT INJ PAST YR: ICD-10-PCS | Mod: CPTII,S$GLB,, | Performed by: INTERNAL MEDICINE

## 2020-11-27 PROCEDURE — 1159F MED LIST DOCD IN RCRD: CPT | Mod: S$GLB,,, | Performed by: INTERNAL MEDICINE

## 2020-11-27 PROCEDURE — 3288F PR FALLS RISK ASSESSMENT DOCUMENTED: ICD-10-PCS | Mod: CPTII,S$GLB,, | Performed by: INTERNAL MEDICINE

## 2020-11-27 PROCEDURE — 99214 OFFICE O/P EST MOD 30 MIN: CPT | Mod: S$GLB,,, | Performed by: INTERNAL MEDICINE

## 2020-11-27 PROCEDURE — 36415 COLL VENOUS BLD VENIPUNCTURE: CPT

## 2020-11-27 PROCEDURE — 85025 COMPLETE CBC W/AUTO DIFF WBC: CPT

## 2020-11-27 PROCEDURE — 3078F PR MOST RECENT DIASTOLIC BLOOD PRESSURE < 80 MM HG: ICD-10-PCS | Mod: CPTII,S$GLB,, | Performed by: INTERNAL MEDICINE

## 2020-11-27 NOTE — PROGRESS NOTES
Subjective:      DATE OF VISIT: 11/27/2020   ?   ?   Patient ID:?Bo Chase is a 75 y.o. male.?? MR#: [id number]   ?   PRIMARY PROVIDER: Dr. Diaz  ?   CHIEF COMPLAINT:  Polycythemia?????   ?   DIAGNOSIS:  Secondary polycythemia  ?   CURRENT TREATMENT:  Phlebotomy, aspirin (takes 325mg daily)      HPI    Mr. Chase comes for follow-up.  Last phlebotomy about 6 weeks ago.  Blood pressure lower and notes taking blood pressure medications prior to leaving house.  Denies lightheadedness or dizziness.  He is planning hip surgery in January 2021.     Review of systems:  ?   A comprehensive 14-point review of systems was reviewed with patient and was negative other than as specified above.   ?     Objective:      Physical Exam      ?   Vitals:    11/27/20 1113   BP: (!) 86/51   Pulse: 85   Temp: 96.5 °F (35.8 °C)      ?   ECOG:?0   General appearance: Generally well appearing, in no acute distress.   Head, eyes, ears, nose, and throat: moist mucous membranes, mild ruddiness of face, relatively stable.  Respiratory:  No increased work of breathing.   Extremities: Warm, with stable chronic stasis changes , no appreciable edema  Neurologic: Alert and oriented. Grossly normal strength, coordination, and gait.   Skin: No rashes, ecchymoses or petechial lesion.   Psychiatric:  Normal mood and affect.        Laboratory:  ?   Lab Visit on 11/27/2020   Component Date Value Ref Range Status    WBC 11/27/2020 11.12  3.90 - 12.70 K/uL Final    RBC 11/27/2020 5.64  4.60 - 6.20 M/uL Final    Hemoglobin 11/27/2020 17.7  14.0 - 18.0 g/dL Final    Hematocrit 11/27/2020 52.4  40.0 - 54.0 % Final    MCV 11/27/2020 93  82 - 98 fL Final    MCH 11/27/2020 31.4* 27.0 - 31.0 pg Final    MCHC 11/27/2020 33.8  32.0 - 36.0 g/dL Final    RDW 11/27/2020 15.5* 11.5 - 14.5 % Final    Platelets 11/27/2020 187  150 - 350 K/uL Final    MPV 11/27/2020 9.5  9.2 - 12.9 fL Final    Immature Granulocytes 11/27/2020 1.0* 0.0 - 0.5 % Final    Gran  # (ANC) 11/27/2020 8.4* 1.8 - 7.7 K/uL Final    Immature Grans (Abs) 11/27/2020 0.11* 0.00 - 0.04 K/uL Final    Lymph # 11/27/2020 1.6  1.0 - 4.8 K/uL Final    Mono # 11/27/2020 0.9  0.3 - 1.0 K/uL Final    Eos # 11/27/2020 0.1  0.0 - 0.5 K/uL Final    Baso # 11/27/2020 0.08  0.00 - 0.20 K/uL Final    nRBC 11/27/2020 0  0 /100 WBC Final    Gran % 11/27/2020 75.2* 38.0 - 73.0 % Final    Lymph % 11/27/2020 14.0* 18.0 - 48.0 % Final    Mono % 11/27/2020 8.0  4.0 - 15.0 % Final    Eosinophil % 11/27/2020 1.1  0.0 - 8.0 % Final    Basophil % 11/27/2020 0.7  0.0 - 1.9 % Final    Differential Method 11/27/2020 Automated   Final      ?   ?   Hemoglobin   Date Value Ref Range Status   11/27/2020 17.7 14.0 - 18.0 g/dL Final   11/11/2020 18.2 (H) 14.0 - 18.0 g/dL Final   10/16/2020 18.6 (H) 14.0 - 18.0 g/dL Final   09/16/2020 17.3 14.0 - 18.0 g/dL Final   09/08/2020 17.6 14.0 - 18.0 g/dL Final   08/06/2020 19.0 (H) 14.0 - 18.0 g/dL Final   08/06/2020 19.0 (H) 14.0 - 18.0 g/dL Final   03/26/2020 16.9 14.0 - 18.0 g/dL Final   02/20/2020 15.4 14.0 - 18.0 g/dL Final   01/30/2020 15.1 14.0 - 18.0 g/dL Final     Hematocrit   Date Value Ref Range Status   11/27/2020 52.4 40.0 - 54.0 % Final   11/11/2020 52.0 40.0 - 54.0 % Final   10/16/2020 54.9 (H) 40.0 - 54.0 % Final   09/16/2020 52.4 40.0 - 54.0 % Final   09/08/2020 52.2 40.0 - 54.0 % Final   08/06/2020 55.9 (H) 40.0 - 54.0 % Final   08/06/2020 55.9 (H) 40.0 - 54.0 % Final   03/26/2020 53.8 40.0 - 54.0 % Final   02/20/2020 50.0 40.0 - 54.0 % Final   01/30/2020 47.2 40.0 - 54.0 % Final       Assessment/Plan:       1. Polycythemia, secondary    2. LEANDRO on CPAP    3. Hypoxemic polycythemia    4. LEANDRO (obstructive sleep apnea)    5. Blood pressure lower than prior measurement    6. Left hip pain          Plan:     # Polycythemia: elevated EPO. JAK2 V617F, exon 12-15 negative.  History of obstructive sleep apnea and labs most consistent with secondary polycythemia.  -  recommended weight loss, continued use of CPAP and treatment with ASA35 and phlebotomy prn.    - recommended phlebotomy 1 unit given hematocrit 52, prefers to use local donation for blood.    # Lower blood pressure:  Asymptomatic, does not taking all blood pressures this morning prior to coming to visit.  Recommended against phlebotomy today given lower blood pressure and he prefers to get elsewhere with donation regardless.  Recommended checking blood pressure prior to donation and drinking plenty of fluids.    # Left hip pain:  Plans for hip replacement and discussed in setting of limited mobility increase risk of thrombosis which he may have with polycythemia and importance of prophylactic anticoagulation per discretion of Orthopedics team and early mobility as possible.    Follow-Up:   - phlebotomy today, 1 unit  - repeat CBC prior in louie Orozco follow-up visit per preference.

## 2020-11-30 ENCOUNTER — TELEPHONE (OUTPATIENT)
Dept: CARDIOLOGY | Facility: CLINIC | Age: 75
End: 2020-11-30

## 2020-11-30 NOTE — TELEPHONE ENCOUNTER
Patient called back and states he will be in at 415----- Message from Myra Phelps sent at 11/30/2020 10:58 AM CST -----  Contact: Bo Soriano returned call regards to his 4:15 appointment he stated that he can not come in early than 4:15 pm. Please call her at [phone].    Thanks  Td

## 2020-11-30 NOTE — TELEPHONE ENCOUNTER
Telephoned patient about rescheduling appointment due to Dr. Joel has an procedure this afternoon. Advised patient to reschedule appointment but patient refused to reschedule appointment.

## 2020-12-01 ENCOUNTER — OFFICE VISIT (OUTPATIENT)
Dept: NEPHROLOGY | Facility: CLINIC | Age: 75
End: 2020-12-01
Payer: MEDICARE

## 2020-12-01 VITALS
HEART RATE: 80 BPM | WEIGHT: 275.13 LBS | DIASTOLIC BLOOD PRESSURE: 60 MMHG | HEIGHT: 67 IN | BODY MASS INDEX: 43.18 KG/M2 | SYSTOLIC BLOOD PRESSURE: 114 MMHG

## 2020-12-01 DIAGNOSIS — I27.20 PULMONARY HYPERTENSION: ICD-10-CM

## 2020-12-01 DIAGNOSIS — M1A.09X0 IDIOPATHIC CHRONIC GOUT OF MULTIPLE SITES WITHOUT TOPHUS: ICD-10-CM

## 2020-12-01 DIAGNOSIS — E87.6 HYPOKALEMIA: ICD-10-CM

## 2020-12-01 DIAGNOSIS — N25.81 SECONDARY HYPERPARATHYROIDISM OF RENAL ORIGIN: ICD-10-CM

## 2020-12-01 DIAGNOSIS — G47.33 OSA (OBSTRUCTIVE SLEEP APNEA): ICD-10-CM

## 2020-12-01 DIAGNOSIS — N18.31 STAGE 3A CHRONIC KIDNEY DISEASE: Primary | ICD-10-CM

## 2020-12-01 DIAGNOSIS — E83.52 HYPERCALCEMIA: ICD-10-CM

## 2020-12-01 DIAGNOSIS — I51.89 DIASTOLIC DYSFUNCTION: ICD-10-CM

## 2020-12-01 DIAGNOSIS — N28.1 COMPLEX RENAL CYST: ICD-10-CM

## 2020-12-01 DIAGNOSIS — R60.0 BILATERAL EDEMA OF LOWER EXTREMITY: ICD-10-CM

## 2020-12-01 PROCEDURE — 1159F MED LIST DOCD IN RCRD: CPT | Mod: S$GLB,,, | Performed by: INTERNAL MEDICINE

## 2020-12-01 PROCEDURE — 3288F PR FALLS RISK ASSESSMENT DOCUMENTED: ICD-10-PCS | Mod: CPTII,S$GLB,, | Performed by: INTERNAL MEDICINE

## 2020-12-01 PROCEDURE — 3078F PR MOST RECENT DIASTOLIC BLOOD PRESSURE < 80 MM HG: ICD-10-PCS | Mod: CPTII,S$GLB,, | Performed by: INTERNAL MEDICINE

## 2020-12-01 PROCEDURE — 3008F BODY MASS INDEX DOCD: CPT | Mod: CPTII,S$GLB,, | Performed by: INTERNAL MEDICINE

## 2020-12-01 PROCEDURE — 99999 PR PBB SHADOW E&M-EST. PATIENT-LVL IV: ICD-10-PCS | Mod: PBBFAC,,, | Performed by: INTERNAL MEDICINE

## 2020-12-01 PROCEDURE — 3008F PR BODY MASS INDEX (BMI) DOCUMENTED: ICD-10-PCS | Mod: CPTII,S$GLB,, | Performed by: INTERNAL MEDICINE

## 2020-12-01 PROCEDURE — 99214 OFFICE O/P EST MOD 30 MIN: CPT | Mod: S$GLB,,, | Performed by: INTERNAL MEDICINE

## 2020-12-01 PROCEDURE — 3078F DIAST BP <80 MM HG: CPT | Mod: CPTII,S$GLB,, | Performed by: INTERNAL MEDICINE

## 2020-12-01 PROCEDURE — 1159F PR MEDICATION LIST DOCUMENTED IN MEDICAL RECORD: ICD-10-PCS | Mod: S$GLB,,, | Performed by: INTERNAL MEDICINE

## 2020-12-01 PROCEDURE — 1125F AMNT PAIN NOTED PAIN PRSNT: CPT | Mod: S$GLB,,, | Performed by: INTERNAL MEDICINE

## 2020-12-01 PROCEDURE — 3074F PR MOST RECENT SYSTOLIC BLOOD PRESSURE < 130 MM HG: ICD-10-PCS | Mod: CPTII,S$GLB,, | Performed by: INTERNAL MEDICINE

## 2020-12-01 PROCEDURE — 99999 PR PBB SHADOW E&M-EST. PATIENT-LVL IV: CPT | Mod: PBBFAC,,, | Performed by: INTERNAL MEDICINE

## 2020-12-01 PROCEDURE — 1101F PR PT FALLS ASSESS DOC 0-1 FALLS W/OUT INJ PAST YR: ICD-10-PCS | Mod: CPTII,S$GLB,, | Performed by: INTERNAL MEDICINE

## 2020-12-01 PROCEDURE — 99214 PR OFFICE/OUTPT VISIT, EST, LEVL IV, 30-39 MIN: ICD-10-PCS | Mod: S$GLB,,, | Performed by: INTERNAL MEDICINE

## 2020-12-01 PROCEDURE — 1101F PT FALLS ASSESS-DOCD LE1/YR: CPT | Mod: CPTII,S$GLB,, | Performed by: INTERNAL MEDICINE

## 2020-12-01 PROCEDURE — 1125F PR PAIN SEVERITY QUANTIFIED, PAIN PRESENT: ICD-10-PCS | Mod: S$GLB,,, | Performed by: INTERNAL MEDICINE

## 2020-12-01 PROCEDURE — 3074F SYST BP LT 130 MM HG: CPT | Mod: CPTII,S$GLB,, | Performed by: INTERNAL MEDICINE

## 2020-12-01 PROCEDURE — 3288F FALL RISK ASSESSMENT DOCD: CPT | Mod: CPTII,S$GLB,, | Performed by: INTERNAL MEDICINE

## 2020-12-01 RX ORDER — TRAMADOL HYDROCHLORIDE 50 MG/1
50 TABLET ORAL EVERY 12 HOURS PRN
Qty: 180 TABLET | Refills: 2 | Status: SHIPPED | OUTPATIENT
Start: 2020-12-01 | End: 2020-12-07 | Stop reason: SDUPTHER

## 2020-12-01 RX ORDER — POTASSIUM CHLORIDE 750 MG/1
10 TABLET, EXTENDED RELEASE ORAL DAILY
COMMUNITY
End: 2021-10-29 | Stop reason: SDUPTHER

## 2020-12-01 NOTE — PROGRESS NOTES
Subjective:       Patient ID: Bo Chase is a 75 y.o. White male who presents for follow-up evaluation of Chronic Kidney Disease, Hypokalemia, Hypertension, and Edema    Hypertension  Pertinent negatives include no chest pain, headaches, neck pain, palpitations or shortness of breath.   Edema  Pertinent negatives include no abdominal pain, arthralgias, chest pain, chills, congestion, coughing, diaphoresis, fatigue, fever, headaches, joint swelling, nausea, neck pain, rash or vomiting.      Patient is a  white male who was  by profession.  Has history of chronic kidney disease for at least 5 years.  He was last seen by Dr. Prather in 2012.  Renal ultrasound at that time showed bilateral renal cysts.  Left kidney had a cyst of 2.5 cm.  Patient never had any proteinuria.  His creatinine has been fluctuating between 1.6 and 2.0.  His fluctuations have been due to diuretic therapy.  In the last 5 years his kidney function has been stable.  He has had history of coronary artery disease status post shortness of breath and dyspnea and exertion.    12/ 2014Occluded lad lcx and rca.Occluded svg to d1svg to rca patent svg to  om1 90% eccentric treated with dennis with filter wire protection.Patent lima.No complication. Dr. Joel     2014 LEANDRO : now on CPAP    6/2015 LBP s/p KAROLINA and s/p surgery laminectomy     4/2017 Renal USD CT scan of the abdomen shows extensive calcifications of the aorta    August 2017 2-D echo reviewed= PA pressure >26 mm Hg, creatinine down to 1.4, uric acid controlled, vitamin D level is 43, PTH 91, estimated GFR with Cystatin C is 50%; weight down by 10 ib from 263 to 254 ib    March 2018 patient's creatinine is up to 1.8.  Weight is up to 262 pounds.  Approximate GFR about 40%.  GFR loss is about 10% per year.  No proteinuria.  PTH has gone up from 91 in 2017-235 in March 2018.  Labwork shows severe metabolic alkalosis, severe hypokalemia, low chloride levels due to complications of diuretics.  "Severe Polycythemia may need phlebotomy     6/2018 K+ is better ; Hgb is better ; weight is higher ; GFR 37 % with Cr 1.7 ; retired and now in gym     09/2018 High Calcium; stop D; check USD and SPEP     November 2018 ultrasound done no cancer.  Negative serum protein electrophoresis    1/2019 s/p fall now pending left hips IA steroids in SI and Hip ; creatinine down off ibuprofen ; rtired now in [place] --gym and work out d/w patient [place]    6/2019 s/p cellulitis x2 s/p inpatient and I&D ( serratia ) ; left hip injection and Chiro for Pyriformitis     August 2020 creatinine 2.0.  Status post phlebotomy for polycythemia under care of Dr. Diaz.    Review of Systems   Constitutional: Negative.  Negative for activity change, appetite change, chills, diaphoresis, fatigue and fever.         water aerobics ; weight training    HENT: Negative.  Negative for congestion and trouble swallowing.    Eyes: Negative.    Respiratory: Negative.  Negative for cough, chest tightness, shortness of breath and wheezing.    Cardiovascular: Positive for leg swelling. Negative for chest pain and palpitations.   Gastrointestinal: Negative.  Negative for abdominal distention, abdominal pain, nausea and vomiting.   Genitourinary: Negative.  Negative for decreased urine volume, difficulty urinating, dysuria, enuresis, flank pain, frequency, hematuria, penile swelling, scrotal swelling and urgency.   Musculoskeletal: Negative.  Negative for arthralgias, back pain, joint swelling and neck pain.   Skin: Negative for rash.   Neurological: Negative.  Negative for tremors, seizures and headaches.   Psychiatric/Behavioral: Negative.  Negative for confusion and sleep disturbance. The patient is not nervous/anxious.        Objective:   /60   Pulse 80   Ht 5' 7" (1.702 m)   Wt 124.8 kg (275 lb 2.2 oz)   BMI 43.09 kg/m²      Weight at home 270     gained 10 lb since last visit.  From 259-269    1/2019 277 ib     6/2019 254 ib = 115.3 kg "     10/2019 257 = 116.6    2/2020 263 ib     August 2020 266 lb    12/2020 275 ib   Physical Exam  Constitutional:       General: He is not in acute distress.     Appearance: He is well-developed.   HENT:      Head: Normocephalic.   Eyes:      Pupils: Pupils are equal, round, and reactive to light.   Neck:      Musculoskeletal: Normal range of motion and neck supple.      Thyroid: No thyromegaly.      Vascular: No JVD.   Cardiovascular:      Rate and Rhythm: Normal rate and regular rhythm.      Chest Wall: PMI is not displaced.      Heart sounds: Normal heart sounds, S1 normal and S2 normal. No murmur. No friction rub. No gallop.       Comments: Cholesterol emboli ; loud P2   Pulmonary:      Effort: Pulmonary effort is normal. No respiratory distress.      Breath sounds: Normal breath sounds. No wheezing or rales.   Chest:      Chest wall: No tenderness.   Abdominal:      General: There is no distension.      Palpations: There is no mass.      Tenderness: There is no abdominal tenderness. There is no rebound.      Hernia: No hernia is present.   Musculoskeletal: Normal range of motion.         General: No tenderness.      Comments: 2+ edema R>L    Lymphadenopathy:      Cervical: No cervical adenopathy.   Skin:     General: Skin is warm and dry.      Findings: No erythema or rash.   Neurological:      Mental Status: He is alert and oriented to person, place, and time. He is not disoriented.      Cranial Nerves: No cranial nerve deficit.      Motor: No abnormal muscle tone.      Coordination: Coordination normal.      Deep Tendon Reflexes: Reflexes are normal and symmetric. Reflexes normal.   Psychiatric:         Behavior: Behavior normal.         Thought Content: Thought content normal.         Judgment: Judgment normal.        Left shoulder / fibro / myofascial pain       Lab Results   Component Value Date    CREATININE 1.7 (H) 11/11/2020    BUN 32 (H) 11/11/2020     (L) 11/11/2020    K 3.7 11/11/2020    CL 91  (L) 11/11/2020    CO2 28 11/11/2020     Lab Results   Component Value Date    WBC 11.12 11/27/2020    HGB 17.7 11/27/2020    HCT 52.4 11/27/2020    MCV 93 11/27/2020     11/27/2020     Lab Results   Component Value Date    .0 (H) 11/11/2020    CALCIUM 9.6 11/11/2020    PHOS 3.7 11/11/2020     Lab Results   Component Value Date    URICACID 6.0 08/06/2020    URICACID 6.0 08/06/2020    URICACID 6.0 08/06/2020         Assessment:    )    1. Stage 3a chronic kidney disease    2. Bilateral edema of lower extremity    3. Secondary hyperparathyroidism of renal origin    4. Complex renal cyst    5. Hypokalemia    6. Idiopathic chronic gout of multiple sites without tophus    7. Pulmonary hypertension    8. Diastolic dysfunction    9. Hypercalcemia    10. LEANDRO (obstructive sleep apnea)        Plan:         1.  Chronic kidney disease stage III  patient may have cardiorenal syndrome:  fluctuating creatinine is due to diuretics.   No heavy proteinuria.  Creatinine is stable today.  GFR 35-% ;     2.  Chronic gout: Last attack was 2018.   on allopurinol 450 mg uric acid is well controlled at 6 followed by Rheumatology.    3.  Hyperparathyroidism due to chronic kidney disease stage III:  Off  vitamin-D therapy since the calcium is too high.  Consider Rayaldee at some point no vitamin-D is needed at this time.  Vitamin-D level was normal in September 2019.    4.   anasarca on the lower extremities with chronic venous stasis findings as well as brawny edema of the skin: Patient had an ultrasound of the legs in 2012 which showed no DVT.  Clinically patient has pulm. hypertension, metolazone ( now 2.5 mg -2x week )  + torsemide 20 mg x2  daily.  Followed by Cardiology.     5.  Cholesterol emboli of the lower extremities: Extensive aortic calcifications noted on the CT scan.    aspirin to 325 mg daily. Continue Lipitor.    6.  Pulm HTN and LEANDRO: records of CPAP and dr. Elaine in Cardiology department  Reviewed.  Polycythemia  reviewed elevated erythropoietin.  Followed by Dr. Diaz. Try Provigil     7.  Hypokalemia:keep  aldactone 25 mg  KCL to 40 meq daily.  Will try Inspra 50 mg instead of Aldactone d/w patient if and when needed.    8.  Continued follow-up with Hematology for polycythemia monthly phlebotomy.  Records of Hematology- reviewed     9.  Acquired renal cysts: Patient had multiple complex cysts in the ultrasound done in 2017 and 2018 .  Status post  renal ultrasound June of 2020    10. Hip Arthiritis: will need long term tramadol which I will prescribe.  Last prescription July 2020. Refill today. Surgery on [place] 12/16/20     I have reconciled with the patient's narcotic prescriptions on [place].  I have complied with all the narcotic prescription rules and regulations as outlined by the [facility] board.    11. Phlebotomy for polycythemia: no ASIF mutation ; Epo levels < 20          Follow-up  4 -6 months        [place] Bernal MD

## 2020-12-07 ENCOUNTER — PATIENT MESSAGE (OUTPATIENT)
Dept: NEPHROLOGY | Facility: CLINIC | Age: 75
End: 2020-12-07

## 2020-12-07 ENCOUNTER — OFFICE VISIT (OUTPATIENT)
Dept: PULMONOLOGY | Facility: CLINIC | Age: 75
End: 2020-12-07
Payer: MEDICARE

## 2020-12-07 VITALS
OXYGEN SATURATION: 95 % | WEIGHT: 276.88 LBS | HEART RATE: 80 BPM | DIASTOLIC BLOOD PRESSURE: 78 MMHG | HEIGHT: 67 IN | BODY MASS INDEX: 43.46 KG/M2 | SYSTOLIC BLOOD PRESSURE: 120 MMHG | RESPIRATION RATE: 16 BRPM

## 2020-12-07 DIAGNOSIS — G47.33 OSA ON CPAP: Primary | ICD-10-CM

## 2020-12-07 DIAGNOSIS — G47.34 NOCTURNAL HYPOXEMIA: ICD-10-CM

## 2020-12-07 DIAGNOSIS — D75.1 ACQUIRED POLYCYTHEMIA: ICD-10-CM

## 2020-12-07 DIAGNOSIS — E66.01 MORBID OBESITY WITH BMI OF 40.0-44.9, ADULT: ICD-10-CM

## 2020-12-07 DIAGNOSIS — M16.12 PRIMARY OSTEOARTHRITIS OF LEFT HIP: ICD-10-CM

## 2020-12-07 DIAGNOSIS — I50.32 CHRONIC DIASTOLIC CONGESTIVE HEART FAILURE: ICD-10-CM

## 2020-12-07 DIAGNOSIS — D75.1 HYPOXEMIC POLYCYTHEMIA: ICD-10-CM

## 2020-12-07 PROCEDURE — 99999 PR PBB SHADOW E&M-EST. PATIENT-LVL V: ICD-10-PCS | Mod: PBBFAC,,, | Performed by: NURSE PRACTITIONER

## 2020-12-07 PROCEDURE — 3078F PR MOST RECENT DIASTOLIC BLOOD PRESSURE < 80 MM HG: ICD-10-PCS | Mod: CPTII,S$GLB,, | Performed by: NURSE PRACTITIONER

## 2020-12-07 PROCEDURE — 3008F BODY MASS INDEX DOCD: CPT | Mod: CPTII,S$GLB,, | Performed by: NURSE PRACTITIONER

## 2020-12-07 PROCEDURE — 99214 OFFICE O/P EST MOD 30 MIN: CPT | Mod: S$GLB,,, | Performed by: NURSE PRACTITIONER

## 2020-12-07 PROCEDURE — 1159F PR MEDICATION LIST DOCUMENTED IN MEDICAL RECORD: ICD-10-PCS | Mod: S$GLB,,, | Performed by: NURSE PRACTITIONER

## 2020-12-07 PROCEDURE — 99999 PR PBB SHADOW E&M-EST. PATIENT-LVL V: CPT | Mod: PBBFAC,,, | Performed by: NURSE PRACTITIONER

## 2020-12-07 PROCEDURE — 3074F SYST BP LT 130 MM HG: CPT | Mod: CPTII,S$GLB,, | Performed by: NURSE PRACTITIONER

## 2020-12-07 PROCEDURE — 99214 PR OFFICE/OUTPT VISIT, EST, LEVL IV, 30-39 MIN: ICD-10-PCS | Mod: S$GLB,,, | Performed by: NURSE PRACTITIONER

## 2020-12-07 PROCEDURE — 3008F PR BODY MASS INDEX (BMI) DOCUMENTED: ICD-10-PCS | Mod: CPTII,S$GLB,, | Performed by: NURSE PRACTITIONER

## 2020-12-07 PROCEDURE — 1159F MED LIST DOCD IN RCRD: CPT | Mod: S$GLB,,, | Performed by: NURSE PRACTITIONER

## 2020-12-07 PROCEDURE — 3078F DIAST BP <80 MM HG: CPT | Mod: CPTII,S$GLB,, | Performed by: NURSE PRACTITIONER

## 2020-12-07 PROCEDURE — 3074F PR MOST RECENT SYSTOLIC BLOOD PRESSURE < 130 MM HG: ICD-10-PCS | Mod: CPTII,S$GLB,, | Performed by: NURSE PRACTITIONER

## 2020-12-07 RX ORDER — LEVOTHYROXINE SODIUM 100 UG/1
TABLET ORAL
Qty: 90 TABLET | Refills: 2 | Status: SHIPPED | OUTPATIENT
Start: 2020-12-07

## 2020-12-07 RX ORDER — SPIRONOLACTONE 25 MG/1
25 TABLET ORAL DAILY
Qty: 90 TABLET | Refills: 3 | Status: SHIPPED | OUTPATIENT
Start: 2020-12-07 | End: 2021-02-17 | Stop reason: SDUPTHER

## 2020-12-07 RX ORDER — METOPROLOL TARTRATE 25 MG/1
25 TABLET, FILM COATED ORAL 2 TIMES DAILY
Qty: 180 TABLET | Refills: 2 | Status: SHIPPED | OUTPATIENT
Start: 2020-12-07 | End: 2021-02-17 | Stop reason: SDUPTHER

## 2020-12-07 RX ORDER — TORSEMIDE 20 MG/1
20 TABLET ORAL 2 TIMES DAILY
Qty: 180 TABLET | Refills: 3 | Status: SHIPPED | OUTPATIENT
Start: 2020-12-07 | End: 2021-02-18 | Stop reason: SDUPTHER

## 2020-12-07 RX ORDER — TRAMADOL HYDROCHLORIDE 50 MG/1
50 TABLET ORAL EVERY 12 HOURS PRN
Qty: 180 TABLET | Refills: 2 | Status: SHIPPED | OUTPATIENT
Start: 2020-12-07 | End: 2021-05-12

## 2020-12-07 NOTE — PROGRESS NOTES
Addendum 12/11/2025 4:00 p.m. results from overnight oximetry    12/9/2020 overnight oximetry no desaturations on auto CPAP 14-20 cm requiring supplemental oxygen for sleep.    12/9/2020 Overnight Oxygen Saturation Study    Interpretation:   Conditions of testing: Stable outpatient. Room air on Continuous   Positive Airway Pressure   Overnight oxygen saturuation study revealed minimal transient   hypoxemia (oxygen saturation less than 88%). Time with SpO2<89:   0:04:20, 0.7% of study period. Lowest oxygen saturation recorded   as 87%.  Clinicial correlation suggested.     Wei Garcia MD     Medicare Criteria Comments:   Oximetry test results suggest the patient does not fall under   Medicare Group 1 Criteria for oxygen prescription.    (Arterial oxygen saturation at or below 88% for at least 5   minutes taken during sleep).    Subjective:      Patient ID: Bo Chase is a 75 y.o. male.    Chief Complaint: Sleep Apnea    HPI: Bo Chase presents to clinic for follow up for LEANDRO with CPAP complaince assessment, initial download after replacement CPAP machine, prior malfunction of CPAP machine obtained in 2014. Diagnosed in 2012.   He is on CPAP of 14 cmH2O pressure with optimal control with AHI 2.1  He is compliant with CPAP use.    Patient reports benefit from CPAP use states can't sleep without CPAP.   Nasal wisp mask is tolerated.   [place] 5  Patient request setting no lower than CPAP 14 cm, he likes auto CPAP 14-20 cm settings.     History of polycythemia proceed with overnight oximetry on room air on Auto CPAP 14-20 cm     12/9/2020 overnight oximetry no desaturations on auto CPAP 14-20 cm requiring supplemental oxygen for sleep.    Diagnosotic PSG 12/18/2012 split night PSG showed the presence of severe LEANDRO, with an AHI of 62.6 event/hour, a SaO2 hipolito of 85%.    Previous Report Reviewed: lab reports and office notes     Past Medical History: The following portions of the patient's history were reviewed  and updated as appropriate:   He  has a past surgical history that includes throid lobectomy (4/2012); umbilicial hernia; Coronary artery bypass graft (1996); Cardiac catheterization (3/6/2012); Coronary angioplasty; and back surgary.  His family history includes Heart disease in his father.  He  reports that he has never smoked. He has never used smokeless tobacco. He reports current alcohol use. He reports that he does not use drugs.  He has a current medication list which includes the following prescription(s): albuterol, allopurinol, ascorbic acid (vitamin c), aspirin, atorvastatin, docusate sodium, fluticasone propionate, isosorbide mononitrate, levothyroxine, losartan, metolazone, metoprolol tartrate, multivitamin, nitroglycerin, fish oil-omega-3 fatty acids, pentoxifylline, potassium chloride sa, spironolactone, torsemide, tramadol, true metrix go glucose meter, vitamin e, hydrocodone-acetaminophen, melatonin, metformin, and allopurinol.  He is allergic to cat/feline products; ciprofloxacin; latex; and sulfa (sulfonamide antibiotics)..    The following portions of the patient's history were reviewed and updated as appropriate: allergies, current medications, past family history, past medical history, past social history, past surgical history and problem list.    Review of Systems   Constitutional: Negative for fever, chills, weight loss, weight gain, activity change, appetite change, fatigue and night sweats.   HENT: Negative for postnasal drip, rhinorrhea, sinus pressure, voice change and congestion.    Eyes: Negative for redness and itching.   Respiratory: Negative for snoring, cough, sputum production, chest tightness, shortness of breath, wheezing, orthopnea, asthma nighttime symptoms, dyspnea on extertion, use of rescue inhaler and somnolence.    Cardiovascular: Negative.  Negative for chest pain, palpitations and leg swelling.   Genitourinary: Negative for difficulty urinating and hematuria.  "  Endocrine: Negative for cold intolerance and heat intolerance.    Musculoskeletal: Negative for arthralgias, gait problem, joint swelling and myalgias.   Skin: Negative.    Gastrointestinal: Negative for nausea, vomiting, abdominal pain and acid reflux.   Neurological: Negative for dizziness, weakness, light-headedness and headaches.   Hematological: Negative for adenopathy. No excessive bruising.   All other systems reviewed and are negative.     Objective:   /78   Pulse 80   Resp 16   Ht 5' 7" (1.702 m)   Wt 125.6 kg (276 lb 14.4 oz)   SpO2 95%   BMI 43.37 kg/m²   Physical Exam  Vitals signs and nursing note reviewed.   Constitutional:       General: He is awake. He is not in acute distress.     Appearance: Normal appearance. He is well-developed and well-groomed. He is morbidly obese. He is not ill-appearing or toxic-appearing.   HENT:      Head: Normocephalic and atraumatic.      Right Ear: External ear normal.      Left Ear: External ear normal.      Nose: Nose normal.      Mouth/Throat:      Pharynx: No oropharyngeal exudate.   Eyes:      Conjunctiva/sclera: Conjunctivae normal.   Neck:      Musculoskeletal: Normal range of motion and neck supple.   Cardiovascular:      Rate and Rhythm: Normal rate and regular rhythm.      Heart sounds: Normal heart sounds.   Pulmonary:      Effort: Pulmonary effort is normal.      Breath sounds: Normal breath sounds.   Abdominal:      Palpations: Abdomen is soft.   Skin:     General: Skin is warm and dry.   Neurological:      Mental Status: He is alert and oriented to person, place, and time.   Psychiatric:         Behavior: Behavior normal. Behavior is cooperative.         Thought Content: Thought content normal.         Judgment: Judgment normal.       Personal Diagnostic Review  CPAP download  Auto CPAP 14 - 20cm  Compliance Summary  11/7/2020 - 12/6/2020 (30 days)  Days with Device Usage 30 days  Days without Device Usage 0 days  Percent Days with Device " Usage 100.0%  Cumulative Usage 8 days 13 hrs. 56 mins. 23 secs.  Maximum Usage (1 Day) 9 hrs. 44 mins. 21 secs.  Average Usage (All Days) 6 hrs. 51 mins. 52 secs.  Average Usage (Days Used) 6 hrs. 51 mins. 52 secs.  Minimum Usage (1 Day) 3 hrs. 56 mins. 26 secs.  Percent of Days with Usage >= 4 Hours 96.7%  Percent of Days with Usage < 4 Hours 3.3%  Date Range  Total Blower Time 8 days 17 hrs. 47 mins. 42 secs.  Average AHI 2.1  Auto-CPAP Summary  Auto-CPAP Mean Pressure 14.1 cmH2O  Auto-CPAP Peak Average Pressure 16.7 cmH2O  Average Device Pressure <= 90% of Time 14.6 cmH2O  Average Time in Large Leak Per Day 10 secs.    Assessment:     1. LEANDRO on CPAP    2. Nocturnal hypoxemia    3. Chronic diastolic congestive heart failure    4. Morbid obesity with BMI of 40.0-44.9, adult    5. Acquired polycythemia    6. Hypoxemic polycythemia    7. Primary osteoarthritis of left hip      Orders Placed This Encounter   Procedures    PULSE OXIMETRY OVERNIGHT     Standing Status:   Future     Number of Occurrences:   1     Standing Expiration Date:   12/7/2021     Order Specific Question:   Reason for Test?     Answer:   Sleep Apnea Assessment     Order Specific Question:   Symptoms:     Answer:   Other     Comments:   polycythemia     Order Specific Question:   Oxygen Settings:     Answer:   na     Order Specific Question:   BiPAP Settings:     Answer:   na     Order Specific Question:   CPAP Settings:     Answer:   14-20 cm     Order Specific Question:   Room Air:     Answer:   Yes     Plan:     Problem List Items Addressed This Visit     Primary osteoarthritis of left hip     Planned LT 12/16/2020 with Dr. Jay Ho in [place].   Patient is to bring CPAP to surgery for use in post operative period.            LEANDRO on CPAP - Primary     Benefits and compliant with Auto CPAP 14-20 cm obtained replacement CPAP machine, malfunction of prior.   AHI 2.1  Full face mask   HME: [facility]   Follow up december 2021 for yearly CPAP  compliance download and supply order.    Overnight evaluate for nocturnal desats on Auto CPAP 14-20 cm on room air                Relevant Orders    PULSE OXIMETRY OVERNIGHT (Completed)    Morbid obesity with BMI of 40.0-44.9, adult    Relevant Orders    PULSE OXIMETRY OVERNIGHT (Completed)    Hypoxemic polycythemia     Have blood donations every 6 weeks over past 1.5 years related polycythemia    Obstructive sleep apnea controlled on auto CPAP 14-20 cm AHI 2.1.  Proceed with overnight oximetry on room air on auto CPAP 14-20 cm          Chronic diastolic congestive heart failure     Managed by Cardiology         Relevant Orders    PULSE OXIMETRY OVERNIGHT (Completed)    Acquired polycythemia    Relevant Orders    PULSE OXIMETRY OVERNIGHT (Completed)      Other Visit Diagnoses     Nocturnal hypoxemia        Relevant Orders    PULSE OXIMETRY OVERNIGHT (Completed)         (DME) - [facility]  Reviewed therapeutic goals for positive airway pressure therapy Auto CPAP  Ideal is usage 100% of nights for 6 - 8 hours per night. Minimum usage is 70% of night for at least 4 hours per night used.     Follow up in about 1 year (around 12/7/2021) for CPAP 1 year compliance download.    TIME SPENT WITH PATIENT: Time spent:25 minutes in face to face  discussion concerning diagnosis, prognosis, review of lab and test results, benefits of treatment as well as management of disease, counseling of patient. Greater than half the time spent was used for coordination of care and counseling of patient.

## 2020-12-07 NOTE — ASSESSMENT & PLAN NOTE
Benefits and compliant with Auto CPAP 14-20 cm obtained replacement CPAP machine, malfunction of prior.   AHI 2.1  Full face mask   HME: [facility]   Follow up december 2021 for yearly CPAP compliance download and supply order.    Overnight evaluate for nocturnal desats on Auto CPAP 14-20 cm on room air

## 2020-12-07 NOTE — ASSESSMENT & PLAN NOTE
Have blood donations every 6 weeks over past 1.5 years related polycythemia    Obstructive sleep apnea controlled on auto CPAP 14-20 cm AHI 2.1.  Proceed with overnight oximetry on room air on auto CPAP 14-20 cm

## 2020-12-07 NOTE — ASSESSMENT & PLAN NOTE
Planned LT 12/16/2020 with Dr. Jay Ho in [place].   Patient is to bring CPAP to surgery for use in post operative period.

## 2020-12-07 NOTE — Clinical Note
SLAVA Laguna with regard to polycythemia and obstructive sleep apnea.    patient wanted me to let you know there are no oxygen desaturations requiring supplemental oxygen during sleep while wearing Auto CPAP 14-20 cm on room air on 12/9/2020 overnight oximetry on CPAP.  Thank you

## 2020-12-08 ENCOUNTER — TELEPHONE (OUTPATIENT)
Dept: PULMONOLOGY | Facility: CLINIC | Age: 75
End: 2020-12-08

## 2020-12-09 ENCOUNTER — CLINICAL SUPPORT (OUTPATIENT)
Dept: PULMONOLOGY | Facility: CLINIC | Age: 75
End: 2020-12-09
Payer: MEDICARE

## 2020-12-09 DIAGNOSIS — G47.33 OSA ON CPAP: ICD-10-CM

## 2020-12-09 DIAGNOSIS — I50.32 CHRONIC DIASTOLIC CONGESTIVE HEART FAILURE: ICD-10-CM

## 2020-12-09 DIAGNOSIS — D75.1 ACQUIRED POLYCYTHEMIA: ICD-10-CM

## 2020-12-09 DIAGNOSIS — G47.34 NOCTURNAL HYPOXEMIA: ICD-10-CM

## 2020-12-09 DIAGNOSIS — E66.01 MORBID OBESITY WITH BMI OF 40.0-44.9, ADULT: ICD-10-CM

## 2020-12-09 PROCEDURE — 94762 PR NONINVASV OXYGEN SATUR, CONT: ICD-10-PCS | Mod: S$GLB,,, | Performed by: INTERNAL MEDICINE

## 2020-12-09 PROCEDURE — 94762 N-INVAS EAR/PLS OXIMTRY CONT: CPT | Mod: S$GLB,,, | Performed by: INTERNAL MEDICINE

## 2020-12-11 NOTE — PROCEDURES
[facility]  [address] * [place] [address]  Telephone: [phone]  Test date: 20 Start: 20 23:07:37 Bo Chase  Doctor: Laith DEE/Veronaomes End: 12/10/20 09:06:41 [id number]  Oximetry: Summary Report  Comments: [id number]  Recording time: 09:59:04 Highest pulse: 101 Highest SpO2: 99%  Excluded samplin:01:40 Lowest pulse: 66 Lowest SpO2: 87%  Total valid samplin:57:24 Mean pulse: 75 Mean SpO2: 91.6%  1 S.D.: 5.0 1 S.D.: 1.3  Time with SpO2<90: 0:17:04, 2.9%  Time with SpO2<80: 0:00:00, 0.0%  Time with SpO2<70: 0:00:00, 0.0%  Time with SpO2<60: 0:00:00, 0.0%  Time with SpO2<89: 0:04:20, 0.7%  Time with SpO2 =>90: 9:40:20, 97.1%  Time with SpO2=>80 & <90: 0:17:04, 2.9%  Time with SpO2=>70 & <80: 0:00:00, 0.0%  Time with SpO2=>60 & <70: 0:00:00, 0.0%  The longest continuous time with saturation <=88 was 00:00:32, which started at  12/10/20 04:32:05.  A desaturation event was defined as a decrease of saturation by 4 or more.  No events were excluded due to artifact.  There were 13 desaturation events over 3 minutes duration.  There were 73 desaturation events of less than 3 minutes duration during which:  The mean high was 94.7%. The mean low was 89.5%.  The number of these events that were:  > 0 & <10 seconds: 1 > 0 seconds: 73  =>10 & <20 seconds: 16 =>10 seconds: 72  =>20 & <30 seconds: 22 =>20 seconds: 56  =>30 & <40 seconds: 5 =>30 seconds: 34  =>40 & <50 seconds: 4 =>40 seconds: 29  =>50 & <60 seconds: 3 =>50 seconds: 25  =>60 seconds: 22 =>60 seconds: 22  The mean length of desaturation events that were >=10 sec & <=3 mins was: 48.9 sec.  Desaturation event index (events >=10 sec per sampled hour): 7.2  Desaturation event index (events >= 0 sec per sampled hour): 7.3   [employer] Oximetry version Standard JC3224.  Oximeter: [employer] 920M Plus memory, 4 second resolution.    Overnight Oxygen Saturation  Study:    Interpretation:  Conditions of testing: Stable outpatient. Room air on Continuous Positive Airway Pressure   Overnight oxygen saturuation study revealed minimal transient hypoxemia (oxygen saturation less than 88%). Time with SpO2<89: 0:04:20, 0.7% of study period. Lowest oxygen saturation recorded as 87%.  Clinicial correlation suggested.    Wei Garcia MD    Medicare Criteria Comments:   Oximetry test results suggest the patient does not fall under Medicare Group 1 Criteria for oxygen prescription.   (Arterial oxygen saturation at or below 88% for at least 5 minutes taken during sleep)    Details about Medicare Group Criteria coverage can be found at http://www.cms.hhs.gov/manuals/downloads/

## 2021-01-01 NOTE — TELEPHONE ENCOUNTER
Called pt to schedule appt with Pain. Scheduled for 1/22/19. Pt verbalized understanding and location   Provided supportive check-in with parents at bedside.  They are planning to discharge home with baby tomorrow.  Parents requested a birth record to use to add their baby to insurance.  This writer provided contact information for Manisha in the birth certificate office.  No other needs at this time.    Simona VARGASW, MSW, [facility]  Maternal Child Health

## 2021-01-11 ENCOUNTER — TELEPHONE (OUTPATIENT)
Dept: HEMATOLOGY/ONCOLOGY | Facility: CLINIC | Age: 76
End: 2021-01-11

## 2021-01-11 ENCOUNTER — LAB VISIT (OUTPATIENT)
Dept: LAB | Facility: HOSPITAL | Age: 76
End: 2021-01-11
Attending: INTERNAL MEDICINE
Payer: MEDICARE

## 2021-01-11 DIAGNOSIS — G47.33 OSA ON CPAP: ICD-10-CM

## 2021-01-11 DIAGNOSIS — D75.1 POLYCYTHEMIA, SECONDARY: ICD-10-CM

## 2021-01-11 LAB
BASOPHILS # BLD AUTO: 0.04 K/UL (ref 0–0.2)
BASOPHILS NFR BLD: 0.5 % (ref 0–1.9)
DIFFERENTIAL METHOD: ABNORMAL
EOSINOPHIL # BLD AUTO: 0.2 K/UL (ref 0–0.5)
EOSINOPHIL NFR BLD: 2.1 % (ref 0–8)
ERYTHROCYTE [DISTWIDTH] IN BLOOD BY AUTOMATED COUNT: 14.8 % (ref 11.5–14.5)
HCT VFR BLD AUTO: 46.4 % (ref 40–54)
HGB BLD-MCNC: 15 G/DL (ref 14–18)
IMM GRANULOCYTES # BLD AUTO: 0.07 K/UL (ref 0–0.04)
IMM GRANULOCYTES NFR BLD AUTO: 0.8 % (ref 0–0.5)
LYMPHOCYTES # BLD AUTO: 1.1 K/UL (ref 1–4.8)
LYMPHOCYTES NFR BLD: 13.5 % (ref 18–48)
MCH RBC QN AUTO: 30.5 PG (ref 27–31)
MCHC RBC AUTO-ENTMCNC: 32.3 G/DL (ref 32–36)
MCV RBC AUTO: 94 FL (ref 82–98)
MONOCYTES # BLD AUTO: 0.6 K/UL (ref 0.3–1)
MONOCYTES NFR BLD: 6.8 % (ref 4–15)
NEUTROPHILS # BLD AUTO: 6.4 K/UL (ref 1.8–7.7)
NEUTROPHILS NFR BLD: 77.1 % (ref 38–73)
NRBC BLD-RTO: 0 /100 WBC
PLATELET # BLD AUTO: 216 K/UL (ref 150–350)
PMV BLD AUTO: 9.3 FL (ref 9.2–12.9)
RBC # BLD AUTO: 4.92 M/UL (ref 4.6–6.2)
WBC # BLD AUTO: 8.29 K/UL (ref 3.9–12.7)

## 2021-01-11 PROCEDURE — 36415 COLL VENOUS BLD VENIPUNCTURE: CPT | Mod: PO

## 2021-01-11 PROCEDURE — 85025 COMPLETE CBC W/AUTO DIFF WBC: CPT | Mod: PO

## 2021-01-19 ENCOUNTER — TELEPHONE (OUTPATIENT)
Dept: RHEUMATOLOGY | Facility: CLINIC | Age: 76
End: 2021-01-19

## 2021-01-19 DIAGNOSIS — M10.30 ACUTE GOUT DUE TO RENAL IMPAIRMENT, UNSPECIFIED SITE: Primary | ICD-10-CM

## 2021-01-19 RX ORDER — PREDNISONE 10 MG/1
TABLET ORAL
Qty: 5 TABLET | Refills: 1 | Status: SHIPPED | OUTPATIENT
Start: 2021-01-19 | End: 2021-05-12

## 2021-01-19 RX ORDER — COLCHICINE 0.6 MG/1
0.6 TABLET ORAL DAILY
Qty: 30 TABLET | Refills: 3 | Status: SHIPPED | OUTPATIENT
Start: 2021-01-19 | End: 2021-08-09

## 2021-01-25 ENCOUNTER — TELEPHONE (OUTPATIENT)
Dept: PULMONOLOGY | Facility: CLINIC | Age: 76
End: 2021-01-25

## 2021-01-25 DIAGNOSIS — G47.33 OSA ON CPAP: Primary | ICD-10-CM

## 2021-02-17 RX ORDER — SPIRONOLACTONE 25 MG/1
25 TABLET ORAL DAILY
Qty: 90 TABLET | Refills: 3 | Status: SHIPPED | OUTPATIENT
Start: 2021-02-17 | End: 2021-03-19 | Stop reason: SDUPTHER

## 2021-02-17 RX ORDER — METOPROLOL TARTRATE 25 MG/1
25 TABLET, FILM COATED ORAL 2 TIMES DAILY
Qty: 180 TABLET | Refills: 2 | Status: SHIPPED | OUTPATIENT
Start: 2021-02-17 | End: 2021-03-19 | Stop reason: SDUPTHER

## 2021-02-18 RX ORDER — ATORVASTATIN CALCIUM 40 MG/1
40 TABLET, FILM COATED ORAL DAILY
Qty: 90 TABLET | Refills: 4 | Status: SHIPPED | OUTPATIENT
Start: 2021-02-18 | End: 2021-10-29 | Stop reason: SDUPTHER

## 2021-02-18 RX ORDER — PENTOXIFYLLINE 400 MG/1
400 TABLET, EXTENDED RELEASE ORAL
Qty: 270 TABLET | Refills: 4 | Status: SHIPPED | OUTPATIENT
Start: 2021-02-18 | End: 2022-11-23 | Stop reason: SDUPTHER

## 2021-02-18 RX ORDER — LOSARTAN POTASSIUM 25 MG/1
TABLET ORAL
Qty: 90 TABLET | Refills: 3 | Status: SHIPPED | OUTPATIENT
Start: 2021-02-18 | End: 2021-05-12

## 2021-02-18 RX ORDER — ISOSORBIDE MONONITRATE 30 MG/1
30 TABLET, EXTENDED RELEASE ORAL DAILY
Qty: 90 TABLET | Refills: 4 | Status: SHIPPED | OUTPATIENT
Start: 2021-02-18 | End: 2021-10-29 | Stop reason: SDUPTHER

## 2021-02-18 RX ORDER — TORSEMIDE 20 MG/1
20 TABLET ORAL 2 TIMES DAILY
Qty: 180 TABLET | Refills: 3 | Status: SHIPPED | OUTPATIENT
Start: 2021-02-18 | End: 2021-10-29 | Stop reason: SDUPTHER

## 2021-02-23 ENCOUNTER — LAB VISIT (OUTPATIENT)
Dept: LAB | Facility: HOSPITAL | Age: 76
End: 2021-02-23
Attending: INTERNAL MEDICINE
Payer: MEDICARE

## 2021-02-23 DIAGNOSIS — D75.1 POLYCYTHEMIA, SECONDARY: ICD-10-CM

## 2021-02-23 LAB
BASOPHILS # BLD AUTO: 0.05 K/UL (ref 0–0.2)
BASOPHILS NFR BLD: 0.5 % (ref 0–1.9)
DIFFERENTIAL METHOD: ABNORMAL
EOSINOPHIL # BLD AUTO: 0.1 K/UL (ref 0–0.5)
EOSINOPHIL NFR BLD: 0.9 % (ref 0–8)
ERYTHROCYTE [DISTWIDTH] IN BLOOD BY AUTOMATED COUNT: 15.9 % (ref 11.5–14.5)
HCT VFR BLD AUTO: 50.2 % (ref 40–54)
HGB BLD-MCNC: 16.4 G/DL (ref 14–18)
IMM GRANULOCYTES # BLD AUTO: 0.06 K/UL (ref 0–0.04)
IMM GRANULOCYTES NFR BLD AUTO: 0.6 % (ref 0–0.5)
LYMPHOCYTES # BLD AUTO: 1.1 K/UL (ref 1–4.8)
LYMPHOCYTES NFR BLD: 11.6 % (ref 18–48)
MCH RBC QN AUTO: 28.5 PG (ref 27–31)
MCHC RBC AUTO-ENTMCNC: 32.7 G/DL (ref 32–36)
MCV RBC AUTO: 87 FL (ref 82–98)
MONOCYTES # BLD AUTO: 0.7 K/UL (ref 0.3–1)
MONOCYTES NFR BLD: 7 % (ref 4–15)
NEUTROPHILS # BLD AUTO: 7.8 K/UL (ref 1.8–7.7)
NEUTROPHILS NFR BLD: 80 % (ref 38–73)
NRBC BLD-RTO: 0 /100 WBC
PLATELET # BLD AUTO: 203 K/UL (ref 150–350)
PMV BLD AUTO: 9.7 FL (ref 9.2–12.9)
RBC # BLD AUTO: 5.75 M/UL (ref 4.6–6.2)
WBC # BLD AUTO: 9.72 K/UL (ref 3.9–12.7)

## 2021-02-23 PROCEDURE — 36415 COLL VENOUS BLD VENIPUNCTURE: CPT | Mod: PO

## 2021-02-23 PROCEDURE — 85025 COMPLETE CBC W/AUTO DIFF WBC: CPT | Mod: PO

## 2021-03-10 ENCOUNTER — TELEPHONE (OUTPATIENT)
Dept: NEPHROLOGY | Facility: CLINIC | Age: 76
End: 2021-03-10

## 2021-03-19 RX ORDER — METOPROLOL TARTRATE 25 MG/1
25 TABLET, FILM COATED ORAL 2 TIMES DAILY
Qty: 180 TABLET | Refills: 3 | Status: SHIPPED | OUTPATIENT
Start: 2021-03-19 | End: 2021-10-29 | Stop reason: SDUPTHER

## 2021-03-19 RX ORDER — SPIRONOLACTONE 25 MG/1
25 TABLET ORAL DAILY
Qty: 90 TABLET | Refills: 3 | Status: SHIPPED | OUTPATIENT
Start: 2021-03-19 | End: 2021-10-29 | Stop reason: SDUPTHER

## 2021-03-30 ENCOUNTER — LAB VISIT (OUTPATIENT)
Dept: LAB | Facility: HOSPITAL | Age: 76
End: 2021-03-30
Attending: INTERNAL MEDICINE
Payer: MEDICARE

## 2021-03-30 DIAGNOSIS — I50.32 CHRONIC DIASTOLIC HEART FAILURE: Primary | ICD-10-CM

## 2021-03-30 LAB
ANION GAP SERPL CALC-SCNC: 11 MMOL/L (ref 8–16)
BASOPHILS # BLD AUTO: 0.05 K/UL (ref 0–0.2)
BASOPHILS NFR BLD: 0.7 % (ref 0–1.9)
BNP SERPL-MCNC: 92 PG/ML (ref 0–99)
BUN SERPL-MCNC: 18 MG/DL (ref 8–23)
CALCIUM SERPL-MCNC: 9.1 MG/DL (ref 8.7–10.5)
CHLORIDE SERPL-SCNC: 103 MMOL/L (ref 95–110)
CO2 SERPL-SCNC: 23 MMOL/L (ref 23–29)
CREAT SERPL-MCNC: 1.6 MG/DL (ref 0.5–1.4)
DIFFERENTIAL METHOD: ABNORMAL
EOSINOPHIL # BLD AUTO: 0.1 K/UL (ref 0–0.5)
EOSINOPHIL NFR BLD: 1.5 % (ref 0–8)
ERYTHROCYTE [DISTWIDTH] IN BLOOD BY AUTOMATED COUNT: 19 % (ref 11.5–14.5)
EST. GFR  (AFRICAN AMERICAN): 48 ML/MIN/1.73 M^2
EST. GFR  (NON AFRICAN AMERICAN): 41.5 ML/MIN/1.73 M^2
GLUCOSE SERPL-MCNC: 84 MG/DL (ref 70–110)
HCT VFR BLD AUTO: 50.1 % (ref 40–54)
HGB BLD-MCNC: 15.9 G/DL (ref 14–18)
IMM GRANULOCYTES # BLD AUTO: 0.03 K/UL (ref 0–0.04)
IMM GRANULOCYTES NFR BLD AUTO: 0.4 % (ref 0–0.5)
LYMPHOCYTES # BLD AUTO: 1 K/UL (ref 1–4.8)
LYMPHOCYTES NFR BLD: 13.9 % (ref 18–48)
MCH RBC QN AUTO: 28.1 PG (ref 27–31)
MCHC RBC AUTO-ENTMCNC: 31.7 G/DL (ref 32–36)
MCV RBC AUTO: 89 FL (ref 82–98)
MONOCYTES # BLD AUTO: 0.6 K/UL (ref 0.3–1)
MONOCYTES NFR BLD: 8 % (ref 4–15)
NEUTROPHILS # BLD AUTO: 5.6 K/UL (ref 1.8–7.7)
NEUTROPHILS NFR BLD: 75.5 % (ref 38–73)
NRBC BLD-RTO: 0 /100 WBC
PLATELET # BLD AUTO: 194 K/UL (ref 150–450)
PMV BLD AUTO: 10.6 FL (ref 9.2–12.9)
POTASSIUM SERPL-SCNC: 4.2 MMOL/L (ref 3.5–5.1)
RBC # BLD AUTO: 5.65 M/UL (ref 4.6–6.2)
SODIUM SERPL-SCNC: 137 MMOL/L (ref 136–145)
WBC # BLD AUTO: 7.39 K/UL (ref 3.9–12.7)

## 2021-03-30 PROCEDURE — 36415 COLL VENOUS BLD VENIPUNCTURE: CPT | Mod: PO | Performed by: INTERNAL MEDICINE

## 2021-03-30 PROCEDURE — 80048 BASIC METABOLIC PNL TOTAL CA: CPT | Performed by: INTERNAL MEDICINE

## 2021-03-30 PROCEDURE — 85025 COMPLETE CBC W/AUTO DIFF WBC: CPT | Performed by: INTERNAL MEDICINE

## 2021-03-30 PROCEDURE — 83880 ASSAY OF NATRIURETIC PEPTIDE: CPT | Performed by: INTERNAL MEDICINE

## 2021-04-08 ENCOUNTER — LAB VISIT (OUTPATIENT)
Dept: LAB | Facility: HOSPITAL | Age: 76
End: 2021-04-08
Attending: INTERNAL MEDICINE
Payer: MEDICARE

## 2021-04-08 DIAGNOSIS — E78.5 HYPERLIPEMIA: Primary | ICD-10-CM

## 2021-04-08 PROCEDURE — 36415 COLL VENOUS BLD VENIPUNCTURE: CPT | Mod: PO | Performed by: INTERNAL MEDICINE

## 2021-04-08 PROCEDURE — 80061 LIPID PANEL: CPT | Performed by: INTERNAL MEDICINE

## 2021-04-09 LAB
CHOLEST SERPL-MCNC: 134 MG/DL (ref 120–199)
CHOLEST/HDLC SERPL: 5.4 {RATIO} (ref 2–5)
HDLC SERPL-MCNC: 25 MG/DL (ref 40–75)
HDLC SERPL: 18.7 % (ref 20–50)
LDLC SERPL CALC-MCNC: 78 MG/DL (ref 63–159)
NONHDLC SERPL-MCNC: 109 MG/DL
TRIGL SERPL-MCNC: 155 MG/DL (ref 30–150)

## 2021-04-27 ENCOUNTER — TELEPHONE (OUTPATIENT)
Dept: NEPHROLOGY | Facility: CLINIC | Age: 76
End: 2021-04-27

## 2021-05-10 ENCOUNTER — TELEPHONE (OUTPATIENT)
Dept: NEPHROLOGY | Facility: CLINIC | Age: 76
End: 2021-05-10

## 2021-05-12 ENCOUNTER — TELEPHONE (OUTPATIENT)
Dept: NEPHROLOGY | Facility: CLINIC | Age: 76
End: 2021-05-12

## 2021-05-12 ENCOUNTER — OFFICE VISIT (OUTPATIENT)
Dept: NEPHROLOGY | Facility: CLINIC | Age: 76
End: 2021-05-12
Payer: MEDICARE

## 2021-05-12 VITALS
HEART RATE: 72 BPM | SYSTOLIC BLOOD PRESSURE: 138 MMHG | OXYGEN SATURATION: 98 % | HEIGHT: 67 IN | WEIGHT: 268.94 LBS | DIASTOLIC BLOOD PRESSURE: 80 MMHG | BODY MASS INDEX: 42.21 KG/M2

## 2021-05-12 DIAGNOSIS — Z95.5 S/P CORONARY ARTERY STENT PLACEMENT: ICD-10-CM

## 2021-05-12 DIAGNOSIS — E66.01 MORBID OBESITY WITH BMI OF 40.0-44.9, ADULT: ICD-10-CM

## 2021-05-12 DIAGNOSIS — E55.9 VITAMIN D DEFICIENCY: ICD-10-CM

## 2021-05-12 DIAGNOSIS — N18.31 STAGE 3A CHRONIC KIDNEY DISEASE: Primary | ICD-10-CM

## 2021-05-12 DIAGNOSIS — E78.5 HYPERLIPIDEMIA WITH TARGET LDL LESS THAN 70: ICD-10-CM

## 2021-05-12 DIAGNOSIS — Z95.1 S/P CABG X 4: ICD-10-CM

## 2021-05-12 DIAGNOSIS — E83.52 HYPERCALCEMIA: ICD-10-CM

## 2021-05-12 DIAGNOSIS — G47.33 OSA ON CPAP: ICD-10-CM

## 2021-05-12 DIAGNOSIS — N25.81 SECONDARY HYPERPARATHYROIDISM OF RENAL ORIGIN: ICD-10-CM

## 2021-05-12 DIAGNOSIS — M1A.09X0 IDIOPATHIC CHRONIC GOUT OF MULTIPLE SITES WITHOUT TOPHUS: ICD-10-CM

## 2021-05-12 DIAGNOSIS — D75.1 ACQUIRED POLYCYTHEMIA: ICD-10-CM

## 2021-05-12 DIAGNOSIS — I10 ESSENTIAL HYPERTENSION: Chronic | ICD-10-CM

## 2021-05-12 DIAGNOSIS — I50.32 CHRONIC DIASTOLIC CONGESTIVE HEART FAILURE: ICD-10-CM

## 2021-05-12 DIAGNOSIS — I25.708 CORONARY ARTERY DISEASE OF BYPASS GRAFT OF NATIVE HEART WITH STABLE ANGINA PECTORIS: Chronic | ICD-10-CM

## 2021-05-12 DIAGNOSIS — M54.31 SCIATICA OF RIGHT SIDE: ICD-10-CM

## 2021-05-12 PROCEDURE — 1101F PR PT FALLS ASSESS DOC 0-1 FALLS W/OUT INJ PAST YR: ICD-10-PCS | Mod: CPTII,S$GLB,, | Performed by: INTERNAL MEDICINE

## 2021-05-12 PROCEDURE — 3288F PR FALLS RISK ASSESSMENT DOCUMENTED: ICD-10-PCS | Mod: CPTII,S$GLB,, | Performed by: INTERNAL MEDICINE

## 2021-05-12 PROCEDURE — 99999 PR PBB SHADOW E&M-EST. PATIENT-LVL III: CPT | Mod: PBBFAC,,, | Performed by: INTERNAL MEDICINE

## 2021-05-12 PROCEDURE — 99215 PR OFFICE/OUTPT VISIT, EST, LEVL V, 40-54 MIN: ICD-10-PCS | Mod: S$GLB,,, | Performed by: INTERNAL MEDICINE

## 2021-05-12 PROCEDURE — 3288F FALL RISK ASSESSMENT DOCD: CPT | Mod: CPTII,S$GLB,, | Performed by: INTERNAL MEDICINE

## 2021-05-12 PROCEDURE — 1101F PT FALLS ASSESS-DOCD LE1/YR: CPT | Mod: CPTII,S$GLB,, | Performed by: INTERNAL MEDICINE

## 2021-05-12 PROCEDURE — 1159F MED LIST DOCD IN RCRD: CPT | Mod: S$GLB,,, | Performed by: INTERNAL MEDICINE

## 2021-05-12 PROCEDURE — 1125F PR PAIN SEVERITY QUANTIFIED, PAIN PRESENT: ICD-10-PCS | Mod: S$GLB,,, | Performed by: INTERNAL MEDICINE

## 2021-05-12 PROCEDURE — 99215 OFFICE O/P EST HI 40 MIN: CPT | Mod: S$GLB,,, | Performed by: INTERNAL MEDICINE

## 2021-05-12 PROCEDURE — 1159F PR MEDICATION LIST DOCUMENTED IN MEDICAL RECORD: ICD-10-PCS | Mod: S$GLB,,, | Performed by: INTERNAL MEDICINE

## 2021-05-12 PROCEDURE — 99999 PR PBB SHADOW E&M-EST. PATIENT-LVL III: ICD-10-PCS | Mod: PBBFAC,,, | Performed by: INTERNAL MEDICINE

## 2021-05-12 PROCEDURE — 1125F AMNT PAIN NOTED PAIN PRSNT: CPT | Mod: S$GLB,,, | Performed by: INTERNAL MEDICINE

## 2021-05-12 RX ORDER — METOLAZONE 10 MG/1
10 TABLET ORAL WEEKLY
COMMUNITY
Start: 2021-05-05 | End: 2023-08-17

## 2021-05-12 RX ORDER — PREGABALIN 75 MG/1
CAPSULE ORAL
COMMUNITY
Start: 2021-04-19 | End: 2021-08-09

## 2021-05-12 RX ORDER — CEPHALEXIN 500 MG/1
500 CAPSULE ORAL 2 TIMES DAILY
COMMUNITY
Start: 2021-05-04 | End: 2021-08-09 | Stop reason: ALTCHOICE

## 2021-05-12 RX ORDER — CETIRIZINE HYDROCHLORIDE 10 MG/1
TABLET ORAL
COMMUNITY
Start: 2021-04-07 | End: 2023-11-29

## 2021-05-12 RX ORDER — CELECOXIB 200 MG/1
CAPSULE ORAL
COMMUNITY
Start: 2021-04-19 | End: 2021-08-09

## 2021-05-12 RX ORDER — CHOLECALCIFEROL (VITAMIN D3) 50 MCG
CAPSULE ORAL
COMMUNITY
End: 2021-08-09

## 2021-05-12 RX ORDER — HYDROCODONE BITARTRATE AND ACETAMINOPHEN 10; 325 MG/1; MG/1
1 TABLET ORAL EVERY 6 HOURS PRN
COMMUNITY
Start: 2021-04-19 | End: 2023-08-17

## 2021-05-12 RX ORDER — LANCETS
EACH MISCELLANEOUS
COMMUNITY
Start: 2021-01-26

## 2021-05-12 RX ORDER — ASPIRIN 325 MG
325 TABLET ORAL DAILY
COMMUNITY

## 2021-05-12 RX ORDER — CALCIUM CITRATE/VITAMIN D3 200MG-6.25
1 TABLET ORAL
COMMUNITY
Start: 2021-01-26

## 2021-05-18 ENCOUNTER — LAB VISIT (OUTPATIENT)
Dept: LAB | Facility: HOSPITAL | Age: 76
End: 2021-05-18
Attending: INTERNAL MEDICINE
Payer: MEDICARE

## 2021-05-18 DIAGNOSIS — I50.32 CHRONIC DIASTOLIC HEART FAILURE: Primary | ICD-10-CM

## 2021-05-18 LAB
ANION GAP SERPL CALC-SCNC: 9 MMOL/L (ref 8–16)
BASOPHILS # BLD AUTO: 0.04 K/UL (ref 0–0.2)
BASOPHILS NFR BLD: 0.4 % (ref 0–1.9)
BUN SERPL-MCNC: 26 MG/DL (ref 8–23)
CALCIUM SERPL-MCNC: 8.8 MG/DL (ref 8.7–10.5)
CHLORIDE SERPL-SCNC: 104 MMOL/L (ref 95–110)
CO2 SERPL-SCNC: 24 MMOL/L (ref 23–29)
CREAT SERPL-MCNC: 1.6 MG/DL (ref 0.5–1.4)
DIFFERENTIAL METHOD: ABNORMAL
EOSINOPHIL # BLD AUTO: 0.2 K/UL (ref 0–0.5)
EOSINOPHIL NFR BLD: 2.4 % (ref 0–8)
ERYTHROCYTE [DISTWIDTH] IN BLOOD BY AUTOMATED COUNT: 17.8 % (ref 11.5–14.5)
EST. GFR  (AFRICAN AMERICAN): 48 ML/MIN/1.73 M^2
EST. GFR  (NON AFRICAN AMERICAN): 41.5 ML/MIN/1.73 M^2
GLUCOSE SERPL-MCNC: 118 MG/DL (ref 70–110)
HCT VFR BLD AUTO: 36.3 % (ref 40–54)
HGB BLD-MCNC: 11 G/DL (ref 14–18)
IMM GRANULOCYTES # BLD AUTO: 0.09 K/UL (ref 0–0.04)
IMM GRANULOCYTES NFR BLD AUTO: 1 % (ref 0–0.5)
LYMPHOCYTES # BLD AUTO: 1.3 K/UL (ref 1–4.8)
LYMPHOCYTES NFR BLD: 14.4 % (ref 18–48)
MCH RBC QN AUTO: 26.4 PG (ref 27–31)
MCHC RBC AUTO-ENTMCNC: 30.3 G/DL (ref 32–36)
MCV RBC AUTO: 87 FL (ref 82–98)
MONOCYTES # BLD AUTO: 0.8 K/UL (ref 0.3–1)
MONOCYTES NFR BLD: 8.9 % (ref 4–15)
NEUTROPHILS # BLD AUTO: 6.7 K/UL (ref 1.8–7.7)
NEUTROPHILS NFR BLD: 72.9 % (ref 38–73)
NRBC BLD-RTO: 0 /100 WBC
PLATELET # BLD AUTO: 252 K/UL (ref 150–450)
PMV BLD AUTO: 10.8 FL (ref 9.2–12.9)
POTASSIUM SERPL-SCNC: 4.5 MMOL/L (ref 3.5–5.1)
RBC # BLD AUTO: 4.16 M/UL (ref 4.6–6.2)
SODIUM SERPL-SCNC: 137 MMOL/L (ref 136–145)
WBC # BLD AUTO: 9.12 K/UL (ref 3.9–12.7)

## 2021-05-18 PROCEDURE — 80048 BASIC METABOLIC PNL TOTAL CA: CPT | Performed by: INTERNAL MEDICINE

## 2021-05-18 PROCEDURE — 85025 COMPLETE CBC W/AUTO DIFF WBC: CPT | Performed by: INTERNAL MEDICINE

## 2021-05-18 PROCEDURE — 36415 COLL VENOUS BLD VENIPUNCTURE: CPT | Mod: PO | Performed by: INTERNAL MEDICINE

## 2021-05-29 PROBLEM — N18.4 STAGE 4 CHRONIC KIDNEY DISEASE: Status: RESOLVED | Noted: 2018-11-07 | Resolved: 2021-05-29

## 2021-05-31 ENCOUNTER — PATIENT MESSAGE (OUTPATIENT)
Dept: NEPHROLOGY | Facility: CLINIC | Age: 76
End: 2021-05-31

## 2021-08-06 ENCOUNTER — PATIENT MESSAGE (OUTPATIENT)
Dept: RHEUMATOLOGY | Facility: CLINIC | Age: 76
End: 2021-08-06

## 2021-08-06 ENCOUNTER — TELEPHONE (OUTPATIENT)
Dept: RHEUMATOLOGY | Facility: CLINIC | Age: 76
End: 2021-08-06

## 2021-08-09 ENCOUNTER — TELEPHONE (OUTPATIENT)
Dept: RHEUMATOLOGY | Facility: CLINIC | Age: 76
End: 2021-08-09

## 2021-08-09 ENCOUNTER — OFFICE VISIT (OUTPATIENT)
Dept: RHEUMATOLOGY | Facility: CLINIC | Age: 76
End: 2021-08-09
Payer: MEDICARE

## 2021-08-09 ENCOUNTER — LAB VISIT (OUTPATIENT)
Dept: LAB | Facility: HOSPITAL | Age: 76
End: 2021-08-09
Attending: PHYSICIAN ASSISTANT
Payer: MEDICARE

## 2021-08-09 VITALS
SYSTOLIC BLOOD PRESSURE: 142 MMHG | HEART RATE: 97 BPM | BODY MASS INDEX: 42.35 KG/M2 | WEIGHT: 269.81 LBS | HEIGHT: 67 IN | DIASTOLIC BLOOD PRESSURE: 81 MMHG

## 2021-08-09 DIAGNOSIS — Z51.81 MEDICATION MONITORING ENCOUNTER: ICD-10-CM

## 2021-08-09 DIAGNOSIS — M1A.09X0 IDIOPATHIC CHRONIC GOUT OF MULTIPLE SITES WITHOUT TOPHUS: Primary | ICD-10-CM

## 2021-08-09 DIAGNOSIS — M15.9 PRIMARY OSTEOARTHRITIS INVOLVING MULTIPLE JOINTS: ICD-10-CM

## 2021-08-09 DIAGNOSIS — N18.4 STAGE 4 CHRONIC KIDNEY DISEASE: ICD-10-CM

## 2021-08-09 DIAGNOSIS — M1A.09X0 IDIOPATHIC CHRONIC GOUT OF MULTIPLE SITES WITHOUT TOPHUS: ICD-10-CM

## 2021-08-09 LAB
ALBUMIN SERPL BCP-MCNC: 3.7 G/DL (ref 3.5–5.2)
ALP SERPL-CCNC: 96 U/L (ref 55–135)
ALT SERPL W/O P-5'-P-CCNC: 46 U/L (ref 10–44)
ANION GAP SERPL CALC-SCNC: 13 MMOL/L (ref 8–16)
AST SERPL-CCNC: 29 U/L (ref 10–40)
BILIRUB SERPL-MCNC: 0.7 MG/DL (ref 0.1–1)
BUN SERPL-MCNC: 33 MG/DL (ref 8–23)
CALCIUM SERPL-MCNC: 9.9 MG/DL (ref 8.7–10.5)
CHLORIDE SERPL-SCNC: 94 MMOL/L (ref 95–110)
CO2 SERPL-SCNC: 31 MMOL/L (ref 23–29)
CREAT SERPL-MCNC: 1.9 MG/DL (ref 0.5–1.4)
EST. GFR  (AFRICAN AMERICAN): 39 ML/MIN/1.73 M^2
EST. GFR  (NON AFRICAN AMERICAN): 34 ML/MIN/1.73 M^2
GLUCOSE SERPL-MCNC: 203 MG/DL (ref 70–110)
POTASSIUM SERPL-SCNC: 3.7 MMOL/L (ref 3.5–5.1)
PROT SERPL-MCNC: 7.4 G/DL (ref 6–8.4)
SODIUM SERPL-SCNC: 138 MMOL/L (ref 136–145)
URATE SERPL-MCNC: 6.2 MG/DL (ref 3.4–7)

## 2021-08-09 PROCEDURE — 99214 OFFICE O/P EST MOD 30 MIN: CPT | Mod: S$GLB,,, | Performed by: PHYSICIAN ASSISTANT

## 2021-08-09 PROCEDURE — 36415 COLL VENOUS BLD VENIPUNCTURE: CPT | Performed by: PHYSICIAN ASSISTANT

## 2021-08-09 PROCEDURE — 80053 COMPREHEN METABOLIC PANEL: CPT | Performed by: PHYSICIAN ASSISTANT

## 2021-08-09 PROCEDURE — 84550 ASSAY OF BLOOD/URIC ACID: CPT | Performed by: PHYSICIAN ASSISTANT

## 2021-08-09 PROCEDURE — 3079F DIAST BP 80-89 MM HG: CPT | Mod: CPTII,S$GLB,, | Performed by: PHYSICIAN ASSISTANT

## 2021-08-09 PROCEDURE — 1125F PR PAIN SEVERITY QUANTIFIED, PAIN PRESENT: ICD-10-PCS | Mod: CPTII,S$GLB,, | Performed by: PHYSICIAN ASSISTANT

## 2021-08-09 PROCEDURE — 3079F PR MOST RECENT DIASTOLIC BLOOD PRESSURE 80-89 MM HG: ICD-10-PCS | Mod: CPTII,S$GLB,, | Performed by: PHYSICIAN ASSISTANT

## 2021-08-09 PROCEDURE — 3077F PR MOST RECENT SYSTOLIC BLOOD PRESSURE >= 140 MM HG: ICD-10-PCS | Mod: CPTII,S$GLB,, | Performed by: PHYSICIAN ASSISTANT

## 2021-08-09 PROCEDURE — 3077F SYST BP >= 140 MM HG: CPT | Mod: CPTII,S$GLB,, | Performed by: PHYSICIAN ASSISTANT

## 2021-08-09 PROCEDURE — 1125F AMNT PAIN NOTED PAIN PRSNT: CPT | Mod: CPTII,S$GLB,, | Performed by: PHYSICIAN ASSISTANT

## 2021-08-09 PROCEDURE — 99999 PR PBB SHADOW E&M-EST. PATIENT-LVL IV: ICD-10-PCS | Mod: PBBFAC,,, | Performed by: PHYSICIAN ASSISTANT

## 2021-08-09 PROCEDURE — 99214 PR OFFICE/OUTPT VISIT, EST, LEVL IV, 30-39 MIN: ICD-10-PCS | Mod: S$GLB,,, | Performed by: PHYSICIAN ASSISTANT

## 2021-08-09 PROCEDURE — 99999 PR PBB SHADOW E&M-EST. PATIENT-LVL IV: CPT | Mod: PBBFAC,,, | Performed by: PHYSICIAN ASSISTANT

## 2021-08-09 PROCEDURE — 1159F PR MEDICATION LIST DOCUMENTED IN MEDICAL RECORD: ICD-10-PCS | Mod: CPTII,S$GLB,, | Performed by: PHYSICIAN ASSISTANT

## 2021-08-09 PROCEDURE — 1159F MED LIST DOCD IN RCRD: CPT | Mod: CPTII,S$GLB,, | Performed by: PHYSICIAN ASSISTANT

## 2021-08-09 RX ORDER — ALLOPURINOL 300 MG/1
450 TABLET ORAL DAILY
Qty: 135 TABLET | Refills: 3 | Status: SHIPPED | OUTPATIENT
Start: 2021-08-09 | End: 2022-07-05 | Stop reason: SDUPTHER

## 2021-10-27 ENCOUNTER — LAB VISIT (OUTPATIENT)
Dept: LAB | Facility: HOSPITAL | Age: 76
End: 2021-10-27
Attending: INTERNAL MEDICINE
Payer: MEDICARE

## 2021-10-27 DIAGNOSIS — E55.9 VITAMIN D DEFICIENCY: ICD-10-CM

## 2021-10-27 DIAGNOSIS — N18.31 STAGE 3A CHRONIC KIDNEY DISEASE: ICD-10-CM

## 2021-10-27 LAB
25(OH)D3+25(OH)D2 SERPL-MCNC: 36 NG/ML (ref 30–96)
ALBUMIN SERPL BCP-MCNC: 3.6 G/DL (ref 3.5–5.2)
ANION GAP SERPL CALC-SCNC: 16 MMOL/L (ref 8–16)
BASOPHILS # BLD AUTO: 0.05 K/UL (ref 0–0.2)
BASOPHILS NFR BLD: 0.5 % (ref 0–1.9)
BUN SERPL-MCNC: 29 MG/DL (ref 8–23)
CALCIUM SERPL-MCNC: 9.9 MG/DL (ref 8.7–10.5)
CHLORIDE SERPL-SCNC: 93 MMOL/L (ref 95–110)
CO2 SERPL-SCNC: 27 MMOL/L (ref 23–29)
CREAT SERPL-MCNC: 1.7 MG/DL (ref 0.5–1.4)
DIFFERENTIAL METHOD: ABNORMAL
EOSINOPHIL # BLD AUTO: 0.1 K/UL (ref 0–0.5)
EOSINOPHIL NFR BLD: 1.2 % (ref 0–8)
ERYTHROCYTE [DISTWIDTH] IN BLOOD BY AUTOMATED COUNT: 20.4 % (ref 11.5–14.5)
EST. GFR  (AFRICAN AMERICAN): 44.6 ML/MIN/1.73 M^2
EST. GFR  (NON AFRICAN AMERICAN): 38.6 ML/MIN/1.73 M^2
GLUCOSE SERPL-MCNC: 271 MG/DL (ref 70–110)
HCT VFR BLD AUTO: 52.8 % (ref 40–54)
HGB BLD-MCNC: 16.6 G/DL (ref 14–18)
IMM GRANULOCYTES # BLD AUTO: 0.06 K/UL (ref 0–0.04)
IMM GRANULOCYTES NFR BLD AUTO: 0.7 % (ref 0–0.5)
LYMPHOCYTES # BLD AUTO: 1.2 K/UL (ref 1–4.8)
LYMPHOCYTES NFR BLD: 13.4 % (ref 18–48)
MCH RBC QN AUTO: 25.8 PG (ref 27–31)
MCHC RBC AUTO-ENTMCNC: 31.4 G/DL (ref 32–36)
MCV RBC AUTO: 82 FL (ref 82–98)
MONOCYTES # BLD AUTO: 0.5 K/UL (ref 0.3–1)
MONOCYTES NFR BLD: 5.8 % (ref 4–15)
NEUTROPHILS # BLD AUTO: 7.1 K/UL (ref 1.8–7.7)
NEUTROPHILS NFR BLD: 78.4 % (ref 38–73)
NRBC BLD-RTO: 0 /100 WBC
PHOSPHATE SERPL-MCNC: 4 MG/DL (ref 2.7–4.5)
PLATELET # BLD AUTO: 217 K/UL (ref 150–450)
PMV BLD AUTO: 10.9 FL (ref 9.2–12.9)
POTASSIUM SERPL-SCNC: 3.6 MMOL/L (ref 3.5–5.1)
PTH-INTACT SERPL-MCNC: 138.5 PG/ML (ref 9–77)
RBC # BLD AUTO: 6.44 M/UL (ref 4.6–6.2)
SODIUM SERPL-SCNC: 136 MMOL/L (ref 136–145)
URATE SERPL-MCNC: 6.1 MG/DL (ref 3.4–7)
WBC # BLD AUTO: 9.11 K/UL (ref 3.9–12.7)

## 2021-10-27 PROCEDURE — 82306 VITAMIN D 25 HYDROXY: CPT | Performed by: INTERNAL MEDICINE

## 2021-10-27 PROCEDURE — 85025 COMPLETE CBC W/AUTO DIFF WBC: CPT | Performed by: INTERNAL MEDICINE

## 2021-10-27 PROCEDURE — 36415 COLL VENOUS BLD VENIPUNCTURE: CPT | Mod: PO | Performed by: INTERNAL MEDICINE

## 2021-10-27 PROCEDURE — 84550 ASSAY OF BLOOD/URIC ACID: CPT | Performed by: INTERNAL MEDICINE

## 2021-10-27 PROCEDURE — 80069 RENAL FUNCTION PANEL: CPT | Performed by: INTERNAL MEDICINE

## 2021-10-27 PROCEDURE — 83970 ASSAY OF PARATHORMONE: CPT | Performed by: INTERNAL MEDICINE

## 2021-10-28 ENCOUNTER — PATIENT MESSAGE (OUTPATIENT)
Dept: NEPHROLOGY | Facility: CLINIC | Age: 76
End: 2021-10-28
Payer: MEDICARE

## 2021-10-29 ENCOUNTER — OFFICE VISIT (OUTPATIENT)
Dept: CARDIOLOGY | Facility: CLINIC | Age: 76
End: 2021-10-29
Payer: MEDICARE

## 2021-10-29 VITALS
DIASTOLIC BLOOD PRESSURE: 80 MMHG | BODY MASS INDEX: 42.22 KG/M2 | OXYGEN SATURATION: 96 % | HEIGHT: 67 IN | SYSTOLIC BLOOD PRESSURE: 140 MMHG | WEIGHT: 269 LBS | HEART RATE: 80 BPM

## 2021-10-29 DIAGNOSIS — E87.6 HYPOKALEMIA: ICD-10-CM

## 2021-10-29 DIAGNOSIS — I25.708 CORONARY ARTERY DISEASE OF BYPASS GRAFT OF NATIVE HEART WITH STABLE ANGINA PECTORIS: Primary | ICD-10-CM

## 2021-10-29 DIAGNOSIS — I10 ESSENTIAL HYPERTENSION: ICD-10-CM

## 2021-10-29 DIAGNOSIS — E78.5 HYPERLIPIDEMIA WITH TARGET LDL LESS THAN 70: ICD-10-CM

## 2021-10-29 DIAGNOSIS — Z95.1 HX OF CABG: ICD-10-CM

## 2021-10-29 DIAGNOSIS — G47.33 OSA ON CPAP: ICD-10-CM

## 2021-10-29 DIAGNOSIS — N18.31 STAGE 3A CHRONIC KIDNEY DISEASE: ICD-10-CM

## 2021-10-29 DIAGNOSIS — I50.32 CHRONIC DIASTOLIC CONGESTIVE HEART FAILURE: ICD-10-CM

## 2021-10-29 DIAGNOSIS — E66.01 MORBID OBESITY WITH BMI OF 40.0-44.9, ADULT: ICD-10-CM

## 2021-10-29 PROCEDURE — 3079F DIAST BP 80-89 MM HG: CPT | Mod: CPTII,S$GLB,, | Performed by: NURSE PRACTITIONER

## 2021-10-29 PROCEDURE — 1159F PR MEDICATION LIST DOCUMENTED IN MEDICAL RECORD: ICD-10-PCS | Mod: CPTII,S$GLB,, | Performed by: NURSE PRACTITIONER

## 2021-10-29 PROCEDURE — 1160F PR REVIEW ALL MEDS BY PRESCRIBER/CLIN PHARMACIST DOCUMENTED: ICD-10-PCS | Mod: CPTII,S$GLB,, | Performed by: NURSE PRACTITIONER

## 2021-10-29 PROCEDURE — 1101F PT FALLS ASSESS-DOCD LE1/YR: CPT | Mod: CPTII,S$GLB,, | Performed by: NURSE PRACTITIONER

## 2021-10-29 PROCEDURE — 3079F PR MOST RECENT DIASTOLIC BLOOD PRESSURE 80-89 MM HG: ICD-10-PCS | Mod: CPTII,S$GLB,, | Performed by: NURSE PRACTITIONER

## 2021-10-29 PROCEDURE — 3077F SYST BP >= 140 MM HG: CPT | Mod: CPTII,S$GLB,, | Performed by: NURSE PRACTITIONER

## 2021-10-29 PROCEDURE — 99999 PR PBB SHADOW E&M-EST. PATIENT-LVL V: CPT | Mod: PBBFAC,,, | Performed by: NURSE PRACTITIONER

## 2021-10-29 PROCEDURE — 99215 PR OFFICE/OUTPT VISIT, EST, LEVL V, 40-54 MIN: ICD-10-PCS | Mod: S$GLB,,, | Performed by: NURSE PRACTITIONER

## 2021-10-29 PROCEDURE — 4010F PR ACE/ARB THEARPY RXD/TAKEN: ICD-10-PCS | Mod: CPTII,S$GLB,, | Performed by: NURSE PRACTITIONER

## 2021-10-29 PROCEDURE — 3288F FALL RISK ASSESSMENT DOCD: CPT | Mod: CPTII,S$GLB,, | Performed by: NURSE PRACTITIONER

## 2021-10-29 PROCEDURE — 3066F PR DOCUMENTATION OF TREATMENT FOR NEPHROPATHY: ICD-10-PCS | Mod: CPTII,S$GLB,, | Performed by: NURSE PRACTITIONER

## 2021-10-29 PROCEDURE — 3288F PR FALLS RISK ASSESSMENT DOCUMENTED: ICD-10-PCS | Mod: CPTII,S$GLB,, | Performed by: NURSE PRACTITIONER

## 2021-10-29 PROCEDURE — 99999 PR PBB SHADOW E&M-EST. PATIENT-LVL V: ICD-10-PCS | Mod: PBBFAC,,, | Performed by: NURSE PRACTITIONER

## 2021-10-29 PROCEDURE — 4010F ACE/ARB THERAPY RXD/TAKEN: CPT | Mod: CPTII,S$GLB,, | Performed by: NURSE PRACTITIONER

## 2021-10-29 PROCEDURE — 3066F NEPHROPATHY DOC TX: CPT | Mod: CPTII,S$GLB,, | Performed by: NURSE PRACTITIONER

## 2021-10-29 PROCEDURE — 1125F PR PAIN SEVERITY QUANTIFIED, PAIN PRESENT: ICD-10-PCS | Mod: CPTII,S$GLB,, | Performed by: NURSE PRACTITIONER

## 2021-10-29 PROCEDURE — 1159F MED LIST DOCD IN RCRD: CPT | Mod: CPTII,S$GLB,, | Performed by: NURSE PRACTITIONER

## 2021-10-29 PROCEDURE — 99215 OFFICE O/P EST HI 40 MIN: CPT | Mod: S$GLB,,, | Performed by: NURSE PRACTITIONER

## 2021-10-29 PROCEDURE — 1125F AMNT PAIN NOTED PAIN PRSNT: CPT | Mod: CPTII,S$GLB,, | Performed by: NURSE PRACTITIONER

## 2021-10-29 PROCEDURE — 3077F PR MOST RECENT SYSTOLIC BLOOD PRESSURE >= 140 MM HG: ICD-10-PCS | Mod: CPTII,S$GLB,, | Performed by: NURSE PRACTITIONER

## 2021-10-29 PROCEDURE — 1160F RVW MEDS BY RX/DR IN RCRD: CPT | Mod: CPTII,S$GLB,, | Performed by: NURSE PRACTITIONER

## 2021-10-29 PROCEDURE — 1101F PR PT FALLS ASSESS DOC 0-1 FALLS W/OUT INJ PAST YR: ICD-10-PCS | Mod: CPTII,S$GLB,, | Performed by: NURSE PRACTITIONER

## 2021-10-29 RX ORDER — IPRATROPIUM BROMIDE 42 UG/1
2 SPRAY, METERED NASAL
COMMUNITY
Start: 2021-09-16

## 2021-10-29 RX ORDER — ISOSORBIDE MONONITRATE 30 MG/1
30 TABLET, EXTENDED RELEASE ORAL DAILY
Qty: 90 TABLET | Refills: 4 | Status: ON HOLD | OUTPATIENT
Start: 2021-10-29 | End: 2022-05-09 | Stop reason: SDUPTHER

## 2021-10-29 RX ORDER — NITROGLYCERIN 0.4 MG/1
0.4 TABLET SUBLINGUAL EVERY 5 MIN PRN
Qty: 25 TABLET | Refills: 6 | Status: SHIPPED | OUTPATIENT
Start: 2021-10-29

## 2021-10-29 RX ORDER — METOPROLOL TARTRATE 25 MG/1
25 TABLET, FILM COATED ORAL 2 TIMES DAILY
Qty: 180 TABLET | Refills: 3 | Status: SHIPPED | OUTPATIENT
Start: 2021-10-29 | End: 2022-09-07 | Stop reason: SDUPTHER

## 2021-10-29 RX ORDER — POTASSIUM CHLORIDE 750 MG/1
10 TABLET, EXTENDED RELEASE ORAL 4 TIMES DAILY
Qty: 360 TABLET | Refills: 3 | Status: SHIPPED | OUTPATIENT
Start: 2021-10-29 | End: 2022-07-21 | Stop reason: SDUPTHER

## 2021-10-29 RX ORDER — FLUTICASONE PROPIONATE 50 MCG
1 SPRAY, SUSPENSION (ML) NASAL
COMMUNITY
End: 2021-10-29 | Stop reason: SDUPTHER

## 2021-10-29 RX ORDER — ATORVASTATIN CALCIUM 40 MG/1
40 TABLET, FILM COATED ORAL DAILY
Qty: 90 TABLET | Refills: 4 | Status: SHIPPED | OUTPATIENT
Start: 2021-10-29 | End: 2022-12-09 | Stop reason: SDUPTHER

## 2021-10-29 RX ORDER — SPIRONOLACTONE 25 MG/1
25 TABLET ORAL DAILY
Qty: 90 TABLET | Refills: 3 | Status: SHIPPED | OUTPATIENT
Start: 2021-10-29 | End: 2022-09-26 | Stop reason: SDUPTHER

## 2021-10-29 RX ORDER — TORSEMIDE 20 MG/1
20 TABLET ORAL 2 TIMES DAILY
Qty: 180 TABLET | Refills: 3 | Status: SHIPPED | OUTPATIENT
Start: 2021-10-29 | End: 2022-09-15 | Stop reason: SDUPTHER

## 2021-11-03 ENCOUNTER — TELEPHONE (OUTPATIENT)
Dept: NEPHROLOGY | Facility: CLINIC | Age: 76
End: 2021-11-03
Payer: MEDICARE

## 2021-11-05 ENCOUNTER — TELEPHONE (OUTPATIENT)
Dept: NEPHROLOGY | Facility: CLINIC | Age: 76
End: 2021-11-05
Payer: MEDICARE

## 2021-11-07 ENCOUNTER — PATIENT MESSAGE (OUTPATIENT)
Dept: CARDIOLOGY | Facility: CLINIC | Age: 76
End: 2021-11-07
Payer: MEDICARE

## 2021-11-08 ENCOUNTER — TELEPHONE (OUTPATIENT)
Dept: CARDIOLOGY | Facility: CLINIC | Age: 76
End: 2021-11-08
Payer: MEDICARE

## 2021-11-11 DIAGNOSIS — I10 PRIMARY HYPERTENSION: Primary | ICD-10-CM

## 2021-11-11 DIAGNOSIS — E87.6 HYPOKALEMIA: ICD-10-CM

## 2021-11-11 DIAGNOSIS — I10 ESSENTIAL HYPERTENSION: ICD-10-CM

## 2021-11-11 DIAGNOSIS — Z79.899 NEW MEDICATION ADDED: ICD-10-CM

## 2021-11-11 DIAGNOSIS — N18.31 STAGE 3A CHRONIC KIDNEY DISEASE: ICD-10-CM

## 2021-11-11 RX ORDER — LOSARTAN POTASSIUM 25 MG/1
25 TABLET ORAL DAILY
Qty: 30 TABLET | Refills: 5 | Status: SHIPPED | OUTPATIENT
Start: 2021-11-11 | End: 2022-07-21 | Stop reason: SDUPTHER

## 2021-11-23 ENCOUNTER — TELEPHONE (OUTPATIENT)
Dept: CARDIOLOGY | Facility: CLINIC | Age: 76
End: 2021-11-23
Payer: MEDICARE

## 2021-12-04 NOTE — PROGRESS NOTES
Subjective:       Patient ID: Bo Chase is a 74 y.o. White male who presents for follow-up evaluation of Chronic Kidney Disease    Hypertension   Pertinent negatives include no chest pain, headaches, neck pain, palpitations or shortness of breath.   Edema   Pertinent negatives include no abdominal pain, arthralgias, chest pain, chills, congestion, coughing, diaphoresis, fatigue, fever, headaches, joint swelling, nausea, neck pain, rash or vomiting.      Patient is a  white male who was  by profession.  Has history of chronic kidney disease for at least 5 years.  He was last seen by Dr. Prather in 2012.  Renal ultrasound at that time showed bilateral renal cysts.  Left kidney had a cyst of 2.5 cm.  Patient never had any proteinuria.  His creatinine has been fluctuating between 1.6 and 2.0.  His fluctuations have been due to diuretic therapy.  In the last 5 years his kidney function has been stable.  He has had history of coronary artery disease status post shortness of breath and dyspnea and exertion.    12/ 2014Occluded lad lcx and rca.Occluded svg to d1svg to rca patent svg to  om1 90% eccentric treated with dennis with filter wire protection.Patent lima.No complication. Dr. Joel     2014 LEANDRO : now on CPAP    6/2015 LBP s/p KAROLINA and s/p surgery laminectomy     4/2017 Renal USD CT scan of the abdomen shows extensive calcifications of the aorta    August 2017 2-D echo reviewed= PA pressure >26 mm Hg, creatinine down to 1.4, uric acid controlled, vitamin D level is 43, PTH 91, estimated GFR with Cystatin C is 50%; weight down by 10 ib from 263 to 254 ib    March 2018 patient's creatinine is up to 1.8.  Weight is up to 262 pounds.  Approximate GFR about 40%.  GFR loss is about 10% per year.  No proteinuria.  PTH has gone up from 91 in 2017-235 in March 2018.  Labwork shows severe metabolic alkalosis, severe hypokalemia, low chloride levels due to complications of diuretics. Severe Polycythemia may need  "phlebotomy     6/2018 K+ is better ; Hgb is better ; weight is higher ; GFR 37 % with Cr 1.7 ; retired and now in gym     09/2018 High Calcium; stop D; check USD and SPEP     November 2018 ultrasound done no cancer.  Negative serum protein electrophoresis    1/2019 s/p fall now pending left hips IA steroids in SI and Hip ; creatinine down off ibuprofen ; rtired now in [place] --gym and work out d/w patient [place]ell    6/2019 s/p cellulitis x2 s/p inpatient and I&D ( serratia ) ; left hip injection and Chiro for Pyriformitis     Review of Systems   Constitutional: Negative.  Negative for activity change, appetite change, chills, diaphoresis, fatigue and fever.         water aerobics ; weight training    HENT: Negative.  Negative for congestion and trouble swallowing.    Eyes: Negative.    Respiratory: Negative.  Negative for cough, chest tightness, shortness of breath and wheezing.    Cardiovascular: Positive for leg swelling. Negative for chest pain and palpitations.   Gastrointestinal: Negative.  Negative for abdominal distention, abdominal pain, nausea and vomiting.   Genitourinary: Negative.  Negative for decreased urine volume, difficulty urinating, dysuria, enuresis, flank pain, frequency, hematuria, penile swelling, scrotal swelling and urgency.   Musculoskeletal: Negative.  Negative for arthralgias, back pain, joint swelling and neck pain.   Skin: Negative for rash.   Neurological: Negative.  Negative for tremors, seizures and headaches.   Psychiatric/Behavioral: Negative.  Negative for confusion and sleep disturbance. The patient is not nervous/anxious.        Objective:   /70   Pulse 100   Ht 5' 7" (1.702 m)   Wt 116.6 kg (257 lb 0.9 oz)   BMI 40.26 kg/m²      Weight at home 270     gained 10 lb since last visit.  From 259-269    1/2019 277 ib     6/2019 254 ib = 115.3 kg     10/2019 257 = 116.6      Physical Exam   Constitutional: He is oriented to person, place, and time. He appears " well-developed and well-nourished. No distress.   HENT:   Head: Normocephalic.   Eyes: Pupils are equal, round, and reactive to light. EOM are normal.   Neck: Normal range of motion. Neck supple. No JVD present. No thyromegaly present.   Cardiovascular: Normal rate, regular rhythm, S1 normal, S2 normal, normal heart sounds and intact distal pulses. PMI is not displaced. Exam reveals no gallop and no friction rub.   No murmur heard.  Cholesterol emboli ; loud P2    Pulmonary/Chest: Effort normal and breath sounds normal. No respiratory distress. He has no wheezes. He has no rales. He exhibits no tenderness.   Abdominal: He exhibits no distension and no mass. There is no hepatosplenomegaly. There is no tenderness. There is no rebound and no CVA tenderness. No hernia.   Musculoskeletal: Normal range of motion. He exhibits no edema or tenderness.   2+ edema R>L    Lymphadenopathy:     He has no cervical adenopathy.   Neurological: He is alert and oriented to person, place, and time. He has normal reflexes. He is not disoriented. He displays normal reflexes. No cranial nerve deficit. He exhibits normal muscle tone. Coordination normal.   Skin: Skin is warm and dry. No rash noted. No erythema.   Psychiatric: He has a normal mood and affect. His behavior is normal. Judgment and thought content normal.       Point of care ultrasound was performed to evaluate abnormal breathing pattern, volume status, cardiac status, evaluation for acute kidney injury   along with LE edema.         Pulmonary ultrasound was evaluated in multiple sites including anterior chest bilaterally,  chest was evaluated for pneumothorax which was negative. A lines were positive.  B lines were negative. No evidence of pneumothorax.  No Pleural effusion detected   no atelectasis or pneumonia.      Point of care ultrasound of the heart shows no evidence of pericardial effusion.  Normal LV and RV function.  Normal valvular heart  function.         Point of  care ultrasound of the abdomen was also performed no evidence of free fluid in the abdomen.  Both kidneys looked and appeared normal without any hydronephrosis.  Renals cyst left kidney unchanged ; small cysts right kidney unchanged         Lab Results   Component Value Date    CREATININE 1.8 (H) 09/03/2019    BUN 30 (H) 09/03/2019     09/03/2019    K 3.5 09/03/2019    CL 96 09/03/2019    CO2 28 09/03/2019     Lab Results   Component Value Date    WBC 9.54 09/03/2019    HGB 17.1 09/03/2019    HCT 53.8 09/03/2019    MCV 91 09/03/2019     09/03/2019     Lab Results   Component Value Date    .0 (H) 05/13/2019    CALCIUM 9.8 09/03/2019    PHOS 2.7 09/03/2019     Lab Results   Component Value Date    URICACID 6.7 11/15/2018         Assessment:    )    1. CKD (chronic kidney disease) stage 3, GFR 30-59 ml/min    2. Bilateral edema of lower extremity    3. Secondary hyperparathyroidism of renal origin    4. Complex renal cyst    5. Hypokalemia    6. Idiopathic chronic gout of multiple sites without tophus    7. Cholesterol embolism of lower extremity, bilateral    8. Pulmonary hypertension    9. Diastolic dysfunction    10. Hypercalcemia        Plan:         1.  Chronic kidney disease stage III  patient may have cardiorenal syndrome:  fluctuating creatinine is due to diuretics.   No heavy proteinuria.  Creatinine is better today.  GFR 40% ;     2.  Chronic gout: Last attack was 2018.   on allopurinol 450 mg     3.  Hyperparathyroidism due to chronic kidney disease stage III:  Off  vitamin-D therapy since the calcium is too high.  Consider Rayaldee at some point     4.   anasarca on the lower extremities with chronic venous stasis findings as well as brawny edema of the skin: Patient had an ultrasound of the legs in 2012 which showed no DVT.  Clinically patient has pulm. hypertension, metolazone 1-2x week + torsemide 20 mg x2  daily.     5.  Cholesterol emboli of the lower extremities: Extensive aortic  calcifications noted on the CT scan.    aspirin to 325 mg daily. Continue Lipitor.    6.  Pulm HTN: records of CPAP and dr. Honeycutt reviewed     7.  Hypokalemia:keep  aldactone 25 mg  KCL to 40 meq daily.     8.  Continued follow-up with Hematology for polycythemia monthly phlebotomy.      9.  Acquired renal cysts: Patient had multiple complex cysts in the ultrasound done in 2017 and 2018 .  Status post (POCUS) renal ultrasound and check for the follow-up on the complex cysts in 2020            Follow-up  4 months        Washington Bernal MD     ambulatory

## 2021-12-09 ENCOUNTER — OFFICE VISIT (OUTPATIENT)
Dept: NEPHROLOGY | Facility: CLINIC | Age: 76
End: 2021-12-09
Payer: MEDICARE

## 2021-12-09 VITALS
HEIGHT: 67 IN | WEIGHT: 270.94 LBS | BODY MASS INDEX: 42.53 KG/M2 | DIASTOLIC BLOOD PRESSURE: 82 MMHG | SYSTOLIC BLOOD PRESSURE: 122 MMHG | HEART RATE: 76 BPM | OXYGEN SATURATION: 94 %

## 2021-12-09 DIAGNOSIS — I27.20 PULMONARY HYPERTENSION: ICD-10-CM

## 2021-12-09 DIAGNOSIS — I75.023 CHOLESTEROL EMBOLISM OF LOWER EXTREMITY, BILATERAL: ICD-10-CM

## 2021-12-09 DIAGNOSIS — M1A.09X0 IDIOPATHIC CHRONIC GOUT OF MULTIPLE SITES WITHOUT TOPHUS: ICD-10-CM

## 2021-12-09 DIAGNOSIS — E83.52 HYPERCALCEMIA: ICD-10-CM

## 2021-12-09 DIAGNOSIS — G47.33 OSA (OBSTRUCTIVE SLEEP APNEA): ICD-10-CM

## 2021-12-09 DIAGNOSIS — I25.708 CORONARY ARTERY DISEASE OF BYPASS GRAFT OF NATIVE HEART WITH STABLE ANGINA PECTORIS: ICD-10-CM

## 2021-12-09 DIAGNOSIS — D75.1 ACQUIRED POLYCYTHEMIA: ICD-10-CM

## 2021-12-09 DIAGNOSIS — I50.32 CHRONIC DIASTOLIC CONGESTIVE HEART FAILURE: ICD-10-CM

## 2021-12-09 DIAGNOSIS — Z95.1 S/P CABG X 4: ICD-10-CM

## 2021-12-09 DIAGNOSIS — N18.31 STAGE 3A CHRONIC KIDNEY DISEASE: Primary | ICD-10-CM

## 2021-12-09 DIAGNOSIS — I10 ESSENTIAL HYPERTENSION: ICD-10-CM

## 2021-12-09 DIAGNOSIS — Z95.5 S/P CORONARY ARTERY STENT PLACEMENT: ICD-10-CM

## 2021-12-09 DIAGNOSIS — N25.81 SECONDARY HYPERPARATHYROIDISM OF RENAL ORIGIN: ICD-10-CM

## 2021-12-09 DIAGNOSIS — R60.0 BILATERAL EDEMA OF LOWER EXTREMITY: ICD-10-CM

## 2021-12-09 DIAGNOSIS — E78.5 HYPERLIPIDEMIA WITH TARGET LDL LESS THAN 70: ICD-10-CM

## 2021-12-09 DIAGNOSIS — E55.9 VITAMIN D DEFICIENCY: ICD-10-CM

## 2021-12-09 DIAGNOSIS — N28.1 COMPLEX RENAL CYST: ICD-10-CM

## 2021-12-09 DIAGNOSIS — G47.33 OSA ON CPAP: ICD-10-CM

## 2021-12-09 PROCEDURE — 4010F ACE/ARB THERAPY RXD/TAKEN: CPT | Mod: CPTII,S$GLB,, | Performed by: INTERNAL MEDICINE

## 2021-12-09 PROCEDURE — 3066F PR DOCUMENTATION OF TREATMENT FOR NEPHROPATHY: ICD-10-PCS | Mod: CPTII,S$GLB,, | Performed by: INTERNAL MEDICINE

## 2021-12-09 PROCEDURE — 4010F PR ACE/ARB THEARPY RXD/TAKEN: ICD-10-PCS | Mod: CPTII,S$GLB,, | Performed by: INTERNAL MEDICINE

## 2021-12-09 PROCEDURE — 99999 PR PBB SHADOW E&M-EST. PATIENT-LVL IV: CPT | Mod: PBBFAC,,, | Performed by: INTERNAL MEDICINE

## 2021-12-09 PROCEDURE — 99999 PR PBB SHADOW E&M-EST. PATIENT-LVL IV: ICD-10-PCS | Mod: PBBFAC,,, | Performed by: INTERNAL MEDICINE

## 2021-12-09 PROCEDURE — 99215 PR OFFICE/OUTPT VISIT, EST, LEVL V, 40-54 MIN: ICD-10-PCS | Mod: S$GLB,,, | Performed by: INTERNAL MEDICINE

## 2021-12-09 PROCEDURE — 3066F NEPHROPATHY DOC TX: CPT | Mod: CPTII,S$GLB,, | Performed by: INTERNAL MEDICINE

## 2021-12-09 PROCEDURE — 99215 OFFICE O/P EST HI 40 MIN: CPT | Mod: S$GLB,,, | Performed by: INTERNAL MEDICINE

## 2021-12-09 RX ORDER — KETOCONAZOLE 20 MG/G
CREAM TOPICAL 2 TIMES DAILY
COMMUNITY
Start: 2021-07-07 | End: 2023-11-29

## 2021-12-09 RX ORDER — TAMSULOSIN HYDROCHLORIDE 0.4 MG/1
1 CAPSULE ORAL DAILY
COMMUNITY
Start: 2021-11-26

## 2021-12-20 ENCOUNTER — PATIENT MESSAGE (OUTPATIENT)
Dept: RHEUMATOLOGY | Facility: CLINIC | Age: 76
End: 2021-12-20
Payer: MEDICARE

## 2021-12-20 ENCOUNTER — TELEPHONE (OUTPATIENT)
Dept: RHEUMATOLOGY | Facility: CLINIC | Age: 76
End: 2021-12-20
Payer: MEDICARE

## 2022-03-31 ENCOUNTER — LAB VISIT (OUTPATIENT)
Dept: LAB | Facility: HOSPITAL | Age: 77
End: 2022-03-31
Attending: INTERNAL MEDICINE
Payer: MEDICARE

## 2022-03-31 DIAGNOSIS — N18.31 STAGE 3A CHRONIC KIDNEY DISEASE: ICD-10-CM

## 2022-03-31 LAB
ALBUMIN SERPL BCP-MCNC: 3.9 G/DL (ref 3.5–5.2)
ANION GAP SERPL CALC-SCNC: 16 MMOL/L (ref 8–16)
BUN SERPL-MCNC: 29 MG/DL (ref 8–23)
CALCIUM SERPL-MCNC: 10.2 MG/DL (ref 8.7–10.5)
CHLORIDE SERPL-SCNC: 88 MMOL/L (ref 95–110)
CO2 SERPL-SCNC: 32 MMOL/L (ref 23–29)
CREAT SERPL-MCNC: 1.7 MG/DL (ref 0.5–1.4)
EST. GFR  (AFRICAN AMERICAN): 44.3 ML/MIN/1.73 M^2
EST. GFR  (NON AFRICAN AMERICAN): 38.3 ML/MIN/1.73 M^2
GLUCOSE SERPL-MCNC: 143 MG/DL (ref 70–110)
PHOSPHATE SERPL-MCNC: 3.7 MG/DL (ref 2.7–4.5)
POTASSIUM SERPL-SCNC: 3.5 MMOL/L (ref 3.5–5.1)
PTH-INTACT SERPL-MCNC: 128.3 PG/ML (ref 9–77)
SODIUM SERPL-SCNC: 136 MMOL/L (ref 136–145)

## 2022-03-31 PROCEDURE — 83970 ASSAY OF PARATHORMONE: CPT | Performed by: INTERNAL MEDICINE

## 2022-03-31 PROCEDURE — 36415 COLL VENOUS BLD VENIPUNCTURE: CPT | Mod: PO | Performed by: INTERNAL MEDICINE

## 2022-03-31 PROCEDURE — 80069 RENAL FUNCTION PANEL: CPT | Performed by: INTERNAL MEDICINE

## 2022-04-05 ENCOUNTER — OFFICE VISIT (OUTPATIENT)
Dept: NEPHROLOGY | Facility: CLINIC | Age: 77
End: 2022-04-05
Payer: MEDICARE

## 2022-04-05 VITALS
DIASTOLIC BLOOD PRESSURE: 62 MMHG | BODY MASS INDEX: 43.54 KG/M2 | WEIGHT: 277.38 LBS | HEIGHT: 67 IN | HEART RATE: 73 BPM | SYSTOLIC BLOOD PRESSURE: 110 MMHG | OXYGEN SATURATION: 96 %

## 2022-04-05 DIAGNOSIS — I50.32 CHRONIC DIASTOLIC CONGESTIVE HEART FAILURE: ICD-10-CM

## 2022-04-05 DIAGNOSIS — N28.1 COMPLEX RENAL CYST: ICD-10-CM

## 2022-04-05 DIAGNOSIS — I27.20 PULMONARY HYPERTENSION: ICD-10-CM

## 2022-04-05 DIAGNOSIS — M54.31 SCIATICA OF RIGHT SIDE: ICD-10-CM

## 2022-04-05 DIAGNOSIS — I51.89 DIASTOLIC DYSFUNCTION: ICD-10-CM

## 2022-04-05 DIAGNOSIS — E78.5 HYPERLIPIDEMIA WITH TARGET LDL LESS THAN 70: ICD-10-CM

## 2022-04-05 DIAGNOSIS — N18.31 STAGE 3A CHRONIC KIDNEY DISEASE: Primary | ICD-10-CM

## 2022-04-05 DIAGNOSIS — I75.023 CHOLESTEROL EMBOLISM OF LOWER EXTREMITY, BILATERAL: ICD-10-CM

## 2022-04-05 DIAGNOSIS — E66.01 MORBID OBESITY WITH BMI OF 40.0-44.9, ADULT: ICD-10-CM

## 2022-04-05 DIAGNOSIS — E55.9 VITAMIN D DEFICIENCY: ICD-10-CM

## 2022-04-05 DIAGNOSIS — G47.33 OSA (OBSTRUCTIVE SLEEP APNEA): ICD-10-CM

## 2022-04-05 DIAGNOSIS — R60.0 BILATERAL EDEMA OF LOWER EXTREMITY: ICD-10-CM

## 2022-04-05 DIAGNOSIS — D75.1 ACQUIRED POLYCYTHEMIA: ICD-10-CM

## 2022-04-05 DIAGNOSIS — M1A.09X0 IDIOPATHIC CHRONIC GOUT OF MULTIPLE SITES WITHOUT TOPHUS: ICD-10-CM

## 2022-04-05 DIAGNOSIS — I10 ESSENTIAL HYPERTENSION: ICD-10-CM

## 2022-04-05 DIAGNOSIS — Z95.1 S/P CABG X 4: ICD-10-CM

## 2022-04-05 DIAGNOSIS — N25.81 SECONDARY HYPERPARATHYROIDISM OF RENAL ORIGIN: ICD-10-CM

## 2022-04-05 DIAGNOSIS — I25.708 CORONARY ARTERY DISEASE OF BYPASS GRAFT OF NATIVE HEART WITH STABLE ANGINA PECTORIS: ICD-10-CM

## 2022-04-05 DIAGNOSIS — E83.52 HYPERCALCEMIA: ICD-10-CM

## 2022-04-05 DIAGNOSIS — Z95.5 S/P CORONARY ARTERY STENT PLACEMENT: ICD-10-CM

## 2022-04-05 DIAGNOSIS — G47.33 OSA ON CPAP: ICD-10-CM

## 2022-04-05 PROCEDURE — 3288F PR FALLS RISK ASSESSMENT DOCUMENTED: ICD-10-PCS | Mod: CPTII,S$GLB,, | Performed by: INTERNAL MEDICINE

## 2022-04-05 PROCEDURE — 99999 PR PBB SHADOW E&M-EST. PATIENT-LVL V: CPT | Mod: PBBFAC,,, | Performed by: INTERNAL MEDICINE

## 2022-04-05 PROCEDURE — 1159F MED LIST DOCD IN RCRD: CPT | Mod: CPTII,S$GLB,, | Performed by: INTERNAL MEDICINE

## 2022-04-05 PROCEDURE — 3078F DIAST BP <80 MM HG: CPT | Mod: CPTII,S$GLB,, | Performed by: INTERNAL MEDICINE

## 2022-04-05 PROCEDURE — 1160F RVW MEDS BY RX/DR IN RCRD: CPT | Mod: CPTII,S$GLB,, | Performed by: INTERNAL MEDICINE

## 2022-04-05 PROCEDURE — 3288F FALL RISK ASSESSMENT DOCD: CPT | Mod: CPTII,S$GLB,, | Performed by: INTERNAL MEDICINE

## 2022-04-05 PROCEDURE — 3074F PR MOST RECENT SYSTOLIC BLOOD PRESSURE < 130 MM HG: ICD-10-PCS | Mod: CPTII,S$GLB,, | Performed by: INTERNAL MEDICINE

## 2022-04-05 PROCEDURE — 99999 PR PBB SHADOW E&M-EST. PATIENT-LVL V: ICD-10-PCS | Mod: PBBFAC,,, | Performed by: INTERNAL MEDICINE

## 2022-04-05 PROCEDURE — 1125F AMNT PAIN NOTED PAIN PRSNT: CPT | Mod: CPTII,S$GLB,, | Performed by: INTERNAL MEDICINE

## 2022-04-05 PROCEDURE — 1159F PR MEDICATION LIST DOCUMENTED IN MEDICAL RECORD: ICD-10-PCS | Mod: CPTII,S$GLB,, | Performed by: INTERNAL MEDICINE

## 2022-04-05 PROCEDURE — 99215 OFFICE O/P EST HI 40 MIN: CPT | Mod: S$GLB,,, | Performed by: INTERNAL MEDICINE

## 2022-04-05 PROCEDURE — 1101F PR PT FALLS ASSESS DOC 0-1 FALLS W/OUT INJ PAST YR: ICD-10-PCS | Mod: CPTII,S$GLB,, | Performed by: INTERNAL MEDICINE

## 2022-04-05 PROCEDURE — 99215 PR OFFICE/OUTPT VISIT, EST, LEVL V, 40-54 MIN: ICD-10-PCS | Mod: S$GLB,,, | Performed by: INTERNAL MEDICINE

## 2022-04-05 PROCEDURE — 3074F SYST BP LT 130 MM HG: CPT | Mod: CPTII,S$GLB,, | Performed by: INTERNAL MEDICINE

## 2022-04-05 PROCEDURE — 1101F PT FALLS ASSESS-DOCD LE1/YR: CPT | Mod: CPTII,S$GLB,, | Performed by: INTERNAL MEDICINE

## 2022-04-05 PROCEDURE — 3078F PR MOST RECENT DIASTOLIC BLOOD PRESSURE < 80 MM HG: ICD-10-PCS | Mod: CPTII,S$GLB,, | Performed by: INTERNAL MEDICINE

## 2022-04-05 PROCEDURE — 1125F PR PAIN SEVERITY QUANTIFIED, PAIN PRESENT: ICD-10-PCS | Mod: CPTII,S$GLB,, | Performed by: INTERNAL MEDICINE

## 2022-04-05 PROCEDURE — 1160F PR REVIEW ALL MEDS BY PRESCRIBER/CLIN PHARMACIST DOCUMENTED: ICD-10-PCS | Mod: CPTII,S$GLB,, | Performed by: INTERNAL MEDICINE

## 2022-04-05 RX ORDER — DUTASTERIDE 0.5 MG/1
CAPSULE, LIQUID FILLED ORAL
COMMUNITY
Start: 2022-01-26

## 2022-04-05 RX ORDER — ORAL SEMAGLUTIDE 3 MG/1
TABLET ORAL
COMMUNITY
Start: 2022-03-31 | End: 2022-05-19

## 2022-04-05 RX ORDER — DULOXETIN HYDROCHLORIDE 30 MG/1
30 CAPSULE, DELAYED RELEASE ORAL
COMMUNITY
Start: 2022-03-23

## 2022-04-05 NOTE — PROGRESS NOTES
Subjective:       Patient ID: Bo Chase is a 77 y.o. White male who presents for follow-up evaluation of Chronic Kidney Disease       Patient is a  white male who was  by profession.  Has history of chronic kidney disease for at least 5 years.  He was last seen by Dr. Prather in 2012.  Renal ultrasound at that time showed bilateral renal cysts.  Left kidney had a cyst of 2.5 cm.  Patient never had any proteinuria.  His creatinine has been fluctuating between 1.6 and 2.0.  His fluctuations have been due to diuretic therapy.  In the last 5 years his kidney function has been stable.  He has had history of coronary artery disease status post shortness of breath and dyspnea and exertion.    12/ 2014Occluded lad lcx and rca.Occluded svg to d1svg to rca patent svg to  om1 90% eccentric treated with dennis with filter wire protection.Patent lima.No complication. Dr. Joel     2014 LEANDRO : now on CPAP    6/2015 LBP s/p KAROLINA and s/p surgery laminectomy     4/2017 Renal USD CT scan of the abdomen shows extensive calcifications of the aorta    August 2017 2-D echo reviewed= PA pressure >26 mm Hg, creatinine down to 1.4, uric acid controlled, vitamin D level is 43, PTH 91, estimated GFR with Cystatin C is 50%; weight down by 10 ib from 263 to 254 ib    March 2018 patient's creatinine is up to 1.8.  Weight is up to 262 pounds.  Approximate GFR about 40%.  GFR loss is about 10% per year.  No proteinuria.  PTH has gone up from 91 in 2017-235 in March 2018.  Labwork shows severe metabolic alkalosis, severe hypokalemia, low chloride levels due to complications of diuretics. Severe Polycythemia may need phlebotomy     6/2018 K+ is better ; Hgb is better ; weight is higher ; GFR 37 % with Cr 1.7 ; retired and now in gym     09/2018 High Calcium; stop D; check USD and SPEP     November 2018 ultrasound done no cancer.  Negative serum protein electrophoresis    1/2019 s/p fall now pending left hips IA steroids in SI and Hip ;  creatinine down off ibuprofen ; rtired now in [place] --gym and work out d/w patient usman    6/2019 s/p cellulitis x2 s/p inpatient and I&D ( serratia ) ; left hip injection and Chiro for Pyriformitis     August 2020 creatinine 2.0.  Status post phlebotomy for polycythemia under care of Dr. Diaz.    Interval history May 2021: Former pt of Dr. Bernal, last Seen Dec 2020. He knows he is in Stage 3 but was not taught creatinine nor GFR.  He recently underwent R hip replacement (Arboles) , gained 20lbs of fluid, saw Cardiology and medications adjusted but asking if he can resume daily loop diuretic.     Interval History Dec 2021: Doing well. Hip pain. No NSAID use. LE is stable.     Interval History April 2022: He is doing better. Last Hba1c was 7.7 but recent BS at home have improved. Fatigue has improved     Review of Systems   Constitutional: Negative for activity change and appetite change.   HENT: Negative for facial swelling.    Respiratory: Negative for shortness of breath.    Cardiovascular: Positive for leg swelling (much improved).   Gastrointestinal: Negative for nausea and vomiting.   Genitourinary: Positive for frequency.   Musculoskeletal: Positive for arthralgias and gait problem.   Allergic/Immunologic: Positive for immunocompromised state.   Psychiatric/Behavioral: Negative for confusion and decreased concentration.       Objective:   There were no vitals taken for this visit.     Weight at home 270     gained 10 lb since last visit.  From 259-269    1/2019 277 ib     6/2019 254 ib = 115.3 kg     10/2019 257 = 116.6    2/2020 263 ib     August 2020 266 lb    12/2020 275 ib   Physical Exam  Vitals and nursing note reviewed.   Constitutional:       General: He is not in acute distress.     Appearance: Normal appearance. He is obese. He is not ill-appearing.   HENT:      Head: Atraumatic.   Eyes:      General: No scleral icterus.  Cardiovascular:      Rate and Rhythm: Normal rate.   Pulmonary:       Breath sounds: No wheezing or rales.   Abdominal:      General: There is no distension.   Musculoskeletal:      Right lower leg: Edema present.      Left lower leg: Edema present.   Skin:     General: Skin is warm and dry.      Coloration: Skin is not jaundiced.   Neurological:      Mental Status: He is alert and oriented to person, place, and time.   Psychiatric:         Mood and Affect: Mood normal.         Behavior: Behavior normal.         Thought Content: Thought content normal.         Judgment: Judgment normal.           Lab Results   Component Value Date    CREATININE 1.7 (H) 03/31/2022    BUN 29 (H) 03/31/2022     03/31/2022    K 3.5 03/31/2022    CL 88 (L) 03/31/2022    CO2 32 (H) 03/31/2022     Lab Results   Component Value Date    WBC 9.11 10/27/2021    HGB 16.6 10/27/2021    HCT 52.8 10/27/2021    MCV 82 10/27/2021     10/27/2021     Lab Results   Component Value Date    .3 (H) 03/31/2022    CALCIUM 10.2 03/31/2022    PHOS 3.7 03/31/2022     Lab Results   Component Value Date    URICACID 6.1 10/27/2021             )    1. Stage 3a chronic kidney disease    2. Secondary hyperparathyroidism of renal origin    3. Chronic diastolic congestive heart failure    4. Hyperlipidemia with target LDL less than 70    5. Hypercalcemia    6. S/P coronary artery stent placement    7. Essential hypertension    8. Coronary artery disease of bypass graft of native heart with stable angina pectoris    9. S/P CABG x 4    10. Idiopathic chronic gout of multiple sites without tophus    11. Complex renal cyst    12. Vitamin D deficiency    13. Bilateral edema of lower extremity    14. LEANDRO on CPAP    15. LEANDRO (obstructive sleep apnea)    16. Acquired polycythemia    17. Pulmonary hypertension    18. Cholesterol embolism of lower extremity, bilateral    19. Morbid obesity with BMI of 40.0-44.9, adult    20. Sciatica of right side    21. Diastolic dysfunction        Plan:         1.  Chronic kidney disease  stage 3-- patient may have cardiorenal syndrome:  fluctuating creatinine on diuretics.   No heavy proteinuria.  Creatinine is stable and ranges 1.6-2. Stable kidney function and < 100mg of proteinuria     2.  Chronic gout: Last attack was 2018.   Continue allopurinol 450 mg uric acid is well controlled at 6.1, followed by Rheumatology.    3.  Hyperparathyroidism due to chronic kidney disease stage III:  Trend PTH, D level is adequate (hx of hypercalcemia)     4.   anasarca on the lower extremities with chronic venous stasis findings as well as brawny edema of the skin: Patient had an ultrasound of the legs in 2012 which showed no DVT.  Clinically patient has pulm. hypertension, Followed by Cardiology. Continue current diuretics (with potassium supplementation)      6.  Pulm HTN and LEANDRO:   CPAP     7.  Hypokalemia: continue aldactone 25 mg and KCL       8.  Continued follow-up with Hematology for polycythemia    9.  Acquired renal cysts: Patient had multiple complex cysts in the ultrasound done in 2017 and 2018 .  Status post  renal ultrasound June of 2020--the complex cysts seen prior were all classified as simple in the most recent renal u/s     10. Hip Arthiritis: s/p hip replacement. No NSAIDs        Follow-up 6mo  50 minutes of total time spent on the encounter, which includes face to face time and non-face to face time preparing to see the patient (eg, review of tests), Obtaining and/or reviewing separately obtained history, Documenting clinical information in the electronic or other health record, Independently interpreting results (not separately reported) and communicating results to the patient/family/caregiver, or Care coordination (not separately reported).        Mumtaz Massey MD

## 2022-04-12 ENCOUNTER — PATIENT MESSAGE (OUTPATIENT)
Dept: NEPHROLOGY | Facility: CLINIC | Age: 77
End: 2022-04-12
Payer: MEDICARE

## 2022-04-15 ENCOUNTER — PATIENT MESSAGE (OUTPATIENT)
Dept: CARDIOLOGY | Facility: CLINIC | Age: 77
End: 2022-04-15
Payer: MEDICARE

## 2022-04-20 NOTE — PROGRESS NOTES
+Subjective:    Patient ID:  oB Chase is a 77 y.o. male who presents for follow-up of Follow-up and Shortness of Breath      HPI: Mr. Bo Chase presents to the clinic for follow up of CAD, Hx of CABG, diastolic heart failure, HTN, HLP and preop CV exam.  He is planning low back surgery. He went to see his cardiologist in [place], but when testing was ordered, his insurance indicated that it was out of network.  He is reported substernal chest tightness, not necessarily related to exertion. He denied any associated symptoms, and it is nonradiating. It resolves spontaneously without intervention.  He also is able to ride a stationary bike at the gym for 30 minutes at a time without shortness of breath; however, he reports dyspnea on exertion carrying groceries from the car to the house.  He uses a walker to help him assist with ambulation due to pain in his hips and lower back.  The pain in these areas limits his walking.    He has been followed by Dr. Crawley in 2020 and he was seeing Dr. Elaine before that.      He has chronic diastolic heart failure class C; functional class 2-3.    He also uses light weights without problems. Cannot put on compression stockings or socks.  He had one episode of palpitations a few months ago while riding in cart at grocery store. It lasted about 3-4 minutes. He has not had any problems since. He denied any lightheadedness, dizziness, or near syncope.  He denied orthopnea or PND.  He does tend to have recurrent edema in the lower extremities.  The right leg tends to be larger than the left and swell more-it was the site of graft harvest.  He stated that he tends to have low potassium and takes a supplement-30-40 mEq of potassium per day.  Vitamin D was stopped due to high calcium.  Had phlebotomy for polycythemia August 2020.  He has seen Dr. Massey once previously and has a follow-up appointment on November 3rd  He uses a walker to assist with ambulation.    LEANDRO: on  CPAP.    He is seeing a pain management doctor for bilateral hip pain.  He has tried massage and chiropractic treatments for this as well.  He sees Laure Ozuna NP for obstructive sleep apnea, and he has seen Dr. Honeycutt for this and chronic bronchitis.    --------------------------------------------------------------------  Dr. Elaine's note: 76 yo male, 6 months f/u.   PMH CAD, s/p CABGx4 1996, s/p last PCI SVG to OM1, in 2014, LIMA patent and  SVG to diag, Dx of DM in 2019 after steroids injection now A1c wnl, , CHFpEF, HTN, HLP, morbid obesity, sleep apnea on CPAP, hypothyroidism. Chronic lower back pain.  8/31/2016 negative nuclear stress test for ischemia.    ECho normal EF and mod LVH  Pt states that he f/u with cardiologist at [place] in the past two yr. Negative exerrcise stress test in  at [place].   Recent Dx of melanoma on nose and right chest pain. S/p chest tumor early .   Sleep with CPAP.  Chronic leg swelling.  BNP 54 and Cr stable   Pt states that no exercise due to pandemic  Worsening SOB when walking and carrying bags  Taking Torsemide 20 mg bid and Metolazone 5 mg once a week.  No chest pain.   Asa 325 mg given by Dr Bernal nephrology  No gym exercise after COVID 19 pandemic  BP LDL and BS controlled  Plan: CONTINUE TORSEMIDE 20 MG BID AND METOLAZONE 2.5 MG TWICE A WEEK  Daily weight. Please call the office if gain 3 pounds in 1 day or 5 pounds in 1 week.  Fluid restriction 1.5 liters a day  Na< 2 gm  CONTINUE ASa lipitor, lopressor losartan imdur and Aldactone   D/c KCL and repeat BMP I n 4 weeks for K level  Counseled DASH  Check Lipid profile in 6 months  Recommend heart-healthy diet, weight control and regular exercise.  Ion. Risk modification.   RTC in 6 months  -----------------------------------------------------------------------------------    Medications: he is not missing any doses.  He is taking torsemide 20 mg p.o. b.i.d., potassium 10mEq QD; spironolactone 25 mg  p.o. q.day, and metolazone 5 mg p.o. q.week.  He is taking metoprolol tartrate 25 mg p.o. b.i.d. He is taking isosorbide mononitrate 30 mg p.o. q.day, atorvastatin 40 mg p.o. q.day and aspirin, and Losartan 25mg QD.  Sodium: he does not add salt to foods at the table,  he is reading labels for sodium content. he does eat salty foods; sausage in the morning; ham sandwich a few times/week; sometimes with cheese.  Exercise: he Rides a stationary bike 3 days per week and uses light weights; it is not associated with chest pain or SOB. But he does pace himself while lifting weights; he rests between sets.  Tobacco: denies previous or current tobacco use   Alcohol:  Occasional beer     Weight: 123.8 kg (272 lb 14.4 oz) he states that his daily weights have decreased.Body mass index is 42.74 kg/m².  He monitors daily weights.    Wt Readings from Last 3 Encounters:   04/21/22 123.8 kg (272 lb 14.4 oz)   04/05/22 125.8 kg (277 lb 6.4 oz)   12/09/21 122.9 kg (270 lb 15.1 oz)     BP log: None. He does not have a BP monitor at home. He checks it at pharmacy from time to time.      Review of Systems   Constitutional: Positive for weight loss. Negative for chills, decreased appetite, fever, night sweats and weight gain.   HENT: Negative for congestion.    Cardiovascular: Positive for dyspnea on exertion, leg swelling and palpitations (One episode a few months ago; lasted 3-4 minutes-resolved spontaneously; none since.). Negative for chest pain, claudication, cyanosis, irregular heartbeat, near-syncope, orthopnea, paroxysmal nocturnal dyspnea and syncope.   Respiratory: Negative for cough, hemoptysis, shortness of breath, sputum production and wheezing.    Hematologic/Lymphatic: Negative for adenopathy and bleeding problem. Does not bruise/bleed easily.   Skin: Negative for color change and nail changes.   Musculoskeletal: Positive for joint pain (Chronic pain in the lower back and hips.).   Gastrointestinal: Negative for  bloating, abdominal pain, change in bowel habit, heartburn, hematochezia, melena, nausea and vomiting.   Genitourinary: Negative for hematuria.   Neurological: Negative for dizziness and light-headedness.   Psychiatric/Behavioral: Negative for altered mental status.       Objective:   Physical Exam  Constitutional:       General: He is not in acute distress.     Appearance: He is well-developed. He is not diaphoretic.   HENT:      Head: Normocephalic and atraumatic.   Eyes:      General: No scleral icterus.     Conjunctiva/sclera: Conjunctivae normal.   Neck:      Thyroid: No thyromegaly.      Vascular: No JVD.      Trachea: No tracheal deviation.   Cardiovascular:      Rate and Rhythm: Normal rate and regular rhythm.      Pulses: Intact distal pulses.      Heart sounds: Murmur heard.    Harsh midsystolic murmur is present at the upper right sternal border radiating to the neck.    No friction rub. No gallop.   Pulmonary:      Effort: Pulmonary effort is normal. No respiratory distress.      Breath sounds: Normal breath sounds. No wheezing or rales.      Comments: Speaks in complete sentences without obvious signs of shortness of breath or increased work of breathing  Chest:      Chest wall: No tenderness.   Abdominal:      General: Bowel sounds are normal. There is no distension.      Palpations: Abdomen is soft. There is no mass.      Tenderness: There is no abdominal tenderness. There is no guarding or rebound.      Comments: Obese   Musculoskeletal:         General: Normal range of motion.      Cervical back: Neck supple.      Comments: Venous stasis changes to both lower legs bilaterally.  Nonpitting edema noted.  Right lower leg is larger than the left-chronic.   Lymphadenopathy:      Cervical: No cervical adenopathy.   Skin:     General: Skin is warm and dry.      Coloration: Skin is not pale.      Findings: No erythema or rash.      Comments: [place]   Neurological:      Mental Status: He is alert and oriented  to person, place, and time.        Latest Reference Range & Units 03/31/22 09:35   Sodium 136 - 145 mmol/L 136   Potassium 3.5 - 5.1 mmol/L 3.5   Chloride 95 - 110 mmol/L 88 (L)   CO2 23 - 29 mmol/L 32 (H)   Anion Gap 8 - 16 mmol/L 16   BUN 8 - 23 mg/dL 29 (H)   Creatinine 0.5 - 1.4 mg/dL 1.7 (H)   EGFR if non African American >60 mL/min/1.73 m^2 38.3 ! [1]   EGFR if African American >60 mL/min/1.73 m^2 44.3 !   Glucose 70 - 110 mg/dL 143 (H)   Calcium 8.7 - 10.5 mg/dL 10.2   Phosphorus 2.7 - 4.5 mg/dL 3.7   Albumin 3.5 - 5.2 g/dL 3.9         Results for SEBLE GABY NELLI (MRN [id number]) as of 10/29/2021 13:38   Ref. Range 4/8/2021 15:05   Cholesterol Latest Ref Range: 120 - 199 mg/dL 134   HDL Latest Ref Range: 40 - 75 mg/dL 25 (L)   HDL/Cholesterol Ratio Latest Ref Range: 20.0 - 50.0 % 18.7 (L)   LDL Cholesterol External Latest Ref Range: 63.0 - 159.0 mg/dL 78.0   Non-HDL Cholesterol Latest Units: mg/dL 109   Total Cholesterol/HDL Ratio Latest Ref Range: 2.0 - 5.0  5.4 (H)   Triglycerides Latest Ref Range: 30 - 150 mg/dL 155 (H)       Echo at Dr. Crawley's office 12/2020  EF 55-60%  Apical hypokinesis.  Moderate concentric hypertrophy.  Mild AS, AI, and MR.  Mild LAE.  I/IV diastolic dysfunction.    Echo 07/25/2017:CONCLUSIONS     1 - Concentric hypertrophy.     2 - No wall motion abnormalities.     3 - Normal left ventricular systolic function (EF 60-65%).     4 - Normal left ventricular diastolic function.     5 - Normal right ventricular systolic function .     6 - Trivial to mild mitral regurgitation.     7 - Trivial to mild tricuspid regurgitation.     Pharmacologic nuclear medicine stress test 08/31/2016:Impression: NORMAL MYOCARDIAL PERFUSION   1. The perfusion scan is free of evidence for myocardial ischemia or injury.   2. There is a mild intensity fixed defect in the inferior wall of the left ventricle, secondary to diaphragm attenuation.   3. Resting wall motion is physiologic.   4. Resting LV function is  normal.   5. The ventricular volumes are normal at rest and stress.   6. The extracardiac distribution of radioactivity is normal.       Left heart catheterization 12/29/2014:ANGIOGRAPHIC RESULTS:   DIAGNOSTIC:        Patient has a right dominant coronary artery.        - Left Main Coronary Artery:              The LM has luminal irregularities. There is JESUS 3 flow.        - Left Anterior Descending Artery:              The proximal LAD has chronic total occlusion. There is JESUS 0 flow.        - Left Circumflex Artery:              The proximal LCX has chronic total occlusion. There is JESUS 0 flow.        - Right Coronary Artery:              The proximal RCA has chronic total occlusion. There is JESUS 0 flow.        - SVG To RCA:              The SVG to RCA has luminal irregularities. There is JESUS 3 flow.        - THOMPSON To LAD:              The LIMA to LAD is normal. There is JESUS 3 flow.        - SVG To D1:              The SVG to D1 has chronic total occlusion. There is JESUS 0 flow.        - SVG To OM1:              The mid SVG to OM1 has a 90% stenosis.   This was a MI culprit lesion.  The Posterior and Lateral myocardial walls were affected. The following items were used: Wire   Filter Spider Fx 5 X 320/190, Blln Emerge 4 X 20 and Stent Resolute   4.0x38mm (AMARI).   Assessment:      1. Preop cardiovascular exam    2. Coronary artery disease involving coronary bypass graft of native heart with angina pectoris    3. Hx of CABG    4. Chronic diastolic congestive heart failure    5. Primary hypertension    6. Venous insufficiency    7. Edema, unspecified type    8. Mixed hyperlipidemia    9. Stage 3b chronic kidney disease    10. Morbid obesity with BMI of 40.0-44.9, adult    11. LEANDRO on CPAP    12. Hypokalemia    13. Diabetes mellitus type 2 in obese        Plan:     Preop cardiovascular exam  -     Nuclear Stress - 3rd Party; Future  -     Echo; Future    Coronary artery disease involving coronary bypass graft of  native heart with angina pectoris  -     Nuclear Stress - 3rd Party; Future  -     Echo; Future    Hx of CABG  -     Nuclear Stress - 3rd Party; Future  -     Echo; Future    Chronic diastolic congestive heart failure  -     Echo; Future    Primary hypertension  -     Echo; Future    Venous insufficiency  -     Ankle Brachial Indices (ARNULFO); Future  -     CV Ultrasound doppler arterial legs bilat; Future  -     CV Ultrasound doppler venous legs bilat; Future    Edema, unspecified type  -     Ankle Brachial Indices (ARNULFO); Future  -     CV Ultrasound doppler arterial legs bilat; Future  -     CV Ultrasound doppler venous legs bilat; Future    Mixed hyperlipidemia    Stage 3b chronic kidney disease    Morbid obesity with BMI of 40.0-44.9, adult    LEANDRO on CPAP    Hypokalemia    Diabetes mellitus type 2 in obese       Lexiscan and echocardiogram complete. Phone review.  ARNULFO, resting and arterial U/S lower extremities. Phone review.  Venous U/S lower extremities-venous insufficiency evaluation.  Continue ASA, imdur-CAD, angina.  Continue torsemide, metolazone, spironolactone - diastolic heart failure.  He may have cardiorenal syndrome-creatinine is at baseline and renal function appears stable; will monitor.  His volume status appears pretty stable clinically today.  Nephrology recommends to keep aldactone and KCL supplement - severe hypokalemia. He is also on a small dose of losartan given HTN and diabetes with CKD.  Dr. Massey following CKD 3.  Continue metoprolol, losartan, spironolactone - HTN.  Continue atorvastatin, fish oil - HLP.  Sodium restriction encouraged.  Continue using CPAP every night-obstructive sleep apnea.  Continue exercise.   Daily weights.    Continue weight loss.  Whole foods, low glycemic diet.  Follow-up in 3 months or call sooner for any problems.  45 minutes spent in chart review and face to face encounter.  Adelia Sanon NP  [facility]    Addendum April 29, 2022 8:35 a.m.: Spoke with  Mr. Chase on the phone.  He had all the elements of his stress test except for the stress pictures.  He stated that he had been here for 4 hours he was tired and in pain and needed to go to the bathroom so he decided to leave; unfortunately the test was not over when he chose to do so.  He further reports tightness in his chest that occurred off and on for 2 days after the test.  He denies any radiation of pain or nausea vomiting or diaphoresis associated with it.  It was very upsetting for him and he decided that he did not want to do the stress test again.  Discussed the rationale for the stress test, especially in light of his chest pain afterward.  He stated that he would think about it and call us back.  Currently he is scheduled Friday May 6 for the test.  He is trying to get preop clearance for back surgery.  Adelia Sanon NP  [facility]

## 2022-04-21 ENCOUNTER — OFFICE VISIT (OUTPATIENT)
Dept: CARDIOLOGY | Facility: CLINIC | Age: 77
End: 2022-04-21
Payer: MEDICARE

## 2022-04-21 VITALS
OXYGEN SATURATION: 98 % | SYSTOLIC BLOOD PRESSURE: 100 MMHG | WEIGHT: 272.88 LBS | HEART RATE: 84 BPM | BODY MASS INDEX: 42.83 KG/M2 | HEIGHT: 67 IN | DIASTOLIC BLOOD PRESSURE: 52 MMHG

## 2022-04-21 DIAGNOSIS — I10 PRIMARY HYPERTENSION: ICD-10-CM

## 2022-04-21 DIAGNOSIS — I50.32 CHRONIC DIASTOLIC CONGESTIVE HEART FAILURE: ICD-10-CM

## 2022-04-21 DIAGNOSIS — N18.32 STAGE 3B CHRONIC KIDNEY DISEASE: ICD-10-CM

## 2022-04-21 DIAGNOSIS — I25.709 CORONARY ARTERY DISEASE INVOLVING CORONARY BYPASS GRAFT OF NATIVE HEART WITH ANGINA PECTORIS: ICD-10-CM

## 2022-04-21 DIAGNOSIS — I87.2 VENOUS INSUFFICIENCY: ICD-10-CM

## 2022-04-21 DIAGNOSIS — E66.9 DIABETES MELLITUS TYPE 2 IN OBESE: ICD-10-CM

## 2022-04-21 DIAGNOSIS — G47.33 OSA ON CPAP: ICD-10-CM

## 2022-04-21 DIAGNOSIS — E11.69 DIABETES MELLITUS TYPE 2 IN OBESE: ICD-10-CM

## 2022-04-21 DIAGNOSIS — E87.6 HYPOKALEMIA: ICD-10-CM

## 2022-04-21 DIAGNOSIS — E78.2 MIXED HYPERLIPIDEMIA: ICD-10-CM

## 2022-04-21 DIAGNOSIS — Z01.810 PREOP CARDIOVASCULAR EXAM: Primary | ICD-10-CM

## 2022-04-21 DIAGNOSIS — Z95.1 HX OF CABG: ICD-10-CM

## 2022-04-21 DIAGNOSIS — L81.9 HYPERPIGMENTATION OF SKIN: ICD-10-CM

## 2022-04-21 DIAGNOSIS — E66.01 MORBID OBESITY WITH BMI OF 40.0-44.9, ADULT: ICD-10-CM

## 2022-04-21 DIAGNOSIS — R60.9 EDEMA, UNSPECIFIED TYPE: ICD-10-CM

## 2022-04-21 PROCEDURE — 99214 OFFICE O/P EST MOD 30 MIN: CPT | Mod: S$GLB,,, | Performed by: NURSE PRACTITIONER

## 2022-04-21 PROCEDURE — 3074F SYST BP LT 130 MM HG: CPT | Mod: CPTII,S$GLB,, | Performed by: NURSE PRACTITIONER

## 2022-04-21 PROCEDURE — 99214 PR OFFICE/OUTPT VISIT, EST, LEVL IV, 30-39 MIN: ICD-10-PCS | Mod: S$GLB,,, | Performed by: NURSE PRACTITIONER

## 2022-04-21 PROCEDURE — 3288F FALL RISK ASSESSMENT DOCD: CPT | Mod: CPTII,S$GLB,, | Performed by: NURSE PRACTITIONER

## 2022-04-21 PROCEDURE — 1125F PR PAIN SEVERITY QUANTIFIED, PAIN PRESENT: ICD-10-PCS | Mod: CPTII,S$GLB,, | Performed by: NURSE PRACTITIONER

## 2022-04-21 PROCEDURE — 1125F AMNT PAIN NOTED PAIN PRSNT: CPT | Mod: CPTII,S$GLB,, | Performed by: NURSE PRACTITIONER

## 2022-04-21 PROCEDURE — 1160F PR REVIEW ALL MEDS BY PRESCRIBER/CLIN PHARMACIST DOCUMENTED: ICD-10-PCS | Mod: CPTII,S$GLB,, | Performed by: NURSE PRACTITIONER

## 2022-04-21 PROCEDURE — 99999 PR PBB SHADOW E&M-EST. PATIENT-LVL V: CPT | Mod: PBBFAC,,, | Performed by: NURSE PRACTITIONER

## 2022-04-21 PROCEDURE — 1101F PR PT FALLS ASSESS DOC 0-1 FALLS W/OUT INJ PAST YR: ICD-10-PCS | Mod: CPTII,S$GLB,, | Performed by: NURSE PRACTITIONER

## 2022-04-21 PROCEDURE — 3288F PR FALLS RISK ASSESSMENT DOCUMENTED: ICD-10-PCS | Mod: CPTII,S$GLB,, | Performed by: NURSE PRACTITIONER

## 2022-04-21 PROCEDURE — 1101F PT FALLS ASSESS-DOCD LE1/YR: CPT | Mod: CPTII,S$GLB,, | Performed by: NURSE PRACTITIONER

## 2022-04-21 PROCEDURE — 3074F PR MOST RECENT SYSTOLIC BLOOD PRESSURE < 130 MM HG: ICD-10-PCS | Mod: CPTII,S$GLB,, | Performed by: NURSE PRACTITIONER

## 2022-04-21 PROCEDURE — 3078F PR MOST RECENT DIASTOLIC BLOOD PRESSURE < 80 MM HG: ICD-10-PCS | Mod: CPTII,S$GLB,, | Performed by: NURSE PRACTITIONER

## 2022-04-21 PROCEDURE — 3078F DIAST BP <80 MM HG: CPT | Mod: CPTII,S$GLB,, | Performed by: NURSE PRACTITIONER

## 2022-04-21 PROCEDURE — 1159F PR MEDICATION LIST DOCUMENTED IN MEDICAL RECORD: ICD-10-PCS | Mod: CPTII,S$GLB,, | Performed by: NURSE PRACTITIONER

## 2022-04-21 PROCEDURE — 1160F RVW MEDS BY RX/DR IN RCRD: CPT | Mod: CPTII,S$GLB,, | Performed by: NURSE PRACTITIONER

## 2022-04-21 PROCEDURE — 99999 PR PBB SHADOW E&M-EST. PATIENT-LVL V: ICD-10-PCS | Mod: PBBFAC,,, | Performed by: NURSE PRACTITIONER

## 2022-04-21 PROCEDURE — 1159F MED LIST DOCD IN RCRD: CPT | Mod: CPTII,S$GLB,, | Performed by: NURSE PRACTITIONER

## 2022-04-21 RX ORDER — CEPHALEXIN 500 MG/1
500 CAPSULE ORAL 2 TIMES DAILY
COMMUNITY
Start: 2022-04-18 | End: 2022-05-19

## 2022-04-25 ENCOUNTER — TELEPHONE (OUTPATIENT)
Dept: CARDIOLOGY | Facility: HOSPITAL | Age: 77
End: 2022-04-25
Payer: MEDICARE

## 2022-04-25 ENCOUNTER — TELEPHONE (OUTPATIENT)
Dept: CARDIOLOGY | Facility: CLINIC | Age: 77
End: 2022-04-25
Payer: MEDICARE

## 2022-04-25 NOTE — TELEPHONE ENCOUNTER
Attempted without success x2 to contact pt to discuss pt message. LVM for pt to call back to further discuss.    ----- Message from Annie Walker sent at 4/25/2022 12:46 PM CDT -----  Contact: GABY PRUETT [[id number]]  .Type:  Needs Medical Advice    Who Called: GABY PRUETT [[id number]]  Would the patient rather a call back or a response via [facility]? Call   Best Call Back Number: .[phone] (home) [phone] (work)   Additional Information: Pt is req a call back in regards to his appointment for medical test.. Thanks AW

## 2022-04-25 NOTE — TELEPHONE ENCOUNTER
Patient contacted and reviewed instructions for his nuclear stress w Lexiscan in the morning.He verbalized understanding.

## 2022-04-26 ENCOUNTER — TELEPHONE (OUTPATIENT)
Dept: CARDIOLOGY | Facility: HOSPITAL | Age: 77
End: 2022-04-26
Payer: MEDICARE

## 2022-04-26 ENCOUNTER — HOSPITAL ENCOUNTER (OUTPATIENT)
Dept: CARDIOLOGY | Facility: HOSPITAL | Age: 77
Discharge: HOME | End: 2022-04-26
Attending: NURSE PRACTITIONER
Payer: MEDICARE

## 2022-04-26 ENCOUNTER — PATIENT MESSAGE (OUTPATIENT)
Dept: CARDIOLOGY | Facility: CLINIC | Age: 77
End: 2022-04-26
Payer: MEDICARE

## 2022-04-26 DIAGNOSIS — I25.709 CORONARY ARTERY DISEASE INVOLVING CORONARY BYPASS GRAFT OF NATIVE HEART WITH ANGINA PECTORIS: ICD-10-CM

## 2022-04-26 DIAGNOSIS — Z95.1 HX OF CABG: ICD-10-CM

## 2022-04-26 DIAGNOSIS — Z01.810 PREOP CARDIOVASCULAR EXAM: ICD-10-CM

## 2022-04-26 NOTE — TELEPHONE ENCOUNTER
Leda Johnson spoke with patient earlier when he contacted office.He felt he had waited several hours for his procedure to be completed and decided to leave.He will need to be rescheduled.Will try to schedule within 7 days so we will not have to repeat entire test again.Patient agrees to have test repeated if needed. To contact patient tomorrow with appointment.

## 2022-04-26 NOTE — TELEPHONE ENCOUNTER
The patient left office without completing his stress pictures.We have been unable to reach him by telephone.Called and spoke with his wife.He left his cell phone at home.We will continue to attempt to contact patient.

## 2022-04-27 ENCOUNTER — HOSPITAL ENCOUNTER (OUTPATIENT)
Dept: RADIOLOGY | Facility: HOSPITAL | Age: 77
Discharge: HOME | End: 2022-04-27
Attending: NURSE PRACTITIONER
Payer: MEDICARE

## 2022-04-27 DIAGNOSIS — I87.2 VENOUS INSUFFICIENCY: ICD-10-CM

## 2022-04-27 DIAGNOSIS — Z95.1 HX OF CABG: Primary | ICD-10-CM

## 2022-04-27 DIAGNOSIS — I10 ESSENTIAL HYPERTENSION: ICD-10-CM

## 2022-04-27 DIAGNOSIS — L81.9 HYPERPIGMENTATION OF SKIN: ICD-10-CM

## 2022-04-27 DIAGNOSIS — R60.9 EDEMA, UNSPECIFIED TYPE: ICD-10-CM

## 2022-04-27 DIAGNOSIS — Z01.810 PREOP CARDIOVASCULAR EXAM: ICD-10-CM

## 2022-04-27 PROCEDURE — 93970 US LOWER EXTREMITY VEINS BILATERAL INSUFFICIENCY: ICD-10-PCS | Mod: 26,,, | Performed by: RADIOLOGY

## 2022-04-27 PROCEDURE — 93970 EXTREMITY STUDY: CPT | Mod: 26,,, | Performed by: RADIOLOGY

## 2022-04-27 PROCEDURE — 93970 EXTREMITY STUDY: CPT | Mod: TC,PO

## 2022-05-04 ENCOUNTER — PATIENT MESSAGE (OUTPATIENT)
Dept: CARDIOLOGY | Facility: HOSPITAL | Age: 77
End: 2022-05-04
Payer: MEDICARE

## 2022-05-04 ENCOUNTER — TELEPHONE (OUTPATIENT)
Dept: CARDIOLOGY | Facility: HOSPITAL | Age: 77
End: 2022-05-04
Payer: MEDICARE

## 2022-05-05 ENCOUNTER — LAB VISIT (OUTPATIENT)
Dept: LAB | Facility: HOSPITAL | Age: 77
End: 2022-05-05
Attending: INTERNAL MEDICINE
Payer: MEDICARE

## 2022-05-05 DIAGNOSIS — Z79.01 ANTICOAGULANT LONG-TERM USE: ICD-10-CM

## 2022-05-05 DIAGNOSIS — Z01.818 PREOP TESTING: Primary | ICD-10-CM

## 2022-05-05 DIAGNOSIS — Z01.818 PREOP TESTING: ICD-10-CM

## 2022-05-05 LAB
ANION GAP SERPL CALC-SCNC: 10 MMOL/L (ref 8–16)
BUN SERPL-MCNC: 29 MG/DL (ref 8–23)
CALCIUM SERPL-MCNC: 10.1 MG/DL (ref 8.7–10.5)
CHLORIDE SERPL-SCNC: 97 MMOL/L (ref 95–110)
CO2 SERPL-SCNC: 29 MMOL/L (ref 23–29)
CREAT SERPL-MCNC: 1.6 MG/DL (ref 0.5–1.4)
EST. GFR  (AFRICAN AMERICAN): 47.7 ML/MIN/1.73 M^2
EST. GFR  (NON AFRICAN AMERICAN): 41.2 ML/MIN/1.73 M^2
GLUCOSE SERPL-MCNC: 123 MG/DL (ref 70–110)
POTASSIUM SERPL-SCNC: 3.7 MMOL/L (ref 3.5–5.1)
SODIUM SERPL-SCNC: 136 MMOL/L (ref 136–145)

## 2022-05-05 PROCEDURE — 85610 PROTHROMBIN TIME: CPT | Performed by: INTERNAL MEDICINE

## 2022-05-05 PROCEDURE — 36415 COLL VENOUS BLD VENIPUNCTURE: CPT | Mod: PO | Performed by: INTERNAL MEDICINE

## 2022-05-05 PROCEDURE — 85027 COMPLETE CBC AUTOMATED: CPT | Performed by: INTERNAL MEDICINE

## 2022-05-05 PROCEDURE — 80048 BASIC METABOLIC PNL TOTAL CA: CPT | Performed by: INTERNAL MEDICINE

## 2022-05-05 PROCEDURE — 85730 THROMBOPLASTIN TIME PARTIAL: CPT | Performed by: INTERNAL MEDICINE

## 2022-05-06 LAB
APTT BLDCRRT: 27.9 SEC (ref 21–32)
ERYTHROCYTE [DISTWIDTH] IN BLOOD BY AUTOMATED COUNT: 17.1 % (ref 11.5–14.5)
HCT VFR BLD AUTO: 54.1 % (ref 40–54)
HGB BLD-MCNC: 18.2 G/DL (ref 14–18)
INR PPP: 1.2 (ref 0.8–1.2)
MCH RBC QN AUTO: 30.7 PG (ref 27–31)
MCHC RBC AUTO-ENTMCNC: 33.6 G/DL (ref 32–36)
MCV RBC AUTO: 91 FL (ref 82–98)
PLATELET # BLD AUTO: 202 K/UL (ref 150–450)
PMV BLD AUTO: 10.2 FL (ref 9.2–12.9)
PROTHROMBIN TIME: 11.9 SEC (ref 9–12.5)
RBC # BLD AUTO: 5.92 M/UL (ref 4.6–6.2)
WBC # BLD AUTO: 9.19 K/UL (ref 3.9–12.7)

## 2022-05-09 ENCOUNTER — HOSPITAL ENCOUNTER (OUTPATIENT)
Facility: HOSPITAL | Age: 77
Discharge: HOME | End: 2022-05-09
Attending: INTERNAL MEDICINE | Admitting: INTERNAL MEDICINE
Payer: MEDICARE

## 2022-05-09 DIAGNOSIS — I50.32 CHRONIC DIASTOLIC CONGESTIVE HEART FAILURE: Primary | ICD-10-CM

## 2022-05-09 DIAGNOSIS — I25.10 CORONARY ARTERY DISEASE INVOLVING NATIVE CORONARY ARTERY OF NATIVE HEART, UNSPECIFIED WHETHER ANGINA PRESENT: ICD-10-CM

## 2022-05-09 DIAGNOSIS — R94.39 ABNORMAL STRESS TEST: ICD-10-CM

## 2022-05-09 DIAGNOSIS — Z95.1 HX OF CABG: ICD-10-CM

## 2022-05-09 LAB
CATH EF QUANTITATIVE: 60 %
POCT GLUCOSE: 141 MG/DL (ref 70–110)

## 2022-05-09 PROCEDURE — C1725 CATH, TRANSLUMIN NON-LASER: HCPCS | Performed by: INTERNAL MEDICINE

## 2022-05-09 PROCEDURE — C1769 GUIDE WIRE: HCPCS | Performed by: INTERNAL MEDICINE

## 2022-05-09 PROCEDURE — 99152 MOD SED SAME PHYS/QHP 5/>YRS: CPT | Mod: ,,, | Performed by: INTERNAL MEDICINE

## 2022-05-09 PROCEDURE — 99152 PR MOD CONSCIOUS SEDATION, SAME PHYS, 5+ YRS, FIRST 15 MIN: ICD-10-PCS | Mod: ,,, | Performed by: INTERNAL MEDICINE

## 2022-05-09 PROCEDURE — 93459 L HRT ART/GRFT ANGIO: CPT | Performed by: INTERNAL MEDICINE

## 2022-05-09 PROCEDURE — 99152 MOD SED SAME PHYS/QHP 5/>YRS: CPT | Performed by: INTERNAL MEDICINE

## 2022-05-09 PROCEDURE — 93459: ICD-10-PCS | Mod: 26,,, | Performed by: INTERNAL MEDICINE

## 2022-05-09 PROCEDURE — 25500020 PHARM REV CODE 255: Performed by: INTERNAL MEDICINE

## 2022-05-09 PROCEDURE — 99153 MOD SED SAME PHYS/QHP EA: CPT | Performed by: INTERNAL MEDICINE

## 2022-05-09 PROCEDURE — C1760 CLOSURE DEV, VASC: HCPCS | Performed by: INTERNAL MEDICINE

## 2022-05-09 PROCEDURE — C1894 INTRO/SHEATH, NON-LASER: HCPCS | Performed by: INTERNAL MEDICINE

## 2022-05-09 PROCEDURE — 25000003 PHARM REV CODE 250: Performed by: INTERNAL MEDICINE

## 2022-05-09 PROCEDURE — 63600175 PHARM REV CODE 636 W HCPCS: Performed by: INTERNAL MEDICINE

## 2022-05-09 PROCEDURE — 93459 L HRT ART/GRFT ANGIO: CPT | Mod: 26,,, | Performed by: INTERNAL MEDICINE

## 2022-05-09 PROCEDURE — 27201423 OPTIME MED/SURG SUP & DEVICES STERILE SUPPLY: Performed by: INTERNAL MEDICINE

## 2022-05-09 RX ORDER — NITROGLYCERIN 5 MG/ML
INJECTION, SOLUTION INTRAVENOUS
Status: DISCONTINUED | OUTPATIENT
Start: 2022-05-09 | End: 2022-05-09 | Stop reason: HOSPADM

## 2022-05-09 RX ORDER — VERAPAMIL HYDROCHLORIDE 2.5 MG/ML
INJECTION, SOLUTION INTRAVENOUS
Status: DISCONTINUED | OUTPATIENT
Start: 2022-05-09 | End: 2022-05-09 | Stop reason: HOSPADM

## 2022-05-09 RX ORDER — CEFAZOLIN SODIUM 1 G/3ML
INJECTION, POWDER, FOR SOLUTION INTRAMUSCULAR; INTRAVENOUS
Status: DISCONTINUED | OUTPATIENT
Start: 2022-05-09 | End: 2022-05-09 | Stop reason: HOSPADM

## 2022-05-09 RX ORDER — ACETAMINOPHEN 325 MG/1
650 TABLET ORAL EVERY 4 HOURS PRN
Status: DISCONTINUED | OUTPATIENT
Start: 2022-05-09 | End: 2022-05-09 | Stop reason: HOSPADM

## 2022-05-09 RX ORDER — DIPHENHYDRAMINE HCL 50 MG
50 CAPSULE ORAL ONCE
Status: DISCONTINUED | OUTPATIENT
Start: 2022-05-09 | End: 2022-05-09 | Stop reason: HOSPADM

## 2022-05-09 RX ORDER — LIDOCAINE HYDROCHLORIDE 20 MG/ML
INJECTION, SOLUTION EPIDURAL; INFILTRATION; INTRACAUDAL; PERINEURAL
Status: DISCONTINUED | OUTPATIENT
Start: 2022-05-09 | End: 2022-05-09 | Stop reason: HOSPADM

## 2022-05-09 RX ORDER — HEPARIN SODIUM 1000 [USP'U]/ML
INJECTION INTRAVENOUS; SUBCUTANEOUS
Status: DISCONTINUED | OUTPATIENT
Start: 2022-05-09 | End: 2022-05-09 | Stop reason: HOSPADM

## 2022-05-09 RX ORDER — ONDANSETRON 8 MG/1
8 TABLET, ORALLY DISINTEGRATING ORAL EVERY 8 HOURS PRN
Status: DISCONTINUED | OUTPATIENT
Start: 2022-05-09 | End: 2022-05-09 | Stop reason: HOSPADM

## 2022-05-09 RX ORDER — ISOSORBIDE MONONITRATE 60 MG/1
60 TABLET, EXTENDED RELEASE ORAL DAILY
Qty: 30 TABLET | Refills: 11 | Status: SHIPPED | OUTPATIENT
Start: 2022-05-09

## 2022-05-09 RX ORDER — SODIUM CHLORIDE 9 MG/ML
INJECTION, SOLUTION INTRAVENOUS CONTINUOUS
Status: ACTIVE | OUTPATIENT
Start: 2022-05-09 | End: 2022-05-09

## 2022-05-09 RX ORDER — ATROPINE SULFATE 0.1 MG/ML
0.5 INJECTION INTRAVENOUS
Status: DISCONTINUED | OUTPATIENT
Start: 2022-05-09 | End: 2022-05-09 | Stop reason: HOSPADM

## 2022-05-09 RX ORDER — OXYCODONE HYDROCHLORIDE 5 MG/1
10 TABLET ORAL EVERY 4 HOURS PRN
Status: DISCONTINUED | OUTPATIENT
Start: 2022-05-09 | End: 2022-05-09 | Stop reason: HOSPADM

## 2022-05-09 RX ORDER — MIDAZOLAM HYDROCHLORIDE 1 MG/ML
INJECTION, SOLUTION INTRAMUSCULAR; INTRAVENOUS
Status: DISCONTINUED | OUTPATIENT
Start: 2022-05-09 | End: 2022-05-09 | Stop reason: HOSPADM

## 2022-05-09 RX ORDER — DIPHENHYDRAMINE HCL 50 MG
50 CAPSULE ORAL ONCE
Status: COMPLETED | OUTPATIENT
Start: 2022-05-09 | End: 2022-05-09

## 2022-05-09 RX ORDER — FENTANYL CITRATE 50 UG/ML
INJECTION, SOLUTION INTRAMUSCULAR; INTRAVENOUS
Status: DISCONTINUED | OUTPATIENT
Start: 2022-05-09 | End: 2022-05-09 | Stop reason: HOSPADM

## 2022-05-09 RX ORDER — HYDROCODONE BITARTRATE AND ACETAMINOPHEN 5; 325 MG/1; MG/1
1 TABLET ORAL EVERY 4 HOURS PRN
Status: DISCONTINUED | OUTPATIENT
Start: 2022-05-09 | End: 2022-05-09 | Stop reason: HOSPADM

## 2022-05-09 RX ORDER — NITROGLYCERIN 0.4 MG/1
0.4 TABLET SUBLINGUAL EVERY 5 MIN PRN
Status: DISCONTINUED | OUTPATIENT
Start: 2022-05-09 | End: 2022-05-09 | Stop reason: HOSPADM

## 2022-05-09 RX ADMIN — SODIUM CHLORIDE: 0.9 INJECTION, SOLUTION INTRAVENOUS at 01:05

## 2022-05-09 RX ADMIN — SODIUM CHLORIDE: 0.9 INJECTION, SOLUTION INTRAVENOUS at 11:05

## 2022-05-09 RX ADMIN — DIPHENHYDRAMINE HYDROCHLORIDE 50 MG: 50 CAPSULE ORAL at 11:05

## 2022-05-09 NOTE — NURSING
Patient arrived w/use of walker short of breath  States hes sorry he is  late but I couldn't find a parking space.  Reassurances given and staff assuring him it's ok.  Dr Browne at bedside discussing procedure.

## 2022-05-09 NOTE — Clinical Note
The catheter insertion attempt was made into the SVG to diagonal graft.. SVG to diagonal not visualized. Catheter removed.

## 2022-05-09 NOTE — Clinical Note
The catheter was inserted into the left ventricle. Hemodynamics were performed.  and Pullback was recorded.  The angiography was performed via power injection. The injected amount was 24 mL contrast at 12 mL/s.  Catheter removed.

## 2022-05-09 NOTE — Clinical Note
The catheter was inserted into the SVG to OM graft. An angiography was performed of the graft. Catheter removed.

## 2022-05-09 NOTE — H&P
Patient ID:  Bo Chase is a 77 y.o. male who presents for follow-up of Follow-up and Shortness of Breath        HPI: . Bo Chase presents to the clinic for follow up of CAD, Hx of CABG, diastolic heart failure, HTN, HLP and preop CV exam.  He is planning low back surgery. He went to see his cardiologist in [place], but when testing was ordered, his insurance indicated that it was out of network.  He is reported substernal chest tightness, not necessarily related to exertion. He denied any associated symptoms, and it is nonradiating. It resolves spontaneously without intervention.  He also is able to ride a stationary bike at the gym for 30 minutes at a time without shortness of breath; however, he reports dyspnea on exertion carrying groceries from the car to the house.  He uses a walker to help him assist with ambulation due to pain in his hips and lower back.  The pain in these areas limits his walking.     He has been followed by Dr. Crawley in 2020 and he was seeing Dr. Elaine before that.       He has chronic diastolic heart failure class C; functional class 2-3.     He also uses light weights without problems. Cannot put on compression stockings or socks.  He had one episode of palpitations a few months ago while riding in cart at grocery store. It lasted about 3-4 minutes. He has not had any problems since. He denied any lightheadedness, dizziness, or near syncope.  He denied orthopnea or PND.  He does tend to have recurrent edema in the lower extremities.  The right leg tends to be larger than the left and swell more-it was the site of graft harvest.  He stated that he tends to have low potassium and takes a supplement-30-40 mEq of potassium per day.  Vitamin D was stopped due to high calcium.  Had phlebotomy for polycythemia August 2020.  He has seen Dr. Massey once previously and has a follow-up appointment on November 3rd  He uses a walker to assist with ambulation.     LEANDRO: on CPAP.     He is  seeing a pain management doctor for bilateral hip pain.  He has tried massage and chiropractic treatments for this as well.  He sees Laure Ozuna NP for obstructive sleep apnea, and he has seen Dr. Honeycutt for this and chronic bronchitis.     --------------------------------------------------------------------  Dr. Elaine's note: 74 yo male, 6 months f/u.   PMH CAD, s/p CABGx4 1996, s/p last PCI SVG to OM1, in 2014, LIMA patent and  SVG to diag, Dx of DM in 2019 after steroids injection now A1c wnl, , CHFpEF, HTN, HLP, morbid obesity, sleep apnea on CPAP, hypothyroidism. Chronic lower back pain.  8/31/2016 negative nuclear stress test for ischemia.    ECho normal EF and mod LVH  Pt states that he f/u with cardiologist at [place] in the past two yr. Negative exerrcise stress test in  at [place].   Recent Dx of melanoma on nose and right chest pain. S/p chest tumor early .   Sleep with CPAP.  Chronic leg swelling.  BNP 54 and Cr stable   Pt states that no exercise due to pandemic  Worsening SOB when walking and carrying bags  Taking Torsemide 20 mg bid and Metolazone 5 mg once a week.  No chest pain.   Asa 325 mg given by Dr Bernal nephrology  No gym exercise after COVID 19 pandemic  BP LDL and BS controlled  Plan: CONTINUE TORSEMIDE 20 MG BID AND METOLAZONE 2.5 MG TWICE A WEEK  Daily weight. Please call the office if gain 3 pounds in 1 day or 5 pounds in 1 week.  Fluid restriction 1.5 liters a day  Na< 2 gm  CONTINUE ASa lipitor, lopressor losartan imdur and Aldactone   D/c KCL and repeat BMP I n 4 weeks for K level  Counseled DASH  Check Lipid profile in 6 months  Recommend heart-healthy diet, weight control and regular exercise.  Ion. Risk modification.   RTC in 6 months  -----------------------------------------------------------------------------------     Medications: he is not missing any doses.  He is taking torsemide 20 mg p.o. b.i.d., potassium 10mEq QD; spironolactone 25 mg p.o. q.day, and  metolazone 5 mg p.o. q.week.  He is taking metoprolol tartrate 25 mg p.o. b.i.d. He is taking isosorbide mononitrate 30 mg p.o. q.day, atorvastatin 40 mg p.o. q.day and aspirin, and Losartan 25mg QD.  Sodium: he does not add salt to foods at the table,  he is reading labels for sodium content. he does eat salty foods; sausage in the morning; ham sandwich a few times/week; sometimes with cheese.  Exercise: he Rides a stationary bike 3 days per week and uses light weights; it is not associated with chest pain or SOB. But he does pace himself while lifting weights; he rests between sets.  Tobacco: denies previous or current tobacco use   Alcohol:  Occasional beer      Weight: 123.8 kg (272 lb 14.4 oz) he states that his daily weights have decreased.Body mass index is 42.74 kg/m².  He monitors daily weights.        Wt Readings from Last 3 Encounters:   04/21/22 123.8 kg (272 lb 14.4 oz)   04/05/22 125.8 kg (277 lb 6.4 oz)   12/09/21 122.9 kg (270 lb 15.1 oz)      BP log: None. He does not have a BP monitor at home. He checks it at pharmacy from time to time.        Review of Systems   Constitutional: Positive for weight loss. Negative for chills, decreased appetite, fever, night sweats and weight gain.   HENT: Negative for congestion.    Cardiovascular: Positive for dyspnea on exertion, leg swelling and palpitations (One episode a few months ago; lasted 3-4 minutes-resolved spontaneously; none since.). Negative for chest pain, claudication, cyanosis, irregular heartbeat, near-syncope, orthopnea, paroxysmal nocturnal dyspnea and syncope.   Respiratory: Negative for cough, hemoptysis, shortness of breath, sputum production and wheezing.    Hematologic/Lymphatic: Negative for adenopathy and bleeding problem. Does not bruise/bleed easily.   Skin: Negative for color change and nail changes.   Musculoskeletal: Positive for joint pain (Chronic pain in the lower back and hips.).   Gastrointestinal: Negative for bloating,  abdominal pain, change in bowel habit, heartburn, hematochezia, melena, nausea and vomiting.   Genitourinary: Negative for hematuria.   Neurological: Negative for dizziness and light-headedness.   Psychiatric/Behavioral: Negative for altered mental status.         Objective:   Physical Exam  Constitutional:       General: He is not in acute distress.     Appearance: He is well-developed. He is not diaphoretic.   HENT:      Head: Normocephalic and atraumatic.   Eyes:      General: No scleral icterus.     Conjunctiva/sclera: Conjunctivae normal.   Neck:      Thyroid: No thyromegaly.      Vascular: No JVD.      Trachea: No tracheal deviation.   Cardiovascular:      Rate and Rhythm: Normal rate and regular rhythm.      Pulses: Intact distal pulses.      Heart sounds: Murmur heard.    Harsh midsystolic murmur is present at the upper right sternal border radiating to the neck.    No friction rub. No gallop.   Pulmonary:      Effort: Pulmonary effort is normal. No respiratory distress.      Breath sounds: Normal breath sounds. No wheezing or rales.      Comments: Speaks in complete sentences without obvious signs of shortness of breath or increased work of breathing  Chest:      Chest wall: No tenderness.   Abdominal:      General: Bowel sounds are normal. There is no distension.      Palpations: Abdomen is soft. There is no mass.      Tenderness: There is no abdominal tenderness. There is no guarding or rebound.      Comments: Obese   Musculoskeletal:         General: Normal range of motion.      Cervical back: Neck supple.      Comments: Venous stasis changes to both lower legs bilaterally.  Nonpitting edema noted.  Right lower leg is larger than the left-chronic.   Lymphadenopathy:      Cervical: No cervical adenopathy.   Skin:     General: Skin is warm and dry.      Coloration: Skin is not pale.      Findings: No erythema or rash.      Comments: [place]   Neurological:      Mental Status: He is alert and oriented to  person, place, and time.         Latest Reference Range & Units 03/31/22 09:35   Sodium 136 - 145 mmol/L 136   Potassium 3.5 - 5.1 mmol/L 3.5   Chloride 95 - 110 mmol/L 88 (L)   CO2 23 - 29 mmol/L 32 (H)   Anion Gap 8 - 16 mmol/L 16   BUN 8 - 23 mg/dL 29 (H)   Creatinine 0.5 - 1.4 mg/dL 1.7 (H)   EGFR if non African American >60 mL/min/1.73 m^2 38.3 ! [1]   EGFR if African American >60 mL/min/1.73 m^2 44.3 !   Glucose 70 - 110 mg/dL 143 (H)   Calcium 8.7 - 10.5 mg/dL 10.2   Phosphorus 2.7 - 4.5 mg/dL 3.7   Albumin 3.5 - 5.2 g/dL 3.9          Results for GABY PRUETT (MRN [id number]) as of 10/29/2021 13:38    Ref. Range 4/8/2021 15:05   Cholesterol Latest Ref Range: 120 - 199 mg/dL 134   HDL Latest Ref Range: 40 - 75 mg/dL 25 (L)   HDL/Cholesterol Ratio Latest Ref Range: 20.0 - 50.0 % 18.7 (L)   LDL Cholesterol External Latest Ref Range: 63.0 - 159.0 mg/dL 78.0   Non-HDL Cholesterol Latest Units: mg/dL 109   Total Cholesterol/HDL Ratio Latest Ref Range: 2.0 - 5.0  5.4 (H)   Triglycerides Latest Ref Range: 30 - 150 mg/dL 155 (H)         Echo at Dr. Crawley's office 12/2020  EF 55-60%  Apical hypokinesis.  Moderate concentric hypertrophy.  Mild AS, AI, and MR.  Mild LAE.  I/IV diastolic dysfunction.     Echo 07/25/2017:CONCLUSIONS     1 - Concentric hypertrophy.     2 - No wall motion abnormalities.     3 - Normal left ventricular systolic function (EF 60-65%).     4 - Normal left ventricular diastolic function.     5 - Normal right ventricular systolic function .     6 - Trivial to mild mitral regurgitation.     7 - Trivial to mild tricuspid regurgitation.      Pharmacologic nuclear medicine stress test 08/31/2016:Impression: NORMAL MYOCARDIAL PERFUSION   1. The perfusion scan is free of evidence for myocardial ischemia or injury.   2. There is a mild intensity fixed defect in the inferior wall of the left ventricle, secondary to diaphragm attenuation.   3. Resting wall motion is physiologic.   4. Resting LV function is  normal.   5. The ventricular volumes are normal at rest and stress.   6. The extracardiac distribution of radioactivity is normal.         Left heart catheterization 12/29/2014:ANGIOGRAPHIC RESULTS:   DIAGNOSTIC:        Patient has a right dominant coronary artery.        - Left Main Coronary Artery:              The LM has luminal irregularities. There is JESUS 3 flow.        - Left Anterior Descending Artery:              The proximal LAD has chronic total occlusion. There is JESUS 0 flow.        - Left Circumflex Artery:              The proximal LCX has chronic total occlusion. There is JESUS 0 flow.        - Right Coronary Artery:              The proximal RCA has chronic total occlusion. There is JESUS 0 flow.        - SVG To RCA:              The SVG to RCA has luminal irregularities. There is JESUS 3 flow.        - THOMPSON To LAD:              The LIMA to LAD is normal. There is JESUS 3 flow.        - SVG To D1:              The SVG to D1 has chronic total occlusion. There is JESUS 0 flow.        - SVG To OM1:              The mid SVG to OM1 has a 90% stenosis.   This was a MI culprit lesion.  The Posterior and Lateral myocardial walls were affected. The following items were used: Wire   Filter Spider Fx 5 X 320/190, Blln Emerge 4 X 20 and Stent Resolute   4.0x38mm (AMARI).   Assessment:      1. Preop cardiovascular exam    2. Coronary artery disease involving coronary bypass graft of native heart with angina pectoris    3. Hx of CABG    4. Chronic diastolic congestive heart failure    5. Primary hypertension    6. Venous insufficiency    7. Edema, unspecified type    8. Mixed hyperlipidemia    9. Stage 3b chronic kidney disease    10. Morbid obesity with BMI of 40.0-44.9, adult    11. LEANDRO on CPAP    12. Hypokalemia    13. Diabetes mellitus type 2 in obese          Plan:      Preop cardiovascular exam  -     Nuclear Stress - 3rd Party; Future  -     Echo; Future     Coronary artery disease involving coronary bypass  graft of native heart with angina pectoris  -     Nuclear Stress - 3rd Party; Future  -     Echo; Future     Hx of CABG  -     Nuclear Stress - 3rd Party; Future  -     Echo; Future     Chronic diastolic congestive heart failure  -     Echo; Future     Primary hypertension  -     Echo; Future     Venous insufficiency  -     Ankle Brachial Indices (ARNULFO); Future  -     CV Ultrasound doppler arterial legs bilat; Future  -     CV Ultrasound doppler venous legs bilat; Future     Edema, unspecified type  -     Ankle Brachial Indices (ARNULFO); Future  -     CV Ultrasound doppler arterial legs bilat; Future  -     CV Ultrasound doppler venous legs bilat; Future     Mixed hyperlipidemia     Stage 3b chronic kidney disease     Morbid obesity with BMI of 40.0-44.9, adult     LEANDRO on CPAP     Hypokalemia     Diabetes mellitus type 2 in obese        Lexiscan and echocardiogram complete. Phone review.  ARNULFO, resting and arterial U/S lower extremities. Phone review.  Venous U/S lower extremities-venous insufficiency evaluation.  Continue ASA, imdur-CAD, angina.  Continue torsemide, metolazone, spironolactone - diastolic heart failure.  He may have cardiorenal syndrome-creatinine is at baseline and renal function appears stable; will monitor.  His volume status appears pretty stable clinically today.  Nephrology recommends to keep aldactone and KCL supplement - severe hypokalemia. He is also on a small dose of losartan given HTN and diabetes with CKD.  Dr. Massey following CKD 3.  Continue metoprolol, losartan, spironolactone - HTN.  Continue atorvastatin, fish oil - HLP.  Sodium restriction encouraged.  Continue using CPAP every night-obstructive sleep apnea.  Continue exercise.   Daily weights.    Continue weight loss.  Whole foods, low glycemic diet.  Follow-up in 3 months or call sooner for any problems.  45 minutes spent in chart review and face to face encounter.  Adelia Sanon NP  [facility]     Addendum April 29, 2022  8:35 a.m.: Spoke with Mr. Chase on the phone.  He had all the elements of his stress test except for the stress pictures.  He stated that he had been here for 4 hours he was tired and in pain and needed to go to the bathroom so he decided to leave; unfortunately the test was not over when he chose to do so.  He further reports tightness in his chest that occurred off and on for 2 days after the test.  He denies any radiation of pain or nausea vomiting or diaphoresis associated with it.  It was very upsetting for him and he decided that he did not want to do the stress test again.  Discussed the rationale for the stress test, especially in light of his chest pain afterward.  He stated that he would think about it and call us back.  Currently he is scheduled Friday May 6 for the test.  He is trying to get preop clearance for back surgery.  Adelia Sanon NP  [facility]

## 2022-05-09 NOTE — Clinical Note
The catheter was repositioned into the ostium   right coronary artery. An angiography was performed of the right coronary arteries. J wire removed prior to angiography. Catheter removed.

## 2022-05-09 NOTE — Clinical Note
The catheter was inserted into the ostium   left main. An angiography was performed of the left coronary arteries. Multiple views were taken. Catheter removed.

## 2022-05-09 NOTE — Clinical Note
The radial band was applied to the left radial artery. 12 cc's of air were inserted into the closure device. Arterial sheath removed.

## 2022-05-09 NOTE — PLAN OF CARE
"Brought pt to [facility], via wheelchair, per pt's request. Pt's girlfriend had been called and she stated that she would be here in  15 - 20 minutes. Pt informed that only five minutes had passed, but he still wanted to go to front lobby. After sitting outside of front lobby for several minutes, pt stated that he didn't want to waste my time and explained: "no one is coming for me. I'm driving myself home."  He further explained that he had planned to drive himself home from the start, that he has done this after each of his procedures in the past.  Pt was offered to have an Uber ride home, but I was informed that this was not appropriate due to Uber/Lyft drivers not being able to be responsible for pt.   [facility]  were then notified and they indicated that we could either let him stay overnight for observation, or we could give him a taxi voucher for his ride home.  Pt indicated that a taxi was not acceptable due to living in [place] and that he would have to find a ride back to retrieve his car. Pt also indicated that he had no intention of spending the night. AMA option presented and pt agreed that this would be acceptable.   Pt returned willingly to [facility], vitals taken: 148/78, Resp: 17, HR 64; O2Sat 95% on room air. Pt signed AMA paperwork without issue.   It was explained to pt, one more time, that we were worried about him due to having had sedation and it possibly could effect him on his drive home. Pt acknowledged this, but again stated that he has never had an issue with this and that he would be driving himself home.   Pt was offered to be wheeled out to his car, but he opted to walk himself out.   "

## 2022-05-09 NOTE — BRIEF OP NOTE
<[facility] - Cath Lab ([facility])  Surgery Department  Operative Note    SUMMARY     Date of Procedure: 5/9/2022     Procedure: Procedure(s) (LRB):  CATHETERIZATION, HEART, LEFT (Left)  Bypass graft study     Surgeon(s) and Role:     * Cornelius Browne MD - Primary    Assisting Surgeon: None    Pre-Operative Diagnosis: Coronary artery disease involving native coronary artery of native heart, unspecified whether angina present [I25.10]  Hx of CABG [Z95.1]  Chronic diastolic (congestive) heart failure [I50.32]  Chest tightness [R07.89]    Post-Operative Diagnosis: Post-Op Diagnosis Codes:     * Coronary artery disease involving native coronary artery of native heart, unspecified whether angina present [I25.10]     * Hx of CABG [Z95.1]     * Chronic diastolic (congestive) heart failure [I50.32]     * Chest tightness [R07.89]    Anesthesia: RN IV Sedation    Technical Procedures Used: LHC/grafts    Description of the Findings of the Procedure: LHC/grafts, Dx: angina, Findings: LIMA TO LAD PATENT, ALL GRAFTS OCCLUDED , SVG TO RCA, SVG TO DIAG-CHRONICALLY OCCLUDED, SVG TO OM-OSTIAL 99%, EF=60%    Significant Surgical Tasks Conducted by the Assistant(s), if Applicable: NONE    Complications: No    Estimated Blood Loss (EBL): < 50 cc           Implants: * No implants in log *    Specimens:   Specimen (24h ago, onward)            None                  Condition: Good    Disposition: PACU - hemodynamically stable.    Attestation: I was present and scrubbed for the entire procedure.

## 2022-05-09 NOTE — Clinical Note
150 ml of contrast were injected throughout the case. 0 mL of contrast was the total wasted during the case. 150 mL was the total amount used during the case.

## 2022-05-09 NOTE — NURSING
VSS. Ambulating to bathroom to urinate without problems.Left wrist and left groin with no bleeding or hematoma noted. Family notified that pt will be ready to go home soon.

## 2022-05-09 NOTE — Clinical Note
The left radial was prepped. The site was prepped with ChloraPrep. The site was clipped. The patient was draped. The patient was positioned supine. The patient was secured to an armboard.

## 2022-05-10 ENCOUNTER — TELEPHONE (OUTPATIENT)
Dept: CARDIOLOGY | Facility: HOSPITAL | Age: 77
End: 2022-05-10
Payer: MEDICARE

## 2022-05-10 VITALS
SYSTOLIC BLOOD PRESSURE: 128 MMHG | HEART RATE: 64 BPM | DIASTOLIC BLOOD PRESSURE: 78 MMHG | RESPIRATION RATE: 18 BRPM | HEIGHT: 67 IN | WEIGHT: 265 LBS | OXYGEN SATURATION: 97 % | TEMPERATURE: 97 F | BODY MASS INDEX: 41.59 KG/M2

## 2022-05-10 NOTE — TELEPHONE ENCOUNTER
Spoke with patient, he is doing well post cath.He explained his results will be reviewed and recommendations will be reviewed with him after a decision is made what the plan of care will be.He plans to come in the morning for his leg studies.

## 2022-05-11 ENCOUNTER — HOSPITAL ENCOUNTER (OUTPATIENT)
Dept: CARDIOLOGY | Facility: HOSPITAL | Age: 77
Discharge: HOME | End: 2022-05-11
Attending: NURSE PRACTITIONER
Payer: MEDICARE

## 2022-05-11 VITALS
BODY MASS INDEX: 41.59 KG/M2 | DIASTOLIC BLOOD PRESSURE: 78 MMHG | HEART RATE: 71 BPM | HEIGHT: 67 IN | BODY MASS INDEX: 41.59 KG/M2 | WEIGHT: 265 LBS | HEART RATE: 71 BPM | SYSTOLIC BLOOD PRESSURE: 128 MMHG | SYSTOLIC BLOOD PRESSURE: 128 MMHG | BODY MASS INDEX: 41.59 KG/M2 | DIASTOLIC BLOOD PRESSURE: 78 MMHG | WEIGHT: 265 LBS | HEART RATE: 71 BPM | WEIGHT: 265 LBS | SYSTOLIC BLOOD PRESSURE: 128 MMHG | HEIGHT: 67 IN | HEIGHT: 67 IN | DIASTOLIC BLOOD PRESSURE: 78 MMHG

## 2022-05-11 DIAGNOSIS — I87.2 VENOUS INSUFFICIENCY: ICD-10-CM

## 2022-05-11 DIAGNOSIS — Z95.1 HX OF CABG: ICD-10-CM

## 2022-05-11 DIAGNOSIS — R60.9 EDEMA, UNSPECIFIED TYPE: ICD-10-CM

## 2022-05-11 DIAGNOSIS — I25.709 CORONARY ARTERY DISEASE INVOLVING CORONARY BYPASS GRAFT OF NATIVE HEART WITH ANGINA PECTORIS: ICD-10-CM

## 2022-05-11 DIAGNOSIS — I50.32 CHRONIC DIASTOLIC CONGESTIVE HEART FAILURE: ICD-10-CM

## 2022-05-11 DIAGNOSIS — Z01.810 PREOP CARDIOVASCULAR EXAM: ICD-10-CM

## 2022-05-11 DIAGNOSIS — I10 PRIMARY HYPERTENSION: ICD-10-CM

## 2022-05-11 LAB
LEFT ABI: 1.05
LEFT ANT TIBIAL SYS PSV: 45 CM/S
LEFT ARM BP: 128 MMHG
LEFT CFA PSV: 101 CM/S
LEFT DORSALIS PEDIS: 120 MMHG
LEFT POPLITEAL PSV: 92 CM/S
LEFT POST TIBIAL SYS PSV: 33 CM/S
LEFT POSTERIOR TIBIAL: 137 MMHG
LEFT PROFUNDA SYS PSV: 42 CM/S
LEFT SUPER FEMORAL DIST SYS PSV: 63 CM/S
LEFT SUPER FEMORAL MID SYS PSV: 74 CM/S
LEFT SUPER FEMORAL OSTIAL SYS PSV: 84 CM/S
LEFT SUPER FEMORAL PROX SYS PSV: 73 CM/S
LEFT TBI: 0.75
LEFT TIB/PER TRUNK SYS PSV: 82 CM/S
LEFT TOE PRESSURE: 97 MMHG
OHS CV LEFT LOWER EXTREMITY ABI (NO CALC): 1.05
OHS CV RIGHT ABI LOWER EXTREMITY (NO CALC): 0.96
RIGHT ABI: 0.96
RIGHT ANT TIBIAL SYS PSV: 48 CM/S
RIGHT ARM BP: 130 MMHG
RIGHT CFA PSV: 117 CM/S
RIGHT DORSALIS PEDIS: 125 MMHG
RIGHT POPLITEAL PSV: 87 CM/S
RIGHT POST TIBIAL SYS PSV: 58 CM/S
RIGHT POSTERIOR TIBIAL: 115 MMHG
RIGHT PROFUNDA SYS PSV: 96 CM/S
RIGHT SUPER FEMORAL DIST SYS PSV: 92 CM/S
RIGHT SUPER FEMORAL MID SYS PSV: 72 CM/S
RIGHT SUPER FEMORAL OSTIAL SYS PSV: 82 CM/S
RIGHT SUPER FEMORAL PROX SYS PSV: 85 CM/S
RIGHT TBI: 0.66
RIGHT TIB/PER TRUNK SYS PSV: 92 CM/S
RIGHT TOE PRESSURE: 86 MMHG

## 2022-05-11 PROCEDURE — 93306 TTE W/DOPPLER COMPLETE: CPT | Mod: 26,,, | Performed by: INTERNAL MEDICINE

## 2022-05-11 PROCEDURE — 93306 ECHO (CUPID ONLY): ICD-10-PCS | Mod: 26,,, | Performed by: INTERNAL MEDICINE

## 2022-05-11 PROCEDURE — 93925 LOWER EXTREMITY STUDY: CPT | Mod: 26,,, | Performed by: INTERNAL MEDICINE

## 2022-05-11 PROCEDURE — 93306 TTE W/DOPPLER COMPLETE: CPT | Mod: PO

## 2022-05-11 PROCEDURE — 93922 UPR/L XTREMITY ART 2 LEVELS: CPT | Mod: PO

## 2022-05-11 PROCEDURE — 93922 UPR/L XTREMITY ART 2 LEVELS: CPT | Mod: 26,,, | Performed by: INTERNAL MEDICINE

## 2022-05-11 PROCEDURE — 93925 LOWER EXTREMITY STUDY: CPT | Mod: PO

## 2022-05-11 PROCEDURE — 93925 CV US DOPPLER ARTERIAL LEGS BILATERAL (CUPID ONLY): ICD-10-PCS | Mod: 26,,, | Performed by: INTERNAL MEDICINE

## 2022-05-11 PROCEDURE — 93922 ANKLE BRACHIAL INDICES (ABI): ICD-10-PCS | Mod: 26,,, | Performed by: INTERNAL MEDICINE

## 2022-05-12 LAB
AORTIC ROOT ANNULUS: 3.15 CM
AV INDEX (PROSTH): 0.32
AV MEAN GRADIENT: 23 MMHG
AV PEAK GRADIENT: 34 MMHG
AV VALVE AREA: 1.3 CM2
AV VELOCITY RATIO: 0.31
BSA FOR ECHO PROCEDURE: 2.38 M2
CV ECHO LV RWT: 0.6 CM
DOP CALC AO PEAK VEL: 2.9 M/S
DOP CALC AO VTI: 59.4 CM
DOP CALC LVOT AREA: 4 CM2
DOP CALC LVOT DIAMETER: 2.26 CM
DOP CALC LVOT PEAK VEL: 0.89 M/S
DOP CALC LVOT STROKE VOLUME: 77.38 CM3
DOP CALC RVOT PEAK VEL: 0.51 M/S
DOP CALC RVOT VTI: 9.6 CM
DOP CALCLVOT PEAK VEL VTI: 19.3 CM
E WAVE DECELERATION TIME: 229.14 MSEC
E/A RATIO: 0.8
E/E' RATIO: 8.8 M/S
ECHO EF ESTIMATED: 46 %
ECHO LV POSTERIOR WALL: 1.45 CM (ref 0.6–1.1)
EJECTION FRACTION: 50 %
FRACTIONAL SHORTENING: 23 % (ref 28–44)
INTERVENTRICULAR SEPTUM: 1.47 CM (ref 0.6–1.1)
IVRT: 99.9 MSEC
LA MAJOR: 5.86 CM
LA MINOR: 5.53 CM
LA WIDTH: 3.9 CM
LEFT ATRIUM SIZE: 4.53 CM
LEFT ATRIUM VOLUME INDEX MOD: 27.2 ML/M2
LEFT ATRIUM VOLUME INDEX: 37.5 ML/M2
LEFT ATRIUM VOLUME MOD: 62 CM3
LEFT ATRIUM VOLUME: 85.45 CM3
LEFT INTERNAL DIMENSION IN SYSTOLE: 3.7 CM (ref 2.1–4)
LEFT VENTRICLE DIASTOLIC VOLUME INDEX: 47.26 ML/M2
LEFT VENTRICLE DIASTOLIC VOLUME: 107.76 ML
LEFT VENTRICLE MASS INDEX: 128 G/M2
LEFT VENTRICLE SYSTOLIC VOLUME INDEX: 25.5 ML/M2
LEFT VENTRICLE SYSTOLIC VOLUME: 58.14 ML
LEFT VENTRICULAR INTERNAL DIMENSION IN DIASTOLE: 4.8 CM (ref 3.5–6)
LEFT VENTRICULAR MASS: 291.39 G
LV LATERAL E/E' RATIO: 8.25 M/S
LV SEPTAL E/E' RATIO: 9.43 M/S
LVOT MG: 1.9 MMHG
LVOT MV: 0.65 CM/S
MV PEAK A VEL: 0.82 M/S
MV PEAK E VEL: 0.66 M/S
MV STENOSIS PRESSURE HALF TIME: 66.45 MS
MV VALVE AREA P 1/2 METHOD: 3.31 CM2
PISA TR MAX VEL: 1.6 M/S
PULM VEIN S/D RATIO: 1.08
PV MEAN GRADIENT: 0.6 MMHG
PV PEAK D VEL: 0.25 M/S
PV PEAK S VEL: 0.27 M/S
PV PEAK VELOCITY: 0.91 CM/S
RA MAJOR: 4.68 CM
RA WIDTH: 3.02 CM
RIGHT VENTRICULAR END-DIASTOLIC DIMENSION: 2.19 CM
SINUS: 2.68 CM
STJ: 2.69 CM
TDI LATERAL: 0.08 M/S
TDI SEPTAL: 0.07 M/S
TDI: 0.08 M/S
TR MAX PG: 10 MMHG
TRICUSPID ANNULAR PLANE SYSTOLIC EXCURSION: 1.73 CM

## 2022-05-13 ENCOUNTER — TELEPHONE (OUTPATIENT)
Dept: CARDIOLOGY | Facility: CLINIC | Age: 77
End: 2022-05-13
Payer: MEDICARE

## 2022-05-13 NOTE — TELEPHONE ENCOUNTER
Pt returned my call, and I discussed Adelia Sanon's message/recommendations with him. Pt verbalized understanding and stated that he is taking his IMDUR 60 mg twice daily. Pt will call back with any further questions or concerns.    Attempted without success x3 to contact pt to discuss plan of care following heart cath. Left voicemail for pt to call back to discuss.     ----- Message from Adelia Sanon NP sent at 5/13/2022  8:14 AM CDT -----  Regarding: Plan of care  I'm not sure why he was not scheduled for post cath wound check, but I see Lula made a note that he was doing well post cath. This type of appointment is helpful to discuss what was found and how to move forward. I usually see patients post cath for this purpose.  In any case, just reiterate to the patient that we don't yet have the plan. He is out of the office today and next week. Also, Dr. Browne may have to speak to more than one surgeon to get options for him. This may take a week or so. Tell him that you will send a reminder message to Dr. Browne, but you are certain that he is working on it, and we will get back to him as soon as we know something. In the meantime, avoid heavy exertion and activities that provoke chest pain. Make sure that he has nitroglycerin that he can use prn. Review instructions for how to use it. Continue using isosorbide mononitrate (long acting nitroglycerin) daily. If he is needing sublingual nitroglycerin regularly, then we can increase isosorbide. He just needs to let us know.  I hope this helps. Let me know if he needs to see me or talk to me about it.  Adelia  ----- Message -----  From: Leda James LPN  Sent: 5/12/2022   5:02 PM CDT  To: Adelia Sanon NP    Spoke with pt and discussed results. Pt verbalized understanding but was wanting to know treatment plan regarding the results of his angiogram with Dr. Browne.   ----- Message -----  From: Adelia Sanon NP  Sent: 5/12/2022   4:23 PM CDT  To:  Leda James, LPN    This is ok. No obstructive disease in legs (arteries).

## 2022-05-13 NOTE — PROGRESS NOTES
+Subjective:    Patient ID:  Bo Chase is a 77 y.o. male who presents for follow-up of Hospital Follow Up, Pre-op Exam, and Shortness of Breath      HPI: . Bo Chase presents to the clinic for follow up after [facility] and to discuss test results. LIMA to LAD patent; SVG to mid RCA occluded, SVG to D1 95% stenosis, Graft to 1st marginal occluded. Mid LM to distal LM occluded; prox RCA occluded. He has elevated left sided filling pressures.  Echo with normal EF; diastolic dysfunction; moderate AS, mild MR; mild LAE.  He also has venous insufficiency.  he denies any pain, bleeding, or discharge from the left radial artery access site.  He is taking isosorbide mononitrate 30 mg p.o. b.i.d.; he carries nitroglycerin tablets with him wherever he goes.  He stated that he feels like he is doing pretty well.  He does experience dyspnea on exertion walking down the garner in the clinic, or carrying groceries into the house.  But he does not experience dyspnea on exertion when working out at the gym.  He denies any chest pain or palpitations.  His major complaint is low back pain which seems to have improved somewhat with current medical therapy.  He uses a walker to help him assist with ambulation due to pain in his hips and lower back.  The pain in these areas limits his walking. Cannot put on compression stockings or socks.    He denied any lightheadedness, dizziness, or near syncope.  He denied orthopnea or PND.  He does tend to have recurrent edema in the lower extremities.  The right leg tends to be larger than the left and swell more-it was the site of graft harvest.    He stated that he tends to have low potassium and takes a supplement-30-40 mEq of potassium per day.  Vitamin D was stopped due to high calcium.  Had phlebotomy for polycythemia August 2020.  He is followed by Dr. Massey for CKD.  He is seeing a pain management doctor for bilateral hip pain.  He has tried massage and chiropractic treatments for this  as well.  He sees Laure Ozuna NP for obstructive sleep apnea, and he has seen Dr. Honeycutt for this and chronic bronchitis.    He is planning low back surgery if and when he can.   He went to see his cardiologist in [place] with CV test results and his opinion.   He has been followed by Dr. Crawley in 2020 and he was seeing Dr. Elaine before that.      He has chronic diastolic heart failure class C; functional class 2-3.    LEANDRO: on CPAP.    --------------------------------------------------------------------  Dr. Elaine's note: 74 yo male, 6 months f/u.   PMH CAD, s/p CABGx4 1996, s/p last PCI SVG to OM1, in 2014, LIMA patent and  SVG to diag, Dx of DM in 2019 after steroids injection now A1c wnl, , CHFpEF, HTN, HLP, morbid obesity, sleep apnea on CPAP, hypothyroidism. Chronic lower back pain.  8/31/2016 negative nuclear stress test for ischemia.    ECho normal EF and mod LVH  Pt states that he f/u with cardiologist at [place] in the past two yr. Negative exerrcise stress test in  at [place].   Recent Dx of melanoma on nose and right chest pain. S/p chest tumor early .   Sleep with CPAP.  Chronic leg swelling.  BNP 54 and Cr stable   Pt states that no exercise due to pandemic  Worsening SOB when walking and carrying bags  Taking Torsemide 20 mg bid and Metolazone 5 mg once a week.  No chest pain.   Asa 325 mg given by Dr Bernal nephrology  No gym exercise after COVID 19 pandemic  BP LDL and BS controlled  Plan: CONTINUE TORSEMIDE 20 MG BID AND METOLAZONE 2.5 MG TWICE A WEEK  Daily weight. Please call the office if gain 3 pounds in 1 day or 5 pounds in 1 week.  Fluid restriction 1.5 liters a day  Na< 2 gm  CONTINUE ASa lipitor, lopressor losartan imdur and Aldactone   D/c KCL and repeat BMP I n 4 weeks for K level  Counseled DASH  Check Lipid profile in 6 months  Recommend heart-healthy diet, weight control and regular exercise.  Ion. Risk modification.   RTC in 6  months  ----------------------------------------------------------    Medications: he is not missing any doses.  He is taking torsemide 20 mg p.o. b.i.d., potassium 10mEq QID; spironolactone 25 mg p.o. q.day, and metolazone 5 mg p.o. q.week.  He is taking metoprolol tartrate 25 mg p.o. b.i.d. He is taking isosorbide mononitrate 30 mg p.o. BID, atorvastatin 40 mg p.o. q.day and Losartan 25mg QD. He stopped ASA and trental and all of his supplements and vitamins in anticipation of surgery.  Sodium: he does not add salt to foods at the table,  he is reading labels for sodium content. he does eat salty foods; sausage in the morning; ham sandwich a few times/week; sometimes with cheese.  Exercise: he Rides a stationary bike 3 days per week and uses light weights; it is not associated with chest pain or SOB. But he does pace himself while lifting weights; he rests between sets.  Tobacco: denies previous or current tobacco use   Alcohol:  Occasional beer     Weight: 123.1 kg (271 lb 6.4 oz) he states that his daily weights have decreased.Body mass index is 42.51 kg/m².  He monitors daily weights.  Last visit in cardiology clinic on April 21, 2022, he weighed 272# 14.4 ounces.  Wt Readings from Last 3 Encounters:   05/19/22 123.1 kg (271 lb 6.4 oz)   05/11/22 120.2 kg (265 lb)   05/11/22 120.2 kg (265 lb)     BP log: None. He does not have a BP monitor at home. He checks it at pharmacy from time to time.      Review of Systems   Constitutional: Negative for chills, decreased appetite, fever, night sweats and weight loss.   HENT: Negative for congestion.    Cardiovascular: Positive for dyspnea on exertion and leg swelling. Negative for chest pain, claudication, cyanosis, irregular heartbeat, near-syncope, orthopnea, palpitations, paroxysmal nocturnal dyspnea and syncope.   Respiratory: Negative for cough, hemoptysis, shortness of breath, sputum production and wheezing.    Hematologic/Lymphatic: Negative for adenopathy and  bleeding problem. Does not bruise/bleed easily.   Skin: Negative for color change and nail changes.   Musculoskeletal: Positive for joint pain (Chronic pain in the lower back and hips.).   Gastrointestinal: Negative for bloating, abdominal pain, change in bowel habit, heartburn, hematochezia, melena, nausea and vomiting.   Genitourinary: Negative for hematuria.   Neurological: Negative for dizziness and light-headedness.   Psychiatric/Behavioral: Negative for altered mental status.       Objective:   Physical Exam  Constitutional:       General: He is not in acute distress.     Appearance: He is well-developed. He is not diaphoretic.   HENT:      Head: Normocephalic and atraumatic.   Eyes:      General: No scleral icterus.     Conjunctiva/sclera: Conjunctivae normal.   Neck:      Thyroid: No thyromegaly.      Vascular: No JVD.      Trachea: No tracheal deviation.   Cardiovascular:      Rate and Rhythm: Normal rate and regular rhythm.      Pulses: Intact distal pulses.      Heart sounds: Murmur heard.    Harsh midsystolic murmur is present at the upper right sternal border radiating to the neck.    No friction rub. No gallop.   Pulmonary:      Effort: Pulmonary effort is normal. No respiratory distress.      Breath sounds: Normal breath sounds. No wheezing or rales.      Comments: Speaks in complete sentences without obvious signs of shortness of breath or increased work of breathing  Chest:      Chest wall: No tenderness.   Abdominal:      General: Bowel sounds are normal. There is no distension.      Palpations: Abdomen is soft. There is no mass.      Tenderness: There is no abdominal tenderness. There is no guarding or rebound.      Comments: Obese   Musculoskeletal:         General: Normal range of motion.      Cervical back: Neck supple.      Comments: Left radial access site without redness, tenderness, swelling, or drainage. There is no active external bleeding or hematoma. Radial and ulnar pulse 2+. Skin  warm/dry/pink.   Venous stasis changes to both lower legs bilaterally.  Nonpitting edema noted.  Right lower leg is larger than the left-chronic.   Lymphadenopathy:      Cervical: No cervical adenopathy.   Skin:     General: Skin is warm and dry.      Coloration: Skin is not pale.      Findings: No erythema or rash.      Comments: Icard   Neurological:      Mental Status: He is alert and oriented to person, place, and time.        Latest Reference Range & Units 03/31/22 09:35   Sodium 136 - 145 mmol/L 136   Potassium 3.5 - 5.1 mmol/L 3.5   Chloride 95 - 110 mmol/L 88 (L)   CO2 23 - 29 mmol/L 32 (H)   Anion Gap 8 - 16 mmol/L 16   BUN 8 - 23 mg/dL 29 (H)   Creatinine 0.5 - 1.4 mg/dL 1.7 (H)   EGFR if non African American >60 mL/min/1.73 m^2 38.3 ! [1]   EGFR if African American >60 mL/min/1.73 m^2 44.3 !   Glucose 70 - 110 mg/dL 143 (H)   Calcium 8.7 - 10.5 mg/dL 10.2   Phosphorus 2.7 - 4.5 mg/dL 3.7   Albumin 3.5 - 5.2 g/dL 3.9         Results for GABY PRUETT (MRN [id number]) as of 10/29/2021 13:38   Ref. Range 4/8/2021 15:05   Cholesterol Latest Ref Range: 120 - 199 mg/dL 134   HDL Latest Ref Range: 40 - 75 mg/dL 25 (L)   HDL/Cholesterol Ratio Latest Ref Range: 20.0 - 50.0 % 18.7 (L)   LDL Cholesterol External Latest Ref Range: 63.0 - 159.0 mg/dL 78.0   Non-HDL Cholesterol Latest Units: mg/dL 109   Total Cholesterol/HDL Ratio Latest Ref Range: 2.0 - 5.0  5.4 (H)   Triglycerides Latest Ref Range: 30 - 150 mg/dL 155 (H)     [facility] 5/9/22:Summary   · The ejection fraction was calculated to be 60%.  · The pre-procedure left ventricular end diastolic pressure was 28.  · The post-procedure left ventricular end diastolic pressure was 29.  · The Mid LM to Dist LM lesion was 100% stenosed.  · The Prox RCA lesion was 100% stenosed.  · The Origin to Prox Graft lesion was 100% stenosed.  · The Prox Graft lesion was 100% stenosed.  · The Origin to Prox Graft lesion was 95% stenosed.  · The estimated blood loss was none.  · High left  sided filling pressures    Echo 5/11/22:Summary  · The left ventricle is normal in size with concentric hypertrophy and low normal systolic function.  · Mild left atrial enlargement.  · Grade I left ventricular diastolic dysfunction.  · Normal right ventricular size with normal right ventricular systolic function.  · The estimated ejection fraction is 50%.  · There is moderate aortic valve stenosis.  · Aortic valve area is 1.30 cm2; peak velocity is 2.90 m/s; mean gradient is 23 mmHg.  · Mild mitral regurgitation.  · Poor endocardial visualization, technically difficult study        Echo at Dr. Crawley's office 12/2020  EF 55-60%  Apical hypokinesis.  Moderate concentric hypertrophy.  Mild AS, AI, and MR.  Mild LAE.  I/IV diastolic dysfunction.    Echo 07/25/2017:CONCLUSIONS     1 - Concentric hypertrophy.     2 - No wall motion abnormalities.     3 - Normal left ventricular systolic function (EF 60-65%).     4 - Normal left ventricular diastolic function.     5 - Normal right ventricular systolic function .     6 - Trivial to mild mitral regurgitation.     7 - Trivial to mild tricuspid regurgitation.     Arterial U/S LE 5/11/22: Conclusion   · No evidence of hemodynamically significant stenosis of visalized bilateral lower extremities arteries  · Bilateral peroneal arteries are not well visualized due to depth of vessels.   TDS:Bilateral peroneal arteries are not well visualized due to depth of vessels.     ARNULFO 5/11/22: Conclusion  · Minimal decreased ARNULFO in the right lower extermity at rest with triphasic flow waveforms  · Normal ARNULFO left lower extremity with triphasic flow waveforms     Venous U/S LE 4/27/22: Impression:   Bilateral lower extremity venous reflux as detailed.  No DVT.    Pharmacologic nuclear medicine stress test 08/31/2016:Impression: NORMAL MYOCARDIAL PERFUSION   1. The perfusion scan is free of evidence for myocardial ischemia or injury.   2. There is a mild intensity fixed defect in the  inferior wall of the left ventricle, secondary to diaphragm attenuation.   3. Resting wall motion is physiologic.   4. Resting LV function is normal.   5. The ventricular volumes are normal at rest and stress.   6. The extracardiac distribution of radioactivity is normal.       Left heart catheterization 12/29/2014:ANGIOGRAPHIC RESULTS:   DIAGNOSTIC:        Patient has a right dominant coronary artery.        - Left Main Coronary Artery:              The LM has luminal irregularities. There is JESUS 3 flow.        - Left Anterior Descending Artery:              The proximal LAD has chronic total occlusion. There is JESUS 0 flow.        - Left Circumflex Artery:              The proximal LCX has chronic total occlusion. There is JESUS 0 flow.        - Right Coronary Artery:              The proximal RCA has chronic total occlusion. There is JESUS 0 flow.        - SVG To RCA:              The SVG to RCA has luminal irregularities. There is JESUS 3 flow.        - THOMPSON To LAD:              The LIMA to LAD is normal. There is JESUS 3 flow.        - SVG To D1:              The SVG to D1 has chronic total occlusion. There is JESUS 0 flow.        - SVG To OM1:              The mid SVG to OM1 has a 90% stenosis.   This was a MI culprit lesion.  The Posterior and Lateral myocardial walls were affected. The following items were used: Wire   Filter Spider Fx 5 X 320/190, Blln Emerge 4 X 20 and Stent Resolute   4.0x38mm (AMARI).   Assessment:      1. Status post cardiac catheterization    2. Coronary artery disease involving coronary bypass graft of native heart with angina pectoris    3. Hx of CABG    4. Chronic diastolic congestive heart failure    5. Essential hypertension    6. Mixed hyperlipidemia    7. Venous insufficiency    8. Stage 3b chronic kidney disease    9. Morbid obesity with BMI of 40.0-44.9, adult    10. LEANDRO on CPAP    11. Diabetes mellitus type 2 in obese    12. Hypokalemia        Plan:     Status post cardiac  catheterization    Coronary artery disease involving coronary bypass graft of native heart with angina pectoris  -     Magnesium, RBC; Future; Expected date: 05/19/2022    Hx of CABG  -     Comprehensive Metabolic Panel; Future; Expected date: 05/19/2022  -     Magnesium, RBC; Future; Expected date: 05/19/2022    Chronic diastolic congestive heart failure    Essential hypertension  -     Comprehensive Metabolic Panel; Future; Expected date: 05/19/2022  -     Magnesium, RBC; Future; Expected date: 05/19/2022    Mixed hyperlipidemia  -     Comprehensive Metabolic Panel; Future; Expected date: 05/19/2022  -     Lipid Panel; Future; Expected date: 05/19/2022    Venous insufficiency    Stage 3b chronic kidney disease    Morbid obesity with BMI of 40.0-44.9, adult    LEANDRO on CPAP    Diabetes mellitus type 2 in obese    Hypokalemia  -     Comprehensive Metabolic Panel; Future; Expected date: 05/19/2022      Referral to Dr. Bernal ([facility]) in progress for consideration for re-do CABG. Requested films on CD to be sent to his office in [place]. Mr. Chase is aware of this.  Continue imdur-CAD, angina.  Recommended that he restart aspirin to take daily for cardiovascular event protection.  He verbalized his understanding of this.  Labs: CMP, FLP, Mg - Phone review.  Continue torsemide, metolazone, spironolactone - diastolic heart failure.  He may have cardiorenal syndrome-fluctuating creatinine on diuretics. Creatinine is at baseline and renal function appears stable; will monitor.  His volume status appears pretty stable clinically today.  Nephrology recommends to keep aldactone and KCL supplement - severe hypokalemia. He is also on a small dose of losartan given HTN and diabetes with CKD.  Dr. Massey following CKD 3.  Continue metoprolol, losartan, spironolactone - HTN.  Continue atorvastatin, fish oil - HLP.  Sodium restriction encouraged.  Continue using CPAP every night-obstructive sleep apnea.  Continue exercise.   Daily  weights.    Continue weight loss.  Whole foods, low glycemic diet.  Follow-up in 3 months or call sooner for any problems.  35 minutes spent in chart review and face to face encounter.  Adelia Sanon NP  [facility]

## 2022-05-18 ENCOUNTER — PATIENT MESSAGE (OUTPATIENT)
Dept: CARDIOLOGY | Facility: CLINIC | Age: 77
End: 2022-05-18
Payer: MEDICARE

## 2022-05-18 ENCOUNTER — TELEPHONE (OUTPATIENT)
Dept: CARDIOLOGY | Facility: CLINIC | Age: 77
End: 2022-05-18
Payer: MEDICARE

## 2022-05-18 DIAGNOSIS — I25.708 CORONARY ARTERY DISEASE OF BYPASS GRAFT OF NATIVE HEART WITH STABLE ANGINA PECTORIS: Primary | ICD-10-CM

## 2022-05-18 NOTE — TELEPHONE ENCOUNTER
Spoke with [facility],  Radiology staff and faxed them a request for CD copy of pt's most recent angiogram be sent to Dr. Bernal with [facility] .

## 2022-05-18 NOTE — TELEPHONE ENCOUNTER
"----- Message from Leda James LPN sent at 5/16/2022  4:34 PM CDT -----  Regarding: FW: Plan of care  Good afternoon,  Dr. Browne asked me to forward messages to you, while he is out this week. Who do we refer pts to in [place] for redo CABG?  ----- Message -----  From: Adelia Sanon NP  Sent: 5/16/2022   4:20 PM CDT  To: Leda James LPN  Subject: RE: Plan of care                                 We have to ask Dr. Elaine who they use in [place] for redo CABG. Let me know if you need help.   Adelia  ----- Message -----  From: Leda James LPN  Sent: 5/16/2022  11:03 AM CDT  To: Adelia Sanon NP  Subject: FW: Plan of care                                 I asked . Lesia Krishnamurthy (one of Dr. Haynes's staff) if Dr. Haynes was still seeing pts in [place]. Her response was: "Not at this time. We are unsure if we are! They are still in negotiations at this time. But we may not be doing surgery at [facility] or seeing patients in [place] at [facility]. Best that they go to [place]" --Please advise, so I can call pt.  ----- Message -----  From: Cornelius Browne MD  Sent: 5/15/2022   6:10 AM CDT  To: Adelia Sanon NP, Leda James LPN  Subject: RE: Plan of care                                 Yes please see pt, schedule appt Dr. Haynes , redo CABG  ----- Message -----  From: Adelia Sanon NP  Sent: 5/13/2022   8:26 AM CDT  To: Cornelius Browne MD, Adelia Sanon NP, #  Subject: Plan of care                                     I'm not sure why he was not scheduled for post cath wound check, but I see Lula made a note that he was doing well post cath. This type of appointment is helpful to discuss what was found and how to move forward. I usually see patients post cath for this purpose.  In any case, just reiterate to the patient that we don't yet have the plan. He is out of the office today and next week. Also, Dr. Browne may have to speak to more than one surgeon " to get options for him. This may take a week or so. Tell him that you will send a reminder message to Dr. Browne, but you are certain that he is working on it, and we will get back to him as soon as we know something. In the meantime, avoid heavy exertion and activities that provoke chest pain. Make sure that he has nitroglycerin that he can use prn. Review instructions for how to use it. Continue using isosorbide mononitrate (long acting nitroglycerin) daily. If he is needing sublingual nitroglycerin regularly, then we can increase isosorbide. He just needs to let us know.  I hope this helps. Let me know if he needs to see me or talk to me about it.  Adelia  ----- Message -----  From: Leda James LPN  Sent: 5/12/2022   5:02 PM CDT  To: Adelia Sanon NP    Spoke with pt and discussed results. Pt verbalized understanding but was wanting to know treatment plan regarding the results of his angiogram with Dr. Browne.   ----- Message -----  From: Adelia Sanon NP  Sent: 5/12/2022   4:23 PM CDT  To: Leda James LPN    This is ok. No obstructive disease in legs (arteries).

## 2022-05-19 ENCOUNTER — OFFICE VISIT (OUTPATIENT)
Dept: CARDIOLOGY | Facility: CLINIC | Age: 77
End: 2022-05-19
Payer: MEDICARE

## 2022-05-19 VITALS
HEIGHT: 67 IN | DIASTOLIC BLOOD PRESSURE: 60 MMHG | OXYGEN SATURATION: 98 % | WEIGHT: 271.38 LBS | BODY MASS INDEX: 42.6 KG/M2 | HEART RATE: 82 BPM | SYSTOLIC BLOOD PRESSURE: 90 MMHG

## 2022-05-19 DIAGNOSIS — N18.32 STAGE 3B CHRONIC KIDNEY DISEASE: ICD-10-CM

## 2022-05-19 DIAGNOSIS — E87.6 HYPOKALEMIA: ICD-10-CM

## 2022-05-19 DIAGNOSIS — I25.709 CORONARY ARTERY DISEASE INVOLVING CORONARY BYPASS GRAFT OF NATIVE HEART WITH ANGINA PECTORIS: ICD-10-CM

## 2022-05-19 DIAGNOSIS — E66.01 MORBID OBESITY WITH BMI OF 40.0-44.9, ADULT: ICD-10-CM

## 2022-05-19 DIAGNOSIS — Z98.890 STATUS POST CARDIAC CATHETERIZATION: Primary | ICD-10-CM

## 2022-05-19 DIAGNOSIS — I10 ESSENTIAL HYPERTENSION: ICD-10-CM

## 2022-05-19 DIAGNOSIS — E11.69 DIABETES MELLITUS TYPE 2 IN OBESE: ICD-10-CM

## 2022-05-19 DIAGNOSIS — E66.9 DIABETES MELLITUS TYPE 2 IN OBESE: ICD-10-CM

## 2022-05-19 DIAGNOSIS — E78.2 MIXED HYPERLIPIDEMIA: ICD-10-CM

## 2022-05-19 DIAGNOSIS — G47.33 OSA ON CPAP: ICD-10-CM

## 2022-05-19 DIAGNOSIS — I50.32 CHRONIC DIASTOLIC CONGESTIVE HEART FAILURE: ICD-10-CM

## 2022-05-19 DIAGNOSIS — I87.2 VENOUS INSUFFICIENCY: ICD-10-CM

## 2022-05-19 DIAGNOSIS — Z95.1 HX OF CABG: ICD-10-CM

## 2022-05-19 PROCEDURE — 1100F PR PT FALLS ASSESS DOC 2+ FALLS/FALL W/INJURY/YR: ICD-10-PCS | Mod: CPTII,S$GLB,, | Performed by: NURSE PRACTITIONER

## 2022-05-19 PROCEDURE — 99999 PR PBB SHADOW E&M-EST. PATIENT-LVL III: CPT | Mod: PBBFAC,,, | Performed by: NURSE PRACTITIONER

## 2022-05-19 PROCEDURE — 1160F RVW MEDS BY RX/DR IN RCRD: CPT | Mod: CPTII,S$GLB,, | Performed by: NURSE PRACTITIONER

## 2022-05-19 PROCEDURE — 1125F AMNT PAIN NOTED PAIN PRSNT: CPT | Mod: CPTII,S$GLB,, | Performed by: NURSE PRACTITIONER

## 2022-05-19 PROCEDURE — 3288F FALL RISK ASSESSMENT DOCD: CPT | Mod: CPTII,S$GLB,, | Performed by: NURSE PRACTITIONER

## 2022-05-19 PROCEDURE — 99214 OFFICE O/P EST MOD 30 MIN: CPT | Mod: S$GLB,,, | Performed by: NURSE PRACTITIONER

## 2022-05-19 PROCEDURE — 1160F PR REVIEW ALL MEDS BY PRESCRIBER/CLIN PHARMACIST DOCUMENTED: ICD-10-PCS | Mod: CPTII,S$GLB,, | Performed by: NURSE PRACTITIONER

## 2022-05-19 PROCEDURE — 1159F MED LIST DOCD IN RCRD: CPT | Mod: CPTII,S$GLB,, | Performed by: NURSE PRACTITIONER

## 2022-05-19 PROCEDURE — 3074F SYST BP LT 130 MM HG: CPT | Mod: CPTII,S$GLB,, | Performed by: NURSE PRACTITIONER

## 2022-05-19 PROCEDURE — 3074F PR MOST RECENT SYSTOLIC BLOOD PRESSURE < 130 MM HG: ICD-10-PCS | Mod: CPTII,S$GLB,, | Performed by: NURSE PRACTITIONER

## 2022-05-19 PROCEDURE — 99214 PR OFFICE/OUTPT VISIT, EST, LEVL IV, 30-39 MIN: ICD-10-PCS | Mod: S$GLB,,, | Performed by: NURSE PRACTITIONER

## 2022-05-19 PROCEDURE — 3288F PR FALLS RISK ASSESSMENT DOCUMENTED: ICD-10-PCS | Mod: CPTII,S$GLB,, | Performed by: NURSE PRACTITIONER

## 2022-05-19 PROCEDURE — 99999 PR PBB SHADOW E&M-EST. PATIENT-LVL III: ICD-10-PCS | Mod: PBBFAC,,, | Performed by: NURSE PRACTITIONER

## 2022-05-19 PROCEDURE — 1125F PR PAIN SEVERITY QUANTIFIED, PAIN PRESENT: ICD-10-PCS | Mod: CPTII,S$GLB,, | Performed by: NURSE PRACTITIONER

## 2022-05-19 PROCEDURE — 1100F PTFALLS ASSESS-DOCD GE2>/YR: CPT | Mod: CPTII,S$GLB,, | Performed by: NURSE PRACTITIONER

## 2022-05-19 PROCEDURE — 1159F PR MEDICATION LIST DOCUMENTED IN MEDICAL RECORD: ICD-10-PCS | Mod: CPTII,S$GLB,, | Performed by: NURSE PRACTITIONER

## 2022-05-19 PROCEDURE — 3078F PR MOST RECENT DIASTOLIC BLOOD PRESSURE < 80 MM HG: ICD-10-PCS | Mod: CPTII,S$GLB,, | Performed by: NURSE PRACTITIONER

## 2022-05-19 PROCEDURE — 3078F DIAST BP <80 MM HG: CPT | Mod: CPTII,S$GLB,, | Performed by: NURSE PRACTITIONER

## 2022-05-20 ENCOUNTER — LAB VISIT (OUTPATIENT)
Dept: LAB | Facility: HOSPITAL | Age: 77
End: 2022-05-20
Attending: NURSE PRACTITIONER
Payer: MEDICARE

## 2022-05-20 DIAGNOSIS — E78.2 MIXED HYPERLIPIDEMIA: ICD-10-CM

## 2022-05-20 DIAGNOSIS — I10 ESSENTIAL HYPERTENSION: ICD-10-CM

## 2022-05-20 DIAGNOSIS — E87.6 HYPOKALEMIA: ICD-10-CM

## 2022-05-20 DIAGNOSIS — Z95.1 HX OF CABG: ICD-10-CM

## 2022-05-20 DIAGNOSIS — I25.709 CORONARY ARTERY DISEASE INVOLVING CORONARY BYPASS GRAFT OF NATIVE HEART WITH ANGINA PECTORIS: ICD-10-CM

## 2022-05-20 LAB
ALBUMIN SERPL BCP-MCNC: 3.9 G/DL (ref 3.5–5.2)
ALP SERPL-CCNC: 80 U/L (ref 55–135)
ALT SERPL W/O P-5'-P-CCNC: 64 U/L (ref 10–44)
ANION GAP SERPL CALC-SCNC: 16 MMOL/L (ref 8–16)
AST SERPL-CCNC: 39 U/L (ref 10–40)
BILIRUB SERPL-MCNC: 1.2 MG/DL (ref 0.1–1)
BUN SERPL-MCNC: 30 MG/DL (ref 8–23)
CALCIUM SERPL-MCNC: 10.3 MG/DL (ref 8.7–10.5)
CHLORIDE SERPL-SCNC: 92 MMOL/L (ref 95–110)
CHOLEST SERPL-MCNC: 176 MG/DL (ref 120–199)
CHOLEST/HDLC SERPL: 5.7 {RATIO} (ref 2–5)
CO2 SERPL-SCNC: 29 MMOL/L (ref 23–29)
CREAT SERPL-MCNC: 1.7 MG/DL (ref 0.5–1.4)
EST. GFR  (AFRICAN AMERICAN): 44.3 ML/MIN/1.73 M^2
EST. GFR  (NON AFRICAN AMERICAN): 38.3 ML/MIN/1.73 M^2
GLUCOSE SERPL-MCNC: 160 MG/DL (ref 70–110)
HDLC SERPL-MCNC: 31 MG/DL (ref 40–75)
HDLC SERPL: 17.6 % (ref 20–50)
LDLC SERPL CALC-MCNC: 78.4 MG/DL (ref 63–159)
NONHDLC SERPL-MCNC: 145 MG/DL
POTASSIUM SERPL-SCNC: 3.5 MMOL/L (ref 3.5–5.1)
PROT SERPL-MCNC: 7.6 G/DL (ref 6–8.4)
SODIUM SERPL-SCNC: 137 MMOL/L (ref 136–145)
TRIGL SERPL-MCNC: 333 MG/DL (ref 30–150)

## 2022-05-20 PROCEDURE — 80061 LIPID PANEL: CPT | Performed by: NURSE PRACTITIONER

## 2022-05-20 PROCEDURE — 36415 COLL VENOUS BLD VENIPUNCTURE: CPT | Mod: PO | Performed by: NURSE PRACTITIONER

## 2022-05-20 PROCEDURE — 83735 ASSAY OF MAGNESIUM: CPT | Performed by: NURSE PRACTITIONER

## 2022-05-20 PROCEDURE — 80053 COMPREHEN METABOLIC PANEL: CPT | Performed by: NURSE PRACTITIONER

## 2022-05-20 NOTE — DISCHARGE SUMMARY
[facility] (Hospital)  Discharge Summary      Admit Date: 5/9/2022    Discharge Date and Time: 5/9/2022  5:32 PM    Attending Physician: No att. providers found     Reason for Admission: LHC/grafts    Procedures Performed: Procedure(s) (LRB):  CATHETERIZATION, HEART, LEFT (Left)  Bypass graft study    Hospital Course (synopsis of major diagnoses, care, treatment, and services provided during the course of the hospital stay): Pre-Operative Diagnosis: Coronary artery disease involving native coronary artery of native heart, unspecified whether angina present [I25.10]  Hx of CABG [Z95.1]  Chronic diastolic (congestive) heart failure [I50.32]  Chest tightness [R07.89]     Post-Operative Diagnosis: Post-Op Diagnosis Codes:     * Coronary artery disease involving native coronary artery of native heart, unspecified whether angina present [I25.10]     * Hx of CABG [Z95.1]     * Chronic diastolic (congestive) heart failure [I50.32]     * Chest tightness [R07.89]     Anesthesia: RN IV Sedation     Technical Procedures Used: LHC/grafts     Description of the Findings of the Procedure: LHC/grafts, Dx: angina, Findings: LIMA TO LAD PATENT, ALL GRAFTS OCCLUDED , SVG TO RCA, SVG TO DIAG-CHRONICALLY OCCLUDED, SVG TO OM-OSTIAL 99%, EF=60%     Significant Surgical Tasks Conducted by the Assistant(s), if Applicable: NONE     Complications: No     Estimated Blood Loss (EBL): < 50 cc                Goals of Care Treatment Preferences:  Code Status: Full Code      Consults: CVT    Significant Diagnostic Studies: Labs: All labs within the past 24 hours have been reviewed    Final Diagnoses:    Principal Problem: <principal problem not specified>   Secondary Diagnoses: There are no hospital problems to display for this patient.      Discharged Condition: good    Disposition: Home or Self Care    Follow Up/Patient Instructions:   1 week  Medications:  Reconciled Home Medications:      Medication List      Change how you take these  medications    isosorbide mononitrate 60 MG 24 hr tablet  Commonly known as: IMDUR  Take 1 tablet (60 mg total) by mouth once daily.  What changed:   · medication strength  · how much to take     metoprolol tartrate 25 MG tablet  Commonly known as: LOPRESSOR  Take 1 tablet (25 mg total) by mouth 2 (two) times daily.  What changed: when to take this        Continue taking these medications    albuterol 90 mcg/actuation inhaler  Commonly known as: PROVENTIL/VENTOLIN HFA  Inhale 1-2 puffs into the lungs every 6 (six) hours as needed for Wheezing. Rescue     allopurinoL 300 MG tablet  Commonly known as: ZYLOPRIM  Take 1.5 tablets (450 mg total) by mouth once daily.     ascorbic acid (vitamin C) 500 MG tablet  Commonly known as: VITAMIN C  Take 500 mg by mouth once daily.     aspirin 325 MG tablet  Take 325 mg by mouth once daily.     atorvastatin 40 MG tablet  Commonly known as: LIPITOR  Take 1 tablet (40 mg total) by mouth once daily.     cetirizine 10 MG tablet  Commonly known as: ZYRTEC  cetirizine 10 mg tablet   Take 1 tablet every day by oral route.     docusate sodium 100 MG capsule  Commonly known as: COLACE  Take 100 mg by mouth.     DULoxetine 30 MG capsule  Commonly known as: CYMBALTA  Take 30 mg by mouth.     dutasteride 0.5 mg capsule  Commonly known as: AVODART     fish oil-omega-3 fatty acids 300-1,000 mg capsule  Take 1 capsule by mouth once daily.     fluticasone propionate 50 mcg/actuation nasal spray  Commonly known as: FLONASE  1 spray (50 mcg total) by Each Nare route once daily.     HYDROcodone-acetaminophen  mg per tablet  Commonly known as: NORCO  Take 1 tablet by mouth every 6 (six) hours as needed.     ipratropium 42 mcg (0.06 %) nasal spray  Commonly known as: ATROVENT  2 sprays by Nasal route.     ketoconazole 2 % cream  Commonly known as: NIZORAL  Apply topically 2 (two) times daily.     lancets Misc  Use daily     levothyroxine 100 MCG tablet  Commonly known as: SYNTHROID  TAKE 1  TABLET EVERY DAY BEFORE BREAKFAST     losartan 25 MG tablet  Commonly known as: COZAAR  Take 1 tablet (25 mg total) by mouth once daily.     metOLazone 5 MG tablet  Commonly known as: ZAROXOLYN  Take 10 mg by mouth once daily.     multivitamin per tablet  Commonly known as: THERAGRAN  Take 1 tablet by mouth once daily.     nitroGLYCERIN 0.4 MG SL tablet  Commonly known as: NITROSTAT  Place 1 tablet (0.4 mg total) under the tongue every 5 (five) minutes as needed for Chest pain.     pentoxifylline 400 mg Tbsr  Commonly known as: TRENTAL  Take 1 tablet (400 mg total) by mouth 3 (three) times daily with meals.     potassium chloride SA 10 MEQ tablet  Commonly known as: K-DUR,KLOR-CON  Take 1 tablet (10 mEq total) by mouth 4 (four) times daily. Pt states he takes 3-4 tablets once daily     spironolactone 25 MG tablet  Commonly known as: ALDACTONE  Take 1 tablet (25 mg total) by mouth once daily.     tamsulosin 0.4 mg Cap  Commonly known as: FLOMAX  Take 1 capsule by mouth once daily.     torsemide 20 MG Tab  Commonly known as: DEMADEX  Take 1 tablet (20 mg total) by mouth 2 (two) times a day.     TRUE METRIX GLUCOSE TEST STRIP Strp  Generic drug: blood sugar diagnostic  1 strip.     TRUE METRIX GO GLUCOSE METER Misc  Generic drug: blood-glucose meter  USE TO CHECK GLUCOSE ONCE DAILY AS DIRECTED          No discharge procedures on file.

## 2022-05-26 LAB — MAGNESIUM RBC-MCNC: 5.3 MG/DL (ref 4–6.4)

## 2022-05-27 ENCOUNTER — TELEPHONE (OUTPATIENT)
Dept: CARDIOLOGY | Facility: CLINIC | Age: 77
End: 2022-05-27
Payer: MEDICARE

## 2022-05-27 NOTE — PROGRESS NOTES
Sent note via portal with instructions for increased potassium intake and dietary changes to improve blood sugar and triglycerides. He should see me if he has more questions.

## 2022-05-27 NOTE — TELEPHONE ENCOUNTER
Attempted without success x2 to contact pt to discuss Mrs. Delvalle Fab's message. Left voicemail for pt to call back.    ----- Message from Adelia Sanon NP sent at 5/27/2022  8:36 AM CDT -----  Sent note via portal with instructions for increased potassium intake and dietary changes to improve blood sugar and triglycerides. He should see me if he has more questions.

## 2022-06-27 ENCOUNTER — TELEPHONE (OUTPATIENT)
Dept: CARDIOLOGY | Facility: CLINIC | Age: 77
End: 2022-06-27
Payer: MEDICARE

## 2022-06-27 NOTE — TELEPHONE ENCOUNTER
Spoke to patient after speaking with Dr. Joel and scheduled for 6/28 at [facility] for 220-240 to discuss a procedure.----- Message from Geovany Joel MD sent at 6/27/2022 11:23 AM CDT -----  Contact: pt  Yes   ----- Message -----  From: Miranda Canales LPN  Sent: 6/27/2022  11:20 AM CDT  To: Geovany Joel MD    It is ok to double book this patient of Dr. Henderson with you? Needs a procedure  ----- Message -----  From: Leda James LPN  Sent: 6/27/2022   9:36 AM CDT  To: Marycruz AREVALO Staff    When could I get this pt in to see Dr. Joel? I wanted to check with you before I scheduled. Thanks in advance!  ----- Message -----  From: Cornelius Browne MD  Sent: 6/26/2022  10:56 AM CDT  To: Leda James LPN    Appt with Dr. Joel to discuss high risk percutaneous intervention  ----- Message -----  From: Leda James LPN  Sent: 6/24/2022   2:06 PM CDT  To: Cornelius Browne MD    Pt requesting to be contacted about what the plan is, regarding his stent. Please advise.  ----- Message -----  From: Yamini Sam  Sent: 6/24/2022   1:54 PM CDT  To: Fab Breaux Staff    The pt request a return call, no additional info given and can be reached at [phone]///[id number]

## 2022-06-28 ENCOUNTER — OFFICE VISIT (OUTPATIENT)
Dept: CARDIOLOGY | Facility: CLINIC | Age: 77
End: 2022-06-28
Payer: MEDICARE

## 2022-06-28 ENCOUNTER — TELEPHONE (OUTPATIENT)
Dept: CARDIOLOGY | Facility: CLINIC | Age: 77
End: 2022-06-28
Payer: MEDICARE

## 2022-06-28 ENCOUNTER — LAB VISIT (OUTPATIENT)
Dept: LAB | Facility: HOSPITAL | Age: 77
End: 2022-06-28
Attending: INTERNAL MEDICINE
Payer: MEDICARE

## 2022-06-28 ENCOUNTER — TELEPHONE (OUTPATIENT)
Dept: CARDIOLOGY | Facility: CLINIC | Age: 77
End: 2022-06-28

## 2022-06-28 VITALS
BODY MASS INDEX: 42.75 KG/M2 | SYSTOLIC BLOOD PRESSURE: 107 MMHG | DIASTOLIC BLOOD PRESSURE: 66 MMHG | WEIGHT: 272.94 LBS | OXYGEN SATURATION: 96 % | HEART RATE: 77 BPM

## 2022-06-28 DIAGNOSIS — I10 PRIMARY HYPERTENSION: Chronic | ICD-10-CM

## 2022-06-28 DIAGNOSIS — D75.1 HYPOXEMIC POLYCYTHEMIA: ICD-10-CM

## 2022-06-28 DIAGNOSIS — G47.33 OSA ON CPAP: ICD-10-CM

## 2022-06-28 DIAGNOSIS — M79.604 LEG PAIN, BILATERAL: ICD-10-CM

## 2022-06-28 DIAGNOSIS — M79.605 LEG PAIN, BILATERAL: ICD-10-CM

## 2022-06-28 DIAGNOSIS — E83.52 HYPERCALCEMIA: ICD-10-CM

## 2022-06-28 DIAGNOSIS — E66.01 MORBID OBESITY WITH BMI OF 40.0-44.9, ADULT: ICD-10-CM

## 2022-06-28 DIAGNOSIS — Z95.1 S/P CABG X 4: ICD-10-CM

## 2022-06-28 DIAGNOSIS — N25.81 SECONDARY HYPERPARATHYROIDISM OF RENAL ORIGIN: ICD-10-CM

## 2022-06-28 DIAGNOSIS — R07.9 CHEST PAIN, UNSPECIFIED TYPE: ICD-10-CM

## 2022-06-28 DIAGNOSIS — Z95.5 S/P CORONARY ARTERY STENT PLACEMENT: ICD-10-CM

## 2022-06-28 DIAGNOSIS — I25.708 CORONARY ARTERY DISEASE OF BYPASS GRAFT OF NATIVE HEART WITH STABLE ANGINA PECTORIS: Primary | Chronic | ICD-10-CM

## 2022-06-28 DIAGNOSIS — E78.5 HYPERLIPIDEMIA WITH TARGET LDL LESS THAN 70: ICD-10-CM

## 2022-06-28 DIAGNOSIS — D75.1 ACQUIRED POLYCYTHEMIA: ICD-10-CM

## 2022-06-28 DIAGNOSIS — I50.32 CHRONIC DIASTOLIC CONGESTIVE HEART FAILURE: ICD-10-CM

## 2022-06-28 DIAGNOSIS — I10 PRIMARY HYPERTENSION: ICD-10-CM

## 2022-06-28 DIAGNOSIS — N18.31 STAGE 3A CHRONIC KIDNEY DISEASE: ICD-10-CM

## 2022-06-28 DIAGNOSIS — I25.708 CORONARY ARTERY DISEASE OF BYPASS GRAFT OF NATIVE HEART WITH STABLE ANGINA PECTORIS: ICD-10-CM

## 2022-06-28 DIAGNOSIS — I25.708 CORONARY ARTERY DISEASE OF BYPASS GRAFT OF NATIVE HEART WITH STABLE ANGINA PECTORIS: Primary | ICD-10-CM

## 2022-06-28 LAB
ALBUMIN SERPL BCP-MCNC: 3.9 G/DL (ref 3.5–5.2)
ALP SERPL-CCNC: 81 U/L (ref 55–135)
ALT SERPL W/O P-5'-P-CCNC: 54 U/L (ref 10–44)
ANION GAP SERPL CALC-SCNC: 15 MMOL/L (ref 8–16)
APTT BLDCRRT: 28.8 SEC (ref 21–32)
AST SERPL-CCNC: 42 U/L (ref 10–40)
BASOPHILS # BLD AUTO: 0.05 K/UL (ref 0–0.2)
BASOPHILS NFR BLD: 0.6 % (ref 0–1.9)
BILIRUB SERPL-MCNC: 1.1 MG/DL (ref 0.1–1)
BUN SERPL-MCNC: 25 MG/DL (ref 8–23)
CALCIUM SERPL-MCNC: 10.2 MG/DL (ref 8.7–10.5)
CHLORIDE SERPL-SCNC: 94 MMOL/L (ref 95–110)
CO2 SERPL-SCNC: 31 MMOL/L (ref 23–29)
CREAT SERPL-MCNC: 1.6 MG/DL (ref 0.5–1.4)
DIFFERENTIAL METHOD: ABNORMAL
EOSINOPHIL # BLD AUTO: 0.1 K/UL (ref 0–0.5)
EOSINOPHIL NFR BLD: 1 % (ref 0–8)
ERYTHROCYTE [DISTWIDTH] IN BLOOD BY AUTOMATED COUNT: 15.4 % (ref 11.5–14.5)
EST. GFR  (AFRICAN AMERICAN): 47.7 ML/MIN/1.73 M^2
EST. GFR  (NON AFRICAN AMERICAN): 41.2 ML/MIN/1.73 M^2
GLUCOSE SERPL-MCNC: 149 MG/DL (ref 70–110)
HCT VFR BLD AUTO: 51.5 % (ref 40–54)
HGB BLD-MCNC: 17.8 G/DL (ref 14–18)
IMM GRANULOCYTES # BLD AUTO: 0.07 K/UL (ref 0–0.04)
IMM GRANULOCYTES NFR BLD AUTO: 0.8 % (ref 0–0.5)
INR PPP: 1.1 (ref 0.8–1.2)
LYMPHOCYTES # BLD AUTO: 1.2 K/UL (ref 1–4.8)
LYMPHOCYTES NFR BLD: 13.4 % (ref 18–48)
MCH RBC QN AUTO: 32.1 PG (ref 27–31)
MCHC RBC AUTO-ENTMCNC: 34.6 G/DL (ref 32–36)
MCV RBC AUTO: 93 FL (ref 82–98)
MONOCYTES # BLD AUTO: 0.5 K/UL (ref 0.3–1)
MONOCYTES NFR BLD: 6.1 % (ref 4–15)
NEUTROPHILS # BLD AUTO: 6.9 K/UL (ref 1.8–7.7)
NEUTROPHILS NFR BLD: 78.1 % (ref 38–73)
NRBC BLD-RTO: 0 /100 WBC
PLATELET # BLD AUTO: 180 K/UL (ref 150–450)
PMV BLD AUTO: 10.3 FL (ref 9.2–12.9)
POTASSIUM SERPL-SCNC: 4 MMOL/L (ref 3.5–5.1)
PROT SERPL-MCNC: 7 G/DL (ref 6–8.4)
PROTHROMBIN TIME: 11.8 SEC (ref 9–12.5)
RBC # BLD AUTO: 5.54 M/UL (ref 4.6–6.2)
SODIUM SERPL-SCNC: 140 MMOL/L (ref 136–145)
WBC # BLD AUTO: 8.79 K/UL (ref 3.9–12.7)

## 2022-06-28 PROCEDURE — 99999 PR PBB SHADOW E&M-EST. PATIENT-LVL III: ICD-10-PCS | Mod: PBBFAC,,, | Performed by: INTERNAL MEDICINE

## 2022-06-28 PROCEDURE — 1160F PR REVIEW ALL MEDS BY PRESCRIBER/CLIN PHARMACIST DOCUMENTED: ICD-10-PCS | Mod: CPTII,S$GLB,, | Performed by: INTERNAL MEDICINE

## 2022-06-28 PROCEDURE — 99999 PR PBB SHADOW E&M-EST. PATIENT-LVL III: CPT | Mod: PBBFAC,,, | Performed by: INTERNAL MEDICINE

## 2022-06-28 PROCEDURE — 3074F SYST BP LT 130 MM HG: CPT | Mod: CPTII,S$GLB,, | Performed by: INTERNAL MEDICINE

## 2022-06-28 PROCEDURE — 99215 OFFICE O/P EST HI 40 MIN: CPT | Mod: S$GLB,,, | Performed by: INTERNAL MEDICINE

## 2022-06-28 PROCEDURE — 3078F PR MOST RECENT DIASTOLIC BLOOD PRESSURE < 80 MM HG: ICD-10-PCS | Mod: CPTII,S$GLB,, | Performed by: INTERNAL MEDICINE

## 2022-06-28 PROCEDURE — 80053 COMPREHEN METABOLIC PANEL: CPT | Performed by: INTERNAL MEDICINE

## 2022-06-28 PROCEDURE — 85025 COMPLETE CBC W/AUTO DIFF WBC: CPT | Performed by: INTERNAL MEDICINE

## 2022-06-28 PROCEDURE — 1159F PR MEDICATION LIST DOCUMENTED IN MEDICAL RECORD: ICD-10-PCS | Mod: CPTII,S$GLB,, | Performed by: INTERNAL MEDICINE

## 2022-06-28 PROCEDURE — 99215 PR OFFICE/OUTPT VISIT, EST, LEVL V, 40-54 MIN: ICD-10-PCS | Mod: S$GLB,,, | Performed by: INTERNAL MEDICINE

## 2022-06-28 PROCEDURE — 85730 THROMBOPLASTIN TIME PARTIAL: CPT | Performed by: INTERNAL MEDICINE

## 2022-06-28 PROCEDURE — 3074F PR MOST RECENT SYSTOLIC BLOOD PRESSURE < 130 MM HG: ICD-10-PCS | Mod: CPTII,S$GLB,, | Performed by: INTERNAL MEDICINE

## 2022-06-28 PROCEDURE — 36415 COLL VENOUS BLD VENIPUNCTURE: CPT | Performed by: INTERNAL MEDICINE

## 2022-06-28 PROCEDURE — 85610 PROTHROMBIN TIME: CPT | Performed by: INTERNAL MEDICINE

## 2022-06-28 PROCEDURE — 1159F MED LIST DOCD IN RCRD: CPT | Mod: CPTII,S$GLB,, | Performed by: INTERNAL MEDICINE

## 2022-06-28 PROCEDURE — 3078F DIAST BP <80 MM HG: CPT | Mod: CPTII,S$GLB,, | Performed by: INTERNAL MEDICINE

## 2022-06-28 PROCEDURE — 1160F RVW MEDS BY RX/DR IN RCRD: CPT | Mod: CPTII,S$GLB,, | Performed by: INTERNAL MEDICINE

## 2022-06-28 NOTE — PROGRESS NOTES
Subjective:   Patient ID:  Bo Chase is a 77 y.o. male who presents for follow up of No chief complaint on file.      MICHAEL LLAMAS NP 5/19/2022  Mr. Bo Chase presents to the clinic for follow up after [facility] and to discuss test results. LIMA to LAD patent; SVG to mid RCA occluded, SVG to D1 95% stenosis, Graft to 1st marginal occluded. Mid LM to distal LM occluded; prox RCA occluded. He has elevated left sided filling pressures.  Echo with normal EF; diastolic dysfunction; moderate AS, mild MR; mild LAE.  He also has venous insufficiency.  he denies any pain, bleeding, or discharge from the left radial artery access site.  He is taking isosorbide mononitrate 30 mg p.o. b.i.d.; he carries nitroglycerin tablets with him wherever he goes.  He stated that he feels like he is doing pretty well.  He does experience dyspnea on exertion walking down the garner in the clinic, or carrying groceries into the house.  But he does not experience dyspnea on exertion when working out at the gym.  He denies any chest pain or palpitations.  His major complaint is low back pain which seems to have improved somewhat with current medical therapy.  He uses a walker to help him assist with ambulation due to pain in his hips and lower back.  The pain in these areas limits his walking. Cannot put on compression stockings or socks.     He denied any lightheadedness, dizziness, or near syncope.  He denied orthopnea or PND.  He does tend to have recurrent edema in the lower extremities.  The right leg tends to be larger than the left and swell more-it was the site of graft harvest.     He stated that he tends to have low potassium and takes a supplement-30-40 mEq of potassium per day.  Vitamin D was stopped due to high calcium.  Had phlebotomy for polycythemia August 2020.  He is followed by Dr. Massey for CKD.  He is seeing a pain management doctor for bilateral hip pain.  He has tried massage and chiropractic treatments for this as  well.  He sees Laure Ozuna NP for obstructive sleep apnea, and he has seen Dr. Honeycutt for this and chronic bronchitis.     He is planning low back surgery if and when he can.   He went to see his cardiologist in [place] with CV test results and his opinion.   He has been followed by Dr. Crawley in 2020 and he was seeing Dr. Elaine before that.       He has chronic diastolic heart failure class C; functional class 2-3.     LEANDRO: on CPAP.     --------------------------------------------------------------------  Dr. Elaine's note: 74 yo male, 6 months f/u.   PMH CAD, s/p CABGx4 1996, s/p last PCI SVG to OM1, in 2014, LIMA patent and  SVG to diag, Dx of DM in 2019 after steroids injection now A1c wnl, , CHFpEF, HTN, HLP, morbid obesity, sleep apnea on CPAP, hypothyroidism. Chronic lower back pain.  8/31/2016 negative nuclear stress test for ischemia.    ECho normal EF and mod LVH  Pt states that he f/u with cardiologist at [place] in the past two yr. Negative exerrcise stress test in  at [place].   Recent Dx of melanoma on nose and right chest pain. S/p chest tumor early .   Sleep with CPAP.  Chronic leg swelling.  BNP 54 and Cr stable   Pt states that no exercise due to pandemic  Worsening SOB when walking and carrying bags  Taking Torsemide 20 mg bid and Metolazone 5 mg once a week.  No chest pain.   Asa 325 mg given by Dr Bernal nephrology  No gym exercise after COVID 19 pandemic  BP LDL and BS controlled  Plan: CONTINUE TORSEMIDE 20 MG BID AND METOLAZONE 2.5 MG TWICE A WEEK  Daily weight. Please call the office if gain 3 pounds in 1 day or 5 pounds in 1 week.  Fluid restriction 1.5 liters a day  Na< 2 gm  CONTINUE ASa lipitor, lopressor losartan imdur and Aldactone   D/c KCL and repeat BMP I n 4 weeks for K level  Counseled DASH  Check Lipid profile in 6 months  Recommend heart-healthy diet, weight control and regular exercise.  Ion. Risk modification.   RTC in 6  months  ----------------------------------------------------------     Medications: he is not missing any doses.  He is taking torsemide 20 mg p.o. b.i.d., potassium 10mEq QID; spironolactone 25 mg p.o. q.day, and metolazone 5 mg p.o. q.week.  He is taking metoprolol tartrate 25 mg p.o. b.i.d. He is taking isosorbide mononitrate 30 mg p.o. BID, atorvastatin 40 mg p.o. q.day and Losartan 25mg QD. He stopped ASA and trental and all of his supplements and vitamins in anticipation of surgery.  Sodium: he does not add salt to foods at the table,  he is reading labels for sodium content. he does eat salty foods; sausage in the morning; ham sandwich a few times/week; sometimes with cheese.  Exercise: he Rides a stationary bike 3 days per week and uses light weights; it is not associated with chest pain or SOB. But he does pace himself while lifting weights; he rests between sets.  Tobacco: denies previous or current tobacco use   Alcohol:  Occasional beer      Weight: 123.1 kg (271 lb 6.4 oz) he states that his daily weights have decreased.Body mass index is 42.51 kg/m².  He monitors daily weights.  Last visit in cardiology clinic on April 21, 2022, he weighed 272# 14.4 ounces.      Wt Readings from Last 3 Encounters:   05/19/22 123.1 kg (271 lb 6.4 oz)   05/11/22 120.2 kg (265 lb)   05/11/22 120.2 kg (265 lb)      BP log: None. He does not have a BP monitor at home. He checks it at pharmacy from time to time.     6/28/2022  HERE AT THE REQUEST OF Dr Browne TO DISCUSS INTERVENTION OF SVG GRAFT TO OM. HE IS SYMPTOMATIC WHENEVER HE WALKS And sees his walker gets groceries he goes to health club no symptoms. He has significant svg stenopsis.   HE IS COMPLIANT WITH MEDS. USES CPAP REGULARILY. HA SNO PALPITATION HA SLEG SWELLING.  AND VENOUS INSUFFICIENCY   Past Medical History:   Diagnosis Date    Acute coronary syndrome     CHF (congestive heart failure)     Chronic kidney disease     Coronary artery disease     Heart  murmur     Hyperlipidemia     Hypertension     Lumbar spondylosis     Sleep apnea        Past Surgical History:   Procedure Laterality Date    back surgary      CARDIAC CATHETERIZATION  3/6/2012    CORONARY ANGIOPLASTY      CORONARY ARTERY BYPASS GRAFT  1996    x4    CORONARY BYPASS GRAFT ANGIOGRAPHY  5/9/2022    Procedure: Bypass graft study;  Surgeon: Cornelius Browne MD;  Location: [facility] CATH LAB;  Service: Cardiology;;    LEFT HEART CATHETERIZATION Left 5/9/2022    Procedure: CATHETERIZATION, HEART, LEFT;  Surgeon: Cornelius Browne MD;  Location: [facility] CATH LAB;  Service: Cardiology;  Laterality: Left;    throid lobectomy  4/2012    TOTAL HIP ARTHROPLASTY Bilateral     umbilicial hernia         Social History     Tobacco Use    Smoking status: Never Smoker    Smokeless tobacco: Never Used   Substance Use Topics    Alcohol use: Yes     Comment: 6 BEERS A YEAR     Drug use: No       Family History   Problem Relation Age of Onset    Heart disease Father        Current Outpatient Medications   Medication Sig    allopurinoL (ZYLOPRIM) 300 MG tablet Take 1.5 tablets (450 mg total) by mouth once daily.    ascorbic acid, vitamin C, (VITAMIN C) 500 MG tablet Take 500 mg by mouth once daily.    aspirin 325 MG tablet Take 325 mg by mouth once daily.    atorvastatin (LIPITOR) 40 MG tablet Take 1 tablet (40 mg total) by mouth once daily.    cetirizine (ZYRTEC) 10 MG tablet cetirizine 10 mg tablet   Take 1 tablet every day by oral route.    docusate sodium (COLACE) 100 MG capsule Take 100 mg by mouth.    DULoxetine (CYMBALTA) 30 MG capsule Take 30 mg by mouth.    dutasteride (AVODART) 0.5 mg capsule     fluticasone (FLONASE) 50 mcg/actuation nasal spray 1 spray (50 mcg total) by Each Nare route once daily.    HYDROcodone-acetaminophen (NORCO)  mg per tablet Take 1 tablet by mouth every 6 (six) hours as needed.    isosorbide mononitrate (IMDUR) 60 MG 24 hr tablet Take 1 tablet (60 mg  total) by mouth once daily.    levothyroxine (SYNTHROID) 100 MCG tablet TAKE 1 TABLET EVERY DAY BEFORE BREAKFAST    losartan (COZAAR) 25 MG tablet Take 1 tablet (25 mg total) by mouth once daily.    metOLazone (ZAROXOLYN) 5 MG tablet Take 10 mg by mouth once daily.     metoprolol tartrate (LOPRESSOR) 25 MG tablet Take 1 tablet (25 mg total) by mouth 2 (two) times daily. (Patient taking differently: Take 25 mg by mouth once daily at 6am.)    multivitamin (THERAGRAN) per tablet Take 1 tablet by mouth once daily.    nitroGLYCERIN (NITROSTAT) 0.4 MG SL tablet Place 1 tablet (0.4 mg total) under the tongue every 5 (five) minutes as needed for Chest pain.    omega-3 fatty acids/fish oil (FISH OIL-OMEGA-3 FATTY ACIDS) 300-1,000 mg capsule Take 1 capsule by mouth once daily.    pentoxifylline (TRENTAL) 400 mg TbSR Take 1 tablet (400 mg total) by mouth 3 (three) times daily with meals.    potassium chloride SA (K-DUR,KLOR-CON) 10 MEQ tablet Take 1 tablet (10 mEq total) by mouth 4 (four) times daily. Pt states he takes 3-4 tablets once daily    spironolactone (ALDACTONE) 25 MG tablet Take 1 tablet (25 mg total) by mouth once daily.    albuterol (PROVENTIL/VENTOLIN HFA) 90 mcg/actuation inhaler Inhale 1-2 puffs into the lungs every 6 (six) hours as needed for Wheezing. Rescue (Patient not taking: No sig reported)    blood sugar diagnostic (TRUE METRIX GLUCOSE TEST STRIP) Strp 1 strip.    ipratropium (ATROVENT) 42 mcg (0.06 %) nasal spray 2 sprays by Nasal route.    ketoconazole (NIZORAL) 2 % cream Apply topically 2 (two) times daily.    lancets Misc Use daily    tamsulosin (FLOMAX) 0.4 mg Cap Take 1 capsule by mouth once daily.    torsemide (DEMADEX) 20 MG Tab Take 1 tablet (20 mg total) by mouth 2 (two) times a day.    TRUE METRIX GO GLUCOSE METER Misc USE TO CHECK GLUCOSE ONCE DAILY AS DIRECTED     No current facility-administered medications for this visit.     Current Outpatient Medications on File Prior  to Visit   Medication Sig    allopurinoL (ZYLOPRIM) 300 MG tablet Take 1.5 tablets (450 mg total) by mouth once daily.    ascorbic acid, vitamin C, (VITAMIN C) 500 MG tablet Take 500 mg by mouth once daily.    aspirin 325 MG tablet Take 325 mg by mouth once daily.    atorvastatin (LIPITOR) 40 MG tablet Take 1 tablet (40 mg total) by mouth once daily.    cetirizine (ZYRTEC) 10 MG tablet cetirizine 10 mg tablet   Take 1 tablet every day by oral route.    docusate sodium (COLACE) 100 MG capsule Take 100 mg by mouth.    DULoxetine (CYMBALTA) 30 MG capsule Take 30 mg by mouth.    dutasteride (AVODART) 0.5 mg capsule     fluticasone (FLONASE) 50 mcg/actuation nasal spray 1 spray (50 mcg total) by Each Nare route once daily.    HYDROcodone-acetaminophen (NORCO)  mg per tablet Take 1 tablet by mouth every 6 (six) hours as needed.    isosorbide mononitrate (IMDUR) 60 MG 24 hr tablet Take 1 tablet (60 mg total) by mouth once daily.    levothyroxine (SYNTHROID) 100 MCG tablet TAKE 1 TABLET EVERY DAY BEFORE BREAKFAST    losartan (COZAAR) 25 MG tablet Take 1 tablet (25 mg total) by mouth once daily.    metOLazone (ZAROXOLYN) 5 MG tablet Take 10 mg by mouth once daily.     metoprolol tartrate (LOPRESSOR) 25 MG tablet Take 1 tablet (25 mg total) by mouth 2 (two) times daily. (Patient taking differently: Take 25 mg by mouth once daily at 6am.)    multivitamin (THERAGRAN) per tablet Take 1 tablet by mouth once daily.    nitroGLYCERIN (NITROSTAT) 0.4 MG SL tablet Place 1 tablet (0.4 mg total) under the tongue every 5 (five) minutes as needed for Chest pain.    omega-3 fatty acids/fish oil (FISH OIL-OMEGA-3 FATTY ACIDS) 300-1,000 mg capsule Take 1 capsule by mouth once daily.    pentoxifylline (TRENTAL) 400 mg TbSR Take 1 tablet (400 mg total) by mouth 3 (three) times daily with meals.    potassium chloride SA (K-DUR,KLOR-CON) 10 MEQ tablet Take 1 tablet (10 mEq total) by mouth 4 (four) times daily. Pt states  he takes 3-4 tablets once daily    spironolactone (ALDACTONE) 25 MG tablet Take 1 tablet (25 mg total) by mouth once daily.    albuterol (PROVENTIL/VENTOLIN HFA) 90 mcg/actuation inhaler Inhale 1-2 puffs into the lungs every 6 (six) hours as needed for Wheezing. Rescue (Patient not taking: No sig reported)    blood sugar diagnostic (TRUE METRIX GLUCOSE TEST STRIP) Strp 1 strip.    ipratropium (ATROVENT) 42 mcg (0.06 %) nasal spray 2 sprays by Nasal route.    ketoconazole (NIZORAL) 2 % cream Apply topically 2 (two) times daily.    lancets Misc Use daily    tamsulosin (FLOMAX) 0.4 mg Cap Take 1 capsule by mouth once daily.    torsemide (DEMADEX) 20 MG Tab Take 1 tablet (20 mg total) by mouth 2 (two) times a day.    TRUE METRIX GO GLUCOSE METER Misc USE TO CHECK GLUCOSE ONCE DAILY AS DIRECTED     No current facility-administered medications on file prior to visit.     Review of patient's allergies indicates:   Allergen Reactions    Cat/feline products Itching    Ciprofloxacin Other (See Comments)     Foggy, drowsy  Other reaction(s): Other (See Comments), Other (See Comments)    Latex      Other reaction(s): Swelling    Sulfa (sulfonamide antibiotics)      Other reaction(s): Flushing (skin)     Review of Systems   Constitutional: Negative for malaise/fatigue.   HENT: Negative for ear discharge.    Eyes: Negative for blurred vision.   Cardiovascular: Positive for dyspnea on exertion and leg swelling. Negative for chest pain, claudication, cyanosis, irregular heartbeat, near-syncope, orthopnea, palpitations and paroxysmal nocturnal dyspnea.   Respiratory: Positive for shortness of breath. Negative for cough and hemoptysis.    Hematologic/Lymphatic: Negative for bleeding problem. Does not bruise/bleed easily.   Skin: Positive for color change. Negative for dry skin and itching.   Musculoskeletal: Negative for falls, muscle weakness and myalgias.   Gastrointestinal: Negative for abdominal pain, diarrhea,  heartburn, hematemesis, hematochezia and melena.   Genitourinary: Negative for flank pain and hematuria.   Neurological: Negative for dizziness, focal weakness, headaches, light-headedness, numbness, paresthesias, seizures and weakness.   Psychiatric/Behavioral: Negative for altered mental status and memory loss. The patient is not nervous/anxious.    Allergic/Immunologic: Negative for hives.       Objective:   Physical Exam  Vitals and nursing note reviewed.   Constitutional:       General: He is not in acute distress.     Appearance: He is well-developed. He is not diaphoretic.   HENT:      Head: Normocephalic and atraumatic.   Eyes:      General:         Right eye: No discharge.         Left eye: No discharge.      Pupils: Pupils are equal, round, and reactive to light.   Neck:      Thyroid: No thyromegaly.      Vascular: No JVD.   Cardiovascular:      Rate and Rhythm: Normal rate and regular rhythm.      Pulses: Normal pulses and intact distal pulses.      Heart sounds: Murmur heard.    Harsh midsystolic murmur is present with a grade of 2/6 at the upper right sternal border radiating to the neck.    No friction rub. No gallop.   Pulmonary:      Effort: Pulmonary effort is normal. No respiratory distress.      Breath sounds: Normal breath sounds. No wheezing or rales.      Comments: SCAR WELL HEALED.   Chest:      Chest wall: No tenderness.   Abdominal:      General: Bowel sounds are normal. There is no distension.      Palpations: Abdomen is soft.      Tenderness: There is no abdominal tenderness.   Musculoskeletal:      Cervical back: Neck supple.      Right lower leg: Edema present.      Left lower leg: Edema present.      Comments: CHRONIC VENOUS STASIS CHANGES   Skin:     General: Skin is warm and dry.      Findings: No erythema or rash.   Neurological:      Mental Status: He is alert and oriented to person, place, and time.      Cranial Nerves: No cranial nerve deficit.   Psychiatric:         Behavior:  Behavior normal.         Judgment: Judgment normal.       Vitals:    06/28/22 1509 06/28/22 1510   BP: 122/76 107/66   BP Location: Right arm Left arm   Patient Position: Sitting Sitting   BP Method: Medium (Manual) Medium (Manual)   Pulse: 94 77   SpO2: 96%    Weight: 123.8 kg (272 lb 14.9 oz)      Lab Results   Component Value Date    CHOL 176 05/20/2022    CHOL 134 04/08/2021    CHOL 117 (L) 01/17/2020     Lab Results   Component Value Date    HDL 31 (L) 05/20/2022    HDL 25 (L) 04/08/2021    HDL 27 (L) 01/17/2020     Lab Results   Component Value Date    LDLCALC 78.4 05/20/2022    LDLCALC 78.0 04/08/2021    LDLCALC 56.4 (L) 01/17/2020     Lab Results   Component Value Date    TRIG 333 (H) 05/20/2022    TRIG 155 (H) 04/08/2021    TRIG 168 (H) 01/17/2020     Lab Results   Component Value Date    CHOLHDL 17.6 (L) 05/20/2022    CHOLHDL 18.7 (L) 04/08/2021    CHOLHDL 23.1 01/17/2020       Chemistry        Component Value Date/Time     05/20/2022 1024    K 3.5 05/20/2022 1024    CL 92 (L) 05/20/2022 1024    CO2 29 05/20/2022 1024    BUN 30 (H) 05/20/2022 1024    CREATININE 1.7 (H) 05/20/2022 1024     (H) 05/20/2022 1024        Component Value Date/Time    CALCIUM 10.3 05/20/2022 1024    ALKPHOS 80 05/20/2022 1024    AST 39 05/20/2022 1024    ALT 64 (H) 05/20/2022 1024    BILITOT 1.2 (H) 05/20/2022 1024    ESTGFRAFRICA 44.3 (A) 05/20/2022 1024    EGFRNONAA 38.3 (A) 05/20/2022 1024          Lab Results   Component Value Date    TSH 1.321 07/12/2017     Lab Results   Component Value Date    INR 1.2 05/05/2022    INR 1.1 07/25/2016    INR 1.1 12/19/2014     Lab Results   Component Value Date    WBC 9.19 05/05/2022    HGB 18.2 (H) 05/05/2022    HCT 54.1 (H) 05/05/2022    MCV 91 05/05/2022     05/05/2022     BMP  Sodium   Date Value Ref Range Status   05/20/2022 137 136 - 145 mmol/L Final     Potassium   Date Value Ref Range Status   05/20/2022 3.5 3.5 - 5.1 mmol/L Final     Chloride   Date Value Ref  Range Status   05/20/2022 92 (L) 95 - 110 mmol/L Final     CO2   Date Value Ref Range Status   05/20/2022 29 23 - 29 mmol/L Final     BUN   Date Value Ref Range Status   05/20/2022 30 (H) 8 - 23 mg/dL Final     Creatinine   Date Value Ref Range Status   05/20/2022 1.7 (H) 0.5 - 1.4 mg/dL Final     Calcium   Date Value Ref Range Status   05/20/2022 10.3 8.7 - 10.5 mg/dL Final     Anion Gap   Date Value Ref Range Status   05/20/2022 16 8 - 16 mmol/L Final     eGFR if    Date Value Ref Range Status   05/20/2022 44.3 (A) >60 mL/min/1.73 m^2 Final     eGFR if non    Date Value Ref Range Status   05/20/2022 38.3 (A) >60 mL/min/1.73 m^2 Final     Comment:     Calculation used to obtain the estimated glomerular filtration  rate (eGFR) is the CKD-EPI equation.            Assessment:     1. Coronary artery disease of bypass graft of native heart with stable angina pectoris    2. S/P coronary artery stent placement    3. Stage 3a chronic kidney disease    4. Hypercalcemia    5. Acquired polycythemia    6. Hypoxemic polycythemia    7. S/P CABG x 4    8. Hyperlipidemia with target LDL less than 70    9. Primary hypertension    10. Chronic diastolic congestive heart failure    11. Morbid obesity with BMI of 40.0-44.9, adult    12. Secondary hyperparathyroidism of renal origin    13. LEANDRO on CPAP      MULTIFACTORIAL ETIOLOGY FOR SHORTNESS OF BREATH INCLUDING ISCHEMIA FROM SVG GRAFT AS WELL AS BODY HABITUS SLEEP APNEA. HE DESERVES AN INTERVENTION  ON HIS SVG OPTIMIZE MEDICAL THERAPY TO ADDRESS DIASTOLIC DYSFUNCTION. NEVER HAD RT HEART CATH EH WILL BENEFIT FROM HAVING A RT HEART CATH AT THE TIME OF INTERVENTION.   I HAVE REVIEWED HIS ANGIOS WITH HIM. AS WELLA S HIS 2104 FILMS  HAS ALSO MODERATE AORTIC STENOSIS CONTRIBUTING TO SYMPTOMS NEED R/O PULM,ONARY HTN Also, . RHC TO R/O PULMONARY HTN.     ATTEMPT AT PTCA OF SVG TO OM./ NEED LASER DISTAL EMBOLIZATION PROTECTION LEFT . RADIAL.APPROACH.ALSO  I have  explained the risks, benefits , and alternatives of the procedure in detail.the patient voices understanding and all questions have been answered.the patient agrees to proceed as planned.

## 2022-06-28 NOTE — TELEPHONE ENCOUNTER
Telephoned patient to discuss/confirm heart cath with Dr. Joel on 7/11/22 at 9:30am with 8am arrival time.  Patient stated Lab work completed today and that he's seeing Cardiac Surgeon on 7/5/22.

## 2022-06-28 NOTE — TELEPHONE ENCOUNTER
Message sent to Dr. Joel--Appt made for pt to see him in clinic 6/28/22.    ----- Message from Cornelius Browne MD sent at 6/26/2022 10:55 AM CDT -----  Contact: pt  Appt with Dr. Joel to discuss high risk percutaneous intervention  ----- Message -----  From: Leda James LPN  Sent: 6/24/2022   2:06 PM CDT  To: Cornelius Browne MD    Pt requesting to be contacted about what the plan is, regarding his stent. Please advise.  ----- Message -----  From: Yamini Sam  Sent: 6/24/2022   1:54 PM CDT  To: Fab Breaux Staff    The pt request a return call, no additional info given and can be reached at [phone]///[id number]

## 2022-06-28 NOTE — TELEPHONE ENCOUNTER
This pt needs to be set up with left and right heart cath on mon or tues this coming up week per dr zambrano. The pt has left the floor so you will need to contact the pt with date and time. Thanks pb

## 2022-06-28 NOTE — H&P (VIEW-ONLY)
Subjective:   Patient ID:  Bo Chase is a 77 y.o. male who presents for follow up of No chief complaint on file.      MICHAEL LLAMAS NP 5/19/2022  Mr. Bo Chase presents to the clinic for follow up after [facility] and to discuss test results. LIMA to LAD patent; SVG to mid RCA occluded, SVG to D1 95% stenosis, Graft to 1st marginal occluded. Mid LM to distal LM occluded; prox RCA occluded. He has elevated left sided filling pressures.  Echo with normal EF; diastolic dysfunction; moderate AS, mild MR; mild LAE.  He also has venous insufficiency.  he denies any pain, bleeding, or discharge from the left radial artery access site.  He is taking isosorbide mononitrate 30 mg p.o. b.i.d.; he carries nitroglycerin tablets with him wherever he goes.  He stated that he feels like he is doing pretty well.  He does experience dyspnea on exertion walking down the garner in the clinic, or carrying groceries into the house.  But he does not experience dyspnea on exertion when working out at the gym.  He denies any chest pain or palpitations.  His major complaint is low back pain which seems to have improved somewhat with current medical therapy.  He uses a walker to help him assist with ambulation due to pain in his hips and lower back.  The pain in these areas limits his walking. Cannot put on compression stockings or socks.     He denied any lightheadedness, dizziness, or near syncope.  He denied orthopnea or PND.  He does tend to have recurrent edema in the lower extremities.  The right leg tends to be larger than the left and swell more-it was the site of graft harvest.     He stated that he tends to have low potassium and takes a supplement-30-40 mEq of potassium per day.  Vitamin D was stopped due to high calcium.  Had phlebotomy for polycythemia August 2020.  He is followed by Dr. Massey for CKD.  He is seeing a pain management doctor for bilateral hip pain.  He has tried massage and chiropractic treatments for this as  well.  He sees Laure Ozuna NP for obstructive sleep apnea, and he has seen Dr. Honeycutt for this and chronic bronchitis.     He is planning low back surgery if and when he can.   He went to see his cardiologist in [place] with CV test results and his opinion.   He has been followed by Dr. Crawley in 2020 and he was seeing Dr. Elaine before that.       He has chronic diastolic heart failure class C; functional class 2-3.     LEANDRO: on CPAP.     --------------------------------------------------------------------  Dr. Elaine's note: 76 yo male, 6 months f/u.   PMH CAD, s/p CABGx4 1996, s/p last PCI SVG to OM1, in 2014, LIMA patent and  SVG to diag, Dx of DM in 2019 after steroids injection now A1c wnl, , CHFpEF, HTN, HLP, morbid obesity, sleep apnea on CPAP, hypothyroidism. Chronic lower back pain.  8/31/2016 negative nuclear stress test for ischemia.    ECho normal EF and mod LVH  Pt states that he f/u with cardiologist at [place] in the past two yr. Negative exerrcise stress test in  at [place].   Recent Dx of melanoma on nose and right chest pain. S/p chest tumor early .   Sleep with CPAP.  Chronic leg swelling.  BNP 54 and Cr stable   Pt states that no exercise due to pandemic  Worsening SOB when walking and carrying bags  Taking Torsemide 20 mg bid and Metolazone 5 mg once a week.  No chest pain.   Asa 325 mg given by Dr Bernal nephrology  No gym exercise after COVID 19 pandemic  BP LDL and BS controlled  Plan: CONTINUE TORSEMIDE 20 MG BID AND METOLAZONE 2.5 MG TWICE A WEEK  Daily weight. Please call the office if gain 3 pounds in 1 day or 5 pounds in 1 week.  Fluid restriction 1.5 liters a day  Na< 2 gm  CONTINUE ASa lipitor, lopressor losartan imdur and Aldactone   D/c KCL and repeat BMP I n 4 weeks for K level  Counseled DASH  Check Lipid profile in 6 months  Recommend heart-healthy diet, weight control and regular exercise.  Ion. Risk modification.   RTC in 6  months  ----------------------------------------------------------     Medications: he is not missing any doses.  He is taking torsemide 20 mg p.o. b.i.d., potassium 10mEq QID; spironolactone 25 mg p.o. q.day, and metolazone 5 mg p.o. q.week.  He is taking metoprolol tartrate 25 mg p.o. b.i.d. He is taking isosorbide mononitrate 30 mg p.o. BID, atorvastatin 40 mg p.o. q.day and Losartan 25mg QD. He stopped ASA and trental and all of his supplements and vitamins in anticipation of surgery.  Sodium: he does not add salt to foods at the table,  he is reading labels for sodium content. he does eat salty foods; sausage in the morning; ham sandwich a few times/week; sometimes with cheese.  Exercise: he Rides a stationary bike 3 days per week and uses light weights; it is not associated with chest pain or SOB. But he does pace himself while lifting weights; he rests between sets.  Tobacco: denies previous or current tobacco use   Alcohol:  Occasional beer      Weight: 123.1 kg (271 lb 6.4 oz) he states that his daily weights have decreased.Body mass index is 42.51 kg/m².  He monitors daily weights.  Last visit in cardiology clinic on April 21, 2022, he weighed 272# 14.4 ounces.      Wt Readings from Last 3 Encounters:   05/19/22 123.1 kg (271 lb 6.4 oz)   05/11/22 120.2 kg (265 lb)   05/11/22 120.2 kg (265 lb)      BP log: None. He does not have a BP monitor at home. He checks it at pharmacy from time to time.     6/28/2022  HERE AT THE REQUEST OF Dr Browne TO DISCUSS INTERVENTION OF SVG GRAFT TO OM. HE IS SYMPTOMATIC WHENEVER HE WALKS And sees his walker gets groceries he goes to health club no symptoms. He has significant svg stenopsis.   HE IS COMPLIANT WITH MEDS. USES CPAP REGULARILY. HA SNO PALPITATION HA SLEG SWELLING.  AND VENOUS INSUFFICIENCY   Past Medical History:   Diagnosis Date    Acute coronary syndrome     CHF (congestive heart failure)     Chronic kidney disease     Coronary artery disease     Heart  murmur     Hyperlipidemia     Hypertension     Lumbar spondylosis     Sleep apnea        Past Surgical History:   Procedure Laterality Date    back surgary      CARDIAC CATHETERIZATION  3/6/2012    CORONARY ANGIOPLASTY      CORONARY ARTERY BYPASS GRAFT  1996    x4    CORONARY BYPASS GRAFT ANGIOGRAPHY  5/9/2022    Procedure: Bypass graft study;  Surgeon: Cornelius Browne MD;  Location: [facility] CATH LAB;  Service: Cardiology;;    LEFT HEART CATHETERIZATION Left 5/9/2022    Procedure: CATHETERIZATION, HEART, LEFT;  Surgeon: Cornelius Browne MD;  Location: [facility] CATH LAB;  Service: Cardiology;  Laterality: Left;    throid lobectomy  4/2012    TOTAL HIP ARTHROPLASTY Bilateral     umbilicial hernia         Social History     Tobacco Use    Smoking status: Never Smoker    Smokeless tobacco: Never Used   Substance Use Topics    Alcohol use: Yes     Comment: 6 BEERS A YEAR     Drug use: No       Family History   Problem Relation Age of Onset    Heart disease Father        Current Outpatient Medications   Medication Sig    allopurinoL (ZYLOPRIM) 300 MG tablet Take 1.5 tablets (450 mg total) by mouth once daily.    ascorbic acid, vitamin C, (VITAMIN C) 500 MG tablet Take 500 mg by mouth once daily.    aspirin 325 MG tablet Take 325 mg by mouth once daily.    atorvastatin (LIPITOR) 40 MG tablet Take 1 tablet (40 mg total) by mouth once daily.    cetirizine (ZYRTEC) 10 MG tablet cetirizine 10 mg tablet   Take 1 tablet every day by oral route.    docusate sodium (COLACE) 100 MG capsule Take 100 mg by mouth.    DULoxetine (CYMBALTA) 30 MG capsule Take 30 mg by mouth.    dutasteride (AVODART) 0.5 mg capsule     fluticasone (FLONASE) 50 mcg/actuation nasal spray 1 spray (50 mcg total) by Each Nare route once daily.    HYDROcodone-acetaminophen (NORCO)  mg per tablet Take 1 tablet by mouth every 6 (six) hours as needed.    isosorbide mononitrate (IMDUR) 60 MG 24 hr tablet Take 1 tablet (60 mg  total) by mouth once daily.    levothyroxine (SYNTHROID) 100 MCG tablet TAKE 1 TABLET EVERY DAY BEFORE BREAKFAST    losartan (COZAAR) 25 MG tablet Take 1 tablet (25 mg total) by mouth once daily.    metOLazone (ZAROXOLYN) 5 MG tablet Take 10 mg by mouth once daily.     metoprolol tartrate (LOPRESSOR) 25 MG tablet Take 1 tablet (25 mg total) by mouth 2 (two) times daily. (Patient taking differently: Take 25 mg by mouth once daily at 6am.)    multivitamin (THERAGRAN) per tablet Take 1 tablet by mouth once daily.    nitroGLYCERIN (NITROSTAT) 0.4 MG SL tablet Place 1 tablet (0.4 mg total) under the tongue every 5 (five) minutes as needed for Chest pain.    omega-3 fatty acids/fish oil (FISH OIL-OMEGA-3 FATTY ACIDS) 300-1,000 mg capsule Take 1 capsule by mouth once daily.    pentoxifylline (TRENTAL) 400 mg TbSR Take 1 tablet (400 mg total) by mouth 3 (three) times daily with meals.    potassium chloride SA (K-DUR,KLOR-CON) 10 MEQ tablet Take 1 tablet (10 mEq total) by mouth 4 (four) times daily. Pt states he takes 3-4 tablets once daily    spironolactone (ALDACTONE) 25 MG tablet Take 1 tablet (25 mg total) by mouth once daily.    albuterol (PROVENTIL/VENTOLIN HFA) 90 mcg/actuation inhaler Inhale 1-2 puffs into the lungs every 6 (six) hours as needed for Wheezing. Rescue (Patient not taking: No sig reported)    blood sugar diagnostic (TRUE METRIX GLUCOSE TEST STRIP) Strp 1 strip.    ipratropium (ATROVENT) 42 mcg (0.06 %) nasal spray 2 sprays by Nasal route.    ketoconazole (NIZORAL) 2 % cream Apply topically 2 (two) times daily.    lancets Misc Use daily    tamsulosin (FLOMAX) 0.4 mg Cap Take 1 capsule by mouth once daily.    torsemide (DEMADEX) 20 MG Tab Take 1 tablet (20 mg total) by mouth 2 (two) times a day.    TRUE METRIX GO GLUCOSE METER Misc USE TO CHECK GLUCOSE ONCE DAILY AS DIRECTED     No current facility-administered medications for this visit.     Current Outpatient Medications on File Prior  to Visit   Medication Sig    allopurinoL (ZYLOPRIM) 300 MG tablet Take 1.5 tablets (450 mg total) by mouth once daily.    ascorbic acid, vitamin C, (VITAMIN C) 500 MG tablet Take 500 mg by mouth once daily.    aspirin 325 MG tablet Take 325 mg by mouth once daily.    atorvastatin (LIPITOR) 40 MG tablet Take 1 tablet (40 mg total) by mouth once daily.    cetirizine (ZYRTEC) 10 MG tablet cetirizine 10 mg tablet   Take 1 tablet every day by oral route.    docusate sodium (COLACE) 100 MG capsule Take 100 mg by mouth.    DULoxetine (CYMBALTA) 30 MG capsule Take 30 mg by mouth.    dutasteride (AVODART) 0.5 mg capsule     fluticasone (FLONASE) 50 mcg/actuation nasal spray 1 spray (50 mcg total) by Each Nare route once daily.    HYDROcodone-acetaminophen (NORCO)  mg per tablet Take 1 tablet by mouth every 6 (six) hours as needed.    isosorbide mononitrate (IMDUR) 60 MG 24 hr tablet Take 1 tablet (60 mg total) by mouth once daily.    levothyroxine (SYNTHROID) 100 MCG tablet TAKE 1 TABLET EVERY DAY BEFORE BREAKFAST    losartan (COZAAR) 25 MG tablet Take 1 tablet (25 mg total) by mouth once daily.    metOLazone (ZAROXOLYN) 5 MG tablet Take 10 mg by mouth once daily.     metoprolol tartrate (LOPRESSOR) 25 MG tablet Take 1 tablet (25 mg total) by mouth 2 (two) times daily. (Patient taking differently: Take 25 mg by mouth once daily at 6am.)    multivitamin (THERAGRAN) per tablet Take 1 tablet by mouth once daily.    nitroGLYCERIN (NITROSTAT) 0.4 MG SL tablet Place 1 tablet (0.4 mg total) under the tongue every 5 (five) minutes as needed for Chest pain.    omega-3 fatty acids/fish oil (FISH OIL-OMEGA-3 FATTY ACIDS) 300-1,000 mg capsule Take 1 capsule by mouth once daily.    pentoxifylline (TRENTAL) 400 mg TbSR Take 1 tablet (400 mg total) by mouth 3 (three) times daily with meals.    potassium chloride SA (K-DUR,KLOR-CON) 10 MEQ tablet Take 1 tablet (10 mEq total) by mouth 4 (four) times daily. Pt states  he takes 3-4 tablets once daily    spironolactone (ALDACTONE) 25 MG tablet Take 1 tablet (25 mg total) by mouth once daily.    albuterol (PROVENTIL/VENTOLIN HFA) 90 mcg/actuation inhaler Inhale 1-2 puffs into the lungs every 6 (six) hours as needed for Wheezing. Rescue (Patient not taking: No sig reported)    blood sugar diagnostic (TRUE METRIX GLUCOSE TEST STRIP) Strp 1 strip.    ipratropium (ATROVENT) 42 mcg (0.06 %) nasal spray 2 sprays by Nasal route.    ketoconazole (NIZORAL) 2 % cream Apply topically 2 (two) times daily.    lancets Misc Use daily    tamsulosin (FLOMAX) 0.4 mg Cap Take 1 capsule by mouth once daily.    torsemide (DEMADEX) 20 MG Tab Take 1 tablet (20 mg total) by mouth 2 (two) times a day.    TRUE METRIX GO GLUCOSE METER Misc USE TO CHECK GLUCOSE ONCE DAILY AS DIRECTED     No current facility-administered medications on file prior to visit.     Review of patient's allergies indicates:   Allergen Reactions    Cat/feline products Itching    Ciprofloxacin Other (See Comments)     Foggy, drowsy  Other reaction(s): Other (See Comments), Other (See Comments)    Latex      Other reaction(s): Swelling    Sulfa (sulfonamide antibiotics)      Other reaction(s): Flushing (skin)     Review of Systems   Constitutional: Negative for malaise/fatigue.   HENT: Negative for ear discharge.    Eyes: Negative for blurred vision.   Cardiovascular: Positive for dyspnea on exertion and leg swelling. Negative for chest pain, claudication, cyanosis, irregular heartbeat, near-syncope, orthopnea, palpitations and paroxysmal nocturnal dyspnea.   Respiratory: Positive for shortness of breath. Negative for cough and hemoptysis.    Hematologic/Lymphatic: Negative for bleeding problem. Does not bruise/bleed easily.   Skin: Positive for color change. Negative for dry skin and itching.   Musculoskeletal: Negative for falls, muscle weakness and myalgias.   Gastrointestinal: Negative for abdominal pain, diarrhea,  heartburn, hematemesis, hematochezia and melena.   Genitourinary: Negative for flank pain and hematuria.   Neurological: Negative for dizziness, focal weakness, headaches, light-headedness, numbness, paresthesias, seizures and weakness.   Psychiatric/Behavioral: Negative for altered mental status and memory loss. The patient is not nervous/anxious.    Allergic/Immunologic: Negative for hives.       Objective:   Physical Exam  Vitals and nursing note reviewed.   Constitutional:       General: He is not in acute distress.     Appearance: He is well-developed. He is not diaphoretic.   HENT:      Head: Normocephalic and atraumatic.   Eyes:      General:         Right eye: No discharge.         Left eye: No discharge.      Pupils: Pupils are equal, round, and reactive to light.   Neck:      Thyroid: No thyromegaly.      Vascular: No JVD.   Cardiovascular:      Rate and Rhythm: Normal rate and regular rhythm.      Pulses: Normal pulses and intact distal pulses.      Heart sounds: Murmur heard.    Harsh midsystolic murmur is present with a grade of 2/6 at the upper right sternal border radiating to the neck.    No friction rub. No gallop.   Pulmonary:      Effort: Pulmonary effort is normal. No respiratory distress.      Breath sounds: Normal breath sounds. No wheezing or rales.      Comments: SCAR WELL HEALED.   Chest:      Chest wall: No tenderness.   Abdominal:      General: Bowel sounds are normal. There is no distension.      Palpations: Abdomen is soft.      Tenderness: There is no abdominal tenderness.   Musculoskeletal:      Cervical back: Neck supple.      Right lower leg: Edema present.      Left lower leg: Edema present.      Comments: CHRONIC VENOUS STASIS CHANGES   Skin:     General: Skin is warm and dry.      Findings: No erythema or rash.   Neurological:      Mental Status: He is alert and oriented to person, place, and time.      Cranial Nerves: No cranial nerve deficit.   Psychiatric:         Behavior:  Behavior normal.         Judgment: Judgment normal.       Vitals:    06/28/22 1509 06/28/22 1510   BP: 122/76 107/66   BP Location: Right arm Left arm   Patient Position: Sitting Sitting   BP Method: Medium (Manual) Medium (Manual)   Pulse: 94 77   SpO2: 96%    Weight: 123.8 kg (272 lb 14.9 oz)      Lab Results   Component Value Date    CHOL 176 05/20/2022    CHOL 134 04/08/2021    CHOL 117 (L) 01/17/2020     Lab Results   Component Value Date    HDL 31 (L) 05/20/2022    HDL 25 (L) 04/08/2021    HDL 27 (L) 01/17/2020     Lab Results   Component Value Date    LDLCALC 78.4 05/20/2022    LDLCALC 78.0 04/08/2021    LDLCALC 56.4 (L) 01/17/2020     Lab Results   Component Value Date    TRIG 333 (H) 05/20/2022    TRIG 155 (H) 04/08/2021    TRIG 168 (H) 01/17/2020     Lab Results   Component Value Date    CHOLHDL 17.6 (L) 05/20/2022    CHOLHDL 18.7 (L) 04/08/2021    CHOLHDL 23.1 01/17/2020       Chemistry        Component Value Date/Time     05/20/2022 1024    K 3.5 05/20/2022 1024    CL 92 (L) 05/20/2022 1024    CO2 29 05/20/2022 1024    BUN 30 (H) 05/20/2022 1024    CREATININE 1.7 (H) 05/20/2022 1024     (H) 05/20/2022 1024        Component Value Date/Time    CALCIUM 10.3 05/20/2022 1024    ALKPHOS 80 05/20/2022 1024    AST 39 05/20/2022 1024    ALT 64 (H) 05/20/2022 1024    BILITOT 1.2 (H) 05/20/2022 1024    ESTGFRAFRICA 44.3 (A) 05/20/2022 1024    EGFRNONAA 38.3 (A) 05/20/2022 1024          Lab Results   Component Value Date    TSH 1.321 07/12/2017     Lab Results   Component Value Date    INR 1.2 05/05/2022    INR 1.1 07/25/2016    INR 1.1 12/19/2014     Lab Results   Component Value Date    WBC 9.19 05/05/2022    HGB 18.2 (H) 05/05/2022    HCT 54.1 (H) 05/05/2022    MCV 91 05/05/2022     05/05/2022     BMP  Sodium   Date Value Ref Range Status   05/20/2022 137 136 - 145 mmol/L Final     Potassium   Date Value Ref Range Status   05/20/2022 3.5 3.5 - 5.1 mmol/L Final     Chloride   Date Value Ref  Range Status   05/20/2022 92 (L) 95 - 110 mmol/L Final     CO2   Date Value Ref Range Status   05/20/2022 29 23 - 29 mmol/L Final     BUN   Date Value Ref Range Status   05/20/2022 30 (H) 8 - 23 mg/dL Final     Creatinine   Date Value Ref Range Status   05/20/2022 1.7 (H) 0.5 - 1.4 mg/dL Final     Calcium   Date Value Ref Range Status   05/20/2022 10.3 8.7 - 10.5 mg/dL Final     Anion Gap   Date Value Ref Range Status   05/20/2022 16 8 - 16 mmol/L Final     eGFR if    Date Value Ref Range Status   05/20/2022 44.3 (A) >60 mL/min/1.73 m^2 Final     eGFR if non    Date Value Ref Range Status   05/20/2022 38.3 (A) >60 mL/min/1.73 m^2 Final     Comment:     Calculation used to obtain the estimated glomerular filtration  rate (eGFR) is the CKD-EPI equation.            Assessment:     1. Coronary artery disease of bypass graft of native heart with stable angina pectoris    2. S/P coronary artery stent placement    3. Stage 3a chronic kidney disease    4. Hypercalcemia    5. Acquired polycythemia    6. Hypoxemic polycythemia    7. S/P CABG x 4    8. Hyperlipidemia with target LDL less than 70    9. Primary hypertension    10. Chronic diastolic congestive heart failure    11. Morbid obesity with BMI of 40.0-44.9, adult    12. Secondary hyperparathyroidism of renal origin    13. LEANDRO on CPAP      MULTIFACTORIAL ETIOLOGY FOR SHORTNESS OF BREATH INCLUDING ISCHEMIA FROM SVG GRAFT AS WELL AS BODY HABITUS SLEEP APNEA. HE DESERVES AN INTERVENTION  ON HIS SVG OPTIMIZE MEDICAL THERAPY TO ADDRESS DIASTOLIC DYSFUNCTION. NEVER HAD RT HEART CATH EH WILL BENEFIT FROM HAVING A RT HEART CATH AT THE TIME OF INTERVENTION.   I HAVE REVIEWED HIS ANGIOS WITH HIM. AS WELLA S HIS 2104 FILMS  HAS ALSO MODERATE AORTIC STENOSIS CONTRIBUTING TO SYMPTOMS NEED R/O PULM,ONARY HTN Also, . RHC TO R/O PULMONARY HTN.     ATTEMPT AT [facility] OF SVG TO OM./ NEED LASER DISTAL EMBOLIZATION PROTECTION LEFT . RADIAL.APPROACH.ALSO  I have  explained the risks, benefits , and alternatives of the procedure in detail.the patient voices understanding and all questions have been answered.the patient agrees to proceed as planned.

## 2022-06-29 ENCOUNTER — PATIENT MESSAGE (OUTPATIENT)
Dept: CARDIOLOGY | Facility: CLINIC | Age: 77
End: 2022-06-29
Payer: MEDICARE

## 2022-07-01 ENCOUNTER — TELEPHONE (OUTPATIENT)
Dept: CARDIOLOGY | Facility: CLINIC | Age: 77
End: 2022-07-01
Payer: MEDICARE

## 2022-07-05 DIAGNOSIS — M1A.09X0 IDIOPATHIC CHRONIC GOUT OF MULTIPLE SITES WITHOUT TOPHUS: ICD-10-CM

## 2022-07-06 RX ORDER — ALLOPURINOL 300 MG/1
450 TABLET ORAL DAILY
Qty: 135 TABLET | Refills: 3 | Status: SHIPPED | OUTPATIENT
Start: 2022-07-06 | End: 2022-09-27 | Stop reason: SDUPTHER

## 2022-07-11 ENCOUNTER — HOSPITAL ENCOUNTER (OUTPATIENT)
Facility: HOSPITAL | Age: 77
Discharge: LEFT BEFORE DISCHARGE | End: 2022-07-11
Attending: INTERNAL MEDICINE | Admitting: INTERNAL MEDICINE
Payer: MEDICARE

## 2022-07-11 DIAGNOSIS — Z95.820 S/P ANGIOPLASTY WITH STENT: ICD-10-CM

## 2022-07-11 DIAGNOSIS — R06.02 SOB (SHORTNESS OF BREATH): ICD-10-CM

## 2022-07-11 DIAGNOSIS — I50.32 CHRONIC DIASTOLIC CONGESTIVE HEART FAILURE: Primary | ICD-10-CM

## 2022-07-11 DIAGNOSIS — I50.32 CHRONIC DIASTOLIC HEART FAILURE: ICD-10-CM

## 2022-07-11 DIAGNOSIS — Z95.1 S/P CABG (CORONARY ARTERY BYPASS GRAFT): ICD-10-CM

## 2022-07-11 DIAGNOSIS — I25.10 CAD IN NATIVE ARTERY: ICD-10-CM

## 2022-07-11 DIAGNOSIS — I35.0 AORTIC VALVE STENOSIS, ETIOLOGY OF CARDIAC VALVE DISEASE UNSPECIFIED: ICD-10-CM

## 2022-07-11 LAB
POC ACTIVATED CLOTTING TIME K: 225 SEC (ref 74–137)
POC ACTIVATED CLOTTING TIME K: 294 SEC (ref 74–137)
SAMPLE: ABNORMAL
SAMPLE: ABNORMAL

## 2022-07-11 PROCEDURE — C1769 GUIDE WIRE: HCPCS | Performed by: INTERNAL MEDICINE

## 2022-07-11 PROCEDURE — 63600175 PHARM REV CODE 636 W HCPCS: Performed by: INTERNAL MEDICINE

## 2022-07-11 PROCEDURE — 92978 ENDOLUMINL IVUS OCT C 1ST: CPT | Performed by: INTERNAL MEDICINE

## 2022-07-11 PROCEDURE — 93571 IV DOP VEL&/PRESS C FLO 1ST: CPT | Mod: 26,52,, | Performed by: INTERNAL MEDICINE

## 2022-07-11 PROCEDURE — 25000003 PHARM REV CODE 250: Performed by: INTERNAL MEDICINE

## 2022-07-11 PROCEDURE — 93571 IV DOP VEL&/PRESS C FLO 1ST: CPT | Mod: 74 | Performed by: INTERNAL MEDICINE

## 2022-07-11 PROCEDURE — 27201423 OPTIME MED/SURG SUP & DEVICES STERILE SUPPLY: Performed by: INTERNAL MEDICINE

## 2022-07-11 PROCEDURE — 93461 PR CATH PLACE/COR ANG,IMG SUPR/INTRP,BYPASS ANG, R&L CATH, L HRT VENTRIC: ICD-10-PCS | Mod: 26,,, | Performed by: INTERNAL MEDICINE

## 2022-07-11 PROCEDURE — 99152 MOD SED SAME PHYS/QHP 5/>YRS: CPT | Performed by: INTERNAL MEDICINE

## 2022-07-11 PROCEDURE — 99152 MOD SED SAME PHYS/QHP 5/>YRS: CPT | Mod: ,,, | Performed by: INTERNAL MEDICINE

## 2022-07-11 PROCEDURE — C1751 CATH, INF, PER/CENT/MIDLINE: HCPCS | Performed by: INTERNAL MEDICINE

## 2022-07-11 PROCEDURE — 92978 ENDOLUMINL IVUS OCT C 1ST: CPT | Mod: 26,,, | Performed by: INTERNAL MEDICINE

## 2022-07-11 PROCEDURE — 99153 MOD SED SAME PHYS/QHP EA: CPT | Performed by: INTERNAL MEDICINE

## 2022-07-11 PROCEDURE — 93571 PR HEART FLOW RESERV MEASURE,INIT VESSL: ICD-10-PCS | Mod: 26,52,, | Performed by: INTERNAL MEDICINE

## 2022-07-11 PROCEDURE — 92978 PR IVUS, CORONARY, 1ST VESSEL: ICD-10-PCS | Mod: 26,,, | Performed by: INTERNAL MEDICINE

## 2022-07-11 PROCEDURE — 99152 PR MOD CONSCIOUS SEDATION, SAME PHYS, 5+ YRS, FIRST 15 MIN: ICD-10-PCS | Mod: ,,, | Performed by: INTERNAL MEDICINE

## 2022-07-11 PROCEDURE — 25500020 PHARM REV CODE 255: Performed by: INTERNAL MEDICINE

## 2022-07-11 PROCEDURE — C1887 CATHETER, GUIDING: HCPCS | Performed by: INTERNAL MEDICINE

## 2022-07-11 PROCEDURE — C1753 CATH, INTRAVAS ULTRASOUND: HCPCS | Performed by: INTERNAL MEDICINE

## 2022-07-11 PROCEDURE — 93461 R&L HRT ART/VENTRICLE ANGIO: CPT | Mod: 26,,, | Performed by: INTERNAL MEDICINE

## 2022-07-11 PROCEDURE — C1894 INTRO/SHEATH, NON-LASER: HCPCS | Performed by: INTERNAL MEDICINE

## 2022-07-11 PROCEDURE — 93461 R&L HRT ART/VENTRICLE ANGIO: CPT | Performed by: INTERNAL MEDICINE

## 2022-07-11 RX ORDER — LIDOCAINE HYDROCHLORIDE 20 MG/ML
INJECTION, SOLUTION INFILTRATION; PERINEURAL
Status: DISCONTINUED | OUTPATIENT
Start: 2022-07-11 | End: 2022-07-11 | Stop reason: HOSPADM

## 2022-07-11 RX ORDER — SODIUM CHLORIDE 9 MG/ML
INJECTION, SOLUTION INTRAVENOUS CONTINUOUS
Status: ACTIVE | OUTPATIENT
Start: 2022-07-11 | End: 2022-07-11

## 2022-07-11 RX ORDER — FENTANYL CITRATE 50 UG/ML
INJECTION, SOLUTION INTRAMUSCULAR; INTRAVENOUS
Status: DISCONTINUED | OUTPATIENT
Start: 2022-07-11 | End: 2022-07-11 | Stop reason: HOSPADM

## 2022-07-11 RX ORDER — DIPHENHYDRAMINE HCL 50 MG
50 CAPSULE ORAL ONCE
Status: COMPLETED | OUTPATIENT
Start: 2022-07-11 | End: 2022-07-11

## 2022-07-11 RX ORDER — SODIUM CHLORIDE 9 MG/ML
INJECTION, SOLUTION INTRAVENOUS CONTINUOUS
Status: DISCONTINUED | OUTPATIENT
Start: 2022-07-11 | End: 2022-07-11 | Stop reason: HOSPADM

## 2022-07-11 RX ORDER — FUROSEMIDE 10 MG/ML
INJECTION INTRAMUSCULAR; INTRAVENOUS
Status: DISCONTINUED | OUTPATIENT
Start: 2022-07-11 | End: 2022-07-11 | Stop reason: HOSPADM

## 2022-07-11 RX ORDER — IODIXANOL 320 MG/ML
INJECTION, SOLUTION INTRAVASCULAR
Status: DISCONTINUED | OUTPATIENT
Start: 2022-07-11 | End: 2022-07-11 | Stop reason: HOSPADM

## 2022-07-11 RX ORDER — MIDAZOLAM HYDROCHLORIDE 1 MG/ML
INJECTION, SOLUTION INTRAMUSCULAR; INTRAVENOUS
Status: DISCONTINUED | OUTPATIENT
Start: 2022-07-11 | End: 2022-07-11 | Stop reason: HOSPADM

## 2022-07-11 RX ORDER — ONDANSETRON 8 MG/1
8 TABLET, ORALLY DISINTEGRATING ORAL EVERY 8 HOURS PRN
Status: DISCONTINUED | OUTPATIENT
Start: 2022-07-11 | End: 2022-07-11 | Stop reason: HOSPADM

## 2022-07-11 RX ORDER — SODIUM NITROPRUSSIDE 25 MG/ML
INJECTION INTRAVENOUS
Status: DISCONTINUED | OUTPATIENT
Start: 2022-07-11 | End: 2022-07-11 | Stop reason: HOSPADM

## 2022-07-11 RX ORDER — ACETAMINOPHEN 325 MG/1
650 TABLET ORAL EVERY 4 HOURS PRN
Status: DISCONTINUED | OUTPATIENT
Start: 2022-07-11 | End: 2022-07-11 | Stop reason: HOSPADM

## 2022-07-11 RX ORDER — NAPROXEN SODIUM 220 MG/1
81 TABLET, FILM COATED ORAL ONCE
Status: COMPLETED | OUTPATIENT
Start: 2022-07-11 | End: 2022-07-11

## 2022-07-11 RX ORDER — HEPARIN SODIUM 1000 [USP'U]/ML
INJECTION, SOLUTION INTRAVENOUS; SUBCUTANEOUS
Status: DISCONTINUED | OUTPATIENT
Start: 2022-07-11 | End: 2022-07-11 | Stop reason: HOSPADM

## 2022-07-11 RX ORDER — PHENYLEPHRINE HYDROCHLORIDE 10 MG/ML
INJECTION INTRAVENOUS
Status: DISCONTINUED | OUTPATIENT
Start: 2022-07-11 | End: 2022-07-11 | Stop reason: HOSPADM

## 2022-07-11 RX ORDER — VERAPAMIL HYDROCHLORIDE 2.5 MG/ML
INJECTION, SOLUTION INTRAVENOUS
Status: DISCONTINUED | OUTPATIENT
Start: 2022-07-11 | End: 2022-07-11 | Stop reason: HOSPADM

## 2022-07-11 RX ORDER — CLOPIDOGREL 300 MG/1
600 TABLET, FILM COATED ORAL ONCE
Status: COMPLETED | OUTPATIENT
Start: 2022-07-11 | End: 2022-07-11

## 2022-07-11 RX ADMIN — ASPIRIN 81 MG CHEWABLE TABLET 81 MG: 81 TABLET CHEWABLE at 09:07

## 2022-07-11 RX ADMIN — CLOPIDOGREL BISULFATE 600 MG: 300 TABLET, FILM COATED ORAL at 09:07

## 2022-07-11 RX ADMIN — DIPHENHYDRAMINE HYDROCHLORIDE 50 MG: 50 CAPSULE ORAL at 09:07

## 2022-07-11 NOTE — INTERVAL H&P NOTE
The patient has been examined and the H&P has been reviewed:    I concur with the findings and no changes have occurred since H&P was written.    Procedure risks, benefits and alternative options discussed and understood by patient/family.          Active Hospital Problems    Diagnosis  POA    *Chronic diastolic congestive heart failure [I50.32]  Yes    Chronic kidney disease, stage III (moderate) [N18.30]  Yes    S/P coronary artery stent placement [Z95.5]  Not Applicable    S/P CABG x 4 [Z95.1]  Not Applicable    Hypoxemic polycythemia [D75.1]  Yes    LEANDRO on CPAP [G47.33, Z99.89]  Not Applicable     12/18/2012 split night PSG showed the presence of severe LEANDRO, with an AHI of 62.6 event/hour, a SaO2 hipolito of 85%, and increased fragmentations. CPAP 10 cm optimal.   4/15/2020 per patient request changed from CPAP 10 to CPAP 14 cm  9/16/2020 Auto CPAP 14 - 20 cm on replacement Full face mask    12/9/2020 overnight oximetry no desat requiring supplemental oxygen with sleep on CPAP 14-20 cm.  Time with SpO2<89: 0:04:20, 0.7% of study period. Lowest oxygen saturation recorded   as 87%.     HME: [facility]       Morbid obesity with BMI of 40.0-44.9, adult [E66.01, Z68.41]  Not Applicable    Hyperlipidemia with target LDL less than 70 [E78.5]  Yes    HTN (hypertension) [I10]  Yes     Chronic    CAD (coronary artery disease) [I25.10]  Yes     Chronic      Resolved Hospital Problems   No resolved problems to display.

## 2022-07-11 NOTE — DISCHARGE INSTRUCTIONS
"Post-op Heart Catheterization Instructions    1. DIET: It is advisable for you to follow a diet that limits the intake of salt, sugar, saturated fats and cholesterol.     2. DRIVING: Due to sedation you received during your procedure, DO NOT drive or operate machinery for 24 hours. Avoid making critical decisions or signing legal documents until tomorrow.    3. ACTIVITY: AVOID activities that require bending of the affected arm/wrist for 3 days and submerging the site in water for 3 days.   REMOVE the dressing 24 hours after the procedure, and shower (1030 Tuesday 7/12/22).  Wash with soap and water in hand; do not use wash cloth.  Apply a bandaid to site after shower.  No ointments, lotions, powders, colognes, ... for 5 days.  WEAR wrist immobilizer until Wednesday morning.  No pushing, pulling, or lifting more than 10 pounds for 3 days  No riding motorcycles, riding lawn mowers, using push mowers or weed eaters... for 5 days.  You may RESUME your normal activities or prescribed exercise program as instructed by your physician after 5 days.                                                                                                       4. WOUND CARE: It is not unusual to have a small amount of bruising to appear at or near the puncture site. It is also common to have a tender "knot" develop beneath the skin at the puncture site of the wrist/arm. This is usually scar tissue and is not a cause for concern or alarm. This tender knot may take several weeks to fully resolve. The bruise will usually spread over several days. However, if the lump gets bigger, call your doctor immediately.    5. DISCOMFORT: For general discomfort at the puncture site, you may take 1 or 2 Acetaminophen (Tylenol) tablets every 4 - 6 hours as needed. (Do not take more than 4000 mg a day)    6. SIGNS AND SYMPTOMS TO REPORT:  Call your physician immediately if any of the following occur:                                            1. Loss " "of feeling, warmth or color to the affected arm/wrist                                                                                                            2. Mild bleeding from the site                 3. Pain that is sudden, sharp or persistent in the affected arm/wrist                 4. Swelling or a change in "lump" size, increased redness or drainage at the puncture site                                                                               5. High fever (101 degrees or higher)    7. GO TO  THE EMERGENCY ROOM OR CALL 911 IF YOU HAVE: Chest pains or discomforts not relieved with 3 nitroglycerin doses (sublingual tablets or spray), numbness or severe pain of limb, if your limb becomes cold or discolored or if you develop uncontrolled bleeding from the puncture site (quickly apply firm, direct pressure above the site).   "

## 2022-07-11 NOTE — Clinical Note
The groin and radials was prepped. The site was prepped with ChloraPrep. The site was clipped. The patient was draped. The patient was positioned supine.

## 2022-07-11 NOTE — Clinical Note
The PA catheter was repositioned to the main pulmonary artery. Hemodynamics were performed. Cardiac output was obtained at 4 L/min. Catheter removed.

## 2022-07-11 NOTE — Clinical Note
left ventricle, right ventricle and pulmonary capillary wedge. Hemodynamics were performed.  and Pullback was recorded.  . Multiple simultaneous recordings obtained.

## 2022-07-11 NOTE — OP NOTE
INPATIENT Operative Note         SUMMARY     Surgery Date: 7/11/2022     Surgeon(s) and Role:     * Geovany Joel MD - Primary    ASSISTANT:none    Pre-op Diagnosis:  CAD in native artery [I25.10]Chronic diastolic heart failure [I50.32]S/P CABG (coronary artery bypass graft) [Z95.1]S/P angioplasty with stent [Z95.820]Aortic valve stenosis, etiology of cardiac valve disease unspecified [I35.0]SOB (shortness of breath)   [R06.02]      Post-op Diagnosis:  CAD in native artery [I25.10]Chronic diastolic heart failure [I50.32]S/P CABG (coronary artery bypass graft) [Z95.1]S/P angioplasty with stent [Z95.820]Aortic valve stenosis, etiology of cardiac valve disease unspecified [I35.0]SOB (shortness of breath)   [R06.02]    Procedure(s) (LRB):  CATHETERIZATION, HEART, BOTH LEFT AND RIGHT (N/A)  INSERTION, CATHETER, RIGHT HEART (Right)  Bypass graft study  IVUS, Coronary  Stent, Drug Eluting, Single Vessel, Coronary  Fractional Flow [place] (FFR), Coronary  Instantaneous Wave-Free Ratio (IFR) (N/A)    COMPLICATION:none    Anesthesia: RN IV Sedation    Findings/Key Components:  Moderate pulmonary htn elevated filling pressures  At least moderate  AS   SVG TO OM CSA 4.3 MM2 IFR 0.98   ?constriction  Estimated Blood Loss: < 50 ML.         SPECIMEN: NONE    Devices/Prostetics: None    PLAN:  Medical therapy

## 2022-07-11 NOTE — DISCHARGE SUMMARY
[facility] - Cath Lab (Hospital)  Discharge Note  Short Stay    Procedure(s) (LRB):  CATHETERIZATION, HEART, BOTH LEFT AND RIGHT (N/A)  INSERTION, CATHETER, RIGHT HEART (Right)  Bypass graft study  IVUS, Coronary  Instantaneous Wave-Free Ratio (IFR) (N/A)  Valve study-aortic    OUTCOME: Patient tolerated treatment/procedure well without complication and is now ready for discharge.    DISPOSITION: Home or Self Care patient signed AMA     FINAL DIAGNOSIS:  Chronic diastolic congestive heart failure    FOLLOWUP: In clinic    DISCHARGE INSTRUCTIONS:    Discharge Procedure Orders   Diet general     Call MD for:  temperature >100.4     Call MD for:  persistent nausea and vomiting     Call MD for:  severe uncontrolled pain     Call MD for:  difficulty breathing, headache or visual disturbances     Call MD for:  redness, tenderness, or signs of infection (pain, swelling, redness, odor or green/yellow discharge around incision site)     Call MD for:  hives     Call MD for:  persistent dizziness or light-headedness     Call MD for:  extreme fatigue        TIME SPENT ON DISCHARGE: 20  minutes

## 2022-07-11 NOTE — Clinical Note
The radial band was applied to the left radial artery. 13 cc's of air were inserted into the closure device. Arterial sheath removed.

## 2022-07-11 NOTE — Clinical Note
40 ml of contrast were injected throughout the case. 110 mL of contrast was the total wasted during the case. 150 mL was the total amount used during the case.

## 2022-07-18 VITALS
OXYGEN SATURATION: 96 % | WEIGHT: 272.94 LBS | TEMPERATURE: 98 F | HEIGHT: 67 IN | BODY MASS INDEX: 42.84 KG/M2 | RESPIRATION RATE: 19 BRPM | SYSTOLIC BLOOD PRESSURE: 108 MMHG | DIASTOLIC BLOOD PRESSURE: 67 MMHG | HEART RATE: 60 BPM

## 2022-07-20 NOTE — PROGRESS NOTES
+Subjective:    Patient ID:  Bo Chase is a 77 y.o. male who presents for follow-up of cath follow up      HPI: Mr. Bo Chase presents to the clinic for follow up after LHC with Graft  study/ IVUS, and RHC. He has MENA, but denies chest pain. He does experience dyspnea on exertion walking down the garner in the clinic, or carrying groceries into the house.  He has not been working out in the gym lately.    SVG to OM found to be 50% proximally; mild to moderate pulmonary HTN, moderate AS. Chronic diastolic heart failure.    he denies any pain, bleeding, or discharge from the  Left radial or left basilic vein access sites.  He reports being confused about how cardiologists disagree with the there is an actual blockage in that graft.  He has questions around in this.  He also would like a CD of the angiogram sent to Dr. Dilip Mathew in [place].    He uses a walker to help him assist with ambulation due to pain in his hips and lower back.  The pain in these areas limits his walking. Cannot put on compression stockings or socks.    He denied any lightheadedness, dizziness, or near syncope.  He denied orthopnea or PND.  He does tend to have recurrent edema in the lower extremities.  The right leg tends to be larger than the left and swell more-it was the site of graft harvest.    He stated that he tends to have low potassium and takes a supplement-30-40 mEq of potassium per day.  Vitamin D was stopped due to high calcium.  Had phlebotomy for polycythemia August 2020.  He is followed by Dr. Massey for CKD.  He is seeing a pain management doctor for bilateral hip pain.  He has tried massage and chiropractic treatments for this as well.  He sees Laure Ozuna NP for obstructive sleep apnea, and he has seen Dr. Honeycutt for this and chronic bronchitis.    He is planning low back surgery if and when he can.   He went to see his cardiologist in [place] with CV test results and his opinion.   He has been followed by  Dr. Crawley in 2020 and he was seeing Dr. Elaine before that.      He has chronic diastolic heart failure class C; functional class 2-3.    LEANDRO: on CPAP every night.    --------------------------------------------------------------------  Dr. Elaine's note: 76 yo male, 6 months f/u.   PMH CAD, s/p CABGx4 1996, s/p last PCI SVG to OM1, in 2014, LIMA patent and  SVG to diag, Dx of DM in 2019 after steroids injection now A1c wnl, , CHFpEF, HTN, HLP, morbid obesity, sleep apnea on CPAP, hypothyroidism. Chronic lower back pain.  8/31/2016 negative nuclear stress test for ischemia.    ECho normal EF and mod LVH  Pt states that he f/u with cardiologist at [place] in the past two yr. Negative exerrcise stress test in  at [place].   Recent Dx of melanoma on nose and right chest pain. S/p chest tumor early .   Sleep with CPAP.  Chronic leg swelling.  BNP 54 and Cr stable   Pt states that no exercise due to pandemic  Worsening SOB when walking and carrying bags  Taking Torsemide 20 mg bid and Metolazone 5 mg once a week.  No chest pain.   Asa 325 mg given by Dr Bernal nephrology  No gym exercise after COVID 19 pandemic  BP LDL and BS controlled  Plan: CONTINUE TORSEMIDE 20 MG BID AND METOLAZONE 2.5 MG TWICE A WEEK  Daily weight. Please call the office if gain 3 pounds in 1 day or 5 pounds in 1 week.  Fluid restriction 1.5 liters a day  Na< 2 gm  CONTINUE ASa lipitor, lopressor losartan imdur and Aldactone   D/c KCL and repeat BMP I n 4 weeks for K level  Counseled DASH  Check Lipid profile in 6 months  Recommend heart-healthy diet, weight control and regular exercise.  Ion. Risk modification.   RTC in 6 months  ----------------------------------------------------------    Medications: he is not missing any doses.  He is taking torsemide 20 mg p.o. b.i.d., potassium 10mEq QID; spironolactone 25 mg p.o. q.day, and metolazone 5 mg p.o. q.week.  He is taking metoprolol tartrate 25 mg p.o. b.i.d. He is taking isosorbide  mononitrate 60 mg p.o. QD, atorvastatin 40 mg p.o. q.day and Losartan 25mg QD.  He is taking aspirin.  Sodium: he does not add salt to foods at the table,  he is reading labels for sodium content. he does eat salty foods; sausage in the morning; ham sandwich a few times/week; sometimes with cheese.  Exercise: he is not exercising in the last 3 weeks.  Tobacco: denies previous or current tobacco use   Alcohol:  Occasional beer     Weight: 121.8 kg (268 lb 9.6 oz) he states that his daily weights have decreased.Body mass index is 42.07 kg/m².  He monitors daily weights.  Last visit in cardiology clinic on May 19, 2022, he weighed 271# 6.4 ounces.  Wt Readings from Last 3 Encounters:   07/21/22 121.8 kg (268 lb 9.6 oz)   07/11/22 123.8 kg (272 lb 14.9 oz)   06/28/22 123.8 kg (272 lb 14.9 oz)     BP log: None. He does not have a BP monitor at home. He checks it at pharmacy from time to time.      Review of Systems   Constitutional: Negative for chills, decreased appetite, fever, night sweats and weight loss.   HENT: Negative for congestion.    Cardiovascular: Positive for dyspnea on exertion and leg swelling. Negative for chest pain, claudication, cyanosis, irregular heartbeat, near-syncope, orthopnea, palpitations, paroxysmal nocturnal dyspnea and syncope.   Respiratory: Negative for cough, hemoptysis, shortness of breath, sputum production and wheezing.    Hematologic/Lymphatic: Negative for adenopathy and bleeding problem. Does not bruise/bleed easily.   Skin: Negative for color change and nail changes.        Circular wound noted to right lower extremity.  Patient stated that it is looking better and he is taking care of it himself.  He does have connections with wound care if he needs them.   Musculoskeletal: Positive for joint pain (Chronic pain in the lower back and hips.).   Gastrointestinal: Negative for bloating, abdominal pain, change in bowel habit, heartburn, hematochezia, melena, nausea and vomiting.    Genitourinary: Negative for hematuria.   Neurological: Negative for dizziness and light-headedness.   Psychiatric/Behavioral: Negative for altered mental status.       Objective:   Physical Exam  Constitutional:       General: He is not in acute distress.     Appearance: He is well-developed. He is not diaphoretic.   HENT:      Head: Normocephalic and atraumatic.   Eyes:      General: No scleral icterus.     Conjunctiva/sclera: Conjunctivae normal.   Neck:      Thyroid: No thyromegaly.      Vascular: No JVD.      Trachea: No tracheal deviation.   Cardiovascular:      Rate and Rhythm: Normal rate and regular rhythm.      Pulses: Intact distal pulses.      Heart sounds: Murmur heard.    Harsh midsystolic murmur is present at the upper right sternal border radiating to the neck.    No friction rub. No gallop.   Pulmonary:      Effort: Pulmonary effort is normal. No respiratory distress.      Breath sounds: Normal breath sounds. No wheezing or rales.      Comments: Speaks in complete sentences without obvious signs of shortness of breath or increased work of breathing  Chest:      Chest wall: No tenderness.   Abdominal:      General: Bowel sounds are normal. There is no distension.      Palpations: Abdomen is soft. There is no mass.      Tenderness: There is no abdominal tenderness. There is no guarding or rebound.      Comments: Obese   Musculoskeletal:         General: Normal range of motion.      Cervical back: Neck supple.      Comments: Left radial and basilic access sites without redness, tenderness, swelling, or drainage. There is no active external bleeding or hematoma. Radial and ulnar pulse 2+. Skin warm/dry/pink.  Appropriate response to tactile stimulation.  Venous stasis changes to both lower legs bilaterally.  Nonpitting edema noted.  Right lower leg is larger than the left-chronic.   Lymphadenopathy:      Cervical: No cervical adenopathy.   Skin:     General: Skin is warm and dry.      Coloration: Skin  is not pale.      Findings: No erythema or rash.      Comments: Pink.  Circular wound noted to right lower extremity; no drainage, erythema, heat, swelling, or tenderness noted.   Neurological:      Mental Status: He is alert and oriented to person, place, and time.        Latest Reference Range & Units 05/20/22 10:24 06/28/22 16:27   Sodium 136 - 145 mmol/L 137 140   Potassium 3.5 - 5.1 mmol/L 3.5 4.0   Chloride 95 - 110 mmol/L 92 (L) 94 (L)   CO2 23 - 29 mmol/L 29 31 (H)   Anion Gap 8 - 16 mmol/L 16 15   BUN 8 - 23 mg/dL 30 (H) 25 (H)   Creatinine 0.5 - 1.4 mg/dL 1.7 (H) 1.6 (H)   eGFR if non African American >60 mL/min/1.73 m^2 38.3 ! 41.2 !   eGFR if African American >60 mL/min/1.73 m^2 44.3 ! 47.7 !   Glucose 70 - 110 mg/dL 160 (H) 149 (H)   Calcium 8.7 - 10.5 mg/dL 10.3 10.2   Alkaline Phosphatase 55 - 135 U/L 80 81   PROTEIN TOTAL 6.0 - 8.4 g/dL 7.6 7.0   Albumin 3.5 - 5.2 g/dL 3.9 3.9   BILIRUBIN TOTAL 0.1 - 1.0 mg/dL 1.2 (H) 1.1 (H)   AST 10 - 40 U/L 39 42 (H)   ALT 10 - 44 U/L 64 (H) 54 (H)   Cholesterol 120 - 199 mg/dL 176    HDL 40 - 75 mg/dL 31 (L)    HDL/Cholesterol Ratio 20.0 - 50.0 % 17.6 (L)    LDL Cholesterol External 63.0 - 159.0 mg/dL 78.4    Non-HDL Cholesterol mg/dL 145    Total Cholesterol/HDL Ratio 2.0 - 5.0  5.7 (H)    Triglycerides 30 - 150 mg/dL 333 (H)    Magnesium RBC 4.0 - 6.4 mg/dL 5.3    (L): Data is abnormally low  (H): Data is abnormally high  !: Data is abnormal     Latest Reference Range & Units 03/31/22 09:35   Sodium 136 - 145 mmol/L 136   Potassium 3.5 - 5.1 mmol/L 3.5   Chloride 95 - 110 mmol/L 88 (L)   CO2 23 - 29 mmol/L 32 (H)   Anion Gap 8 - 16 mmol/L 16   BUN 8 - 23 mg/dL 29 (H)   Creatinine 0.5 - 1.4 mg/dL 1.7 (H)   EGFR if non African American >60 mL/min/1.73 m^2 38.3 ! [1]   EGFR if African American >60 mL/min/1.73 m^2 44.3 !   Glucose 70 - 110 mg/dL 143 (H)   Calcium 8.7 - 10.5 mg/dL 10.2   Phosphorus 2.7 - 4.5 mg/dL 3.7   Albumin 3.5 - 5.2 g/dL 3.9         Results for  GABY PRUETT (MRN [id number]) as of 10/29/2021 13:38   Ref. Range 4/8/2021 15:05   Cholesterol Latest Ref Range: 120 - 199 mg/dL 134   HDL Latest Ref Range: 40 - 75 mg/dL 25 (L)   HDL/Cholesterol Ratio Latest Ref Range: 20.0 - 50.0 % 18.7 (L)   LDL Cholesterol External Latest Ref Range: 63.0 - 159.0 mg/dL 78.0   Non-HDL Cholesterol Latest Units: mg/dL 109   Total Cholesterol/HDL Ratio Latest Ref Range: 2.0 - 5.0  5.4 (H)   Triglycerides Latest Ref Range: 30 - 150 mg/dL 155 (H)     [facility] and C 7/11/2022:   Indications  CAD in native artery [I25.10 (ICD-10-CM)]   Chronic diastolic heart failure [I50.32 (ICD-10-CM)]   S/P CABG (coronary artery bypass graft) [Z95.1 (ICD-10-CM)]   S/P angioplasty with stent [Z95.820 (ICD-10-CM)]   Aortic valve stenosis, etiology of cardiac valve disease unspecified [I35.0 (ICD-10-CM)]   SOB (shortness of breath) [R06.02 (ICD-10-CM)]   Summary   · The pre-procedure left ventricular end diastolic pressure was 28.  · There was moderate aortic valve stenosis.  · The estimated blood loss was none.  · The filling pressures on the right and left were moderately elevated. Pulmonary hypertension was mild to moderate.  · THERE IS SUGGESTION OF CONSTRICTION WITH NEAR EQUALIZATION OF PRESSURES BETWEEN LV AND RV HOWEVER THERE IS NO DISCORDANCE.  · The Origin to Prox Graft lesion was 50% stenosed.  · THE SVG STENOSIS IS NOT HEMODYNAMICALLY SIGNIFICANT.     The procedure log was documented by Documenter: Linda Yepez RN and verified by Mercedes Joel MD.     Date: 7/11/2022  Time: 11:06 AM        [facility] 5/9/22:Summary   · The ejection fraction was calculated to be 60%.  · The pre-procedure left ventricular end diastolic pressure was 28.  · The post-procedure left ventricular end diastolic pressure was 29.  · The Mid LM to Dist LM lesion was 100% stenosed.  · The Prox RCA lesion was 100% stenosed.  · The Origin to Prox Graft lesion was 100% stenosed.  · The Prox Graft lesion was 100% stenosed.  · The  Origin to Prox Graft lesion was 95% stenosed.  · The estimated blood loss was none.  · High left sided filling pressures    Echo 5/11/22:Summary  · The left ventricle is normal in size with concentric hypertrophy and low normal systolic function.  · Mild left atrial enlargement.  · Grade I left ventricular diastolic dysfunction.  · Normal right ventricular size with normal right ventricular systolic function.  · The estimated ejection fraction is 50%.  · There is moderate aortic valve stenosis.  · Aortic valve area is 1.30 cm2; peak velocity is 2.90 m/s; mean gradient is 23 mmHg.  · Mild mitral regurgitation.  · Poor endocardial visualization, technically difficult study        Echo at Dr. Crawley's office 12/2020  EF 55-60%  Apical hypokinesis.  Moderate concentric hypertrophy.  Mild AS, AI, and MR.  Mild LAE.  I/IV diastolic dysfunction.    Echo 07/25/2017:CONCLUSIONS     1 - Concentric hypertrophy.     2 - No wall motion abnormalities.     3 - Normal left ventricular systolic function (EF 60-65%).     4 - Normal left ventricular diastolic function.     5 - Normal right ventricular systolic function .     6 - Trivial to mild mitral regurgitation.     7 - Trivial to mild tricuspid regurgitation.     Arterial U/S LE 5/11/22: Conclusion   · No evidence of hemodynamically significant stenosis of visalized bilateral lower extremities arteries  · Bilateral peroneal arteries are not well visualized due to depth of vessels.   TDS:Bilateral peroneal arteries are not well visualized due to depth of vessels.     ARNULFO 5/11/22: Conclusion  · Minimal decreased ARNULFO in the right lower extermity at rest with triphasic flow waveforms  · Normal ARNULFO left lower extremity with triphasic flow waveforms     Venous U/S LE 4/27/22: Impression:   Bilateral lower extremity venous reflux as detailed.  No DVT.    Pharmacologic nuclear medicine stress test 08/31/2016:Impression: NORMAL MYOCARDIAL PERFUSION   1. The perfusion scan is free of  evidence for myocardial ischemia or injury.   2. There is a mild intensity fixed defect in the inferior wall of the left ventricle, secondary to diaphragm attenuation.   3. Resting wall motion is physiologic.   4. Resting LV function is normal.   5. The ventricular volumes are normal at rest and stress.   6. The extracardiac distribution of radioactivity is normal.       Left heart catheterization 12/29/2014:ANGIOGRAPHIC RESULTS:   DIAGNOSTIC:        Patient has a right dominant coronary artery.        - Left Main Coronary Artery:              The LM has luminal irregularities. There is JESUS 3 flow.        - Left Anterior Descending Artery:              The proximal LAD has chronic total occlusion. There is JESUS 0 flow.        - Left Circumflex Artery:              The proximal LCX has chronic total occlusion. There is JESUS 0 flow.        - Right Coronary Artery:              The proximal RCA has chronic total occlusion. There is JESUS 0 flow.        - SVG To RCA:              The SVG to RCA has luminal irregularities. There is JESUS 3 flow.        - THOMPSON To LAD:              The LIMA to LAD is normal. There is JESUS 3 flow.        - SVG To D1:              The SVG to D1 has chronic total occlusion. There is JESUS 0 flow.        - SVG To OM1:              The mid SVG to OM1 has a 90% stenosis.   This was a MI culprit lesion.  The Posterior and Lateral myocardial walls were affected. The following items were used: Wire   Filter Spider Fx 5 X 320/190, Blln Emerge 4 X 20 and Stent Resolute   4.0x38mm (AMARI).   Assessment:      1. Status post cardiac catheterization    2. Chronic diastolic heart failure    3. Hx of CABG    4. Moderate aortic stenosis    5. Primary hypertension    6. Hyperlipidemia, unspecified hyperlipidemia type    7. Mixed hyperlipidemia    8. Stage 3a chronic kidney disease    9. LEANDRO on CPAP    10. Morbid obesity with BMI of 40.0-44.9, adult    11. Essential hypertension    12. Hypokalemia        Plan:      Status post cardiac catheterization    Chronic diastolic heart failure    Hx of CABG    Moderate aortic stenosis    Primary hypertension    Hyperlipidemia, unspecified hyperlipidemia type    Mixed hyperlipidemia    Stage 3a chronic kidney disease  -     losartan (COZAAR) 25 MG tablet; Take 1 tablet (25 mg total) by mouth once daily.  Dispense: 90 tablet; Refill: 3    LEANDRO on CPAP    Morbid obesity with BMI of 40.0-44.9, adult    Essential hypertension  -     losartan (COZAAR) 25 MG tablet; Take 1 tablet (25 mg total) by mouth once daily.  Dispense: 90 tablet; Refill: 3    Hypokalemia  -     losartan (COZAAR) 25 MG tablet; Take 1 tablet (25 mg total) by mouth once daily.  Dispense: 90 tablet; Refill: 3  -     potassium chloride SA (K-DUR,KLOR-CON) 10 MEQ tablet; Take 1 tablet (10 mEq total) by mouth 4 (four) times daily. Pt states he takes 3-4 tablets once daily  Dispense: 360 tablet; Refill: 3      Reviewed angiogram report and the difference it made using IVUS.  Answered all of his questions.  Continue imdur-CAD, angina.    Continue aspirin-CAD.  Continue torsemide, metolazone, spironolactone - diastolic heart failure.  He may have cardiorenal syndrome-fluctuating creatinine on diuretics. Creatinine is at baseline and renal function appears stable; will monitor.  His volume status appears pretty stable clinically today.  Nephrology recommends to keep aldactone and KCL supplement - severe hypokalemia. He is also on a small dose of losartan given HTN and diabetes with CKD.  Dr. Massey following CKD 3.  Continue metoprolol, losartan, spironolactone - HTN.  Continue atorvastatin, fish oil - HLP.  Sodium restriction encouraged.  Continue using CPAP every night-obstructive sleep apnea.  Continue exercise.   Daily weights.    Continue weight loss.  Whole foods, low glycemic diet.  Follow-up in 6 weeks for chronic heart failure or call sooner for any problems.  35 minutes spent in chart review and face to face  encounter.  Adelia Sanon NP  [facility]    This note has been prepared using a combination of MModaL dictation device and typing.  It has been checked for errors but some errors may still exist within the note as a result of speech recognition errors and/or typographical errors.

## 2022-07-21 ENCOUNTER — OFFICE VISIT (OUTPATIENT)
Dept: CARDIOLOGY | Facility: CLINIC | Age: 77
End: 2022-07-21
Payer: MEDICARE

## 2022-07-21 VITALS
DIASTOLIC BLOOD PRESSURE: 66 MMHG | BODY MASS INDEX: 42.16 KG/M2 | WEIGHT: 268.63 LBS | HEIGHT: 67 IN | HEART RATE: 70 BPM | OXYGEN SATURATION: 97 % | SYSTOLIC BLOOD PRESSURE: 100 MMHG

## 2022-07-21 DIAGNOSIS — E78.5 HYPERLIPIDEMIA, UNSPECIFIED HYPERLIPIDEMIA TYPE: ICD-10-CM

## 2022-07-21 DIAGNOSIS — E66.01 MORBID OBESITY WITH BMI OF 40.0-44.9, ADULT: ICD-10-CM

## 2022-07-21 DIAGNOSIS — I10 PRIMARY HYPERTENSION: ICD-10-CM

## 2022-07-21 DIAGNOSIS — I35.0 MODERATE AORTIC STENOSIS: ICD-10-CM

## 2022-07-21 DIAGNOSIS — E87.6 HYPOKALEMIA: ICD-10-CM

## 2022-07-21 DIAGNOSIS — N18.31 STAGE 3A CHRONIC KIDNEY DISEASE: ICD-10-CM

## 2022-07-21 DIAGNOSIS — I10 ESSENTIAL HYPERTENSION: ICD-10-CM

## 2022-07-21 DIAGNOSIS — E78.2 MIXED HYPERLIPIDEMIA: ICD-10-CM

## 2022-07-21 DIAGNOSIS — Z95.1 HX OF CABG: ICD-10-CM

## 2022-07-21 DIAGNOSIS — G47.33 OSA ON CPAP: ICD-10-CM

## 2022-07-21 DIAGNOSIS — I50.32 CHRONIC DIASTOLIC HEART FAILURE: ICD-10-CM

## 2022-07-21 DIAGNOSIS — Z98.890 STATUS POST CARDIAC CATHETERIZATION: Primary | ICD-10-CM

## 2022-07-21 PROCEDURE — 3288F PR FALLS RISK ASSESSMENT DOCUMENTED: ICD-10-PCS | Mod: CPTII,S$GLB,, | Performed by: NURSE PRACTITIONER

## 2022-07-21 PROCEDURE — 1125F PR PAIN SEVERITY QUANTIFIED, PAIN PRESENT: ICD-10-PCS | Mod: CPTII,S$GLB,, | Performed by: NURSE PRACTITIONER

## 2022-07-21 PROCEDURE — 1160F RVW MEDS BY RX/DR IN RCRD: CPT | Mod: CPTII,S$GLB,, | Performed by: NURSE PRACTITIONER

## 2022-07-21 PROCEDURE — 99999 PR PBB SHADOW E&M-EST. PATIENT-LVL V: CPT | Mod: PBBFAC,,, | Performed by: NURSE PRACTITIONER

## 2022-07-21 PROCEDURE — 3074F SYST BP LT 130 MM HG: CPT | Mod: CPTII,S$GLB,, | Performed by: NURSE PRACTITIONER

## 2022-07-21 PROCEDURE — 99214 PR OFFICE/OUTPT VISIT, EST, LEVL IV, 30-39 MIN: ICD-10-PCS | Mod: S$GLB,,, | Performed by: NURSE PRACTITIONER

## 2022-07-21 PROCEDURE — 1101F PT FALLS ASSESS-DOCD LE1/YR: CPT | Mod: CPTII,S$GLB,, | Performed by: NURSE PRACTITIONER

## 2022-07-21 PROCEDURE — 1159F PR MEDICATION LIST DOCUMENTED IN MEDICAL RECORD: ICD-10-PCS | Mod: CPTII,S$GLB,, | Performed by: NURSE PRACTITIONER

## 2022-07-21 PROCEDURE — 99999 PR PBB SHADOW E&M-EST. PATIENT-LVL V: ICD-10-PCS | Mod: PBBFAC,,, | Performed by: NURSE PRACTITIONER

## 2022-07-21 PROCEDURE — 3078F DIAST BP <80 MM HG: CPT | Mod: CPTII,S$GLB,, | Performed by: NURSE PRACTITIONER

## 2022-07-21 PROCEDURE — 1101F PR PT FALLS ASSESS DOC 0-1 FALLS W/OUT INJ PAST YR: ICD-10-PCS | Mod: CPTII,S$GLB,, | Performed by: NURSE PRACTITIONER

## 2022-07-21 PROCEDURE — 3074F PR MOST RECENT SYSTOLIC BLOOD PRESSURE < 130 MM HG: ICD-10-PCS | Mod: CPTII,S$GLB,, | Performed by: NURSE PRACTITIONER

## 2022-07-21 PROCEDURE — 3078F PR MOST RECENT DIASTOLIC BLOOD PRESSURE < 80 MM HG: ICD-10-PCS | Mod: CPTII,S$GLB,, | Performed by: NURSE PRACTITIONER

## 2022-07-21 PROCEDURE — 1160F PR REVIEW ALL MEDS BY PRESCRIBER/CLIN PHARMACIST DOCUMENTED: ICD-10-PCS | Mod: CPTII,S$GLB,, | Performed by: NURSE PRACTITIONER

## 2022-07-21 PROCEDURE — 3288F FALL RISK ASSESSMENT DOCD: CPT | Mod: CPTII,S$GLB,, | Performed by: NURSE PRACTITIONER

## 2022-07-21 PROCEDURE — 1159F MED LIST DOCD IN RCRD: CPT | Mod: CPTII,S$GLB,, | Performed by: NURSE PRACTITIONER

## 2022-07-21 PROCEDURE — 99214 OFFICE O/P EST MOD 30 MIN: CPT | Mod: S$GLB,,, | Performed by: NURSE PRACTITIONER

## 2022-07-21 PROCEDURE — 1125F AMNT PAIN NOTED PAIN PRSNT: CPT | Mod: CPTII,S$GLB,, | Performed by: NURSE PRACTITIONER

## 2022-07-21 RX ORDER — LOSARTAN POTASSIUM 25 MG/1
25 TABLET ORAL DAILY
Qty: 90 TABLET | Refills: 3 | Status: SHIPPED | OUTPATIENT
Start: 2022-07-21 | End: 2023-08-17

## 2022-07-21 RX ORDER — POTASSIUM CHLORIDE 750 MG/1
10 TABLET, EXTENDED RELEASE ORAL 4 TIMES DAILY
Qty: 360 TABLET | Refills: 3 | Status: SHIPPED | OUTPATIENT
Start: 2022-07-21 | End: 2023-11-29

## 2022-07-25 ENCOUNTER — TELEPHONE (OUTPATIENT)
Dept: CARDIOLOGY | Facility: CLINIC | Age: 77
End: 2022-07-25
Payer: MEDICARE

## 2022-07-25 NOTE — TELEPHONE ENCOUNTER
Faxed record request to [facility] dept. Requesting that a CD copy of pt's most recent angiogram be sent to Dr. Dilip Mathew's office in [place]     ----- Message from Adelia Sanon NP sent at 7/22/2022  1:32 PM CDT -----  Mr. Chase wants his angiogram done most recently by Dr. Joel on a CD and send to Dr. Dilip Mathew in [place]. Can you help with this?  Adelia

## 2022-08-01 NOTE — TELEPHONE ENCOUNTER
Patient called and does not want nuclear done.Requestinf angiogram of heart.Message to Adelia Sanon NP.   - will start lantus 5U + ISS for now  - a1c

## 2022-08-03 ENCOUNTER — TELEPHONE (OUTPATIENT)
Dept: DERMATOLOGY | Facility: CLINIC | Age: 77
End: 2022-08-03
Payer: MEDICARE

## 2022-08-03 NOTE — TELEPHONE ENCOUNTER
----- Message from Talisha Lugo sent at 8/3/2022  3:23 PM CDT -----  Contact: [phone]  the patient is calling to get scheduled for a appt. MOH's procedure.  Dr Fatimah Parker is with [facility] and is the one who is referring him.    the patient can be reached at. [phone]

## 2022-08-26 NOTE — PROGRESS NOTES
Patient had left the emergency department without being seen.  EKG shows T-wave inversion.  Please contact patient and recommend follow-up with his cardiologist or return to emergency department for chest pain, nausea, vomiting, indigestion, fatigue. Patient has been non-compliant with medications.  - HbA1c ordered; if severely elevated, will need endocrine consult  - diabetes education  - monitor FSG  - ISS

## 2022-08-29 ENCOUNTER — TELEPHONE (OUTPATIENT)
Dept: EMERGENCY MEDICINE | Facility: HOSPITAL | Age: 77
End: 2022-08-29
Payer: MEDICARE

## 2022-09-01 ENCOUNTER — OFFICE VISIT (OUTPATIENT)
Dept: CARDIOLOGY | Facility: CLINIC | Age: 77
End: 2022-09-01
Payer: MEDICARE

## 2022-09-01 VITALS
DIASTOLIC BLOOD PRESSURE: 62 MMHG | OXYGEN SATURATION: 96 % | HEART RATE: 88 BPM | BODY MASS INDEX: 42.79 KG/M2 | HEIGHT: 67 IN | SYSTOLIC BLOOD PRESSURE: 115 MMHG | WEIGHT: 272.63 LBS

## 2022-09-01 DIAGNOSIS — I50.32 CHRONIC DIASTOLIC HEART FAILURE: Primary | ICD-10-CM

## 2022-09-01 DIAGNOSIS — E66.01 MORBID OBESITY WITH BMI OF 40.0-44.9, ADULT: ICD-10-CM

## 2022-09-01 DIAGNOSIS — N18.31 STAGE 3A CHRONIC KIDNEY DISEASE: ICD-10-CM

## 2022-09-01 DIAGNOSIS — E78.2 MIXED HYPERLIPIDEMIA: ICD-10-CM

## 2022-09-01 DIAGNOSIS — I25.708 CORONARY ARTERY DISEASE OF BYPASS GRAFT OF NATIVE HEART WITH STABLE ANGINA PECTORIS: ICD-10-CM

## 2022-09-01 DIAGNOSIS — I35.0 MODERATE AORTIC STENOSIS: ICD-10-CM

## 2022-09-01 DIAGNOSIS — E87.6 HYPOKALEMIA: ICD-10-CM

## 2022-09-01 DIAGNOSIS — G47.33 OSA ON CPAP: ICD-10-CM

## 2022-09-01 DIAGNOSIS — I10 PRIMARY HYPERTENSION: ICD-10-CM

## 2022-09-01 PROCEDURE — 1160F PR REVIEW ALL MEDS BY PRESCRIBER/CLIN PHARMACIST DOCUMENTED: ICD-10-PCS | Mod: CPTII,S$GLB,, | Performed by: NURSE PRACTITIONER

## 2022-09-01 PROCEDURE — 99214 OFFICE O/P EST MOD 30 MIN: CPT | Mod: S$GLB,,, | Performed by: NURSE PRACTITIONER

## 2022-09-01 PROCEDURE — 1159F MED LIST DOCD IN RCRD: CPT | Mod: CPTII,S$GLB,, | Performed by: NURSE PRACTITIONER

## 2022-09-01 PROCEDURE — 3288F FALL RISK ASSESSMENT DOCD: CPT | Mod: CPTII,S$GLB,, | Performed by: NURSE PRACTITIONER

## 2022-09-01 PROCEDURE — 3074F PR MOST RECENT SYSTOLIC BLOOD PRESSURE < 130 MM HG: ICD-10-PCS | Mod: CPTII,S$GLB,, | Performed by: NURSE PRACTITIONER

## 2022-09-01 PROCEDURE — 99999 PR PBB SHADOW E&M-EST. PATIENT-LVL V: CPT | Mod: PBBFAC,,, | Performed by: NURSE PRACTITIONER

## 2022-09-01 PROCEDURE — 1101F PT FALLS ASSESS-DOCD LE1/YR: CPT | Mod: CPTII,S$GLB,, | Performed by: NURSE PRACTITIONER

## 2022-09-01 PROCEDURE — 1101F PR PT FALLS ASSESS DOC 0-1 FALLS W/OUT INJ PAST YR: ICD-10-PCS | Mod: CPTII,S$GLB,, | Performed by: NURSE PRACTITIONER

## 2022-09-01 PROCEDURE — 3288F PR FALLS RISK ASSESSMENT DOCUMENTED: ICD-10-PCS | Mod: CPTII,S$GLB,, | Performed by: NURSE PRACTITIONER

## 2022-09-01 PROCEDURE — 99999 PR PBB SHADOW E&M-EST. PATIENT-LVL V: ICD-10-PCS | Mod: PBBFAC,,, | Performed by: NURSE PRACTITIONER

## 2022-09-01 PROCEDURE — 99214 PR OFFICE/OUTPT VISIT, EST, LEVL IV, 30-39 MIN: ICD-10-PCS | Mod: S$GLB,,, | Performed by: NURSE PRACTITIONER

## 2022-09-01 PROCEDURE — 1160F RVW MEDS BY RX/DR IN RCRD: CPT | Mod: CPTII,S$GLB,, | Performed by: NURSE PRACTITIONER

## 2022-09-01 PROCEDURE — 1125F PR PAIN SEVERITY QUANTIFIED, PAIN PRESENT: ICD-10-PCS | Mod: CPTII,S$GLB,, | Performed by: NURSE PRACTITIONER

## 2022-09-01 PROCEDURE — 1125F AMNT PAIN NOTED PAIN PRSNT: CPT | Mod: CPTII,S$GLB,, | Performed by: NURSE PRACTITIONER

## 2022-09-01 PROCEDURE — 3078F PR MOST RECENT DIASTOLIC BLOOD PRESSURE < 80 MM HG: ICD-10-PCS | Mod: CPTII,S$GLB,, | Performed by: NURSE PRACTITIONER

## 2022-09-01 PROCEDURE — 1159F PR MEDICATION LIST DOCUMENTED IN MEDICAL RECORD: ICD-10-PCS | Mod: CPTII,S$GLB,, | Performed by: NURSE PRACTITIONER

## 2022-09-01 PROCEDURE — 3078F DIAST BP <80 MM HG: CPT | Mod: CPTII,S$GLB,, | Performed by: NURSE PRACTITIONER

## 2022-09-01 PROCEDURE — 3074F SYST BP LT 130 MM HG: CPT | Mod: CPTII,S$GLB,, | Performed by: NURSE PRACTITIONER

## 2022-09-01 RX ORDER — AMOXICILLIN 500 MG/1
1000 TABLET, FILM COATED ORAL 2 TIMES DAILY
COMMUNITY
Start: 2022-08-29 | End: 2023-08-17

## 2022-09-01 NOTE — PROGRESS NOTES
+Subjective:    Patient ID:  Bo Chase is a 77 y.o. male who presents for follow-up of Follow-up      HPI: . Bo Chase presents to the clinic for follow up CAD, H/O CABG: total occlusion of SVG to diagonal; nonobstructive disease in SVG to OM; SVG to mid % occlusion; has patent LIMA to LAD; chronic diastolic heart failure, moderate AS, HTN, HLP.  He reports  MENA with walking to the kitchen. In most cases he pushes through the SOB and does not stop.  He denies chest pain.  He uses walker to assist with ambulation. Taking metolazone once a week and torsemide 20mg BID. His weight has been steady.  His edema is improved.  Right leg always larger than the left-it was the leg from which bypass grafts were harvested.  He would like clearance for back surgery.  He reports an episode of chest tightness on August 24, 2022.  He went to the emergency room and had an EKG that was unchanged from the past.  He took 2 nitroglycerin tablets and the symptoms resolved after that.  He stated that he was exhausted that day from the amount of activity the had to do.  He had a lot of activity for 2 days in a row which she tries to avoid, and he thought this might have provoked his chest tightness.  He left the ER before he was seen by the physician because it was taking too long.  He stated that he will not have to do that much activity again in the near future, and that he can pace himself better.    He denied any lightheadedness, dizziness, or near syncope.  He denied orthopnea or PND.      He uses a walker to help him assist with ambulation due to pain in his hips and lower back.  The pain in these areas limits his walking. Cannot put on compression stockings or socks.        He stated that he tends to have low potassium and takes a supplement-30-40 mEq of potassium per day.  Vitamin D was stopped due to high calcium.  Had phlebotomy for polycythemia August 2020.  He is followed by Dr. Massey for CKD.  He is seeing a  pain management doctor for bilateral hip pain.  He has tried massage and chiropractic treatments for this as well.  He sees Laure Ozuna NP for obstructive sleep apnea, and he has seen Dr. Honeycutt for this and chronic bronchitis.    He is planning low back surgery if and when he can.   He went to see his cardiologist in [place] with CV test results and his opinion.   He has been followed by Dr. Crawley in 2020 and he was seeing Dr. Elaine before that.      He has chronic diastolic heart failure class C; functional class 2-3.    LEANDRO: on CPAP every night.    --------------------------------------------------------------------  Dr. Elaine's note: 74 yo male, 6 months f/u.   PMH CAD, s/p CABGx4 1996, s/p last PCI SVG to OM1, in 2014, LIMA patent and  SVG to diag, Dx of DM in 2019 after steroids injection now A1c wnl, , CHFpEF, HTN, HLP, morbid obesity, sleep apnea on CPAP, hypothyroidism. Chronic lower back pain.  8/31/2016 negative nuclear stress test for ischemia.    ECho normal EF and mod LVH  Pt states that he f/u with cardiologist at [place] in the past two yr. Negative exerrcise stress test in  at [place].   Recent Dx of melanoma on nose and right chest pain. S/p chest tumor early .   Sleep with CPAP.  Chronic leg swelling.  BNP 54 and Cr stable   Pt states that no exercise due to pandemic  Worsening SOB when walking and carrying bags  Taking Torsemide 20 mg bid and Metolazone 5 mg once a week.  No chest pain.   Asa 325 mg given by Dr Bernal nephrology  No gym exercise after COVID 19 pandemic  BP LDL and BS controlled  Plan: CONTINUE TORSEMIDE 20 MG BID AND METOLAZONE 2.5 MG TWICE A WEEK  Daily weight. Please call the office if gain 3 pounds in 1 day or 5 pounds in 1 week.  Fluid restriction 1.5 liters a day  Na< 2 gm  CONTINUE ASa lipitor, lopressor losartan imdur and Aldactone   D/c KCL and repeat BMP I n 4 weeks for K level  Counseled DASH  Check Lipid profile in 6 months  Recommend heart-healthy  diet, weight control and regular exercise.  Ion. Risk modification.   RTC in 6 months  ----------------------------------------------------------    Medications: he is not missing any doses.  He is taking torsemide 20 mg p.o. b.i.d., potassium 10mEq QID; spironolactone 25 mg p.o. q.day, and metolazone 5 mg p.o. q.week.  He is taking metoprolol tartrate 25 mg p.o. b.i.d. He is taking isosorbide mononitrate 60 mg p.o. QD, atorvastatin 40 mg p.o. q.day and Losartan 25mg QD.  He is taking aspirin.  Sodium: he does not add salt to foods at the table,  he is reading labels for sodium content. he does eat salty foods; sausage in the morning; ham sandwich a few times/week; sometimes with cheese.  Diet: diced ham, grits, eggs; yesterday: no breakfast; hamburger for lunch ([employer]) and half the french fries with water. Drinks lot-fat milk; usually 1/2 soda twice weekly; unsweet tea  Exercise: he is not exercising.  Tobacco: denies previous or current tobacco use   Alcohol:  Occasional beer     Weight: 123.7 kg (272 lb 9.6 oz) he states that his daily weights  have decreased.Body mass index is 42.7 kg/m².  He monitors daily weights.  Last visit in cardiology clinic on May 19, 2022, he weighed 271# 6.4 ounces.  Wt Readings from Last 3 Encounters:   09/01/22 123.7 kg (272 lb 9.6 oz)   07/21/22 121.8 kg (268 lb 9.6 oz)   07/11/22 123.8 kg (272 lb 14.9 oz)     BP log: None. He does not have a BP monitor at home. He checks it at pharmacy from time to time. He stated that when he goes to a clinic it is similar to today's reading.      Review of Systems   Constitutional: Negative for chills, decreased appetite, fever, night sweats and weight loss.   HENT:  Negative for congestion.    Cardiovascular:  Positive for dyspnea on exertion and leg swelling (improved.). Negative for chest pain, claudication, cyanosis, irregular heartbeat, near-syncope, orthopnea, palpitations, paroxysmal nocturnal dyspnea and syncope.   Respiratory:   Negative for cough, hemoptysis, shortness of breath, sputum production and wheezing.    Hematologic/Lymphatic: Negative for adenopathy and bleeding problem. Does not bruise/bleed easily.   Skin:  Negative for color change and nail changes.        Circular wound noted to right lower extremity.  Patient stated that it is looking better and he is taking care of it himself.  He does have connections with wound care if he needs them.   Musculoskeletal:  Positive for joint pain (Chronic pain in the lower back and hips.).   Gastrointestinal:  Negative for bloating, abdominal pain, change in bowel habit, heartburn, hematochezia, melena, nausea and vomiting.   Genitourinary:  Negative for hematuria.   Neurological:  Negative for dizziness and light-headedness.   Psychiatric/Behavioral:  Negative for altered mental status.      Objective:   Physical Exam  Constitutional:       General: He is not in acute distress.     Appearance: He is well-developed. He is not diaphoretic.   HENT:      Head: Normocephalic and atraumatic.   Eyes:      General: No scleral icterus.     Conjunctiva/sclera: Conjunctivae normal.   Neck:      Thyroid: No thyromegaly.      Vascular: No JVD.      Trachea: No tracheal deviation.   Cardiovascular:      Rate and Rhythm: Normal rate and regular rhythm.      Pulses: Intact distal pulses.      Heart sounds: Murmur heard.   Harsh midsystolic murmur is present with a grade of 1/6 at the upper right sternal border radiating to the neck.     No friction rub. No gallop.   Pulmonary:      Effort: Pulmonary effort is normal. No respiratory distress.      Breath sounds: Normal breath sounds. No wheezing or rales.      Comments: Speaks in complete sentences without obvious signs of shortness of breath or increased work of breathing  Chest:      Chest wall: No tenderness.   Abdominal:      General: Bowel sounds are normal. There is no distension.      Palpations: Abdomen is soft. There is no mass.      Tenderness:  There is no abdominal tenderness. There is no guarding or rebound.      Comments: Obese   Musculoskeletal:         General: Normal range of motion.      Cervical back: Neck supple.      Comments: Venous stasis changes to both lower legs bilaterally.  Nonpitting edema noted.  Right lower leg is larger than the left-chronic.   Lymphadenopathy:      Cervical: No cervical adenopathy.   Skin:     General: Skin is warm and dry.      Coloration: Skin is not pale.      Findings: No erythema or rash.      Comments: Pink.  Circular wound noted to right lower extremity; no drainage, erythema, heat, swelling, or tenderness noted.   Neurological:      Mental Status: He is alert and oriented to person, place, and time.      Latest Reference Range & Units 05/20/22 10:24 06/28/22 16:27   Sodium 136 - 145 mmol/L 137 140   Potassium 3.5 - 5.1 mmol/L 3.5 4.0   Chloride 95 - 110 mmol/L 92 (L) 94 (L)   CO2 23 - 29 mmol/L 29 31 (H)   Anion Gap 8 - 16 mmol/L 16 15   BUN 8 - 23 mg/dL 30 (H) 25 (H)   Creatinine 0.5 - 1.4 mg/dL 1.7 (H) 1.6 (H)   eGFR if non African American >60 mL/min/1.73 m^2 38.3 ! 41.2 !   eGFR if African American >60 mL/min/1.73 m^2 44.3 ! 47.7 !   Glucose 70 - 110 mg/dL 160 (H) 149 (H)   Calcium 8.7 - 10.5 mg/dL 10.3 10.2   Alkaline Phosphatase 55 - 135 U/L 80 81   PROTEIN TOTAL 6.0 - 8.4 g/dL 7.6 7.0   Albumin 3.5 - 5.2 g/dL 3.9 3.9   BILIRUBIN TOTAL 0.1 - 1.0 mg/dL 1.2 (H) 1.1 (H)   AST 10 - 40 U/L 39 42 (H)   ALT 10 - 44 U/L 64 (H) 54 (H)   Cholesterol 120 - 199 mg/dL 176    HDL 40 - 75 mg/dL 31 (L)    HDL/Cholesterol Ratio 20.0 - 50.0 % 17.6 (L)    LDL Cholesterol External 63.0 - 159.0 mg/dL 78.4    Non-HDL Cholesterol mg/dL 145    Total Cholesterol/HDL Ratio 2.0 - 5.0  5.7 (H)    Triglycerides 30 - 150 mg/dL 333 (H)    Magnesium RBC 4.0 - 6.4 mg/dL 5.3    (L): Data is abnormally low  (H): Data is abnormally high  !: Data is abnormal     Latest Reference Range & Units 03/31/22 09:35   Sodium 136 - 145 mmol/L 136    Potassium 3.5 - 5.1 mmol/L 3.5   Chloride 95 - 110 mmol/L 88 (L)   CO2 23 - 29 mmol/L 32 (H)   Anion Gap 8 - 16 mmol/L 16   BUN 8 - 23 mg/dL 29 (H)   Creatinine 0.5 - 1.4 mg/dL 1.7 (H)   EGFR if non African American >60 mL/min/1.73 m^2 38.3 ! [1]   EGFR if African American >60 mL/min/1.73 m^2 44.3 !   Glucose 70 - 110 mg/dL 143 (H)   Calcium 8.7 - 10.5 mg/dL 10.2   Phosphorus 2.7 - 4.5 mg/dL 3.7   Albumin 3.5 - 5.2 g/dL 3.9         [facility] 7/11/2022:   Indications  CAD in native artery [I25.10 (ICD-10-CM)]   Chronic diastolic heart failure [I50.32 (ICD-10-CM)]   S/P CABG (coronary artery bypass graft) [Z95.1 (ICD-10-CM)]   S/P angioplasty with stent [Z95.820 (ICD-10-CM)]   Aortic valve stenosis, etiology of cardiac valve disease unspecified [I35.0 (ICD-10-CM)]   SOB (shortness of breath) [R06.02 (ICD-10-CM)]   Summary   The pre-procedure left ventricular end diastolic pressure was 28.  There was moderate aortic valve stenosis.  The estimated blood loss was none.  The filling pressures on the right and left were moderately elevated. Pulmonary hypertension was mild to moderate.  THERE IS SUGGESTION OF CONSTRICTION WITH NEAR EQUALIZATION OF PRESSURES BETWEEN LV AND RV HOWEVER THERE IS NO DISCORDANCE.  The Origin to Prox Graft lesion was 50% stenosed.  THE SVG STENOSIS IS NOT HEMODYNAMICALLY SIGNIFICANT.     The procedure log was documented by Documenter: Linda Yepez RN and verified by Mercedes Joel MD.     Date: 7/11/2022  Time: 11:06 AM        [facility] 5/9/22:Summary   The ejection fraction was calculated to be 60%.  The pre-procedure left ventricular end diastolic pressure was 28.  The post-procedure left ventricular end diastolic pressure was 29.  The Mid LM to Dist LM lesion was 100% stenosed.  The Prox RCA lesion was 100% stenosed.  The Origin to Prox Graft lesion was 100% stenosed.  The Prox Graft lesion was 100% stenosed.  The Origin to Prox Graft lesion was 95% stenosed.  The estimated blood loss was  none.  High left sided filling pressures    Echo 5/11/22:Summary  The left ventricle is normal in size with concentric hypertrophy and low normal systolic function.  Mild left atrial enlargement.  Grade I left ventricular diastolic dysfunction.  Normal right ventricular size with normal right ventricular systolic function.  The estimated ejection fraction is 50%.  There is moderate aortic valve stenosis.  Aortic valve area is 1.30 cm2; peak velocity is 2.90 m/s; mean gradient is 23 mmHg.  Mild mitral regurgitation.  Poor endocardial visualization, technically difficult study        Echo at Dr. Crawley's office 12/2020  EF 55-60%  Apical hypokinesis.  Moderate concentric hypertrophy.  Mild AS, AI, and MR.  Mild LAE.  I/IV diastolic dysfunction.    Echo 07/25/2017:CONCLUSIONS     1 - Concentric hypertrophy.     2 - No wall motion abnormalities.     3 - Normal left ventricular systolic function (EF 60-65%).     4 - Normal left ventricular diastolic function.     5 - Normal right ventricular systolic function .     6 - Trivial to mild mitral regurgitation.     7 - Trivial to mild tricuspid regurgitation.     Arterial U/S LE 5/11/22: Conclusion   No evidence of hemodynamically significant stenosis of visalized bilateral lower extremities arteries  Bilateral peroneal arteries are not well visualized due to depth of vessels.   TDS:Bilateral peroneal arteries are not well visualized due to depth of vessels.     ARNULFO 5/11/22: Conclusion  Minimal decreased ARNULFO in the right lower extermity at rest with triphasic flow waveforms  Normal ARNULFO left lower extremity with triphasic flow waveforms     Venous U/S LE 4/27/22: Impression:   Bilateral lower extremity venous reflux as detailed.  No DVT.    Pharmacologic nuclear medicine stress test 08/31/2016:Impression: NORMAL MYOCARDIAL PERFUSION   1. The perfusion scan is free of evidence for myocardial ischemia or injury.   2. There is a mild intensity fixed defect in the inferior wall  of the left ventricle, secondary to diaphragm attenuation.   3. Resting wall motion is physiologic.   4. Resting LV function is normal.   5. The ventricular volumes are normal at rest and stress.   6. The extracardiac distribution of radioactivity is normal.       Left heart catheterization 12/29/2014:ANGIOGRAPHIC RESULTS:   DIAGNOSTIC:        Patient has a right dominant coronary artery.        - Left Main Coronary Artery:              The LM has luminal irregularities. There is JESUS 3 flow.        - Left Anterior Descending Artery:              The proximal LAD has chronic total occlusion. There is JESUS 0 flow.        - Left Circumflex Artery:              The proximal LCX has chronic total occlusion. There is JESUS 0 flow.        - Right Coronary Artery:              The proximal RCA has chronic total occlusion. There is JESUS 0 flow.        - SVG To RCA:              The SVG to RCA has luminal irregularities. There is JESUS 3 flow.        - THOMPSON To LAD:              The LIMA to LAD is normal. There is JESUS 3 flow.        - SVG To D1:              The SVG to D1 has chronic total occlusion. There is JESUS 0 flow.        - SVG To OM1:              The mid SVG to OM1 has a 90% stenosis.   This was a MI culprit lesion.  The Posterior and Lateral myocardial walls were affected. The following items were used: Wire   Filter Spider Fx 5 X 320/190, Blln Emerge 4 X 20 and Stent Resolute   4.0x38mm (AMARI).   Assessment:      1. Chronic diastolic heart failure    2. Coronary artery disease of bypass graft of native heart with stable angina pectoris    3. Moderate aortic stenosis    4. Primary hypertension    5. Mixed hyperlipidemia    6. Stage 3a chronic kidney disease    7. LEANDRO on CPAP    8. Morbid obesity with BMI of 40.0-44.9, adult    9. Hypokalemia          Plan:     Chronic diastolic heart failure    Coronary artery disease of bypass graft of native heart with stable angina pectoris    Moderate aortic stenosis    Primary  hypertension    Mixed hyperlipidemia    Stage 3a chronic kidney disease    LEANDRO on CPAP    Morbid obesity with BMI of 40.0-44.9, adult    Hypokalemia      Continue imdur-CAD, angina.  He does not want to increase dose at this time.  If he has further episodes of chest tightness will increase the dose of his Imdur.  Continue aspirin-CAD.  Continue torsemide, metolazone, spironolactone - diastolic heart failure.  He may have cardiorenal syndrome-fluctuating creatinine on diuretics. Creatinine is at baseline and renal function appears stable; will monitor.  His volume status appears pretty stable clinically today.  Nephrology recommends to keep aldactone and KCL supplement - severe hypokalemia. He is also on a small dose of losartan given HTN and diabetes with CKD.  Dr. Massey following CKD 3.  Continue metoprolol, losartan, spironolactone - HTN.  Continue atorvastatin, fish oil - HLP.  Sodium restriction encouraged.  Continue using CPAP every night-obstructive sleep apnea.  Encourage exercise such as walking or chair exercises.    Daily weights.    Continue weight loss.  Whole foods, low glycemic diet.  Follow-up with Dr. Browne for preop clearance (back surgery) or call sooner for any problems.  Adelia Sanon NP  [facility]    This note has been prepared using a combination of [employer] dictation device and typing.  It has been checked for errors but some errors may still exist within the note as a result of speech recognition errors and/or typographical errors.

## 2022-09-07 ENCOUNTER — OFFICE VISIT (OUTPATIENT)
Dept: CARDIOLOGY | Facility: CLINIC | Age: 77
End: 2022-09-07
Payer: MEDICARE

## 2022-09-07 VITALS
SYSTOLIC BLOOD PRESSURE: 112 MMHG | WEIGHT: 274.5 LBS | OXYGEN SATURATION: 96 % | HEART RATE: 80 BPM | BODY MASS INDEX: 43.08 KG/M2 | HEIGHT: 67 IN | DIASTOLIC BLOOD PRESSURE: 64 MMHG

## 2022-09-07 DIAGNOSIS — Z95.1 HX OF CABG: ICD-10-CM

## 2022-09-07 DIAGNOSIS — I50.32 CHRONIC DIASTOLIC CONGESTIVE HEART FAILURE: ICD-10-CM

## 2022-09-07 DIAGNOSIS — E78.5 HYPERLIPIDEMIA WITH TARGET LDL LESS THAN 70: Primary | ICD-10-CM

## 2022-09-07 DIAGNOSIS — I10 PRIMARY HYPERTENSION: Chronic | ICD-10-CM

## 2022-09-07 DIAGNOSIS — G47.33 OSA ON CPAP: ICD-10-CM

## 2022-09-07 DIAGNOSIS — Z95.1 S/P CABG X 4: ICD-10-CM

## 2022-09-07 DIAGNOSIS — I25.10 CORONARY ARTERY DISEASE INVOLVING NATIVE CORONARY ARTERY OF NATIVE HEART WITHOUT ANGINA PECTORIS: Chronic | ICD-10-CM

## 2022-09-07 DIAGNOSIS — I10 ESSENTIAL HYPERTENSION: ICD-10-CM

## 2022-09-07 PROCEDURE — 1159F PR MEDICATION LIST DOCUMENTED IN MEDICAL RECORD: ICD-10-PCS | Mod: CPTII,S$GLB,, | Performed by: INTERNAL MEDICINE

## 2022-09-07 PROCEDURE — 1101F PR PT FALLS ASSESS DOC 0-1 FALLS W/OUT INJ PAST YR: ICD-10-PCS | Mod: CPTII,S$GLB,, | Performed by: INTERNAL MEDICINE

## 2022-09-07 PROCEDURE — 3288F PR FALLS RISK ASSESSMENT DOCUMENTED: ICD-10-PCS | Mod: CPTII,S$GLB,, | Performed by: INTERNAL MEDICINE

## 2022-09-07 PROCEDURE — 99214 OFFICE O/P EST MOD 30 MIN: CPT | Mod: S$GLB,,, | Performed by: INTERNAL MEDICINE

## 2022-09-07 PROCEDURE — 1160F RVW MEDS BY RX/DR IN RCRD: CPT | Mod: CPTII,S$GLB,, | Performed by: INTERNAL MEDICINE

## 2022-09-07 PROCEDURE — 1125F AMNT PAIN NOTED PAIN PRSNT: CPT | Mod: CPTII,S$GLB,, | Performed by: INTERNAL MEDICINE

## 2022-09-07 PROCEDURE — 1159F MED LIST DOCD IN RCRD: CPT | Mod: CPTII,S$GLB,, | Performed by: INTERNAL MEDICINE

## 2022-09-07 PROCEDURE — 3074F PR MOST RECENT SYSTOLIC BLOOD PRESSURE < 130 MM HG: ICD-10-PCS | Mod: CPTII,S$GLB,, | Performed by: INTERNAL MEDICINE

## 2022-09-07 PROCEDURE — 99214 PR OFFICE/OUTPT VISIT, EST, LEVL IV, 30-39 MIN: ICD-10-PCS | Mod: S$GLB,,, | Performed by: INTERNAL MEDICINE

## 2022-09-07 PROCEDURE — 3078F PR MOST RECENT DIASTOLIC BLOOD PRESSURE < 80 MM HG: ICD-10-PCS | Mod: CPTII,S$GLB,, | Performed by: INTERNAL MEDICINE

## 2022-09-07 PROCEDURE — 99999 PR PBB SHADOW E&M-EST. PATIENT-LVL III: ICD-10-PCS | Mod: PBBFAC,,, | Performed by: INTERNAL MEDICINE

## 2022-09-07 PROCEDURE — 3078F DIAST BP <80 MM HG: CPT | Mod: CPTII,S$GLB,, | Performed by: INTERNAL MEDICINE

## 2022-09-07 PROCEDURE — 1160F PR REVIEW ALL MEDS BY PRESCRIBER/CLIN PHARMACIST DOCUMENTED: ICD-10-PCS | Mod: CPTII,S$GLB,, | Performed by: INTERNAL MEDICINE

## 2022-09-07 PROCEDURE — 3074F SYST BP LT 130 MM HG: CPT | Mod: CPTII,S$GLB,, | Performed by: INTERNAL MEDICINE

## 2022-09-07 PROCEDURE — 99999 PR PBB SHADOW E&M-EST. PATIENT-LVL III: CPT | Mod: PBBFAC,,, | Performed by: INTERNAL MEDICINE

## 2022-09-07 PROCEDURE — 3288F FALL RISK ASSESSMENT DOCD: CPT | Mod: CPTII,S$GLB,, | Performed by: INTERNAL MEDICINE

## 2022-09-07 PROCEDURE — 1125F PR PAIN SEVERITY QUANTIFIED, PAIN PRESENT: ICD-10-PCS | Mod: CPTII,S$GLB,, | Performed by: INTERNAL MEDICINE

## 2022-09-07 PROCEDURE — 1101F PT FALLS ASSESS-DOCD LE1/YR: CPT | Mod: CPTII,S$GLB,, | Performed by: INTERNAL MEDICINE

## 2022-09-07 RX ORDER — RANOLAZINE 500 MG/1
500 TABLET, EXTENDED RELEASE ORAL 2 TIMES DAILY
Qty: 60 TABLET | Refills: 11 | Status: SHIPPED | OUTPATIENT
Start: 2022-09-07 | End: 2022-09-07 | Stop reason: SDUPTHER

## 2022-09-07 RX ORDER — RANOLAZINE 500 MG/1
500 TABLET, EXTENDED RELEASE ORAL 2 TIMES DAILY
Qty: 60 TABLET | Refills: 11 | Status: SHIPPED | OUTPATIENT
Start: 2022-09-07 | End: 2022-09-26 | Stop reason: SDUPTHER

## 2022-09-07 NOTE — PROGRESS NOTES
Subjective:   Patient ID:  Bo Chase is a 77 y.o. male who presents for follow-up of Pre-op Exam  Pt still with CP and SOB.Repeat cath SVG graft 50%Other grafts closed.  Pt planned for Lumbar surgery.  Increased lasix did not help.    Hypertension  This is a chronic problem. The current episode started more than 1 year ago. The problem has been gradually improving since onset. The problem is controlled. Pertinent negatives include no chest pain, palpitations or shortness of breath. Past treatments include beta blockers, angiotensin blockers and diuretics. The current treatment provides moderate improvement. There are no compliance problems.    Hyperlipidemia  This is a chronic problem. The current episode started more than 1 year ago. The problem is controlled. Recent lipid tests were reviewed and are variable. Pertinent negatives include no chest pain or shortness of breath. The current treatment provides moderate improvement of lipids. There are no compliance problems.    Coronary Artery Disease  Presents for follow-up visit. Pertinent negatives include no chest pain, chest pressure, chest tightness, dizziness, leg swelling, muscle weakness, palpitations, shortness of breath or weight gain. Risk factors include hyperlipidemia. The symptoms have been stable. Compliance with diet is variable. Compliance with exercise is variable. Compliance with medications is good.     Review of Systems   Constitutional: Negative. Negative for weight gain.   HENT: Negative.     Eyes: Negative.    Cardiovascular: Negative.  Negative for chest pain, leg swelling and palpitations.   Respiratory: Negative.  Negative for chest tightness and shortness of breath.    Endocrine: Negative.    Hematologic/Lymphatic: Negative.    Skin: Negative.    Musculoskeletal:  Negative for muscle weakness.   Gastrointestinal: Negative.    Genitourinary: Negative.    Neurological: Negative.  Negative for dizziness.   Psychiatric/Behavioral: Negative.      Allergic/Immunologic: Negative.    All other systems reviewed and are negative.  Family History   Problem Relation Age of Onset    Heart disease Father      Past Medical History:   Diagnosis Date    Acute coronary syndrome     CHF (congestive heart failure)     Chronic kidney disease     Coronary artery disease     Heart murmur     Hyperlipidemia     Hypertension     Lumbar spondylosis     Sleep apnea      Social History     Socioeconomic History    Marital status:    Occupational History    Occupation:    Tobacco Use    Smoking status: Never    Smokeless tobacco: Never   Substance and Sexual Activity    Alcohol use: Yes     Comment: 6 BEERS A YEAR     Drug use: No     Current Outpatient Medications on File Prior to Visit   Medication Sig Dispense Refill    allopurinoL (ZYLOPRIM) 300 MG tablet Take 1.5 tablets (450 mg total) by mouth once daily. 135 tablet 3    amoxicillin (AMOXIL) 500 MG Tab Take 1,000 mg by mouth 2 (two) times daily.      ascorbic acid, vitamin C, (VITAMIN C) 500 MG tablet Take 500 mg by mouth once daily.      aspirin 325 MG tablet Take 325 mg by mouth once daily.      atorvastatin (LIPITOR) 40 MG tablet Take 1 tablet (40 mg total) by mouth once daily. 90 tablet 4    blood sugar diagnostic (TRUE METRIX GLUCOSE TEST STRIP) Strp 1 strip.      cetirizine (ZYRTEC) 10 MG tablet cetirizine 10 mg tablet   Take 1 tablet every day by oral route.      docusate sodium (COLACE) 100 MG capsule Take 100 mg by mouth.      DULoxetine (CYMBALTA) 30 MG capsule Take 30 mg by mouth.      dutasteride (AVODART) 0.5 mg capsule       fluticasone (FLONASE) 50 mcg/actuation nasal spray 1 spray (50 mcg total) by Each Nare route once daily. 1 Bottle 0    HYDROcodone-acetaminophen (NORCO)  mg per tablet Take 1 tablet by mouth every 6 (six) hours as needed.      ipratropium (ATROVENT) 42 mcg (0.06 %) nasal spray 2 sprays by Nasal route.      isosorbide mononitrate (IMDUR) 60 MG 24 hr tablet Take 1 tablet  (60 mg total) by mouth once daily. 30 tablet 11    ketoconazole (NIZORAL) 2 % cream Apply topically 2 (two) times daily.      lancets Misc Use daily      levothyroxine (SYNTHROID) 100 MCG tablet TAKE 1 TABLET EVERY DAY BEFORE BREAKFAST 90 tablet 2    losartan (COZAAR) 25 MG tablet Take 1 tablet (25 mg total) by mouth once daily. 90 tablet 3    metOLazone (ZAROXOLYN) 10 MG tablet Take 10 mg by mouth once a week.      metoprolol tartrate (LOPRESSOR) 25 MG tablet Take 1 tablet (25 mg total) by mouth 2 (two) times daily. (Patient taking differently: Take 25 mg by mouth once daily at 6am.) 180 tablet 3    multivitamin (THERAGRAN) per tablet Take 1 tablet by mouth once daily.      nitroGLYCERIN (NITROSTAT) 0.4 MG SL tablet Place 1 tablet (0.4 mg total) under the tongue every 5 (five) minutes as needed for Chest pain. 25 tablet 6    omega-3 fatty acids/fish oil (FISH OIL-OMEGA-3 FATTY ACIDS) 300-1,000 mg capsule Take 1 capsule by mouth once daily.      pentoxifylline (TRENTAL) 400 mg TbSR Take 1 tablet (400 mg total) by mouth 3 (three) times daily with meals. 270 tablet 4    potassium chloride SA (K-DUR,KLOR-CON) 10 MEQ tablet Take 1 tablet (10 mEq total) by mouth 4 (four) times daily. Pt states he takes 3-4 tablets once daily 360 tablet 3    spironolactone (ALDACTONE) 25 MG tablet Take 1 tablet (25 mg total) by mouth once daily. 90 tablet 3    tamsulosin (FLOMAX) 0.4 mg Cap Take 1 capsule by mouth once daily.      torsemide (DEMADEX) 20 MG Tab Take 1 tablet (20 mg total) by mouth 2 (two) times a day. 180 tablet 3    TRUE METRIX GO GLUCOSE METER Misc USE TO CHECK GLUCOSE ONCE DAILY AS DIRECTED      albuterol (PROVENTIL/VENTOLIN HFA) 90 mcg/actuation inhaler Inhale 1-2 puffs into the lungs every 6 (six) hours as needed for Wheezing. Rescue (Patient not taking: Reported on 9/7/2022) 1 Inhaler 5     No current facility-administered medications on file prior to visit.     Review of patient's allergies indicates:   Allergen  Reactions    Cat/feline products Itching    Ciprofloxacin Other (See Comments)     Foggy, drowsy  Other reaction(s): Other (See Comments), Other (See Comments)    Latex      Other reaction(s): Swelling    Sulfa (sulfonamide antibiotics)      Other reaction(s): Flushing (skin)       Objective:     Physical Exam  Vitals and nursing note reviewed.   Constitutional:       Appearance: He is well-developed.   HENT:      Head: Normocephalic and atraumatic.   Eyes:      Conjunctiva/sclera: Conjunctivae normal.      Pupils: Pupils are equal, round, and reactive to light.   Cardiovascular:      Rate and Rhythm: Normal rate and regular rhythm.      Pulses: Intact distal pulses.      Heart sounds: Normal heart sounds.   Pulmonary:      Effort: Pulmonary effort is normal.      Breath sounds: Normal breath sounds.   Abdominal:      General: Bowel sounds are normal.      Palpations: Abdomen is soft.   Musculoskeletal:      Cervical back: Normal range of motion and neck supple.   Skin:     General: Skin is warm and dry.   Neurological:      Mental Status: He is alert and oriented to person, place, and time.       Assessment:     1. Hyperlipidemia with target LDL less than 70    2. Chronic diastolic congestive heart failure    3. S/P CABG x 4    4. Coronary artery disease involving native coronary artery of native heart without angina pectoris    5. Primary hypertension    6. LEANDRO on CPAP        Plan:     Hyperlipidemia with target LDL less than 70    Chronic diastolic congestive heart failure    S/P CABG x 4    Coronary artery disease involving native coronary artery of native heart without angina pectoris    Primary hypertension    LEANDRO on CPAP      Add ranexa , continue nitrates - CAD  Continue statin-hlp  Continue diuretics, metoprolol, losartan-htn

## 2022-09-08 RX ORDER — METOPROLOL TARTRATE 25 MG/1
25 TABLET, FILM COATED ORAL 2 TIMES DAILY
Qty: 180 TABLET | Refills: 3 | Status: SHIPPED | OUTPATIENT
Start: 2022-09-08 | End: 2023-08-30 | Stop reason: SDUPTHER

## 2022-09-15 DIAGNOSIS — I50.32 CHRONIC DIASTOLIC CONGESTIVE HEART FAILURE: ICD-10-CM

## 2022-09-15 RX ORDER — TORSEMIDE 20 MG/1
20 TABLET ORAL 2 TIMES DAILY
Qty: 180 TABLET | Refills: 3 | Status: SHIPPED | OUTPATIENT
Start: 2022-09-15 | End: 2023-11-29

## 2022-09-22 ENCOUNTER — TELEPHONE (OUTPATIENT)
Dept: NEPHROLOGY | Facility: CLINIC | Age: 77
End: 2022-09-22
Payer: MEDICARE

## 2022-09-22 ENCOUNTER — OFFICE VISIT (OUTPATIENT)
Dept: NEPHROLOGY | Facility: CLINIC | Age: 77
End: 2022-09-22
Payer: MEDICARE

## 2022-09-22 ENCOUNTER — LAB VISIT (OUTPATIENT)
Dept: LAB | Facility: HOSPITAL | Age: 77
End: 2022-09-22
Attending: INTERNAL MEDICINE
Payer: MEDICARE

## 2022-09-22 VITALS
BODY MASS INDEX: 42.38 KG/M2 | OXYGEN SATURATION: 93 % | HEIGHT: 67 IN | SYSTOLIC BLOOD PRESSURE: 112 MMHG | WEIGHT: 270 LBS | DIASTOLIC BLOOD PRESSURE: 60 MMHG | HEART RATE: 86 BPM

## 2022-09-22 DIAGNOSIS — N28.1 COMPLEX RENAL CYST: ICD-10-CM

## 2022-09-22 DIAGNOSIS — E55.9 VITAMIN D DEFICIENCY: ICD-10-CM

## 2022-09-22 DIAGNOSIS — I10 PRIMARY HYPERTENSION: Chronic | ICD-10-CM

## 2022-09-22 DIAGNOSIS — I50.32 CHRONIC DIASTOLIC CONGESTIVE HEART FAILURE: ICD-10-CM

## 2022-09-22 DIAGNOSIS — N25.81 SECONDARY HYPERPARATHYROIDISM OF RENAL ORIGIN: ICD-10-CM

## 2022-09-22 DIAGNOSIS — I50.22 CHRONIC SYSTOLIC (CONGESTIVE) HEART FAILURE: ICD-10-CM

## 2022-09-22 DIAGNOSIS — N18.31 STAGE 3A CHRONIC KIDNEY DISEASE: ICD-10-CM

## 2022-09-22 DIAGNOSIS — E66.01 MORBID OBESITY WITH BMI OF 40.0-44.9, ADULT: ICD-10-CM

## 2022-09-22 DIAGNOSIS — M1A.09X0 IDIOPATHIC CHRONIC GOUT OF MULTIPLE SITES WITHOUT TOPHUS: ICD-10-CM

## 2022-09-22 DIAGNOSIS — N18.32 STAGE 3B CHRONIC KIDNEY DISEASE: Primary | ICD-10-CM

## 2022-09-22 LAB
ALBUMIN SERPL BCP-MCNC: 3.9 G/DL (ref 3.5–5.2)
ANION GAP SERPL CALC-SCNC: 15 MMOL/L (ref 8–16)
BUN SERPL-MCNC: 41 MG/DL (ref 8–23)
CALCIUM SERPL-MCNC: 10.1 MG/DL (ref 8.7–10.5)
CHLORIDE SERPL-SCNC: 91 MMOL/L (ref 95–110)
CO2 SERPL-SCNC: 32 MMOL/L (ref 23–29)
CREAT SERPL-MCNC: 1.9 MG/DL (ref 0.5–1.4)
EST. GFR  (NO RACE VARIABLE): 36.1 ML/MIN/1.73 M^2
GLUCOSE SERPL-MCNC: 244 MG/DL (ref 70–110)
PHOSPHATE SERPL-MCNC: 2.4 MG/DL (ref 2.7–4.5)
POTASSIUM SERPL-SCNC: 3.5 MMOL/L (ref 3.5–5.1)
PTH-INTACT SERPL-MCNC: 74 PG/ML (ref 9–77)
SODIUM SERPL-SCNC: 138 MMOL/L (ref 136–145)

## 2022-09-22 PROCEDURE — 1125F PR PAIN SEVERITY QUANTIFIED, PAIN PRESENT: ICD-10-PCS | Mod: CPTII,S$GLB,, | Performed by: INTERNAL MEDICINE

## 2022-09-22 PROCEDURE — 36415 COLL VENOUS BLD VENIPUNCTURE: CPT | Mod: PO | Performed by: INTERNAL MEDICINE

## 2022-09-22 PROCEDURE — 3288F PR FALLS RISK ASSESSMENT DOCUMENTED: ICD-10-PCS | Mod: CPTII,S$GLB,, | Performed by: INTERNAL MEDICINE

## 2022-09-22 PROCEDURE — 83970 ASSAY OF PARATHORMONE: CPT | Performed by: INTERNAL MEDICINE

## 2022-09-22 PROCEDURE — 1125F AMNT PAIN NOTED PAIN PRSNT: CPT | Mod: CPTII,S$GLB,, | Performed by: INTERNAL MEDICINE

## 2022-09-22 PROCEDURE — 99215 OFFICE O/P EST HI 40 MIN: CPT | Mod: S$GLB,,, | Performed by: INTERNAL MEDICINE

## 2022-09-22 PROCEDURE — 3074F PR MOST RECENT SYSTOLIC BLOOD PRESSURE < 130 MM HG: ICD-10-PCS | Mod: CPTII,S$GLB,, | Performed by: INTERNAL MEDICINE

## 2022-09-22 PROCEDURE — 3078F PR MOST RECENT DIASTOLIC BLOOD PRESSURE < 80 MM HG: ICD-10-PCS | Mod: CPTII,S$GLB,, | Performed by: INTERNAL MEDICINE

## 2022-09-22 PROCEDURE — 99999 PR PBB SHADOW E&M-EST. PATIENT-LVL V: CPT | Mod: PBBFAC,,, | Performed by: INTERNAL MEDICINE

## 2022-09-22 PROCEDURE — 1159F MED LIST DOCD IN RCRD: CPT | Mod: CPTII,S$GLB,, | Performed by: INTERNAL MEDICINE

## 2022-09-22 PROCEDURE — 99999 PR PBB SHADOW E&M-EST. PATIENT-LVL V: ICD-10-PCS | Mod: PBBFAC,,, | Performed by: INTERNAL MEDICINE

## 2022-09-22 PROCEDURE — 3078F DIAST BP <80 MM HG: CPT | Mod: CPTII,S$GLB,, | Performed by: INTERNAL MEDICINE

## 2022-09-22 PROCEDURE — 99215 PR OFFICE/OUTPT VISIT, EST, LEVL V, 40-54 MIN: ICD-10-PCS | Mod: S$GLB,,, | Performed by: INTERNAL MEDICINE

## 2022-09-22 PROCEDURE — 3288F FALL RISK ASSESSMENT DOCD: CPT | Mod: CPTII,S$GLB,, | Performed by: INTERNAL MEDICINE

## 2022-09-22 PROCEDURE — 3074F SYST BP LT 130 MM HG: CPT | Mod: CPTII,S$GLB,, | Performed by: INTERNAL MEDICINE

## 2022-09-22 PROCEDURE — 80069 RENAL FUNCTION PANEL: CPT | Performed by: INTERNAL MEDICINE

## 2022-09-22 PROCEDURE — 1100F PTFALLS ASSESS-DOCD GE2>/YR: CPT | Mod: CPTII,S$GLB,, | Performed by: INTERNAL MEDICINE

## 2022-09-22 PROCEDURE — 1160F PR REVIEW ALL MEDS BY PRESCRIBER/CLIN PHARMACIST DOCUMENTED: ICD-10-PCS | Mod: CPTII,S$GLB,, | Performed by: INTERNAL MEDICINE

## 2022-09-22 PROCEDURE — 1160F RVW MEDS BY RX/DR IN RCRD: CPT | Mod: CPTII,S$GLB,, | Performed by: INTERNAL MEDICINE

## 2022-09-22 PROCEDURE — 1159F PR MEDICATION LIST DOCUMENTED IN MEDICAL RECORD: ICD-10-PCS | Mod: CPTII,S$GLB,, | Performed by: INTERNAL MEDICINE

## 2022-09-22 PROCEDURE — 1100F PR PT FALLS ASSESS DOC 2+ FALLS/FALL W/INJURY/YR: ICD-10-PCS | Mod: CPTII,S$GLB,, | Performed by: INTERNAL MEDICINE

## 2022-09-22 NOTE — TELEPHONE ENCOUNTER
Pt notified. Gave pt instructions again. All questions answered.//lp   Niacinamide Pregnancy And Lactation Text: These medications are considered safe during pregnancy.

## 2022-09-22 NOTE — PROGRESS NOTES
Subjective:       Patient ID: Bo Chase is a 77 y.o. White male who presents for follow-up evaluation of Chronic Kidney Disease       Patient is a  white male who was  by profession.  Has history of chronic kidney disease for at least 5 years.  He was last seen by Dr. Prather in 2012.  Renal ultrasound at that time showed bilateral renal cysts.  Left kidney had a cyst of 2.5 cm.  Patient never had any proteinuria.  His creatinine has been fluctuating between 1.6 and 2.0.  His fluctuations have been due to diuretic therapy.  In the last 5 years his kidney function has been stable.  He has had history of coronary artery disease status post shortness of breath and dyspnea and exertion.    12/ 2014Occluded lad lcx and rca.Occluded svg to d1svg to rca patent svg to  om1 90% eccentric treated with dennis with filter wire protection.Patent lima.No complication. Dr. Joel     2014 LEANDRO : now on CPAP    6/2015 LBP s/p KAROLINA and s/p surgery laminectomy     4/2017 Renal USD CT scan of the abdomen shows extensive calcifications of the aorta    August 2017 2-D echo reviewed= PA pressure >26 mm Hg, creatinine down to 1.4, uric acid controlled, vitamin D level is 43, PTH 91, estimated GFR with Cystatin C is 50%; weight down by 10 ib from 263 to 254 ib    March 2018 patient's creatinine is up to 1.8.  Weight is up to 262 pounds.  Approximate GFR about 40%.  GFR loss is about 10% per year.  No proteinuria.  PTH has gone up from 91 in 2017-235 in March 2018.  Labwork shows severe metabolic alkalosis, severe hypokalemia, low chloride levels due to complications of diuretics. Severe Polycythemia may need phlebotomy     6/2018 K+ is better ; Hgb is better ; weight is higher ; GFR 37 % with Cr 1.7 ; retired and now in gym     09/2018 High Calcium; stop D; check USD and SPEP     November 2018 ultrasound done no cancer.  Negative serum protein electrophoresis    1/2019 s/p fall now pending left hips IA steroids in SI and Hip ;  "creatinine down off ibuprofen ; rtired now in [place] --gym and work out d/w patient usman    6/2019 s/p cellulitis x2 s/p inpatient and I&D ( serratia ) ; left hip injection and Chiro for Pyriformitis     August 2020 creatinine 2.0.  Status post phlebotomy for polycythemia under care of Dr. Diaz.    Interval history May 2021: Former pt of Dr. Bernal, last Seen Dec 2020. He knows he is in Stage 3 but was not taught creatinine nor GFR.  He recently underwent R hip replacement ([place]) , gained 20lbs of fluid, saw Cardiology and medications adjusted but asking if he can resume daily loop diuretic.     Interval History Dec 2021: Doing well. Hip pain. No NSAID use. LE is stable.     Interval History April 2022: He is doing better. Last Hba1c was 7.7 but recent BS at home have improved. Fatigue has improved     Sep 2022:  Life has become quite hectic. Has CP which respond to NTG. He is s/p two [facility] May and July without PCI.  Saw CTS at [facility]. This is for "clearance" for hopeful surgery in future. Notes chronic constipation with pain medications    Review of Systems   Constitutional:  Negative for activity change, appetite change and unexpected weight change.   HENT:  Negative for facial swelling.    Respiratory:  Negative for shortness of breath.    Cardiovascular:  Positive for chest pain and leg swelling (much improved).   Gastrointestinal:  Positive for constipation.   Genitourinary:  Positive for frequency.   Musculoskeletal:  Positive for arthralgias and gait problem.   Allergic/Immunologic: Positive for immunocompromised state (due to DM 2 and CKD).   Psychiatric/Behavioral:  Negative for confusion and decreased concentration.      Objective:   /60   Pulse 86   Ht 5' 7" (1.702 m)   Wt 122.5 kg (270 lb)   SpO2 (!) 93%   BMI 42.29 kg/m²      Weight at home 270     gained 10 lb since last visit.  From 259-269    1/2019 277 ib     6/2019 254 ib = 115.3 kg     10/2019 257 = 116.6    2/2020 263 ib "     August 2020 266 lb    12/2020 275 ib   Physical Exam  Vitals and nursing note reviewed.   Constitutional:       General: He is not in acute distress.     Appearance: Normal appearance. He is obese. He is not ill-appearing.   HENT:      Head: Atraumatic.   Eyes:      General: No scleral icterus.  Cardiovascular:      Rate and Rhythm: Normal rate.   Pulmonary:      Breath sounds: No wheezing or rales.   Abdominal:      General: There is no distension.   Musculoskeletal:      Right lower leg: Edema (much improved) present.      Left lower leg: Edema (much improved) present.   Skin:     General: Skin is warm and dry.      Coloration: Skin is not jaundiced.   Neurological:      Mental Status: He is alert and oriented to person, place, and time.   Psychiatric:         Mood and Affect: Mood normal.         Behavior: Behavior normal.         Thought Content: Thought content normal.         Judgment: Judgment normal.         Lab Results   Component Value Date    CREATININE 1.6 (H) 06/28/2022    BUN 25 (H) 06/28/2022     06/28/2022    K 4.0 06/28/2022    CL 94 (L) 06/28/2022    CO2 31 (H) 06/28/2022     Lab Results   Component Value Date    WBC 8.79 06/28/2022    HGB 17.8 06/28/2022    HCT 51.5 06/28/2022    MCV 93 06/28/2022     06/28/2022     Lab Results   Component Value Date    .3 (H) 03/31/2022    CALCIUM 10.2 06/28/2022    PHOS 3.7 03/31/2022     Lab Results   Component Value Date    URICACID 6.1 10/27/2021             )    1. Stage 3b chronic kidney disease    2. Secondary hyperparathyroidism of renal origin    3. Primary hypertension    4. Chronic diastolic congestive heart failure    5. Vitamin D deficiency    6. Complex renal cyst    7. Morbid obesity with BMI of 40.0-44.9, adult    8. Idiopathic chronic gout of multiple sites without tophus    9. Chronic systolic (congestive) heart failure          Plan:         1.  Chronic kidney disease stage 3-- patient may have cardiorenal syndrome:   fluctuating creatinine on diuretics.  Creatinine is stable and ranges 1.6-2. Stable kidney function and < 100mg of proteinuria. S/p two LHC and no evidence of permament damage based on recent Cr    2.  Chronic gout: Last attack was 2018.   Continue allopurinol 450 mg uric acid is well controlled at 6.1, followed by Rheumatology.    3.  Hyperparathyroidism due to chronic kidney disease stage III:  Trend PTH, D level is adequate (hx of hypercalcemia)     4.   Chronic systolic/diastolic CHF--also with chronic venous stasis findings as well as brawny edema of the skin: Patient had an ultrasound of the legs in 2012 which showed no DVT.  Clinically patient has pulm. hypertension, Followed by Cardiology. Continue current diuretics (with potassium supplementation)      6.  Pulm HTN and LEANDRO:   CPAP     7.  Hypokalemia: continue aldactone 25 mg and KCL       9.  Acquired renal cysts: Patient had multiple complex cysts in the ultrasound done in 2017 and 2018 .  Status post  renal ultrasound June of 2020--the complex cysts seen prior were all classified as simple in the most recent renal u/s     10. Hip Arthiritis: s/p hip replacement. No NSAIDs        Follow-up 6mo  49 minutes of total time spent on the encounter, which includes face to face time and non-face to face time preparing to see the patient (eg, review of tests), Obtaining and/or reviewing separately obtained history, Documenting clinical information in the electronic or other health record, Independently interpreting results (not separately reported) and communicating results to the patient/family/caregiver, or Care coordination (not separately reported).        Mumtaz Massey MD

## 2022-09-22 NOTE — TELEPHONE ENCOUNTER
Please let pt know that his kidney function is stable, the creatinine is 1.9mg/dLtoday. His Cr ranges 1.6-2.     PTH is pending and once resulted will notify him if therapy needs to be changed.     Thank you

## 2022-09-24 PROBLEM — I50.22 CHRONIC SYSTOLIC (CONGESTIVE) HEART FAILURE: Status: ACTIVE | Noted: 2022-09-24

## 2022-09-26 DIAGNOSIS — I50.32 CHRONIC DIASTOLIC CONGESTIVE HEART FAILURE: ICD-10-CM

## 2022-09-26 DIAGNOSIS — R07.9 CHEST PAIN, UNSPECIFIED TYPE: Primary | ICD-10-CM

## 2022-09-26 NOTE — TELEPHONE ENCOUNTER
Received fax from [facility] requesting refills for pt's aldactone. Need new Rx for ranexa as well.

## 2022-09-27 DIAGNOSIS — M1A.09X0 IDIOPATHIC CHRONIC GOUT OF MULTIPLE SITES WITHOUT TOPHUS: ICD-10-CM

## 2022-09-27 DIAGNOSIS — R07.9 CHEST PAIN, UNSPECIFIED TYPE: ICD-10-CM

## 2022-09-27 RX ORDER — SPIRONOLACTONE 25 MG/1
25 TABLET ORAL DAILY
Qty: 90 TABLET | Refills: 3 | Status: SHIPPED | OUTPATIENT
Start: 2022-09-27 | End: 2023-11-29 | Stop reason: SDUPTHER

## 2022-09-27 RX ORDER — RANOLAZINE 500 MG/1
500 TABLET, EXTENDED RELEASE ORAL 2 TIMES DAILY
Qty: 60 TABLET | Refills: 11 | Status: SHIPPED | OUTPATIENT
Start: 2022-09-27 | End: 2022-09-27 | Stop reason: SDUPTHER

## 2022-09-27 RX ORDER — ALLOPURINOL 300 MG/1
450 TABLET ORAL DAILY
Qty: 135 TABLET | Refills: 3 | Status: SHIPPED | OUTPATIENT
Start: 2022-09-27 | End: 2023-08-14

## 2022-09-27 NOTE — TELEPHONE ENCOUNTER
----- Message from Stone Lemon sent at 9/27/2022  1:22 PM CDT -----  Type:  RX Refill Request    Who Called:  Patient  Refill or New Rx:  New  RX Name and Strength:  allopurinoL (ZYLOPRIM) 300 MG tablet      How is the patient currently taking it? (ex. 1XDay):  1.5 Tablets per day  Is this a 30 day or 90 day RX:  135 Tablets    Preferred Pharmacy with phone number:        [facility]  ([facility]) - [address] [address]  Phone: [phone] Fax: [phone]      Local or Mail Order:  Mail Order  Ordering Provider:  Bryson Peters Call Back Number:  [phone]  Additional Information:

## 2022-09-28 RX ORDER — RANOLAZINE 500 MG/1
500 TABLET, EXTENDED RELEASE ORAL 2 TIMES DAILY
Qty: 60 TABLET | Refills: 11 | Status: SHIPPED | OUTPATIENT
Start: 2022-09-28 | End: 2023-08-17

## 2022-10-27 ENCOUNTER — TELEPHONE (OUTPATIENT)
Dept: CARDIOLOGY | Facility: CLINIC | Age: 77
End: 2022-10-27
Payer: MEDICARE

## 2022-10-27 NOTE — TELEPHONE ENCOUNTER
----- Message from Darlene Davison sent at 10/27/2022  3:52 PM CDT -----  Pt would like a call back regarding medical records. Call back number is [phone]. Thx. EL

## 2022-10-28 ENCOUNTER — TELEPHONE (OUTPATIENT)
Dept: CARDIOLOGY | Facility: CLINIC | Age: 77
End: 2022-10-28
Payer: MEDICARE

## 2022-10-28 ENCOUNTER — PATIENT MESSAGE (OUTPATIENT)
Dept: CARDIOLOGY | Facility: CLINIC | Age: 77
End: 2022-10-28
Payer: MEDICARE

## 2022-10-28 NOTE — TELEPHONE ENCOUNTER
----- Message from Noelle Harrison sent at 10/28/2022  9:26 AM CDT -----  Contact: [phone]  Pt is calling in regards to getting a copy of his medical records. Please call pt back at [phone]. Thanks KB

## 2022-11-23 RX ORDER — PENTOXIFYLLINE 400 MG/1
400 TABLET, EXTENDED RELEASE ORAL
Qty: 270 TABLET | Refills: 4 | Status: SHIPPED | OUTPATIENT
Start: 2022-11-23 | End: 2023-08-17

## 2022-11-23 NOTE — TELEPHONE ENCOUNTER
----- Message from Elizabeth Recio sent at 11/23/2022  1:29 PM CST -----  Type:  RX Refill Request    Who Called:  Patient  Refill or New Rx: refill  RX Name and Strength: pentoxifylline (TRENTAL) 400 mg TbSR  How is the patient currently taking it? (ex. 1XDay): 3x a day  Is this a 30 day or 90 day RX: 90 day  Preferred Pharmacy with phone number:   Local or Mail Order: Mail order  Ordering Provider: Dr. FATOU Alvarado  Would the patient rather a call back or a response via [facility]?  Call back  Best Call Back Number: [phone]  Additional Information:

## 2022-11-23 NOTE — TELEPHONE ENCOUNTER
----- Message from Elizabeth Recio sent at 11/23/2022  1:29 PM CST -----  Type:  RX Refill Request    Who Called:  Patient  Refill or New Rx: refill  RX Name and Strength: pentoxifylline (TRENTAL) 400 mg TbSR  How is the patient currently taking it? (ex. 1XDay): 3x a day  Is this a 30 day or 90 day RX: 90 day  Preferred Pharmacy with phone number:   Local or Mail Order: Mail order  Ordering Provider: Dr. FATOU Alvarado  Would the patient rather a call back or a response via [facility]?  Call back  Best Call Back Number: [phone]  Additional Information:

## 2022-12-09 DIAGNOSIS — Z95.1 HX OF CABG: ICD-10-CM

## 2022-12-09 DIAGNOSIS — E78.5 HYPERLIPIDEMIA WITH TARGET LDL LESS THAN 70: ICD-10-CM

## 2022-12-09 RX ORDER — ATORVASTATIN CALCIUM 40 MG/1
40 TABLET, FILM COATED ORAL DAILY
Qty: 90 TABLET | Refills: 3 | Status: SHIPPED | OUTPATIENT
Start: 2022-12-09 | End: 2023-11-08 | Stop reason: SDUPTHER

## 2023-01-01 NOTE — TELEPHONE ENCOUNTER
"Regarding: Non-Urgent Medical Question  Contact: [phone]  ----- Message from Viviane Kent PA-C sent at 1/9/2017  2:50 PM PST -----  Please call pt and advise that neck \"causing\" migraines was never the issue.  Point of Shaneka Stock is neck exercises that can help relieve some of the neck pain that occurs WITH migraine AND to use dry needling and other pain management techniques provided by physical therapy.  You might need to call Shaneka Stock office first as the patient's comments do not seem like what Shaneka would have recommended.       ----- Message from Ct Montalvo to Viviane Kent PA-C sent at 1/9/2017  8:42 AM -----    Hi Dr. Kent, Shaneka Stock does not feel like my neck is causing my migraines based on her analysis and the MRI. She, after looking at my migraine  diary, thinks this is hormone related. She has suggested I see a Dr. Dago Montenegro, DO. What do you think? Tena Mata has given me a referral but  suggested I asked you what your thoughts were about it.   Thanks,  Georgiana Montalvo  " Contacted pt about stopping Potassium because labs show ed Potsssium was 4.9 and the medicine that he is taking increases Potassium levels Losartan and Aldacatone, pt stated he would continue to take potassium because he did that one time and wound up in hospital, I explained to him What Dr Elaine noted and pt hung up   Chivo Soliz

## 2023-01-09 ENCOUNTER — LAB VISIT (OUTPATIENT)
Dept: LAB | Facility: HOSPITAL | Age: 78
End: 2023-01-09
Attending: INTERNAL MEDICINE
Payer: MEDICARE

## 2023-01-09 DIAGNOSIS — I35.0 NODULAR CALCIFIC AORTIC VALVE STENOSIS: Primary | ICD-10-CM

## 2023-01-09 LAB
ALBUMIN SERPL BCP-MCNC: 3.3 G/DL (ref 3.5–5.2)
ALP SERPL-CCNC: 72 U/L (ref 55–135)
ALT SERPL W/O P-5'-P-CCNC: 77 U/L (ref 10–44)
ANION GAP SERPL CALC-SCNC: 9 MMOL/L (ref 8–16)
AST SERPL-CCNC: 53 U/L (ref 10–40)
BILIRUB SERPL-MCNC: 1 MG/DL (ref 0.1–1)
BNP SERPL-MCNC: 86 PG/ML (ref 0–99)
BUN SERPL-MCNC: 23 MG/DL (ref 8–23)
CALCIUM SERPL-MCNC: 9.4 MG/DL (ref 8.7–10.5)
CHLORIDE SERPL-SCNC: 102 MMOL/L (ref 95–110)
CO2 SERPL-SCNC: 26 MMOL/L (ref 23–29)
CREAT SERPL-MCNC: 1.5 MG/DL (ref 0.5–1.4)
EST. GFR  (NO RACE VARIABLE): 48 ML/MIN/1.73 M^2
GLUCOSE SERPL-MCNC: 182 MG/DL (ref 70–110)
POTASSIUM SERPL-SCNC: 4.8 MMOL/L (ref 3.5–5.1)
PROT SERPL-MCNC: 6.3 G/DL (ref 6–8.4)
SODIUM SERPL-SCNC: 137 MMOL/L (ref 136–145)

## 2023-01-09 PROCEDURE — 85027 COMPLETE CBC AUTOMATED: CPT | Performed by: INTERNAL MEDICINE

## 2023-01-09 PROCEDURE — 83880 ASSAY OF NATRIURETIC PEPTIDE: CPT | Performed by: INTERNAL MEDICINE

## 2023-01-09 PROCEDURE — 36415 COLL VENOUS BLD VENIPUNCTURE: CPT | Mod: PO | Performed by: INTERNAL MEDICINE

## 2023-01-09 PROCEDURE — 80053 COMPREHEN METABOLIC PANEL: CPT | Performed by: INTERNAL MEDICINE

## 2023-01-10 LAB
ERYTHROCYTE [DISTWIDTH] IN BLOOD BY AUTOMATED COUNT: 15.8 % (ref 11.5–14.5)
HCT VFR BLD AUTO: 48.8 % (ref 40–54)
HGB BLD-MCNC: 15.8 G/DL (ref 14–18)
MCH RBC QN AUTO: 31.5 PG (ref 27–31)
MCHC RBC AUTO-ENTMCNC: 32.4 G/DL (ref 32–36)
MCV RBC AUTO: 97 FL (ref 82–98)
PLATELET # BLD AUTO: 149 K/UL (ref 150–450)
PMV BLD AUTO: 10.6 FL (ref 9.2–12.9)
RBC # BLD AUTO: 5.02 M/UL (ref 4.6–6.2)
WBC # BLD AUTO: 8.26 K/UL (ref 3.9–12.7)

## 2023-03-08 ENCOUNTER — LAB VISIT (OUTPATIENT)
Dept: LAB | Facility: HOSPITAL | Age: 78
End: 2023-03-08
Attending: INTERNAL MEDICINE
Payer: MEDICARE

## 2023-03-08 DIAGNOSIS — N18.32 STAGE 3B CHRONIC KIDNEY DISEASE: ICD-10-CM

## 2023-03-08 DIAGNOSIS — N25.81 SECONDARY HYPERPARATHYROIDISM OF RENAL ORIGIN: ICD-10-CM

## 2023-03-08 PROCEDURE — 82570 ASSAY OF URINE CREATININE: CPT | Mod: 91 | Performed by: INTERNAL MEDICINE

## 2023-03-09 LAB
CREAT UR-MCNC: 35 MG/DL (ref 23–375)
PROT UR-MCNC: <7 MG/DL (ref 0–15)
PROT/CREAT UR: NORMAL MG/G{CREAT} (ref 0–0.2)

## 2023-06-19 DIAGNOSIS — E87.6 HYPOKALEMIA: ICD-10-CM

## 2023-06-25 RX ORDER — POTASSIUM CHLORIDE 750 MG/1
10 TABLET, EXTENDED RELEASE ORAL 4 TIMES DAILY
Qty: 360 TABLET | Refills: 3 | OUTPATIENT
Start: 2023-06-25

## 2023-07-12 DIAGNOSIS — E87.6 HYPOKALEMIA: ICD-10-CM

## 2023-07-14 RX ORDER — POTASSIUM CHLORIDE 750 MG/1
10 TABLET, EXTENDED RELEASE ORAL 4 TIMES DAILY
Qty: 360 TABLET | Refills: 3 | OUTPATIENT
Start: 2023-07-14

## 2023-08-10 NOTE — PROGRESS NOTES
+Subjective:    Patient ID:  Bo Chase is a 78 y.o. male who presents for follow-up of Follow-up        HPI: . Bo Chase presents to the clinic for follow up CAD, H/O CABG: total occlusion of SVG to diagonal with 75% lesion in D1; nonobstructive disease in SVG to OM; SVG to mid % occlusion; has patent LIMA to LAD; chronic diastolic heart failure, moderate AS, HTN, HLP.  I have not seen Mr. Chase in a year. He has been to [facility] cardiology and Dr. Mathew in [place].  Dr. Mathew's note, 7/20/23, reports THOMPSON to LAD is only patent graft that has stenosis with normal IFR. Other grafts occluded. Medical therapy and risk factor modification. Angina controlled. Had significant relief with addition of entresto. Has diastolic heart failure with EF around 50%; on entresto and aldactone. Demadex every other day and potassium every other day. A CPX was ordered. He has stable class II-III symptoms. BP controlled. Back on CPAP. Significant venous insufficiency changes in his lower extremities. Had some skin breakdown cared for by wound care clinic in [place]. Encouraged compression stockings. BP in clinic that day was 80/54 with HR 76 BPM. BMI 40.    He stated that he is here to get CPX test ordered. He is following at [facility] cardiology and will continue to do that. He stated that Dr. Gil would not order CPX test, so he wanted to try here.    Taking torsemide 10mg once daily with potassium supplement 10mEq once daily. Off metolazone. Taking low dose entresto and spironolactone 25mg Daily. No need for ntg. He is taking Imdur 60mg daily.    He denied any chest pain, not even when exerting himself. His MENA is markedly improved.  He eats BID, sometimes once daily with a snack.     Denied orthopnea, PND, and edema is improved. He denied any lightheadedness, dizziness, or near syncope. He denied any palpitations, lightheadedness, or near syncope.        His edema is improved.  Right leg always larger than  the left-it was the leg from which bypass grafts were harvested. He has venous insufficiency and cannot put compression stockings on due to hip pain and his abdomen gets in the way.      He uses a walker to help him assist with ambulation due to pain in his hips and lower back.  The pain in these areas limits his walking. Cannot put on compression stockings or socks.  He has been on ozempic to help with weight loss.        He stated that he tends to have low potassium, but has been able to reduce diuretic and potassium supplement. So he is taking only one potassium supplement/day.  Vitamin D was stopped due to high calcium.  Had phlebotomy for polycythemia August 2020.  He is followed by Dr. Massey for CKD.  He is seeing a pain management doctor for bilateral hip pain.  He has tried massage and chiropractic treatments for this as well.  He sees Laure Ozuna NP for obstructive sleep apnea, and he has seen Dr. Honeycutt for this and chronic bronchitis.      He went to see his cardiologist in [place] with CV test results and his opinion.   He was followed by Dr. Crawley in 2020 and he was seeing Dr. Elaine before that.      He has chronic diastolic heart failure class C; functional class 2.    LEANDRO: on CPAP every night.    --------------------------------------------------------------------  Dr. Elaine's note: 74 yo male, 6 months f/u.   PMH CAD, s/p CABGx4 1996, s/p last PCI SVG to OM1, in 2014, LIMA patent and  SVG to diag, Dx of DM in 2019 after steroids injection now A1c wnl, , CHFpEF, HTN, HLP, morbid obesity, sleep apnea on CPAP, hypothyroidism. Chronic lower back pain.  8/31/2016 negative nuclear stress test for ischemia.    ECho normal EF and mod LVH  Pt states that he f/u with cardiologist at [place] in the past two yr. Negative exerrcise stress test in  at [place].   Recent Dx of melanoma on nose and right chest pain. S/p chest tumor early .   Sleep with CPAP.  Chronic leg swelling.  BNP 54 and  Cr stable   Pt states that no exercise due to pandemic  Worsening SOB when walking and carrying bags  Taking Torsemide 20 mg bid and Metolazone 5 mg once a week.  No chest pain.   Asa 325 mg given by Dr Bernal nephrology  No gym exercise after COVID 19 pandemic  BP LDL and BS controlled  Plan: CONTINUE TORSEMIDE 20 MG BID AND METOLAZONE 2.5 MG TWICE A WEEK  Daily weight. Please call the office if gain 3 pounds in 1 day or 5 pounds in 1 week.  Fluid restriction 1.5 liters a day  Na< 2 gm  CONTINUE ASa lipitor, lopressor losartan imdur and Aldactone   D/c KCL and repeat BMP I n 4 weeks for K level  Counseled DASH  Check Lipid profile in 6 months  Recommend heart-healthy diet, weight control and regular exercise.  Ion. Risk modification.   RTC in 6 months  ----------------------------------------------------------    Medications: he is not missing any doses.  He is taking torsemide 10 mg p.o. qd., potassium 10mEq QD; spironolactone 25 mg p.o. q.day.  He is taking metoprolol tartrate 25 mg p.o. b.i.d. He is taking isosorbide mononitrate 60 mg p.o. QD, atorvastatin 40 mg p.o. q.day and entresto 24/26mg BID.  He is taking aspirin.  Sodium: he does not add salt to foods at the table,  he is reading labels for sodium content. he does eat salty foods; eats some ham, durham-small amount.  Diet: steel cut oats with fruit; sandwich with lettuce, tomato (roast beef, turkey, ham) fish 2-3 times per week. Water.  Exercise: he is going to the gym 4-5 days a week and is lifting weights, using bikes.  Tobacco: denies previous or current tobacco use   Alcohol:  None.     Weight: 113.7 kg (250 lb 11.2 oz) he states that his daily weights  have decreased.Body mass index is 39.27 kg/m².    Wt Readings from Last 3 Encounters:   08/17/23 113.7 kg (250 lb 11.2 oz)   09/22/22 122.5 kg (270 lb)   09/07/22 124.5 kg (274 lb 8 oz)     BP log: None. He stated that his BP runs about what it is today in clinic.      Review of Systems    Constitutional: Negative for chills, decreased appetite, fever, night sweats and weight loss.   HENT:  Negative for congestion.    Cardiovascular:  Positive for dyspnea on exertion (markedly improved.). Negative for chest pain, claudication, cyanosis, irregular heartbeat, leg swelling, near-syncope, orthopnea, palpitations, paroxysmal nocturnal dyspnea and syncope.   Respiratory:  Negative for cough, hemoptysis, shortness of breath, sputum production and wheezing.    Hematologic/Lymphatic: Negative for adenopathy and bleeding problem. Does not bruise/bleed easily.   Skin:  Negative for color change and nail changes.   Musculoskeletal:  Positive for joint pain (Chronic pain in the lower back and hips.).   Gastrointestinal:  Negative for bloating, abdominal pain, change in bowel habit, heartburn, hematochezia, melena, nausea and vomiting.   Genitourinary:  Negative for hematuria.   Neurological:  Negative for dizziness and light-headedness.   Psychiatric/Behavioral:  Negative for altered mental status.        Objective:   Physical Exam  Constitutional:       General: He is not in acute distress.     Appearance: He is well-developed. He is not diaphoretic.   HENT:      Head: Normocephalic and atraumatic.   Eyes:      General: No scleral icterus.     Conjunctiva/sclera: Conjunctivae normal.   Neck:      Thyroid: No thyromegaly.      Vascular: No JVD.      Trachea: No tracheal deviation.   Cardiovascular:      Rate and Rhythm: Normal rate and regular rhythm.      Pulses: Intact distal pulses.      Heart sounds: Murmur heard.      Harsh midsystolic murmur is present with a grade of 2/6 at the upper right sternal border radiating to the neck.      No friction rub. No gallop.   Pulmonary:      Effort: Pulmonary effort is normal. No respiratory distress.      Breath sounds: Normal breath sounds. No wheezing or rales.      Comments: Speaks in complete sentences without obvious signs of shortness of breath or increased work  of breathing  Chest:      Chest wall: No tenderness.   Abdominal:      General: Bowel sounds are normal. There is no distension.      Palpations: Abdomen is soft. There is no mass.      Tenderness: There is no abdominal tenderness. There is no guarding or rebound.      Comments: Obese   Musculoskeletal:         General: Normal range of motion.      Cervical back: Neck supple.      Comments: Venous stasis changes to both lower legs bilaterally.  Nonpitting edema noted.  Right lower leg is larger than the left-chronic.   Lymphadenopathy:      Cervical: No cervical adenopathy.   Skin:     General: Skin is warm and dry.      Coloration: Skin is not pale.      Findings: No erythema or rash.      Comments: [place].     Neurological:      Mental Status: He is alert and oriented to person, place, and time.        Latest Reference Range & Units 05/20/22 10:24 06/28/22 16:27   Sodium 136 - 145 mmol/L 137 140   Potassium 3.5 - 5.1 mmol/L 3.5 4.0   Chloride 95 - 110 mmol/L 92 (L) 94 (L)   CO2 23 - 29 mmol/L 29 31 (H)   Anion Gap 8 - 16 mmol/L 16 15   BUN 8 - 23 mg/dL 30 (H) 25 (H)   Creatinine 0.5 - 1.4 mg/dL 1.7 (H) 1.6 (H)   eGFR if non African American >60 mL/min/1.73 m^2 38.3 ! 41.2 !   eGFR if African American >60 mL/min/1.73 m^2 44.3 ! 47.7 !   Glucose 70 - 110 mg/dL 160 (H) 149 (H)   Calcium 8.7 - 10.5 mg/dL 10.3 10.2   Alkaline Phosphatase 55 - 135 U/L 80 81   PROTEIN TOTAL 6.0 - 8.4 g/dL 7.6 7.0   Albumin 3.5 - 5.2 g/dL 3.9 3.9   BILIRUBIN TOTAL 0.1 - 1.0 mg/dL 1.2 (H) 1.1 (H)   AST 10 - 40 U/L 39 42 (H)   ALT 10 - 44 U/L 64 (H) 54 (H)   Cholesterol 120 - 199 mg/dL 176    HDL 40 - 75 mg/dL 31 (L)    HDL/Cholesterol Ratio 20.0 - 50.0 % 17.6 (L)    LDL Cholesterol External 63.0 - 159.0 mg/dL 78.4    Non-HDL Cholesterol mg/dL 145    Total Cholesterol/HDL Ratio 2.0 - 5.0  5.7 (H)    Triglycerides 30 - 150 mg/dL 333 (H)    Magnesium RBC 4.0 - 6.4 mg/dL 5.3    (L): Data is abnormally low  (H): Data is abnormally high  !: Data  is abnormal     Latest Reference Range & Units 03/31/22 09:35   Sodium 136 - 145 mmol/L 136   Potassium 3.5 - 5.1 mmol/L 3.5   Chloride 95 - 110 mmol/L 88 (L)   CO2 23 - 29 mmol/L 32 (H)   Anion Gap 8 - 16 mmol/L 16   BUN 8 - 23 mg/dL 29 (H)   Creatinine 0.5 - 1.4 mg/dL 1.7 (H)   EGFR if non African American >60 mL/min/1.73 m^2 38.3 ! [1]   EGFR if African American >60 mL/min/1.73 m^2 44.3 !   Glucose 70 - 110 mg/dL 143 (H)   Calcium 8.7 - 10.5 mg/dL 10.2   Phosphorus 2.7 - 4.5 mg/dL 3.7   Albumin 3.5 - 5.2 g/dL 3.9         [facility] and [facility] 7/11/2022:   Indications  CAD in native artery [I25.10 (ICD-10-CM)]   Chronic diastolic heart failure [I50.32 (ICD-10-CM)]   S/P CABG (coronary artery bypass graft) [Z95.1 (ICD-10-CM)]   S/P angioplasty with stent [Z95.820 (ICD-10-CM)]   Aortic valve stenosis, etiology of cardiac valve disease unspecified [I35.0 (ICD-10-CM)]   SOB (shortness of breath) [R06.02 (ICD-10-CM)]   Summary   The pre-procedure left ventricular end diastolic pressure was 28.  There was moderate aortic valve stenosis.  The estimated blood loss was none.  The filling pressures on the right and left were moderately elevated. Pulmonary hypertension was mild to moderate.  THERE IS SUGGESTION OF CONSTRICTION WITH NEAR EQUALIZATION OF PRESSURES BETWEEN LV AND RV HOWEVER THERE IS NO DISCORDANCE.  The Origin to Prox Graft lesion was 50% stenosed.  THE SVG STENOSIS IS NOT HEMODYNAMICALLY SIGNIFICANT.     The procedure log was documented by Documenter: Linda Yepez RN and verified by Mercedes Joel MD.     Date: 7/11/2022  Time: 11:06 AM        [facility] 5/9/22:Summary   The ejection fraction was calculated to be 60%.  The pre-procedure left ventricular end diastolic pressure was 28.  The post-procedure left ventricular end diastolic pressure was 29.  The Mid LM to Dist LM lesion was 100% stenosed.  The Prox RCA lesion was 100% stenosed.  The Origin to Prox Graft lesion was 100% stenosed.  The Prox Graft lesion was 100%  stenosed.  The Origin to Prox Graft lesion was 95% stenosed.  The estimated blood loss was none.  High left sided filling pressures    Echo 5/11/22:Summary  The left ventricle is normal in size with concentric hypertrophy and low normal systolic function.  Mild left atrial enlargement.  Grade I left ventricular diastolic dysfunction.  Normal right ventricular size with normal right ventricular systolic function.  The estimated ejection fraction is 50%.  There is moderate aortic valve stenosis.  Aortic valve area is 1.30 cm2; peak velocity is 2.90 m/s; mean gradient is 23 mmHg.  Mild mitral regurgitation.  Poor endocardial visualization, technically difficult study        Echo at Dr. Crawley's office 12/2020  EF 55-60%  Apical hypokinesis.  Moderate concentric hypertrophy.  Mild AS, AI, and MR.  Mild LAE.  I/IV diastolic dysfunction.    Echo 07/25/2017:CONCLUSIONS     1 - Concentric hypertrophy.     2 - No wall motion abnormalities.     3 - Normal left ventricular systolic function (EF 60-65%).     4 - Normal left ventricular diastolic function.     5 - Normal right ventricular systolic function .     6 - Trivial to mild mitral regurgitation.     7 - Trivial to mild tricuspid regurgitation.     Arterial U/S LE 5/11/22: Conclusion   No evidence of hemodynamically significant stenosis of visalized bilateral lower extremities arteries  Bilateral peroneal arteries are not well visualized due to depth of vessels.   TDS:Bilateral peroneal arteries are not well visualized due to depth of vessels.     ARNULFO 5/11/22: Conclusion  Minimal decreased ARNULFO in the right lower extermity at rest with triphasic flow waveforms  Normal ARNULFO left lower extremity with triphasic flow waveforms     Venous U/S LE 4/27/22: Impression:   Bilateral lower extremity venous reflux as detailed.  No DVT.    Pharmacologic nuclear medicine stress test 08/31/2016:Impression: NORMAL MYOCARDIAL PERFUSION   1. The perfusion scan is free of evidence for  myocardial ischemia or injury.   2. There is a mild intensity fixed defect in the inferior wall of the left ventricle, secondary to diaphragm attenuation.   3. Resting wall motion is physiologic.   4. Resting LV function is normal.   5. The ventricular volumes are normal at rest and stress.   6. The extracardiac distribution of radioactivity is normal.       Left heart catheterization 12/29/2014:ANGIOGRAPHIC RESULTS:   DIAGNOSTIC:        Patient has a right dominant coronary artery.        - Left Main Coronary Artery:              The LM has luminal irregularities. There is JESUS 3 flow.        - Left Anterior Descending Artery:              The proximal LAD has chronic total occlusion. There is JESUS 0 flow.        - Left Circumflex Artery:              The proximal LCX has chronic total occlusion. There is JESUS 0 flow.        - Right Coronary Artery:              The proximal RCA has chronic total occlusion. There is JESUS 0 flow.        - SVG To RCA:              The SVG to RCA has luminal irregularities. There is JESUS 3 flow.        - THOMPSON To LAD:              The LIMA to LAD is normal. There is JESUS 3 flow.        - SVG To D1:              The SVG to D1 has chronic total occlusion. There is JESUS 0 flow.        - SVG To OM1:              The mid SVG to OM1 has a 90% stenosis.   This was a MI culprit lesion.  The Posterior and Lateral myocardial walls were affected. The following items were used: Wire   Filter Spider Fx 5 X 320/190, Blln Emerge 4 X 20 and Stent Resolute   4.0x38mm (AMARI).   Assessment:      1. Coronary artery disease of bypass graft of native heart with stable angina pectoris    2. Hx of CABG    3. Chronic diastolic congestive heart failure    4. Moderate aortic stenosis    5. Hypertensive heart disease with chronic diastolic congestive heart failure    6. Mixed hyperlipidemia    7. Stage 3a chronic kidney disease    8. LEANDRO on CPAP    9. Morbid obesity with BMI of 40.0-44.9, adult    10. Hypokalemia     11. Atherosclerosis of aorta    12. SOB (shortness of breath)    13. Venous insufficiency      Plan:     Coronary artery disease of bypass graft of native heart with stable angina pectoris  -     CPX Study; Future    Hx of CABG  -     CPX Study; Future    Chronic diastolic congestive heart failure  -     CPX Study; Future    Moderate aortic stenosis    Hypertensive heart disease with chronic diastolic congestive heart failure    Mixed hyperlipidemia    Stage 3a chronic kidney disease    LEANDRO on CPAP    Morbid obesity with BMI of 40.0-44.9, adult    Hypokalemia    Atherosclerosis of aorta    SOB (shortness of breath)  -     CPX Study; Future    Venous insufficiency      CPX. Phone review.  Continue imdur-CAD, angina.    Continue aspirin-CAD.  Continue torsemide, spironolactone, entresto - diastolic heart failure.   His volume status appears pretty stable clinically today.  Dr. Massey following CKD 3.  Continue metoprolol, entresto, spironolactone - HTN.  Continue atorvastatin, fish oil - HLP.  Sodium restriction encouraged.  Continue using CPAP every night-obstructive sleep apnea.  Encourage exercise such as walking or chair exercises.    Daily weights.    Continue weight loss.  Whole foods, low glycemic diet.  Follow-up with [facility] cardiology as planned or call sooner for any problems.  Adelia Sanon NP  [facility]    This note has been prepared using a combination of MMRivalroo dictation device and typing.  It has been checked for errors but some errors may still exist within the note as a result of speech recognition errors and/or typographical errors.

## 2023-08-12 DIAGNOSIS — M1A.09X0 IDIOPATHIC CHRONIC GOUT OF MULTIPLE SITES WITHOUT TOPHUS: ICD-10-CM

## 2023-08-14 RX ORDER — ALLOPURINOL 300 MG/1
450 TABLET ORAL
Qty: 135 TABLET | Refills: 3 | Status: SHIPPED | OUTPATIENT
Start: 2023-08-14

## 2023-08-17 ENCOUNTER — OFFICE VISIT (OUTPATIENT)
Dept: CARDIOLOGY | Facility: CLINIC | Age: 78
End: 2023-08-17
Payer: MEDICARE

## 2023-08-17 VITALS
DIASTOLIC BLOOD PRESSURE: 68 MMHG | WEIGHT: 250.69 LBS | HEART RATE: 70 BPM | OXYGEN SATURATION: 98 % | HEIGHT: 67 IN | SYSTOLIC BLOOD PRESSURE: 105 MMHG | BODY MASS INDEX: 39.35 KG/M2

## 2023-08-17 DIAGNOSIS — G47.33 OSA ON CPAP: ICD-10-CM

## 2023-08-17 DIAGNOSIS — I11.0 HYPERTENSIVE HEART DISEASE WITH CHRONIC DIASTOLIC CONGESTIVE HEART FAILURE: ICD-10-CM

## 2023-08-17 DIAGNOSIS — Z95.1 HX OF CABG: ICD-10-CM

## 2023-08-17 DIAGNOSIS — R06.02 SOB (SHORTNESS OF BREATH): ICD-10-CM

## 2023-08-17 DIAGNOSIS — E66.01 MORBID OBESITY WITH BMI OF 40.0-44.9, ADULT: ICD-10-CM

## 2023-08-17 DIAGNOSIS — E78.2 MIXED HYPERLIPIDEMIA: ICD-10-CM

## 2023-08-17 DIAGNOSIS — N18.31 STAGE 3A CHRONIC KIDNEY DISEASE: ICD-10-CM

## 2023-08-17 DIAGNOSIS — I50.32 HYPERTENSIVE HEART DISEASE WITH CHRONIC DIASTOLIC CONGESTIVE HEART FAILURE: ICD-10-CM

## 2023-08-17 DIAGNOSIS — I35.0 MODERATE AORTIC STENOSIS: ICD-10-CM

## 2023-08-17 DIAGNOSIS — E87.6 HYPOKALEMIA: ICD-10-CM

## 2023-08-17 DIAGNOSIS — I87.2 VENOUS INSUFFICIENCY: ICD-10-CM

## 2023-08-17 DIAGNOSIS — I70.0 ATHEROSCLEROSIS OF AORTA: ICD-10-CM

## 2023-08-17 DIAGNOSIS — I25.708 CORONARY ARTERY DISEASE OF BYPASS GRAFT OF NATIVE HEART WITH STABLE ANGINA PECTORIS: Primary | ICD-10-CM

## 2023-08-17 DIAGNOSIS — I50.32 CHRONIC DIASTOLIC CONGESTIVE HEART FAILURE: ICD-10-CM

## 2023-08-17 PROCEDURE — 3288F PR FALLS RISK ASSESSMENT DOCUMENTED: ICD-10-PCS | Mod: CPTII,S$GLB,, | Performed by: NURSE PRACTITIONER

## 2023-08-17 PROCEDURE — 3288F FALL RISK ASSESSMENT DOCD: CPT | Mod: CPTII,S$GLB,, | Performed by: NURSE PRACTITIONER

## 2023-08-17 PROCEDURE — 1160F RVW MEDS BY RX/DR IN RCRD: CPT | Mod: CPTII,S$GLB,, | Performed by: NURSE PRACTITIONER

## 2023-08-17 PROCEDURE — 99214 PR OFFICE/OUTPT VISIT, EST, LEVL IV, 30-39 MIN: ICD-10-PCS | Mod: S$GLB,,, | Performed by: NURSE PRACTITIONER

## 2023-08-17 PROCEDURE — 3074F PR MOST RECENT SYSTOLIC BLOOD PRESSURE < 130 MM HG: ICD-10-PCS | Mod: CPTII,S$GLB,, | Performed by: NURSE PRACTITIONER

## 2023-08-17 PROCEDURE — 1159F PR MEDICATION LIST DOCUMENTED IN MEDICAL RECORD: ICD-10-PCS | Mod: CPTII,S$GLB,, | Performed by: NURSE PRACTITIONER

## 2023-08-17 PROCEDURE — 99999 PR PBB SHADOW E&M-EST. PATIENT-LVL III: CPT | Mod: PBBFAC,,, | Performed by: NURSE PRACTITIONER

## 2023-08-17 PROCEDURE — 3074F SYST BP LT 130 MM HG: CPT | Mod: CPTII,S$GLB,, | Performed by: NURSE PRACTITIONER

## 2023-08-17 PROCEDURE — 99214 OFFICE O/P EST MOD 30 MIN: CPT | Mod: S$GLB,,, | Performed by: NURSE PRACTITIONER

## 2023-08-17 PROCEDURE — 1101F PR PT FALLS ASSESS DOC 0-1 FALLS W/OUT INJ PAST YR: ICD-10-PCS | Mod: CPTII,S$GLB,, | Performed by: NURSE PRACTITIONER

## 2023-08-17 PROCEDURE — 1101F PT FALLS ASSESS-DOCD LE1/YR: CPT | Mod: CPTII,S$GLB,, | Performed by: NURSE PRACTITIONER

## 2023-08-17 PROCEDURE — 3078F PR MOST RECENT DIASTOLIC BLOOD PRESSURE < 80 MM HG: ICD-10-PCS | Mod: CPTII,S$GLB,, | Performed by: NURSE PRACTITIONER

## 2023-08-17 PROCEDURE — 3078F DIAST BP <80 MM HG: CPT | Mod: CPTII,S$GLB,, | Performed by: NURSE PRACTITIONER

## 2023-08-17 PROCEDURE — 1125F PR PAIN SEVERITY QUANTIFIED, PAIN PRESENT: ICD-10-PCS | Mod: CPTII,S$GLB,, | Performed by: NURSE PRACTITIONER

## 2023-08-17 PROCEDURE — 1125F AMNT PAIN NOTED PAIN PRSNT: CPT | Mod: CPTII,S$GLB,, | Performed by: NURSE PRACTITIONER

## 2023-08-17 PROCEDURE — 1159F MED LIST DOCD IN RCRD: CPT | Mod: CPTII,S$GLB,, | Performed by: NURSE PRACTITIONER

## 2023-08-17 PROCEDURE — 99999 PR PBB SHADOW E&M-EST. PATIENT-LVL III: ICD-10-PCS | Mod: PBBFAC,,, | Performed by: NURSE PRACTITIONER

## 2023-08-17 PROCEDURE — 1160F PR REVIEW ALL MEDS BY PRESCRIBER/CLIN PHARMACIST DOCUMENTED: ICD-10-PCS | Mod: CPTII,S$GLB,, | Performed by: NURSE PRACTITIONER

## 2023-08-17 RX ORDER — SEMAGLUTIDE 0.68 MG/ML
INJECTION, SOLUTION SUBCUTANEOUS
COMMUNITY
End: 2023-11-29

## 2023-08-17 RX ORDER — METHOCARBAMOL 500 MG/1
500 TABLET, FILM COATED ORAL 3 TIMES DAILY
COMMUNITY
Start: 2023-08-16 | End: 2023-11-29

## 2023-08-17 RX ORDER — SACUBITRIL AND VALSARTAN 24; 26 MG/1; MG/1
1 TABLET, FILM COATED ORAL 2 TIMES DAILY
COMMUNITY
Start: 2023-06-05

## 2023-08-17 RX ORDER — LIDOCAINE 50 MG/G
1 PATCH TOPICAL
COMMUNITY
Start: 2023-08-13 | End: 2023-11-29

## 2023-08-18 DIAGNOSIS — I50.32 CHRONIC DIASTOLIC CONGESTIVE HEART FAILURE: ICD-10-CM

## 2023-08-18 DIAGNOSIS — Z95.1 HX OF CABG: ICD-10-CM

## 2023-08-18 DIAGNOSIS — R06.02 SHORTNESS OF BREATH: Primary | ICD-10-CM

## 2023-08-25 ENCOUNTER — TELEPHONE (OUTPATIENT)
Dept: PULMONOLOGY | Facility: CLINIC | Age: 78
End: 2023-08-25
Payer: MEDICARE

## 2023-08-30 DIAGNOSIS — I10 ESSENTIAL HYPERTENSION: ICD-10-CM

## 2023-08-30 DIAGNOSIS — I50.32 CHRONIC DIASTOLIC CONGESTIVE HEART FAILURE: ICD-10-CM

## 2023-08-30 DIAGNOSIS — Z95.1 HX OF CABG: ICD-10-CM

## 2023-08-31 RX ORDER — METOPROLOL TARTRATE 25 MG/1
25 TABLET, FILM COATED ORAL 2 TIMES DAILY
Qty: 180 TABLET | Refills: 3 | Status: SHIPPED | OUTPATIENT
Start: 2023-08-31

## 2023-09-11 ENCOUNTER — TELEPHONE (OUTPATIENT)
Dept: NEPHROLOGY | Facility: CLINIC | Age: 78
End: 2023-09-11
Payer: MEDICARE

## 2023-09-11 DIAGNOSIS — N18.32 STAGE 3B CHRONIC KIDNEY DISEASE: Primary | ICD-10-CM

## 2023-09-11 NOTE — TELEPHONE ENCOUNTER
----- Message from Marni Roca sent at 9/11/2023  1:26 PM CDT -----  Regarding: appointment  Contact: patient  Type:  Sooner Appointment Request    Caller is requesting a sooner appointment.  Caller declined first available appointment listed below.  Caller will not accept being placed on the waitlist and is requesting a message be sent to doctor.    Name of Caller:  patient  When is the first available appointment?    Symptoms:  follow lup  Best Call Back Number:  [phone] (home)     Additional Information:  Please call patient to schedule.  Thanks!

## 2023-09-11 NOTE — TELEPHONE ENCOUNTER
----- Message from Isabel Brewster sent at 9/11/2023  2:05 PM CDT -----  Contact: Bo  Type:  Patient Returning Call    Who Called: Bo  Who Left Message for Patient: Corazon  Does the patient know what this is regarding?: an appointment  Would the patient rather a call back or a response via [facility]? call  Best Call Back Number: [phone]   Additional Information: Patient reports missing a call and request another call back.

## 2023-09-13 ENCOUNTER — TELEPHONE (OUTPATIENT)
Dept: PULMONOLOGY | Facility: CLINIC | Age: 78
End: 2023-09-13
Payer: MEDICARE

## 2023-09-13 NOTE — TELEPHONE ENCOUNTER
----- Message from Mattie Grimm sent at 9/13/2023  2:34 PM CDT -----  Contact: Bo Soriano is calling to speak to the nurse regarding his cpap supplies, please give him a call at [phone]    Thanks  LJ

## 2023-10-02 ENCOUNTER — LAB VISIT (OUTPATIENT)
Dept: LAB | Facility: HOSPITAL | Age: 78
End: 2023-10-02
Attending: INTERNAL MEDICINE
Payer: MEDICARE

## 2023-10-02 DIAGNOSIS — N18.32 STAGE 3B CHRONIC KIDNEY DISEASE: ICD-10-CM

## 2023-10-02 LAB
ALBUMIN SERPL BCP-MCNC: 3.5 G/DL (ref 3.5–5.2)
ANION GAP SERPL CALC-SCNC: 9 MMOL/L (ref 8–16)
BUN SERPL-MCNC: 22 MG/DL (ref 8–23)
CALCIUM SERPL-MCNC: 9.2 MG/DL (ref 8.7–10.5)
CHLORIDE SERPL-SCNC: 106 MMOL/L (ref 95–110)
CO2 SERPL-SCNC: 24 MMOL/L (ref 23–29)
CREAT SERPL-MCNC: 1.4 MG/DL (ref 0.5–1.4)
EST. GFR  (NO RACE VARIABLE): 51.8 ML/MIN/1.73 M^2
GLUCOSE SERPL-MCNC: 87 MG/DL (ref 70–110)
PHOSPHATE SERPL-MCNC: 2.7 MG/DL (ref 2.7–4.5)
POTASSIUM SERPL-SCNC: 4.2 MMOL/L (ref 3.5–5.1)
PTH-INTACT SERPL-MCNC: 106.3 PG/ML (ref 9–77)
SODIUM SERPL-SCNC: 139 MMOL/L (ref 136–145)

## 2023-10-02 PROCEDURE — 80069 RENAL FUNCTION PANEL: CPT | Performed by: INTERNAL MEDICINE

## 2023-10-02 PROCEDURE — 36415 COLL VENOUS BLD VENIPUNCTURE: CPT | Mod: PO | Performed by: INTERNAL MEDICINE

## 2023-10-02 PROCEDURE — 83970 ASSAY OF PARATHORMONE: CPT | Performed by: INTERNAL MEDICINE

## 2023-11-08 DIAGNOSIS — E78.5 HYPERLIPIDEMIA WITH TARGET LDL LESS THAN 70: ICD-10-CM

## 2023-11-08 DIAGNOSIS — Z95.1 HX OF CABG: ICD-10-CM

## 2023-11-09 RX ORDER — ATORVASTATIN CALCIUM 40 MG/1
40 TABLET, FILM COATED ORAL DAILY
Qty: 90 TABLET | Refills: 3 | Status: SHIPPED | OUTPATIENT
Start: 2023-11-09 | End: 2023-11-29

## 2023-11-24 NOTE — PROGRESS NOTES
Subjective:       Patient ID: Bo Chase is a 78 y.o. male.    Chief Complaint: CKD    HPI    Patient is a  white male who was  by profession.  Has history of chronic kidney disease for at least 5 years.  He was previously seen by Dr. Prather in 2012; now followed by Dr Massey.  Renal ultrasound at that time showed bilateral renal cysts.  Left kidney had a cyst of 2.5 cm.  Patient never had any proteinuria.  His creatinine has been fluctuating between 1.6 and 2.0.  His fluctuations have been due to diuretic therapy.  In the last 5 years his kidney function has been stable.  He has had history of coronary artery disease status post shortness of breath and dyspnea and exertion.    12/ 2014Occluded lad lcx and rca.Occluded svg to d1svg to rca patent svg to  om1 90% eccentric treated with dennis with filter wire protection.Patent lima.No complication. Dr. Joel     2014 LEANDRO : now on CPAP    6/2015 LBP s/p KAROLINA and s/p surgery laminectomy     4/2017 Renal [facility] CT scan of the abdomen shows extensive calcifications of the aorta    August 2017 2-D echo reviewed= PA pressure >26 mm Hg, creatinine down to 1.4, uric acid controlled, vitamin D level is 43, PTH 91, estimated GFR with Cystatin C is 50%; weight down by 10 ib from 263 to 254 ib    March 2018 patient's creatinine is up to 1.8.  Weight is up to 262 pounds.  Approximate GFR about 40%.  GFR loss is about 10% per year.  No proteinuria.  PTH has gone up from 91 in 2017-235 in March 2018.  Labwork shows severe metabolic alkalosis, severe hypokalemia, low chloride levels due to complications of diuretics. Severe Polycythemia may need phlebotomy     6/2018 K+ is better ; Hgb is better ; weight is higher ; GFR 37 % with Cr 1.7 ; retired and now in gym     09/2018 High Calcium; stop D; check USD and SPEP     November 2018 ultrasound done no cancer.  Negative serum protein electrophoresis    1/2019 s/p fall now pending left hips IA steroids in SI and Hip ; creatinine  "down off ibuprofen ; rtired now in [place] --gym and work out d/w patient [facility]man    6/2019 s/p cellulitis x2 s/p inpatient and I&D ( serratia ) ; left hip injection and Chiro for Pyriformitis     August 2020 creatinine 2.0.  Status post phlebotomy for polycythemia under care of Dr. Diaz.    Interval history May 2021: Former pt of Dr. Bernal, last Seen Dec 2020. He knows he is in Stage 3 but was not taught creatinine nor GFR.  He recently underwent R hip replacement ([place]) , gained 20lbs of fluid, saw Cardiology and medications adjusted but asking if he can resume daily loop diuretic.     Interval History Dec 2021: Doing well. Hip pain. No NSAID use. LE is stable.     Interval History April 2022: He is doing better. Last Hba1c was 7.7 but recent BS at home have improved. Fatigue has improved     Sep 2022:  Life has become quite hectic. Has CP which respond to NTG. He is s/p two [facility] May and July without PCI.  Saw CTS at [facility]. This is for "clearance" for hopeful surgery in future. Notes chronic constipation with pain medications    PAST MEDICAL HISTORY:  He  has a past medical history of Acute coronary syndrome, CHF (congestive heart failure), Chronic kidney disease, Coronary artery disease, Heart murmur, Hyperlipidemia, Hypertension, Lumbar spondylosis, and Sleep apnea.    PAST SURGICAL HISTORY:  He  has a past surgical history that includes throid lobectomy (04/2012); umbilicial hernia; Coronary artery bypass graft (1996); Cardiac catheterization (03/06/2012); Coronary angioplasty; back surgary; Total hip arthroplasty (Bilateral); Left heart catheterization (Left, 05/09/2022); Coronary bypass graft angiography (05/09/2022); Catheterization of both left and right heart (N/A, 07/11/2022); Right heart catheterization (Right, 07/11/2022); Coronary bypass graft angiography (07/11/2022); and Skin cancer excision.    SOCIAL HISTORY:  He  reports that he has never smoked. He has never used smokeless tobacco. He " reports current alcohol use. He reports that he does not use drugs.    FAMILY MEDICAL HISTORY:  His family history includes Heart disease in his father.    Review of patient's allergies indicates:   Allergen Reactions    Cat/feline products Itching    Ciprofloxacin Other (See Comments)     Foggy, drowsy  Other reaction(s): Other (See Comments), Other (See Comments)    Latex      Other reaction(s): Swelling    Sulfa (sulfonamide antibiotics)      Other reaction(s): Flushing (skin)       Review of Systems   Constitutional: Negative.    HENT: Negative.     Respiratory:  Negative for shortness of breath.    Cardiovascular:  Positive for leg swelling.   Gastrointestinal: Negative.    Genitourinary: Negative.    Musculoskeletal: Negative.    Skin: Negative.    Neurological:  Negative for dizziness, light-headedness and headaches.   Psychiatric/Behavioral: Negative.           Lab Results   Component Value Date    WBC 8.26 01/09/2023    HGB 15.8 01/09/2023    HCT 48.8 01/09/2023    MCV 97 01/09/2023     (L) 01/09/2023        CMP  Sodium   Date Value Ref Range Status   10/02/2023 139 136 - 145 mmol/L Final     Potassium   Date Value Ref Range Status   10/02/2023 4.2 3.5 - 5.1 mmol/L Final     Chloride   Date Value Ref Range Status   10/02/2023 106 95 - 110 mmol/L Final     CO2   Date Value Ref Range Status   10/02/2023 24 23 - 29 mmol/L Final     Glucose   Date Value Ref Range Status   10/02/2023 87 70 - 110 mg/dL Final     BUN   Date Value Ref Range Status   10/02/2023 22 8 - 23 mg/dL Final     Creatinine   Date Value Ref Range Status   10/02/2023 1.4 0.5 - 1.4 mg/dL Final     Calcium   Date Value Ref Range Status   10/02/2023 9.2 8.7 - 10.5 mg/dL Final     Total Protein   Date Value Ref Range Status   08/13/2023 6.0 (L) 6.1 - 7.9 g/dL Final   01/09/2023 6.3 6.0 - 8.4 g/dL Final     Albumin   Date Value Ref Range Status   10/02/2023 3.5 3.5 - 5.2 g/dL Final     Total Bilirubin   Date Value Ref Range Status    08/13/2023 0.8 0.4 - 2.0 mg/dL Final   01/09/2023 1.0 0.1 - 1.0 mg/dL Final     Comment:     For infants and newborns, interpretation of results should be based  on gestational age, weight and in agreement with clinical  observations.    Premature Infant recommended reference ranges:  Up to 24 hours.............<8.0 mg/dL  Up to 48 hours............<12.0 mg/dL  3-5 days..................<15.0 mg/dL  6-29 days.................<15.0 mg/dL       Alkaline Phosphatase   Date Value Ref Range Status   08/13/2023 86 28 - 116 U/L Final   01/09/2023 72 55 - 135 U/L Final     AST   Date Value Ref Range Status   08/13/2023 32 12 - 40 U/L Final   01/09/2023 53 (H) 10 - 40 U/L Final     ALT   Date Value Ref Range Status   08/13/2023 49 5 - 56 U/L Final   01/09/2023 77 (H) 10 - 44 U/L Final     Anion Gap   Date Value Ref Range Status   10/02/2023 9 8 - 16 mmol/L Final     eGFR if    Date Value Ref Range Status   06/28/2022 47.7 (A) >60 mL/min/1.73 m^2 Final     eGFR if non    Date Value Ref Range Status   06/28/2022 41.2 (A) >60 mL/min/1.73 m^2 Final     Comment:     Calculation used to obtain the estimated glomerular filtration  rate (eGFR) is the CKD-EPI equation.          Current Outpatient Medications on File Prior to Visit   Medication Sig Dispense Refill    allopurinoL (ZYLOPRIM) 300 MG tablet TAKE 1 AND 1/2 TABLETS BY  MOUTH ONCE DAILY (Patient taking differently: 150 mg.) 135 tablet 3    ascorbic acid, vitamin C, (VITAMIN C) 500 MG tablet Take 500 mg by mouth once daily.      aspirin 325 MG tablet Take 325 mg by mouth once daily.      blood sugar diagnostic (TRUE METRIX GLUCOSE TEST STRIP) Strp 1 strip.      cetirizine (ZYRTEC) 10 MG tablet Take 1 tablet by mouth once daily.      docusate sodium (COLACE) 100 MG capsule Take 100 mg by mouth.      DULoxetine (CYMBALTA) 30 MG capsule Take 30 mg by mouth.      dutasteride (AVODART) 0.5 mg capsule       ENTRESTO 24-26 mg per tablet Take 1  tablet by mouth 2 (two) times daily.      ipratropium (ATROVENT) 42 mcg (0.06 %) nasal spray 2 sprays by Nasal route.      isosorbide mononitrate (IMDUR) 60 MG 24 hr tablet Take 1 tablet (60 mg total) by mouth once daily. 30 tablet 11    lancets Misc Use daily      levothyroxine (SYNTHROID) 100 MCG tablet TAKE 1 TABLET EVERY DAY BEFORE BREAKFAST 90 tablet 2    losartan (COZAAR) 25 MG tablet Take 1 tablet by mouth once daily.      metoprolol tartrate (LOPRESSOR) 25 MG tablet Take 1 tablet (25 mg total) by mouth 2 (two) times daily. 180 tablet 3    multivitamin (THERAGRAN) per tablet Take 1 tablet by mouth once daily.      nitroGLYCERIN (NITROSTAT) 0.4 MG SL tablet Place 1 tablet (0.4 mg total) under the tongue every 5 (five) minutes as needed for Chest pain. 25 tablet 6    tamsulosin (FLOMAX) 0.4 mg Cap Take 1 capsule by mouth once daily.      TRUE METRIX GO GLUCOSE METER Misc USE TO CHECK GLUCOSE ONCE DAILY AS DIRECTED      [DISCONTINUED] albuterol (PROVENTIL/VENTOLIN HFA) 90 mcg/actuation inhaler Inhale 1-2 puffs into the lungs every 6 (six) hours as needed for Wheezing. Rescue 1 Inhaler 5    [DISCONTINUED] atorvastatin (LIPITOR) 40 MG tablet Take 1 tablet (40 mg total) by mouth once daily. 90 tablet 3    [DISCONTINUED] cetirizine (ZYRTEC) 10 MG tablet cetirizine 10 mg tablet   Take 1 tablet every day by oral route.      [DISCONTINUED] fluticasone (FLONASE) 50 mcg/actuation nasal spray 1 spray (50 mcg total) by Each Nare route once daily. 1 Bottle 0    [DISCONTINUED] ketoconazole (NIZORAL) 2 % cream Apply topically 2 (two) times daily.      [DISCONTINUED] LIDOcaine (LIDODERM) 5 % Place 1 patch onto the skin.      [DISCONTINUED] methocarbamoL (ROBAXIN) 500 MG Tab Take 500 mg by mouth 3 (three) times daily.      [DISCONTINUED] metOLazone (ZAROXOLYN) 5 MG tablet Take 1 tablet by mouth daily as needed.      [DISCONTINUED] omega-3 fatty acids/fish oil (FISH OIL-OMEGA-3 FATTY ACIDS) 300-1,000 mg capsule Take 1 capsule by  mouth once daily.      [DISCONTINUED] potassium chloride SA (K-DUR,KLOR-CON) 10 MEQ tablet Take 1 tablet (10 mEq total) by mouth 4 (four) times daily. Pt states he takes 3-4 tablets once daily (Patient taking differently: Take 10 mEq by mouth once daily.) 360 tablet 3    [DISCONTINUED] semaglutide (OZEMPIC) 0.25 mg or 0.5 mg (2 mg/3 mL) pen injector INJECT 0.5mgs(Increase in dose) Subcutaneous once weekly for 90 days      [DISCONTINUED] spironolactone (ALDACTONE) 25 MG tablet Take 1 tablet (25 mg total) by mouth once daily. 90 tablet 3    [DISCONTINUED] torsemide (DEMADEX) 20 MG Tab Take 1 tablet (20 mg total) by mouth 2 (two) times a day. (Patient taking differently: Take 20 mg by mouth once daily. 1/2 tablet daily) 180 tablet 3     No current facility-administered medications on file prior to visit.       Objective:        Physical Exam  Vitals and nursing note reviewed.   Constitutional:       Appearance: Normal appearance.   HENT:      Head: Normocephalic and atraumatic.   Eyes:      Extraocular Movements: Extraocular movements intact.      Pupils: Pupils are equal, round, and reactive to light.   Cardiovascular:      Rate and Rhythm: Normal rate and regular rhythm.   Pulmonary:      Effort: Pulmonary effort is normal.   Abdominal:      General: Bowel sounds are normal.      Palpations: Abdomen is soft.   Musculoskeletal:         General: Normal range of motion.      Right lower leg: Edema present.      Left lower leg: Edema present.   Skin:     General: Skin is warm and dry.   Neurological:      General: No focal deficit present.      Mental Status: He is alert and oriented to person, place, and time.   Psychiatric:         Mood and Affect: Mood normal.         Behavior: Behavior normal.         Thought Content: Thought content normal.         Judgment: Judgment normal.           Assessment:       1. Stage 3a chronic kidney disease    2. Idiopathic chronic gout of multiple sites without tophus    3. Secondary  hyperparathyroidism of renal origin    4. Vitamin D deficiency    5. Hypertension, essential    6. Chronic diastolic congestive heart failure    7. Chronic systolic (congestive) heart failure    8. Hypokalemia    9. Complex renal cyst    10. Morbid obesity with BMI of 40.0-44.9, adult        Plan:          1.  Chronic kidney disease stage 3-- patient may have cardiorenal syndrome:  fluctuating creatinine on diuretics.  Creatinine is stable and ranges 1.4-2. Stable kidney function and < 100mg of proteinuria. S/p two LHC and no evidence of permament damage based on recent Cr    2.  Chronic gout: Last attack was 2018.   Continue allopurinol 150mg; uric acid is well controlled at 6.1, followed by Rheumatology.    3-4.  Hyperparathyroidism due to chronic kidney disease stage III:  iPTH 106.3; Trend PTH, D level is adequate (hx of hypercalcemia)     5-7.   Chronic systolic/diastolic CHF--also with chronic venous stasis findings as well as brawny edema of the skin: Patient had an ultrasound of the legs in 2012 which showed no DVT.  Clinically patient has pulm. hypertension, Followed by Cardiology. Continue current diuretics (with potassium supplementation)  LEANDRO:   CPAP     8.  Hypokalemia: continue aldactone 25 mg and KCL; refilled spironolactone today; continue potassium supplement as prescribed.    9.  Acquired renal cysts: Patient had multiple complex cysts in the ultrasound done in 2017 and 2018 .  Status post  renal ultrasound June of 2020--the complex cysts seen prior were all classified as simple in the most recent renal u/s     10. Lifestyle and wt control discussed with pt; further weight loss would be beneficial.  Advised pt on importance of BP and diabetes control, wt control, calorie restriction/ change in diet.  Maintain low sodium and low carb diet as directed.  Avoiding sodas/sweets and fried food/boxed foods, etc as discussed today in clinic.  Increase daily exercise as tolerated.  Pt verbalized  understanding.    Return to clinic in 6 mos    40 minutes of total time spent on the encounter, which includes face to face time and non-face to face time preparing to see the patient (eg, review of tests), obtaining and/or reviewing separately obtained history, documenting clinical information in the electronic or other health record, Independently interpreting results and communicating results to the patient/family/caregiver, or care coordination.    Laure Merritt, DESHAWNP-C

## 2023-11-29 ENCOUNTER — OFFICE VISIT (OUTPATIENT)
Dept: NEPHROLOGY | Facility: CLINIC | Age: 78
End: 2023-11-29
Payer: MEDICARE

## 2023-11-29 VITALS
SYSTOLIC BLOOD PRESSURE: 132 MMHG | DIASTOLIC BLOOD PRESSURE: 82 MMHG | HEART RATE: 77 BPM | BODY MASS INDEX: 38.45 KG/M2 | OXYGEN SATURATION: 98 % | HEIGHT: 67 IN | WEIGHT: 245 LBS

## 2023-11-29 DIAGNOSIS — I50.32 CHRONIC DIASTOLIC CONGESTIVE HEART FAILURE: ICD-10-CM

## 2023-11-29 DIAGNOSIS — I10 HYPERTENSION, ESSENTIAL: ICD-10-CM

## 2023-11-29 DIAGNOSIS — N25.81 SECONDARY HYPERPARATHYROIDISM OF RENAL ORIGIN: ICD-10-CM

## 2023-11-29 DIAGNOSIS — E87.6 HYPOKALEMIA: ICD-10-CM

## 2023-11-29 DIAGNOSIS — I50.22 CHRONIC SYSTOLIC (CONGESTIVE) HEART FAILURE: ICD-10-CM

## 2023-11-29 DIAGNOSIS — E66.01 MORBID OBESITY WITH BMI OF 40.0-44.9, ADULT: ICD-10-CM

## 2023-11-29 DIAGNOSIS — N18.31 STAGE 3A CHRONIC KIDNEY DISEASE: Primary | ICD-10-CM

## 2023-11-29 DIAGNOSIS — M1A.09X0 IDIOPATHIC CHRONIC GOUT OF MULTIPLE SITES WITHOUT TOPHUS: ICD-10-CM

## 2023-11-29 DIAGNOSIS — E55.9 VITAMIN D DEFICIENCY: ICD-10-CM

## 2023-11-29 DIAGNOSIS — N28.1 COMPLEX RENAL CYST: ICD-10-CM

## 2023-11-29 PROCEDURE — 3288F PR FALLS RISK ASSESSMENT DOCUMENTED: ICD-10-PCS | Mod: CPTII,S$GLB,, | Performed by: NURSE PRACTITIONER

## 2023-11-29 PROCEDURE — 3079F DIAST BP 80-89 MM HG: CPT | Mod: CPTII,S$GLB,, | Performed by: NURSE PRACTITIONER

## 2023-11-29 PROCEDURE — 99999 PR PBB SHADOW E&M-EST. PATIENT-LVL IV: ICD-10-PCS | Mod: PBBFAC,,, | Performed by: NURSE PRACTITIONER

## 2023-11-29 PROCEDURE — 1101F PR PT FALLS ASSESS DOC 0-1 FALLS W/OUT INJ PAST YR: ICD-10-PCS | Mod: CPTII,S$GLB,, | Performed by: NURSE PRACTITIONER

## 2023-11-29 PROCEDURE — 1101F PT FALLS ASSESS-DOCD LE1/YR: CPT | Mod: CPTII,S$GLB,, | Performed by: NURSE PRACTITIONER

## 2023-11-29 PROCEDURE — 3288F FALL RISK ASSESSMENT DOCD: CPT | Mod: CPTII,S$GLB,, | Performed by: NURSE PRACTITIONER

## 2023-11-29 PROCEDURE — 1160F PR REVIEW ALL MEDS BY PRESCRIBER/CLIN PHARMACIST DOCUMENTED: ICD-10-PCS | Mod: CPTII,S$GLB,, | Performed by: NURSE PRACTITIONER

## 2023-11-29 PROCEDURE — 99999 PR PBB SHADOW E&M-EST. PATIENT-LVL IV: CPT | Mod: PBBFAC,,, | Performed by: NURSE PRACTITIONER

## 2023-11-29 PROCEDURE — 1159F PR MEDICATION LIST DOCUMENTED IN MEDICAL RECORD: ICD-10-PCS | Mod: CPTII,S$GLB,, | Performed by: NURSE PRACTITIONER

## 2023-11-29 PROCEDURE — 99215 OFFICE O/P EST HI 40 MIN: CPT | Mod: S$GLB,,, | Performed by: NURSE PRACTITIONER

## 2023-11-29 PROCEDURE — 3075F SYST BP GE 130 - 139MM HG: CPT | Mod: CPTII,S$GLB,, | Performed by: NURSE PRACTITIONER

## 2023-11-29 PROCEDURE — 1125F AMNT PAIN NOTED PAIN PRSNT: CPT | Mod: CPTII,S$GLB,, | Performed by: NURSE PRACTITIONER

## 2023-11-29 PROCEDURE — 3079F PR MOST RECENT DIASTOLIC BLOOD PRESSURE 80-89 MM HG: ICD-10-PCS | Mod: CPTII,S$GLB,, | Performed by: NURSE PRACTITIONER

## 2023-11-29 PROCEDURE — 1160F RVW MEDS BY RX/DR IN RCRD: CPT | Mod: CPTII,S$GLB,, | Performed by: NURSE PRACTITIONER

## 2023-11-29 PROCEDURE — 1159F MED LIST DOCD IN RCRD: CPT | Mod: CPTII,S$GLB,, | Performed by: NURSE PRACTITIONER

## 2023-11-29 PROCEDURE — 1125F PR PAIN SEVERITY QUANTIFIED, PAIN PRESENT: ICD-10-PCS | Mod: CPTII,S$GLB,, | Performed by: NURSE PRACTITIONER

## 2023-11-29 PROCEDURE — 99215 PR OFFICE/OUTPT VISIT, EST, LEVL V, 40-54 MIN: ICD-10-PCS | Mod: S$GLB,,, | Performed by: NURSE PRACTITIONER

## 2023-11-29 PROCEDURE — 3075F PR MOST RECENT SYSTOLIC BLOOD PRESS GE 130-139MM HG: ICD-10-PCS | Mod: CPTII,S$GLB,, | Performed by: NURSE PRACTITIONER

## 2023-11-29 RX ORDER — METOLAZONE 5 MG/1
1 TABLET ORAL DAILY PRN
COMMUNITY
End: 2023-11-29

## 2023-11-29 RX ORDER — CETIRIZINE HYDROCHLORIDE 10 MG/1
1 TABLET ORAL DAILY
COMMUNITY

## 2023-11-29 RX ORDER — LOSARTAN POTASSIUM 25 MG/1
1 TABLET ORAL DAILY
COMMUNITY

## 2023-11-29 RX ORDER — SPIRONOLACTONE 25 MG/1
25 TABLET ORAL DAILY
Qty: 90 TABLET | Refills: 3 | Status: SHIPPED | OUTPATIENT
Start: 2023-11-29 | End: 2024-11-28

## 2023-12-07 ENCOUNTER — TELEPHONE (OUTPATIENT)
Dept: PULMONOLOGY | Facility: CLINIC | Age: 78
End: 2023-12-07
Payer: MEDICARE

## 2024-04-01 ENCOUNTER — TELEPHONE (OUTPATIENT)
Dept: NEPHROLOGY | Facility: CLINIC | Age: 79
End: 2024-04-01
Payer: MEDICARE

## 2024-04-01 ENCOUNTER — LAB VISIT (OUTPATIENT)
Dept: LAB | Facility: HOSPITAL | Age: 79
End: 2024-04-01
Attending: NURSE PRACTITIONER
Payer: MEDICARE

## 2024-04-01 DIAGNOSIS — E55.9 VITAMIN D DEFICIENCY: ICD-10-CM

## 2024-04-01 DIAGNOSIS — N25.81 SECONDARY HYPERPARATHYROIDISM OF RENAL ORIGIN: ICD-10-CM

## 2024-04-01 DIAGNOSIS — N18.31 STAGE 3A CHRONIC KIDNEY DISEASE: ICD-10-CM

## 2024-04-01 LAB
ALBUMIN SERPL BCP-MCNC: 3.6 G/DL (ref 3.5–5.2)
ANION GAP SERPL CALC-SCNC: 9 MMOL/L (ref 8–16)
BASOPHILS # BLD AUTO: 0.08 K/UL (ref 0–0.2)
BASOPHILS NFR BLD: 0.8 % (ref 0–1.9)
BUN SERPL-MCNC: 22 MG/DL (ref 8–23)
CALCIUM SERPL-MCNC: 9.7 MG/DL (ref 8.7–10.5)
CHLORIDE SERPL-SCNC: 102 MMOL/L (ref 95–110)
CO2 SERPL-SCNC: 28 MMOL/L (ref 23–29)
CREAT SERPL-MCNC: 1.5 MG/DL (ref 0.5–1.4)
DIFFERENTIAL METHOD BLD: ABNORMAL
EOSINOPHIL # BLD AUTO: 0.1 K/UL (ref 0–0.5)
EOSINOPHIL NFR BLD: 1.3 % (ref 0–8)
ERYTHROCYTE [DISTWIDTH] IN BLOOD BY AUTOMATED COUNT: 14.5 % (ref 11.5–14.5)
EST. GFR  (NO RACE VARIABLE): 47.4 ML/MIN/1.73 M^2
GLUCOSE SERPL-MCNC: 82 MG/DL (ref 70–110)
HCT VFR BLD AUTO: 54.2 % (ref 40–54)
HGB BLD-MCNC: 18.3 G/DL (ref 14–18)
IMM GRANULOCYTES # BLD AUTO: 0.1 K/UL (ref 0–0.04)
IMM GRANULOCYTES NFR BLD AUTO: 1 % (ref 0–0.5)
LYMPHOCYTES # BLD AUTO: 1.5 K/UL (ref 1–4.8)
LYMPHOCYTES NFR BLD: 14.3 % (ref 18–48)
MCH RBC QN AUTO: 32.2 PG (ref 27–31)
MCHC RBC AUTO-ENTMCNC: 33.8 G/DL (ref 32–36)
MCV RBC AUTO: 95 FL (ref 82–98)
MONOCYTES # BLD AUTO: 0.7 K/UL (ref 0.3–1)
MONOCYTES NFR BLD: 6.9 % (ref 4–15)
NEUTROPHILS # BLD AUTO: 7.9 K/UL (ref 1.8–7.7)
NEUTROPHILS NFR BLD: 75.7 % (ref 38–73)
NRBC BLD-RTO: 0 /100 WBC
PHOSPHATE SERPL-MCNC: 3 MG/DL (ref 2.7–4.5)
PLATELET # BLD AUTO: 184 K/UL (ref 150–450)
PMV BLD AUTO: 10.1 FL (ref 9.2–12.9)
POTASSIUM SERPL-SCNC: 4.8 MMOL/L (ref 3.5–5.1)
PTH-INTACT SERPL-MCNC: 101.9 PG/ML (ref 9–77)
RBC # BLD AUTO: 5.69 M/UL (ref 4.6–6.2)
SODIUM SERPL-SCNC: 139 MMOL/L (ref 136–145)
WBC # BLD AUTO: 10.42 K/UL (ref 3.9–12.7)

## 2024-04-01 PROCEDURE — 83970 ASSAY OF PARATHORMONE: CPT | Performed by: NURSE PRACTITIONER

## 2024-04-01 PROCEDURE — 80069 RENAL FUNCTION PANEL: CPT | Performed by: NURSE PRACTITIONER

## 2024-04-01 PROCEDURE — 85025 COMPLETE CBC W/AUTO DIFF WBC: CPT | Performed by: NURSE PRACTITIONER

## 2024-04-01 PROCEDURE — 36415 COLL VENOUS BLD VENIPUNCTURE: CPT | Mod: PO | Performed by: NURSE PRACTITIONER

## 2024-04-01 NOTE — TELEPHONE ENCOUNTER
Patient was called back and report being scheduled for shoulder surgery on Wednesday for Dr. Ronal Ji in [place]. He needs a kidney clearance faxed to [phone] by tomorrow. He is scheduled for labs today.  A call was placed to [phone] Josue SKINNER with updates.

## 2024-04-01 NOTE — TELEPHONE ENCOUNTER
----- Message from Miya Merlos sent at 4/1/2024 12:01 PM CDT -----  Contact: Bo  .Patient is calling to speak with the nurse regarding clearance . Reports needing a surgery clearance scheduled . Please give patient a call back at   [phone]

## 2024-04-02 ENCOUNTER — TELEPHONE (OUTPATIENT)
Dept: NEPHROLOGY | Facility: CLINIC | Age: 79
End: 2024-04-02
Payer: MEDICARE

## 2024-04-02 NOTE — TELEPHONE ENCOUNTER
From a Nephrology standpoint there is no contraindication for upcoming shoulder surgery. His Chronic Kidney Disease is stable and electrolytes are in good range.    Recommend:  1--avoid Demerol, NSAIDs and Cox2 inhibitors for pain control due to CKD  2--avoid Bactrim for antibiotic use

## 2024-04-02 NOTE — TELEPHONE ENCOUNTER
----- Message from Jeremias May sent at 4/2/2024 11:16 AM CDT -----  Contact: pt  Type: Needs Medical Advice  Who Called:  pt  Best Call Back Number: [phone]    Additional Information: Pt is calling the office regarding results from clearance that needs to be faxed over to [facility] fax # [phone].Please call back and advise.

## 2024-04-03 ENCOUNTER — PATIENT MESSAGE (OUTPATIENT)
Dept: PULMONOLOGY | Facility: CLINIC | Age: 79
End: 2024-04-03
Payer: MEDICARE

## 2024-05-20 ENCOUNTER — OFFICE VISIT (OUTPATIENT)
Dept: NEPHROLOGY | Facility: CLINIC | Age: 79
End: 2024-05-20
Payer: MEDICARE

## 2024-05-20 VITALS
DIASTOLIC BLOOD PRESSURE: 62 MMHG | BODY MASS INDEX: 42.12 KG/M2 | SYSTOLIC BLOOD PRESSURE: 104 MMHG | OXYGEN SATURATION: 97 % | HEIGHT: 67 IN | WEIGHT: 268.38 LBS | HEART RATE: 66 BPM

## 2024-05-20 DIAGNOSIS — N18.31 STAGE 3A CHRONIC KIDNEY DISEASE: Primary | ICD-10-CM

## 2024-05-20 DIAGNOSIS — N25.81 SECONDARY HYPERPARATHYROIDISM OF RENAL ORIGIN: ICD-10-CM

## 2024-05-20 DIAGNOSIS — M1A.09X0 IDIOPATHIC CHRONIC GOUT OF MULTIPLE SITES WITHOUT TOPHUS: ICD-10-CM

## 2024-05-20 DIAGNOSIS — E55.9 VITAMIN D DEFICIENCY: ICD-10-CM

## 2024-05-20 DIAGNOSIS — E66.01 MORBID OBESITY WITH BMI OF 40.0-44.9, ADULT: ICD-10-CM

## 2024-05-20 DIAGNOSIS — I50.22 CHRONIC SYSTOLIC (CONGESTIVE) HEART FAILURE: ICD-10-CM

## 2024-05-20 DIAGNOSIS — I50.32 CHRONIC DIASTOLIC CONGESTIVE HEART FAILURE: ICD-10-CM

## 2024-05-20 PROCEDURE — 3078F DIAST BP <80 MM HG: CPT | Mod: CPTII,S$GLB,, | Performed by: INTERNAL MEDICINE

## 2024-05-20 PROCEDURE — 99215 OFFICE O/P EST HI 40 MIN: CPT | Mod: S$GLB,,, | Performed by: INTERNAL MEDICINE

## 2024-05-20 PROCEDURE — 3288F FALL RISK ASSESSMENT DOCD: CPT | Mod: CPTII,S$GLB,, | Performed by: INTERNAL MEDICINE

## 2024-05-20 PROCEDURE — 1125F AMNT PAIN NOTED PAIN PRSNT: CPT | Mod: CPTII,S$GLB,, | Performed by: INTERNAL MEDICINE

## 2024-05-20 PROCEDURE — 3074F SYST BP LT 130 MM HG: CPT | Mod: CPTII,S$GLB,, | Performed by: INTERNAL MEDICINE

## 2024-05-20 PROCEDURE — 1101F PT FALLS ASSESS-DOCD LE1/YR: CPT | Mod: CPTII,S$GLB,, | Performed by: INTERNAL MEDICINE

## 2024-05-20 PROCEDURE — 1160F RVW MEDS BY RX/DR IN RCRD: CPT | Mod: CPTII,S$GLB,, | Performed by: INTERNAL MEDICINE

## 2024-05-20 PROCEDURE — 1159F MED LIST DOCD IN RCRD: CPT | Mod: CPTII,S$GLB,, | Performed by: INTERNAL MEDICINE

## 2024-05-20 PROCEDURE — 99999 PR PBB SHADOW E&M-EST. PATIENT-LVL IV: CPT | Mod: PBBFAC,,, | Performed by: INTERNAL MEDICINE

## 2024-05-20 RX ORDER — SEMAGLUTIDE 0.68 MG/ML
0.5 INJECTION, SOLUTION SUBCUTANEOUS
COMMUNITY

## 2024-05-20 RX ORDER — TORSEMIDE 10 MG/1
10 TABLET ORAL DAILY
COMMUNITY

## 2024-05-20 RX ORDER — FLUTICASONE PROPIONATE 50 MCG
SPRAY, SUSPENSION (ML) NASAL
COMMUNITY
Start: 2024-05-13

## 2024-05-20 NOTE — PROGRESS NOTES
Subjective:       Patient ID: Bo Chase is a 79 y.o. White male who presents for follow-up evaluation of Chronic Kidney Disease         Patient is a  white male who was  by profession.  Has history of chronic kidney disease for at least 5 years.  He was last seen by Dr. Prather in 2012.  Renal ultrasound at that time showed bilateral renal cysts.  Left kidney had a cyst of 2.5 cm.  Patient never had any proteinuria.  His creatinine has been fluctuating between 1.6 and 2.0.  His fluctuations have been due to diuretic therapy.  In the last 5 years his kidney function has been stable.  He has had history of coronary artery disease status post shortness of breath and dyspnea and exertion.    12/ 2014Occluded lad lcx and rca.Occluded svg to d1svg to rca patent svg to  om1 90% eccentric treated with dennis with filter wire protection.Patent lima.No complication. Dr. Joel     2014 LEANDRO : now on CPAP    6/2015 LBP s/p KAROLINA and s/p surgery laminectomy     4/2017 Renal [facility] CT scan of the abdomen shows extensive calcifications of the aorta    August 2017 2-D echo reviewed= PA pressure >26 mm Hg, creatinine down to 1.4, uric acid controlled, vitamin D level is 43, PTH 91, estimated GFR with Cystatin C is 50%; weight down by 10 ib from 263 to 254 ib    March 2018 patient's creatinine is up to 1.8.  Weight is up to 262 pounds.  Approximate GFR about 40%.  GFR loss is about 10% per year.  No proteinuria.  PTH has gone up from 91 in 2017-235 in March 2018.  Labwork shows severe metabolic alkalosis, severe hypokalemia, low chloride levels due to complications of diuretics. Severe Polycythemia may need phlebotomy     6/2018 K+ is better ; Hgb is better ; weight is higher ; GFR 37 % with Cr 1.7 ; retired and now in gym     09/2018 High Calcium; stop D; check USD and SPEP     November 2018 ultrasound done no cancer.  Negative serum protein electrophoresis    1/2019 s/p fall now pending left hips IA steroids in SI and Hip ;  "creatinine down off ibuprofen ; rtired now in [place] --gym and work out d/w patient [facility]    6/2019 s/p cellulitis x2 s/p inpatient and I&D ( serratia ) ; left hip injection and Chiro for Pyriformitis     August 2020 creatinine 2.0.  Status post phlebotomy for polycythemia under care of Dr. Diaz.    Interval history May 2021: Former pt of Dr. Bernal, last Seen Dec 2020. He knows he is in Stage 3 but was not taught creatinine nor GFR.  He recently underwent R hip replacement ([place]) , gained 20lbs of fluid, saw Cardiology and medications adjusted but asking if he can resume daily loop diuretic.     Interval History Dec 2021: Doing well. Hip pain. No NSAID use. LE is stable.     Interval History April 2022: He is doing better. Last Hba1c was 7.7 but recent BS at home have improved. Fatigue has improved     Sep 2022:  Life has become quite hectic. Has CP which respond to NTG. He is s/p two [facility] May and July without PCI.  Saw CTS at [facility]. This is for "clearance" for hopeful surgery in future. Notes chronic constipation with pain medications    Nov 2023:saw GEORGE    May 2024: Staying active. Feeling quite well! saw [facility] cardiology. Now on Entresto. And Ozempic. Started vit d with K April 3X a week. Off NTG. Now going to gym weights and cycles. 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     Review of Systems   Constitutional:  Negative for activity change, appetite change and unexpected weight change.   HENT:  Negative for facial swelling.    Respiratory:  Negative for shortness of breath.    Cardiovascular:  Positive for leg swelling (much improved). Negative for chest pain.   Genitourinary:  Positive for frequency.   Musculoskeletal:  Positive for arthralgias and gait problem.   Allergic/Immunologic: Positive for immunocompromised state (due to DM 2 and CKD).   Psychiatric/Behavioral:  Negative for confusion and decreased concentration.        Objective:   /62   Pulse 66   Ht 5' 7" (1.702 m)  "  Wt 121.7 kg (268 lb 6.4 oz)   SpO2 97%   BMI 42.04 kg/m²        Physical Exam  Vitals and nursing note reviewed.   Constitutional:       General: He is not in acute distress.     Appearance: Normal appearance. He is obese. He is not ill-appearing.   HENT:      Head: Atraumatic.   Eyes:      General: No scleral icterus.  Cardiovascular:      Rate and Rhythm: Normal rate and regular rhythm.      Heart sounds: Murmur heard.   Pulmonary:      Breath sounds: No wheezing or rales.   Abdominal:      General: There is no distension.   Musculoskeletal:      Right lower leg: Edema (much improved) present.      Left lower leg: Edema (much improved) present.   Skin:     General: Skin is warm and dry.      Coloration: Skin is not jaundiced.   Neurological:      Mental Status: He is alert and oriented to person, place, and time. Mental status is at baseline.   Psychiatric:         Mood and Affect: Mood normal.           Lab Results   Component Value Date    CREATININE 1.5 (H) 04/01/2024    BUN 22 04/01/2024     04/01/2024    K 4.8 04/01/2024     04/01/2024    CO2 28 04/01/2024     Lab Results   Component Value Date    WBC 10.42 04/01/2024    HGB 18.3 (H) 04/01/2024    HCT 54.2 (H) 04/01/2024    MCV 95 04/01/2024     04/01/2024     Lab Results   Component Value Date    .9 (H) 04/01/2024    CALCIUM 9.7 04/01/2024    PHOS 3.0 04/01/2024     Lab Results   Component Value Date    URICACID 4.0 03/08/2023             )    1. Stage 3a chronic kidney disease    2. Chronic diastolic congestive heart failure    3. Morbid obesity with BMI of 40.0-44.9, adult    4. Secondary hyperparathyroidism of renal origin    5. Vitamin D deficiency    6. Idiopathic chronic gout of multiple sites without tophus    7. Chronic systolic (congestive) heart failure            Plan:         1.  Chronic kidney disease stage 3-- patient may have cardiorenal syndrome:  fluctuating creatinine on diuretics.  Creatinine is stable and  ranges 1.6-2. Stable kidney function and < 100mg of proteinuria. S/p two LHC and no evidence of permament damage based on recent Cr (most recent 7/2022) he looks much better.     2.  Chronic gout: Last attack was 2018.   Continue allopurinol 150 mg, followed by Rheumatology.    3.  Hyperparathyroidism due to chronic kidney disease stage III:  Trend PTH, D level is adequate (hx of hypercalcemia)     4.   Chronic systolic/diastolic CHF--also with chronic venous stasis findings as well as brawny edema of the skin: Patient had an ultrasound of the legs in 2012 which showed no DVT.  Clinically patient has pulm. hypertension, Followed by Cardiology. Continue current diuretics. Low sodium diet. Entresto.     6.  Pulm HTN and LEANDRO:   CPAP     7.  Hypokalemia: continue aldactone 25 mg and ARB (valsartan in Entresto)     9.  Acquired renal cysts: Patient had multiple complex cysts in the ultrasound done in 2017 and 2018 .  Status post  renal ultrasound June of 2020--the complex cysts seen prior were all classified as simple in the most recent renal u/s     10. Hip Arthiritis: s/p hip replacement. No NSAIDs        Follow-up 6mo or prn sooner   45 minutes of total time spent on the encounter, which includes face to face time and non-face to face time preparing to see the patient (eg, review of tests), Obtaining and/or reviewing separately obtained history, Documenting clinical information in the electronic or other health record, Independently interpreting results (not separately reported) and communicating results to the patient/family/caregiver, or Care coordination (not separately reported).        Mumtaz Massey MD              HPI

## 2024-08-15 NOTE — PRE-PROCEDURE INSTRUCTIONS
Information to Prepare you for your Surgery    PRE-ADMIT TESTING -  [phone]    [address]  [facility]          Your surgery has been scheduled at [facility]. We are pleased to have the opportunity to serve you. For Further Information please call [phone].    On the day of surgery please report to the Information Desk on the 1st floor.    CONTACT YOUR PHYSICIAN'S OFFICE THE DAY PRIOR TO YOUR SURGERY TO OBTAIN YOUR ARRIVAL TIME.     The evening before surgery do not eat anything after 9 p.m. ( this includes hard candy, chewing gum and mints).  You may only have GATORADE, POWERADE AND WATER  from 9 p.m. until you leave your home.   DO NOT DRINK ANY LIQUIDS ON THE WAY TO THE HOSPITAL.      Why does your anesthesiologist allow you to drink Gatorade/Powerade before surgery?  Gatorade/Powerade helps to increase your comfort before surgery and to decrease your nausea after surgery. The carbohydrates in Gatorade/Powerade help reduce your body's stress response to surgery.  If you are a diabetic-drink only water prior to surgery.    Outpatient Surgery- May allow 2 adult (18 and older) Support Persons (1 being the designated ) for all surgical/procedural patients. A breastfeeding mother will be allowed her infant and 2 adult Support Persons. No one under the age of 18 will be allowed in the building. No swapping out of visitors in the [facility].      SPECIAL MEDICATION INSTRUCTIONS: TAKE medications checked off by the Anesthesiologist on your Medication List.    Angiogram Patients: Take medications as instructed by your physician, including aspirin.     Surgery Patients:    If you take ASPIRIN - Your PHYSICIAN/SURGEON will need to inform you IF/OR when you need to stop taking aspirin prior to your surgery.     The week prior to surgery do not ot take any medications containing IBUPROFEN or NSAIDS ( Advil, Motrin, Goodys, BC, Aleve, Naproxen etc)  If you are not sure if you should take a medicine please call your surgeon's office.  Ok to take Tylenol    Do Not Wear any make-up (especially eye make-up) to surgery. Please remove any false eyelashes or eyelash extensions. If you arrive the day of surgery with makeup/eyelashes on you will be required to remove prior to surgery. (There is a risk of corneal abrasions if eye makeup/eyelash extensions are not removed)      Leave all valuables at home.   Do Not wear any jewelry or watches, including any metal in body piercings. Jewelry must be removed prior to coming to the hospital.  There is a possibility that rings that are unable to be removed may be cut off if they are on the surgical extremity.    Please remove all hair extensions, wigs, clips and any other metal accessories/ ornaments from your hair.  These items may pose a flammable/fire risk in Surgery and must be removed.    Do not shave your surgical area at least 5 days prior to your surgery. The surgical prep will be performed at the hospital according to Infection Control regulations.    Contact Lens must be removed before surgery. Either do not wear the contact lens or bring a case and solution for storage.  Please bring a container for eyeglasses or dentures as required.  Bring any paperwork your physician has provided, such as consent forms,  history and physicals, doctor's orders, etc.   Bring comfortable clothes that are loose fitting to wear upon discharge. Take into consideration the type of surgery being performed.  Maintain your diet as advised per your physician the day prior to surgery.      Adequate rest the night before surgery is advised.   Park in the Parking lot behind the hospital or in the [facility] across the street from the parking lot. Parking is complimentary.  If you will be discharged the same day as your procedure, please arrange for a responsible adult to drive you home or to accompany you if traveling by taxi.    YOU WILL NOT BE PERMITTED TO DRIVE OR TO LEAVE THE HOSPITAL ALONE AFTER SURGERY.   If you are being discharged the same day, it is strongly recommended that you arrange for someone to remain with you for the first 24 hrs following your surgery.    The Surgeon will speak to your family/visitor after your surgery regarding the outcome of your surgery and post op care.  The Surgeon may speak to you after your surgery, but there is a possibility you may not remember the details.  Please check with your family members regarding the conversation with the Surgeon.    We strongly recommend whoever is bringing you home be present for discharge instructions.  This will ensure a thorough understanding for your post op home care.    If the patient has fever, cough, or signs/symptoms of Flu or Covid please do not come in for your surgery. Contact your surgeon and your primary care physician for further instructions.           Thank you for your cooperation.  The Staff of [facility].            Bathing Instructions with Hibiclens    Shower the evening before and morning of your procedure with Chlorhexidine (Hibiclens)  do not use Chlorhexidine on your face or genitals. Do not get in your eyes.  Wash your face with water and your regular face wash/soap  Use your regular shampoo  Apply Chlorhexidine (Hibiclens) directly on your skin or on a wet washcloth and wash gently. When showering: Move away from the shower stream when applying Chlorhexidine (Hibiclens) to avoid rinsing off too soon.  Rinse thoroughly with warm water  Do not dilute Chlorhexidine (Hibiclens)   Dry off as usual, do not use any deodorant, powder, body lotions, perfume, after shave or cologne.

## 2024-08-15 NOTE — PRE ADMISSION SCREENING
EKG and pt/inr orders cancelled - verbal order from Dr. Gil per Yessenia.   EKG and pt/inr in chart from  [facility] admission 8/14/24.

## 2024-08-16 ENCOUNTER — HOSPITAL ENCOUNTER (OUTPATIENT)
Facility: OTHER | Age: 79
Discharge: HOME | End: 2024-08-16
Attending: INTERNAL MEDICINE | Admitting: INTERNAL MEDICINE
Payer: MEDICARE

## 2024-08-16 VITALS
DIASTOLIC BLOOD PRESSURE: 76 MMHG | TEMPERATURE: 97 F | RESPIRATION RATE: 16 BRPM | HEIGHT: 66 IN | HEART RATE: 72 BPM | WEIGHT: 250 LBS | SYSTOLIC BLOOD PRESSURE: 125 MMHG | BODY MASS INDEX: 40.18 KG/M2 | OXYGEN SATURATION: 96 %

## 2024-08-16 DIAGNOSIS — R60.9 EDEMA: ICD-10-CM

## 2024-08-16 DIAGNOSIS — M1A.09X0 IDIOPATHIC CHRONIC GOUT OF MULTIPLE SITES WITHOUT TOPHUS: ICD-10-CM

## 2024-08-16 DIAGNOSIS — I25.118 ATHSCL HEART DISEASE OF NATIVE COR ART W OTH ANG PCTRS: Primary | ICD-10-CM

## 2024-08-16 DIAGNOSIS — I20.0 INTERMEDIATE CORONARY SYNDROME: ICD-10-CM

## 2024-08-16 PROBLEM — I35.0 MODERATE AORTIC STENOSIS: Status: ACTIVE | Noted: 2024-08-16

## 2024-08-16 PROBLEM — R07.2 PRECORDIAL PAIN: Status: ACTIVE | Noted: 2024-08-16

## 2024-08-16 LAB
ALBUMIN SERPL BCP-MCNC: 3.4 G/DL (ref 3.5–5.2)
ALP SERPL-CCNC: 86 U/L (ref 55–135)
ALT SERPL W/O P-5'-P-CCNC: 44 U/L (ref 10–44)
ANION GAP SERPL CALC-SCNC: 9 MMOL/L (ref 8–16)
AST SERPL-CCNC: 24 U/L (ref 10–40)
BASOPHILS # BLD AUTO: 0.04 K/UL (ref 0–0.2)
BASOPHILS NFR BLD: 0.4 % (ref 0–1.9)
BILIRUB SERPL-MCNC: 0.8 MG/DL (ref 0.1–1)
BUN SERPL-MCNC: 27 MG/DL (ref 8–23)
CALCIUM SERPL-MCNC: 8.7 MG/DL (ref 8.7–10.5)
CATH EF QUANTITATIVE: 60 %
CHLORIDE SERPL-SCNC: 106 MMOL/L (ref 95–110)
CO2 SERPL-SCNC: 23 MMOL/L (ref 23–29)
CREAT SERPL-MCNC: 1.4 MG/DL (ref 0.5–1.4)
DIFFERENTIAL METHOD BLD: ABNORMAL
EOSINOPHIL # BLD AUTO: 0.1 K/UL (ref 0–0.5)
EOSINOPHIL NFR BLD: 1.1 % (ref 0–8)
ERYTHROCYTE [DISTWIDTH] IN BLOOD BY AUTOMATED COUNT: 15.8 % (ref 11.5–14.5)
EST. GFR  (NO RACE VARIABLE): 51 ML/MIN/1.73 M^2
GLUCOSE SERPL-MCNC: 104 MG/DL (ref 70–110)
HCT VFR BLD AUTO: 53.8 % (ref 40–54)
HGB BLD-MCNC: 18 G/DL (ref 14–18)
IMM GRANULOCYTES # BLD AUTO: 0.08 K/UL (ref 0–0.04)
IMM GRANULOCYTES NFR BLD AUTO: 0.7 % (ref 0–0.5)
LYMPHOCYTES # BLD AUTO: 1.4 K/UL (ref 1–4.8)
LYMPHOCYTES NFR BLD: 12.7 % (ref 18–48)
MCH RBC QN AUTO: 31 PG (ref 27–31)
MCHC RBC AUTO-ENTMCNC: 33.5 G/DL (ref 32–36)
MCV RBC AUTO: 93 FL (ref 82–98)
MONOCYTES # BLD AUTO: 0.8 K/UL (ref 0.3–1)
MONOCYTES NFR BLD: 7 % (ref 4–15)
NEUTROPHILS # BLD AUTO: 8.6 K/UL (ref 1.8–7.7)
NEUTROPHILS NFR BLD: 78.1 % (ref 38–73)
NRBC BLD-RTO: 0 /100 WBC
PLATELET # BLD AUTO: 124 K/UL (ref 150–450)
PMV BLD AUTO: 9.8 FL (ref 9.2–12.9)
POCT GLUCOSE: 107 MG/DL (ref 70–110)
POTASSIUM SERPL-SCNC: 4.6 MMOL/L (ref 3.5–5.1)
PROT SERPL-MCNC: 6.3 G/DL (ref 6–8.4)
RBC # BLD AUTO: 5.8 M/UL (ref 4.6–6.2)
SODIUM SERPL-SCNC: 138 MMOL/L (ref 136–145)
WBC # BLD AUTO: 10.98 K/UL (ref 3.9–12.7)

## 2024-08-16 PROCEDURE — 36415 COLL VENOUS BLD VENIPUNCTURE: CPT | Performed by: INTERNAL MEDICINE

## 2024-08-16 PROCEDURE — 93799 UNLISTED CV SVC/PROCEDURE: CPT | Performed by: INTERNAL MEDICINE

## 2024-08-16 PROCEDURE — C1751 CATH, INF, PER/CENT/MIDLINE: HCPCS | Performed by: INTERNAL MEDICINE

## 2024-08-16 PROCEDURE — 99152 MOD SED SAME PHYS/QHP 5/>YRS: CPT | Performed by: INTERNAL MEDICINE

## 2024-08-16 PROCEDURE — C1894 INTRO/SHEATH, NON-LASER: HCPCS | Performed by: INTERNAL MEDICINE

## 2024-08-16 PROCEDURE — 93461 R&L HRT ART/VENTRICLE ANGIO: CPT | Performed by: INTERNAL MEDICINE

## 2024-08-16 PROCEDURE — 63600175 PHARM REV CODE 636 W HCPCS: Performed by: INTERNAL MEDICINE

## 2024-08-16 PROCEDURE — 80053 COMPREHEN METABOLIC PANEL: CPT | Performed by: INTERNAL MEDICINE

## 2024-08-16 PROCEDURE — 25000003 PHARM REV CODE 250: Performed by: INTERNAL MEDICINE

## 2024-08-16 PROCEDURE — 25500020 PHARM REV CODE 255: Performed by: INTERNAL MEDICINE

## 2024-08-16 PROCEDURE — 99153 MOD SED SAME PHYS/QHP EA: CPT | Performed by: INTERNAL MEDICINE

## 2024-08-16 PROCEDURE — 93567 NJX CAR CTH SPRVLV AORTGRPHY: CPT | Performed by: INTERNAL MEDICINE

## 2024-08-16 PROCEDURE — C1769 GUIDE WIRE: HCPCS | Performed by: INTERNAL MEDICINE

## 2024-08-16 PROCEDURE — 85025 COMPLETE CBC W/AUTO DIFF WBC: CPT | Performed by: INTERNAL MEDICINE

## 2024-08-16 PROCEDURE — C1760 CLOSURE DEV, VASC: HCPCS | Performed by: INTERNAL MEDICINE

## 2024-08-16 RX ORDER — DIPHENHYDRAMINE HYDROCHLORIDE 50 MG/ML
25 INJECTION INTRAMUSCULAR; INTRAVENOUS EVERY 6 HOURS PRN
Status: DISCONTINUED | OUTPATIENT
Start: 2024-08-16 | End: 2024-08-16 | Stop reason: HOSPADM

## 2024-08-16 RX ORDER — ALLOPURINOL 300 MG/1
300 TABLET ORAL DAILY
Qty: 30 TABLET | Refills: 6 | Status: SHIPPED | OUTPATIENT
Start: 2024-08-16 | End: 2024-08-19

## 2024-08-16 RX ORDER — HEPARIN SOD,PORCINE/0.9 % NACL 1000/500ML
INTRAVENOUS SOLUTION INTRAVENOUS
Status: DISCONTINUED | OUTPATIENT
Start: 2024-08-16 | End: 2024-08-16 | Stop reason: HOSPADM

## 2024-08-16 RX ORDER — NAPROXEN SODIUM 220 MG/1
162 TABLET, FILM COATED ORAL ONCE AS NEEDED
Status: DISCONTINUED | OUTPATIENT
Start: 2024-08-16 | End: 2024-08-16 | Stop reason: HOSPADM

## 2024-08-16 RX ORDER — HYDROCODONE BITARTRATE AND ACETAMINOPHEN 5; 325 MG/1; MG/1
1 TABLET ORAL EVERY 4 HOURS PRN
Status: DISCONTINUED | OUTPATIENT
Start: 2024-08-16 | End: 2024-08-16 | Stop reason: HOSPADM

## 2024-08-16 RX ORDER — DIAZEPAM 5 MG/1
5 TABLET ORAL
Status: COMPLETED | OUTPATIENT
Start: 2024-08-16 | End: 2024-08-16

## 2024-08-16 RX ORDER — FENTANYL CITRATE 50 UG/ML
INJECTION, SOLUTION INTRAMUSCULAR; INTRAVENOUS
Status: DISCONTINUED | OUTPATIENT
Start: 2024-08-16 | End: 2024-08-16 | Stop reason: HOSPADM

## 2024-08-16 RX ORDER — SODIUM CHLORIDE 9 MG/ML
INJECTION, SOLUTION INTRAVENOUS CONTINUOUS
Status: ACTIVE | OUTPATIENT
Start: 2024-08-16 | End: 2024-08-16

## 2024-08-16 RX ORDER — ONDANSETRON 8 MG/1
8 TABLET, ORALLY DISINTEGRATING ORAL EVERY 8 HOURS PRN
Status: DISCONTINUED | OUTPATIENT
Start: 2024-08-16 | End: 2024-08-16 | Stop reason: HOSPADM

## 2024-08-16 RX ORDER — DIPHENHYDRAMINE HCL 25 MG
25 CAPSULE ORAL
Status: COMPLETED | OUTPATIENT
Start: 2024-08-16 | End: 2024-08-16

## 2024-08-16 RX ORDER — NITROGLYCERIN 0.4 MG/1
0.4 TABLET SUBLINGUAL EVERY 5 MIN PRN
Status: DISCONTINUED | OUTPATIENT
Start: 2024-08-16 | End: 2024-08-16 | Stop reason: HOSPADM

## 2024-08-16 RX ORDER — NAPROXEN SODIUM 220 MG/1
81 TABLET, FILM COATED ORAL ONCE AS NEEDED
Status: DISCONTINUED | OUTPATIENT
Start: 2024-08-16 | End: 2024-08-16 | Stop reason: HOSPADM

## 2024-08-16 RX ORDER — ACETAMINOPHEN 325 MG/1
650 TABLET ORAL EVERY 4 HOURS PRN
Status: DISCONTINUED | OUTPATIENT
Start: 2024-08-16 | End: 2024-08-16 | Stop reason: HOSPADM

## 2024-08-16 RX ORDER — HYDROCODONE BITARTRATE AND ACETAMINOPHEN 10; 325 MG/1; MG/1
1 TABLET ORAL EVERY 4 HOURS PRN
Status: DISCONTINUED | OUTPATIENT
Start: 2024-08-16 | End: 2024-08-16 | Stop reason: HOSPADM

## 2024-08-16 RX ORDER — LIDOCAINE HYDROCHLORIDE 10 MG/ML
INJECTION, SOLUTION EPIDURAL; INFILTRATION; INTRACAUDAL; PERINEURAL
Status: DISCONTINUED | OUTPATIENT
Start: 2024-08-16 | End: 2024-08-16 | Stop reason: HOSPADM

## 2024-08-16 RX ORDER — SODIUM CHLORIDE 9 MG/ML
INJECTION, SOLUTION INTRAVENOUS CONTINUOUS
Status: DISCONTINUED | OUTPATIENT
Start: 2024-08-16 | End: 2024-08-16 | Stop reason: HOSPADM

## 2024-08-16 RX ORDER — ALUMINUM HYDROXIDE, MAGNESIUM HYDROXIDE, AND SIMETHICONE 1200; 120; 1200 MG/30ML; MG/30ML; MG/30ML
30 SUSPENSION ORAL
Status: DISCONTINUED | OUTPATIENT
Start: 2024-08-16 | End: 2024-08-16 | Stop reason: HOSPADM

## 2024-08-16 RX ORDER — MIDAZOLAM HYDROCHLORIDE 1 MG/ML
INJECTION INTRAMUSCULAR; INTRAVENOUS
Status: DISCONTINUED | OUTPATIENT
Start: 2024-08-16 | End: 2024-08-16 | Stop reason: HOSPADM

## 2024-08-16 RX ADMIN — DIPHENHYDRAMINE HYDROCHLORIDE 25 MG: 25 CAPSULE ORAL at 06:08

## 2024-08-16 RX ADMIN — DIAZEPAM 5 MG: 5 TABLET ORAL at 06:08

## 2024-08-16 RX ADMIN — SODIUM CHLORIDE: 9 INJECTION, SOLUTION INTRAVENOUS at 06:08

## 2024-08-16 NOTE — Clinical Note
The catheter was repositioned into the ostial SVG graft. An angiography was performed of the graft. SVG TO OM1

## 2024-08-16 NOTE — Clinical Note
The skin was dry, was intact, was without bleeding and was without hematoma. RCFA & RCFV  manual pressure  hold

## 2024-08-16 NOTE — Clinical Note
The catheter was repositioned into the pulmonary artery. Hemodynamics were performed.  Cardiac output was obtained at 4 L/min.

## 2024-08-16 NOTE — PLAN OF CARE
Patient prefers to have Justin present for discharge teaching. Patient arrived alone this morning, but Justin was called and confirmed that he would be picking up.

## 2024-08-16 NOTE — Clinical Note
The catheter was inserted into the ostial LIMA graft. An angiography was performed of the graft. LIMA TO LAD

## 2024-08-16 NOTE — DISCHARGE INSTRUCTIONS
Discharge Instructions and Care of Your Groin      Catheter Insertion Site  The morning after your procedure, you may take the dressing off. The easiest way to do this is when you are showering, get the tape and dressing wet and remove it.  After the bandage is removed, cover the area with a small adhesive bandage. It is normal for the catheter insertion site to be black and blue for a couple of days. The site may also be slightly swollen and pink, and there may be a small lump (about the size of a quarter) at the site.  Wash the catheter insertion site at least once daily with soap and water. Place soapy water on your hand or washcloth and gently wash the insertion site; do not rub.  Keep the area clean and dry when you are not showering.  Do not use creams, lotions or ointment on the wound site.  Wear loose clothes and loose underwear.  Do not take a bath, tub soak, go in a Jacuzzi, or swim in a pool or lake for one week after the procedure.    Activity Instructions  Do not strain during bowel movements for the first 3 to 4 days after the procedure to prevent bleeding from the catheter insertion site.  Avoid heavy lifting (more than 10 pounds) and pushing or pulling heavy objects for the first 5 to 7 days after the procedure.  Do not participate in strenuous activities for 5 days after the procedure. This includes most sports - jogging, golfing, play tennis, and bowling.  You may climb stairs if needed, but walk up and down the stairs more slowly than usual.  Gradually increase your activities until you reach your normal activity level within one week after the procedure.      If bleeding should occur following discharge:  Sit down and apply firm pressure to the puncture site with your fingers for 10 minutes   If the bleeding stops, continue to sit quietly, keeping your leg straight for 2 hours. Notify your physician as soon as possible   If bleeding does not stop after 10 minutes or if there is a large amount of  bleeding or spurting, call 911 immediately.  Do not drive yourself to the hospital.     Notify your physician if these symptoms persist or if you experience:  Change in color or temperature of the leg  Redness, heat, or pus at the puncture site  Chills or fever greater than 101.0 FRecovery After Procedural Sedation (Adult)   You have been given medicine by vein to make you sleep during your surgery. This may have included both a pain medicine and sleeping medicine. Most of the effects have worn off. But you may still have some drowsiness for the next 6 to 8 hours.    Sedation Home care  Follow these guidelines when you get home:  For the next 8 hours, you should be watched by a responsible adult. This person should make sure your condition is not getting worse.  Don't drink any alcohol for the next 24 hours.  Don't drive, operate dangerous machinery, or make important business or personal decisions during the next 24 hours.  To prevent injury or falls, use caution when standing and walking for at least 24 hours after your procedure.  Note: Your healthcare provider may tell you not to take any medicine by mouth for pain or sleep in the next 4 hours. These medicines may react with the medicines you were given in the hospital. This could cause a much stronger response than usual.  Follow-up care  Follow up with your healthcare provider if you are not alert and back to your usual level of activity within 12 hours.  When to seek medical advice  Call your healthcare provider right away if any of these occur:  Drowsiness gets worse  Weakness or dizziness gets worse  Repeated vomiting  You can't be awakened  Fever  New rash  [employer] last reviewed this educational content on 9/1/2019 © 2000-2020 [employer]. [address] [place] [address], [place] [address]. All rights reserved. This information is not intended as a substitute for professional medical care. Always follow your healthcare professional's instructions.

## 2024-08-16 NOTE — DISCHARGE SUMMARY
[facility] - Cath Lab  Cardiology  Discharge Summary      Patient Name: Bo Chase    Admission Date: 8/16/2024    Discharge Date and Time: 8/16/24      Final Diagnoses: Aortic stenosis, CAD   Principal Problem: Precordial pain    HPI: Mr Chase is a 79-year-old gentleman presented to the office with complaints of exertional chest pain for the last few weeks.  Four vessel aortocoronary bypass surgery in 1996.  Previous placement of a drug-eluting stent in the vein graft to the obtuse marginal branch.  In 2022 he had a patent LIMA to the left anterior descending, and a patent vein graft to the obtuse marginal, with the proximal 50% stenosis.  He has moderately severe aortic stenosis by echocardiography.    Impression on Admission:  Angina pectoris, coronary artery disease, aortic stenosis    Hospital Course:  At heart catheterization the right heart pressures were normal.  The left main and the proximal right coronary artery were occluded.  The LIMA to the left anterior descending coronary artery was patent.  The vein graft to the obtuse marginal branch had an ostial 40-50% stenosis, the stented segment was patent.  The trans valvular aortic valve gradient was a mean of 42 mmHg, the estimated aortic valve sq area was 0.8 sq cm.  Plan increase Ozempic, urged weight reduction.  Will refer to structural heart for eventual TAVR.    Disposition:  Discharged to home    Follow up/Patient Instructions:    Medications:     Medication List        CHANGE how you take these medications      allopurinoL 300 MG tablet  Commonly known as: ZYLOPRIM  Take 1 tablet (300 mg total) by mouth once daily.  What changed:   how much to take  when to take this            CONTINUE taking these medications      ascorbic acid (vitamin C) 500 MG tablet  Commonly known as: VITAMIN C     aspirin 325 MG tablet     cetirizine 10 MG tablet  Commonly known as: ZYRTEC     DULoxetine 30 MG capsule  Commonly known as: CYMBALTA     dutasteride 0.5 mg  capsule  Commonly known as: AVODART     ENTRESTO 24-26 mg per tablet  Generic drug: sacubitriL-valsartan     fluticasone propionate 50 mcg/actuation nasal spray  Commonly known as: FLONASE     ipratropium 42 mcg (0.06 %) nasal spray  Commonly known as: ATROVENT     isosorbide mononitrate 60 MG 24 hr tablet  Commonly known as: IMDUR  Take 1 tablet (60 mg total) by mouth once daily.     lancets Misc     levothyroxine 100 MCG tablet  Commonly known as: SYNTHROID  TAKE 1 TABLET EVERY DAY BEFORE BREAKFAST     metoprolol tartrate 25 MG tablet  Commonly known as: LOPRESSOR  Take 1 tablet (25 mg total) by mouth 2 (two) times daily.     multivitamin per tablet  Commonly known as: THERAGRAN     nitroGLYCERIN 0.4 MG SL tablet  Commonly known as: NITROSTAT  Place 1 tablet (0.4 mg total) under the tongue every 5 (five) minutes as needed for Chest pain.     OZEMPIC 0.25 mg or 0.5 mg (2 mg/3 mL) pen injector  Generic drug: semaglutide     spironolactone 25 MG tablet  Commonly known as: ALDACTONE  Take 1 tablet (25 mg total) by mouth once daily.     tamsulosin 0.4 mg Cap  Commonly known as: FLOMAX     torsemide 10 MG Tab  Commonly known as: DEMADEX     TRUE METRIX GLUCOSE TEST STRIP Strp  Generic drug: blood sugar diagnostic     TRUE METRIX GO GLUCOSE METER Misc  Generic drug: blood-glucose meter            ASK your doctor about these medications      docusate sodium 100 MG capsule  Commonly known as: COLACE               Where to Get Your Medications        These medications were sent to [facility]  [address] [id number]      Hours: Mon-Fri, 8a-5:30p Phone: [phone]   allopurinoL 300 MG tablet         Discharge Procedure Orders   Diet general     Other restrictions (specify):   Scheduling Instructions: Avoid strenuous activity.     Call MD for:  temperature >100.4     Call MD for:  severe uncontrolled pain     Call MD for:  difficulty breathing, headache or visual disturbances     Call  MD for:  redness, tenderness, or signs of infection (pain, swelling, redness, odor or green/yellow discharge around incision site)     Call MD for:   Order Comments: Bleeding and oozing at site.     Shower on day dressing removed (No bath)   Scheduling Instructions: Remove dressing in am.         Condition upon Discharge:  Stable          Carl Gil MD

## 2024-08-19 DIAGNOSIS — M1A.09X0 IDIOPATHIC CHRONIC GOUT OF MULTIPLE SITES WITHOUT TOPHUS: ICD-10-CM

## 2024-08-19 RX ORDER — ALLOPURINOL 300 MG/1
450 TABLET ORAL
Qty: 135 TABLET | Refills: 3 | Status: SHIPPED | OUTPATIENT
Start: 2024-08-19

## 2024-08-20 ENCOUNTER — OFFICE VISIT (OUTPATIENT)
Dept: PULMONOLOGY | Facility: CLINIC | Age: 79
End: 2024-08-20
Payer: MEDICARE

## 2024-08-20 VITALS
DIASTOLIC BLOOD PRESSURE: 62 MMHG | OXYGEN SATURATION: 98 % | RESPIRATION RATE: 20 BRPM | BODY MASS INDEX: 42.84 KG/M2 | SYSTOLIC BLOOD PRESSURE: 112 MMHG | WEIGHT: 266.56 LBS | HEART RATE: 70 BPM | HEIGHT: 66 IN

## 2024-08-20 DIAGNOSIS — Z71.89 CPAP USE COUNSELING: ICD-10-CM

## 2024-08-20 DIAGNOSIS — G47.33 OSA ON CPAP: Primary | ICD-10-CM

## 2024-08-20 DIAGNOSIS — G47.33 SEVERE OBSTRUCTIVE SLEEP APNEA: ICD-10-CM

## 2024-08-20 DIAGNOSIS — Z23 NEED FOR VACCINATION AGAINST STREPTOCOCCUS PNEUMONIAE: ICD-10-CM

## 2024-08-20 PROCEDURE — 1160F RVW MEDS BY RX/DR IN RCRD: CPT | Mod: CPTII,S$GLB,, | Performed by: INTERNAL MEDICINE

## 2024-08-20 PROCEDURE — 3074F SYST BP LT 130 MM HG: CPT | Mod: CPTII,S$GLB,, | Performed by: INTERNAL MEDICINE

## 2024-08-20 PROCEDURE — 99999 PR PBB SHADOW E&M-EST. PATIENT-LVL V: CPT | Mod: PBBFAC,,, | Performed by: INTERNAL MEDICINE

## 2024-08-20 PROCEDURE — 3288F FALL RISK ASSESSMENT DOCD: CPT | Mod: CPTII,S$GLB,, | Performed by: INTERNAL MEDICINE

## 2024-08-20 PROCEDURE — 99204 OFFICE O/P NEW MOD 45 MIN: CPT | Mod: S$GLB,,, | Performed by: INTERNAL MEDICINE

## 2024-08-20 PROCEDURE — 1101F PT FALLS ASSESS-DOCD LE1/YR: CPT | Mod: CPTII,S$GLB,, | Performed by: INTERNAL MEDICINE

## 2024-08-20 PROCEDURE — 1159F MED LIST DOCD IN RCRD: CPT | Mod: CPTII,S$GLB,, | Performed by: INTERNAL MEDICINE

## 2024-08-20 PROCEDURE — G0009 ADMIN PNEUMOCOCCAL VACCINE: HCPCS | Mod: S$GLB,,, | Performed by: INTERNAL MEDICINE

## 2024-08-20 PROCEDURE — 3078F DIAST BP <80 MM HG: CPT | Mod: CPTII,S$GLB,, | Performed by: INTERNAL MEDICINE

## 2024-08-20 PROCEDURE — 90677 PCV20 VACCINE IM: CPT | Mod: S$GLB,,, | Performed by: INTERNAL MEDICINE

## 2024-08-20 RX ORDER — ATORVASTATIN CALCIUM 40 MG/1
40 TABLET, FILM COATED ORAL
COMMUNITY
Start: 2024-06-21

## 2024-08-20 NOTE — PATIENT INSTRUCTIONS
No eating / drinking for 3 hours before going to bed.  Elevate head of bed 30 - 45 %     CPAP HABITUATION PROCEDURE     Jim Paredes, Ph.D., [facility] and Wei Garcia M.D.  [facility], [facility]     Some people have difficulty adjusting to CPAP/BiPAP/AutoCPAP.  This is not unusual or hard to understand: Breathing with CPAP is different from ordinary breathing, and this difference is aversive to some. The problem can be overcome, however, and the benefits CPAP confers are certainly worth the effort.  Below, you will find a simple and gradual way to get used to CPAP before you try to use it all night, every night.  The essence of this procedure is to relax and let breathing with CPAP become a habit.  It may take about 2 weeks, and involves the following:      CPAP while awake and comfortably seated, during the late evening.     CPAP in bed while attempting sleep at night.     If your discomfort is too great at any time, discontinue and attempt again later the same night, for the same amount of time.   You and your physician may alter the times and pressures if necessary.     If you find that it is very easy to get used to CPAP, you may start using it every night when you are comfortable enough to do so.  IMPORTANT REMINDER: If you have a cold or sinus congestion it is okay to miss a night or two of CPAP. Consider using antihistamines or decongestants to clear up your sinus congestion prior to sleeping.     DAYS  1-3   Start CPAP while awake and comfortably seated during the late evening, after having prepared for bed.  You may do this while watching television, listening to music or reading. Use for 1 hour, then take off CPAP and go directly to bed to sleep     DAYS  4-6     Start CPAP when you go to bed and use for 1 hour, or until you fall asleep.  If your discomfort is too great at any time, discontinue and attempt again later the same night, for the same designated  amount of time (1 hour).      DAYS  7-9     Increase time with CPAP to 2 hours a night.  If your discomfort is too great at any time, discontinue and attempt again later the same night, for the same designated amount of time (2 hours).      DAYS 10-12    Increase time with CPAP to 3 hours a night. If your discomfort is too great at any time, discontinue and attempt again later the same night, for the same designated amount of time (3 hours).      DAYS 13-15     Sleep the entire night with CPAP.      OPTIONAL: You may use Progressive Muscle Relaxation (PMR) to help put you at ease when using CPAP; do PMR twice each day, once in the morning or afternoon, and once in the evening just before using CPAP. You may do PMR prior to any attempt until you are comfortable with CPAP.        Continuous Positive Air Pressure (CPAP)  Continuous positive air pressure (CPAP) uses gentle air pressure to hold the airway open. CPAP is often the most effective treatment for sleep apnea and severe snoring. It works very well for many people. But keep in mind that it can take several adjustments before the setup is right for you.       How CPAP Works  CPAP is a small portable pump beside the bed. The pump sends air through a hose, which is held over your nose and/or mouth by a mask. Mild air pressure is gently pushed through your airway. The air pressure nudges sagging tissues aside. This widens the airway so you can breathe better. CPAP may be combined with other kinds of therapy for sleep apnea.       Types of Air Pressure Treatments  There are different types of CPAP. Your doctor or CPAP technician will help you decide which type is best for you:  Basic CPAP keeps the pressure constant all night long.  A bilevel device (BiPAP) provides more pressure when you breathe in and less when you breathe out. A BiPAP machine also may be set to provide automatic breaths to maintain breathing if you stop breathing while sleeping.  An autoCPAP  device automatically adjusts pressure throughout the night and in response to changes such as body position, sleep stage, and snoring.  © 8840-1170 [employer]. [address] [place] [address]. All rights reserved. This information is not intended as a substitute for professional medical care. Always follow your healthcare professional's instructions.        Snoring and Sleep Apnea: Notes for a Partner  Snoring and sleep apnea affect your life, as well as your partners. You can help in the treatment of the problem. Be supportive. Encourage your partner both to get treatment and to make the adjustments needed to follow through.       Adjusting to Changes  Your partners treatment may involve making changes to certain life habits. You can help your partner make and stick with these changes. For example:  Support and even join your partners exercise program.  Be supportive if your partner gets CPAP (continuous positive airway pressure). He or she may feel self-conscious at first. Remind your partner to expect adjustments to CPAP before it feels just right.  Consider joining a snoring and sleep apnea support group.  Go Along to See the Health Care Provider  You can give the health care provider the best account of your partners nighttime breathing and snoring patterns. Try to go along to health care providers appointments. If you cant go, write notes for your partner to give to the health care provider. Describe your partners snoring and sleep breathing patterns in detail.  Tips for Sleeping with a Snorer  Until treatment takes care of your partners snoring:  Try to go to bed first. It may help if youre already asleep when your partner starts to snore.  Wear earplugs to bed. A fan or other source of background noise may also help drown out snoring.   © 2197-9668 [employer]. [address] [place] [address]. All rights reserved. This information is not intended as a  substitute for professional medical care. Always follow your healthcare professional's instructions.        Continuous Positive Airway Pressure (CPAP)  Your health care provider has prescribed continuous positive airway pressure (CPAP) therapy for you. A CPAP device helps you breathe better at night. The device delivers air through your nose or mouth when you breathe in to keep your air passages open. CPAP is:  Used most often to treat sleep apnea and some other problems (Sleep apnea is a chronic condition with periods of sleep in which you briefly stop breathing.)  Safe and very effective, but it takes time to get used to the mask.   Your health care provider, nurse, or medical supplier will give you tips for wearing and caring for your CPAP device.  General guidelines  It's very important not to give up! It takes time to get used to wearing the mask at night.  Practice using your CPAP device during the day, especially whenever you take a nap.  Remember, there are several different types of masks. If you cant get used to your mask, ask your provider or medical supply company about trying another style.  If you have nasal stuffiness or dryness when using your CPAP device, talk with your provider or medical supply company. There are ways to lessen these problems. For example, your provider may recommend moistening nasal spray or the medical supply company may recommend a device with a humidifier.  The goal is to use your CPAP all night, every night, during all naps, and even when you travel.  Keep your mask clean. Wash it with soap and water. Be sure to rinse the mask and tubing well with water to remove any soap. Let them air-dry thoroughly before using.  Make yourself comfortable when sleeping with CPAP. Try using extra pillows.  Work with your medical supply company so that you know how to correctly use your CPAP. Their representative will be able to help you:  Use the CPAP correctly  Troubleshoot any problems  that come up  Learn to clean and maintain the device  Adjust to regular use of the CPAP  © 2812-0704 [employer]. [address] [place] [address], [place] [address]. All rights reserved. This information is not intended as a substitute for professional medical care. Always follow your healthcare professional's instructions.

## 2024-08-20 NOTE — PROGRESS NOTES
Initial Outpatient Pulmonary Evaluation       SUBJECTIVE:     Chief Complaint   Patient presents with    Sleep Apnea       History of Present Illness:    Patient is a 79 y.o. male is here to establish care for obstructive sleep apnea.      He is currently on auto PAP 14-20 cm water.  Has a Bennett machine.  Has a nasal mask.      [place] Common Sleepiness Scale score 5.      Using and benefitting from CPAP therapy.      Inquiring about alternative therapy options due to wife concern about the noise of the CPAP.    Review of Systems   Respiratory:  Positive for apnea and snoring.        Review of patient's allergies indicates:   Allergen Reactions    Cat/feline products Itching    Ciprofloxacin Other (See Comments)     Foggy, drowsy  Other reaction(s): Other (See Comments), Other (See Comments)    Latex      Other reaction(s): Swelling    Sulfa (sulfonamide antibiotics)      Other reaction(s): Flushing (skin)       Current Outpatient Medications   Medication Sig Dispense Refill    allopurinoL (ZYLOPRIM) 300 MG tablet TAKE 1 AND 1/2 TABLETS BY MOUTH  ONCE DAILY 135 tablet 3    ascorbic acid, vitamin C, (VITAMIN C) 500 MG tablet Take 500 mg by mouth once daily.      aspirin 325 MG tablet Take 325 mg by mouth once daily.      atorvastatin (LIPITOR) 40 MG tablet Take 40 mg by mouth.      blood sugar diagnostic (TRUE METRIX GLUCOSE TEST STRIP) Strp 1 strip.      cetirizine (ZYRTEC) 10 MG tablet Take 1 tablet by mouth once daily.      docusate sodium (COLACE) 100 MG capsule Take 100 mg by mouth.      DULoxetine (CYMBALTA) 30 MG capsule Take 30 mg by mouth once daily.      dutasteride (AVODART) 0.5 mg capsule Take by mouth 3 (three) times a week.      ENTRESTO 24-26 mg per tablet Take 1 tablet by mouth 2 (two) times daily.      fluticasone propionate (FLONASE) 50 mcg/actuation nasal spray daily as needed.      ipratropium (ATROVENT) 42 mcg (0.06 %) nasal spray 2 sprays by Nasal route.       isosorbide mononitrate (IMDUR) 60 MG 24 hr tablet Take 1 tablet (60 mg total) by mouth once daily. 30 tablet 11    lancets Misc Use daily      levothyroxine (SYNTHROID) 100 MCG tablet TAKE 1 TABLET EVERY DAY BEFORE BREAKFAST 90 tablet 2    metoprolol tartrate (LOPRESSOR) 25 MG tablet Take 1 tablet (25 mg total) by mouth 2 (two) times daily. 180 tablet 3    multivitamin (THERAGRAN) per tablet Take 1 tablet by mouth once daily.      nitroGLYCERIN (NITROSTAT) 0.4 MG SL tablet Place 1 tablet (0.4 mg total) under the tongue every 5 (five) minutes as needed for Chest pain. 25 tablet 6    OZEMPIC 0.25 mg or 0.5 mg (2 mg/3 mL) pen injector Inject 0.5 mg into the skin every 7 days.      spironolactone (ALDACTONE) 25 MG tablet Take 1 tablet (25 mg total) by mouth once daily. 90 tablet 3    tamsulosin (FLOMAX) 0.4 mg Cap Take 1 capsule by mouth once daily.      torsemide (DEMADEX) 10 MG Tab Take 10 mg by mouth once daily. 1/2 tablet QD      TRUE METRIX GO GLUCOSE METER Misc USE TO CHECK GLUCOSE ONCE DAILY AS DIRECTED       Current Facility-Administered Medications   Medication Dose Route Frequency Provider Last Rate Last Admin    (VFC) PCV20 (Prevnar 20) IM vaccine (>/= 6 wks)  0.5 mL Intramuscular 1 time in Clinic/HOD            Past Medical History:   Diagnosis Date    Acute coronary syndrome     CHF (congestive heart failure)     Chronic kidney disease     Coronary artery disease     Diabetes mellitus     Heart murmur     Hyperlipidemia     Hypertension     Lumbar spondylosis     Sleep apnea      Past Surgical History:   Procedure Laterality Date    back surgary      CARDIAC CATHETERIZATION  03/06/2012    CATHETERIZATION OF BOTH LEFT AND RIGHT HEART N/A 07/11/2022    Procedure: CATHETERIZATION, HEART, BOTH LEFT AND RIGHT;  Surgeon: Geovany Joel MD;  Location: [facility] CATH LAB;  Service: Cardiology;  Laterality: N/A;    CORONARY ANGIOPLASTY      CORONARY ARTERY BYPASS GRAFT  1996    x4    CORONARY BYPASS GRAFT ANGIOGRAPHY  " 05/09/2022    Procedure: Bypass graft study;  Surgeon: Cornelius Browne MD;  Location: [facility] CATH LAB;  Service: Cardiology;;    CORONARY BYPASS GRAFT ANGIOGRAPHY  07/11/2022    Procedure: Bypass graft study;  Surgeon: Geovany Joel MD;  Location: [facility] CATH LAB;  Service: Cardiology;;    LEFT HEART CATHETERIZATION Left 05/09/2022    Procedure: CATHETERIZATION, HEART, LEFT;  Surgeon: Cornelius Browne MD;  Location: [facility] CATH LAB;  Service: Cardiology;  Laterality: Left;    RIGHT HEART CATHETERIZATION Right 07/11/2022    Procedure: INSERTION, CATHETER, RIGHT HEART;  Surgeon: Geovany Joel MD;  Location: [facility] CATH LAB;  Service: Cardiology;  Laterality: Right;    SKIN CANCER EXCISION      throid lobectomy  04/2012    TOTAL HIP ARTHROPLASTY Bilateral     umbilicial hernia       Family History   Problem Relation Name Age of Onset    Heart disease Father       Social History     Tobacco Use    Smoking status: Never    Smokeless tobacco: Never   Substance Use Topics    Alcohol use: Yes     Comment: 6 BEERS A YEAR     Drug use: No          OBJECTIVE:     Vital Signs (Most Recent)  Vital Signs  Pulse: 70  Resp: 20  SpO2: 98 %  BP: 112/62  Height and Weight  Height: 5' 6" (167.6 cm)  Weight: 120.9 kg (266 lb 8.6 oz)  BSA (Calculated - sq m): 2.37 sq meters  BMI (Calculated): 43  Weight in (lb) to have BMI = 25: 154.6]  Wt Readings from Last 2 Encounters:   08/20/24 120.9 kg (266 lb 8.6 oz)   08/16/24 113.4 kg (250 lb)         Physical Exam:  Physical Exam   Constitutional: He is oriented to person, place, and time. He appears well-developed and well-nourished.   Pulmonary/Chest: Normal expansion and effort normal.   Neurological: He is alert and oriented to person, place, and time.       Laboratory  Lab Results   Component Value Date    WBC 10.98 08/16/2024    RBC 5.80 08/16/2024    HGB 18.0 08/16/2024    HCT 53.8 08/16/2024    MCV 93 08/16/2024    MCH 31.0 08/16/2024    MCHC 33.5 08/16/2024    RDW 15.8 (H) " "08/16/2024     (L) 08/16/2024    MPV 9.8 08/16/2024    GRAN 8.6 (H) 08/16/2024    GRAN 78.1 (H) 08/16/2024    LYMPH 1.4 08/16/2024    LYMPH 12.7 (L) 08/16/2024    MONO 0.8 08/16/2024    MONO 7.0 08/16/2024    EOS 0.1 08/16/2024    BASO 0.04 08/16/2024    EOSINOPHIL 1.1 08/16/2024    BASOPHIL 0.4 08/16/2024       BMP  Lab Results   Component Value Date     08/16/2024    K 4.6 08/16/2024     08/16/2024    CO2 23 08/16/2024    BUN 27 (H) 08/16/2024    CREATININE 1.4 08/16/2024    CALCIUM 8.7 08/16/2024    ANIONGAP 9 08/16/2024    ESTGFRAFRICA 47.7 (A) 06/28/2022    EGFRNONAA 41.2 (A) 06/28/2022    AST 24 08/16/2024    ALT 44 08/16/2024    PROT 6.3 08/16/2024       Lab Results   Component Value Date    BNP 86 01/09/2023    BNP 92 03/30/2021    BNP 42 11/08/2018    BNP 90 08/26/2014       Lab Results   Component Value Date    TSH 1.666 03/08/2023       Lab Results   Component Value Date    SEDRATE 1 11/17/2016       Lab Results   Component Value Date    CRP 0.6 11/17/2016       No results found for: "IGE"    No results found for: "ASPERGILLUS"  No results found for: "AFUMIGATUSCL"     No results found for: "ACE"    Diagnostic Results:  I have personally reviewed today the following studies :    Diagnosotic PSG 12/18/2012 split night PSG showed the presence of severe LEANDRO, with an AHI of 62.6 event/hour, a SaO2 hipolito of 85%     ASSESSMENT/PLAN:     LEANDRO on CPAP  -     CPAP/BIPAP SUPPLIES    CPAP use counseling  -     CPAP/BIPAP SUPPLIES    Severe obstructive sleep apnea  -     CPAP/BIPAP SUPPLIES    Need for vaccination against Streptococcus pneumoniae  -     (VFC) PCV20 (Prevnar 20) IM vaccine (>/= 6 wks)        Continue auto PAP 14-20 cm water during sleep with nasal mask.      Advised patient that CPAP is the 1st line treatment in his case especially with a BMI of greater than 40 kg per m2 and severe LEANDRO.    Prevnar 20.        Follow up in about 1 year (around 8/20/2025).    This note was prepared " using voice recognition system and is likely to have sound alike errors that may have been overlooked even after proof reading.  Please call me with any questions    Discussed diagnosis, its evaluation, treatment and usual course. All questions answered.    Thank you for the courtesy of participating in the care of this patient    James Olson MD

## 2024-09-10 ENCOUNTER — PATIENT MESSAGE (OUTPATIENT)
Dept: PULMONOLOGY | Facility: CLINIC | Age: 79
End: 2024-09-10
Payer: MEDICARE

## 2024-09-17 DIAGNOSIS — G47.33 OSA ON CPAP: Primary | ICD-10-CM

## 2024-09-30 ENCOUNTER — TELEPHONE (OUTPATIENT)
Dept: NEPHROLOGY | Facility: CLINIC | Age: 79
End: 2024-09-30
Payer: MEDICARE

## 2024-09-30 NOTE — TELEPHONE ENCOUNTER
Aaliyah with [facility] called to report patient being scheduled for a Ct chest with contrast TARV for 10/3/24. His diagnosis is Severe Aortic Stenosis. He complains of shortness of breath and 2+BLE edema.  Please advise if patient needs more that PO hydration before and after his CT.

## 2024-09-30 NOTE — TELEPHONE ENCOUNTER
----- Message from Kayla sent at 9/30/2024  2:07 PM CDT -----  Contact: Tracy from [facility]  Type:  Needs Medical Advice    Who Called:   Tracy from [facility]    Would the patient rather a call back or a response via [facility]?   Call back  Best Call Back Number:  [phone] - Tracy    Additional Information:   States he has CTA's scheduled for this week on Thursday - please call - thank you

## 2024-10-01 NOTE — TELEPHONE ENCOUNTER
I feel volume expansion with IVFs would mitigate the risk of kidney injury from contrast used in CTA.

## 2024-10-01 NOTE — TELEPHONE ENCOUNTER
Aaliyah with [facility] was called back and a message was left for patient to hydrate well by mouth before and after CTA. No IVF's needed.

## 2024-10-24 DIAGNOSIS — I50.32 CHRONIC DIASTOLIC CONGESTIVE HEART FAILURE: ICD-10-CM

## 2024-10-24 DIAGNOSIS — N18.31 STAGE 3A CHRONIC KIDNEY DISEASE: ICD-10-CM

## 2024-10-24 RX ORDER — SPIRONOLACTONE 25 MG/1
25 TABLET ORAL DAILY
Qty: 90 TABLET | Refills: 3 | Status: SHIPPED | OUTPATIENT
Start: 2024-10-24 | End: 2025-10-24

## 2024-12-02 ENCOUNTER — LAB VISIT (OUTPATIENT)
Dept: LAB | Facility: HOSPITAL | Age: 79
End: 2024-12-02
Attending: INTERNAL MEDICINE
Payer: MEDICARE

## 2024-12-02 DIAGNOSIS — N18.31 STAGE 3A CHRONIC KIDNEY DISEASE: ICD-10-CM

## 2024-12-02 DIAGNOSIS — N25.81 SECONDARY HYPERPARATHYROIDISM OF RENAL ORIGIN: ICD-10-CM

## 2024-12-02 DIAGNOSIS — E55.9 VITAMIN D DEFICIENCY: ICD-10-CM

## 2024-12-02 LAB
CREAT UR-MCNC: 81 MG/DL (ref 23–375)
PROT UR-MCNC: 18 MG/DL (ref 0–15)
PROT/CREAT UR: 0.22 MG/G{CREAT} (ref 0–0.2)

## 2024-12-02 PROCEDURE — 82570 ASSAY OF URINE CREATININE: CPT | Performed by: INTERNAL MEDICINE

## 2024-12-10 ENCOUNTER — OFFICE VISIT (OUTPATIENT)
Dept: NEPHROLOGY | Facility: CLINIC | Age: 79
End: 2024-12-10
Payer: MEDICARE

## 2024-12-10 VITALS
HEIGHT: 66 IN | BODY MASS INDEX: 43.77 KG/M2 | HEART RATE: 88 BPM | DIASTOLIC BLOOD PRESSURE: 70 MMHG | WEIGHT: 272.38 LBS | SYSTOLIC BLOOD PRESSURE: 128 MMHG

## 2024-12-10 DIAGNOSIS — N18.31 STAGE 3A CHRONIC KIDNEY DISEASE: Primary | ICD-10-CM

## 2024-12-10 DIAGNOSIS — I50.22 CHRONIC SYSTOLIC (CONGESTIVE) HEART FAILURE: ICD-10-CM

## 2024-12-10 PROCEDURE — 1160F RVW MEDS BY RX/DR IN RCRD: CPT | Mod: CPTII,S$GLB,, | Performed by: INTERNAL MEDICINE

## 2024-12-10 PROCEDURE — 1159F MED LIST DOCD IN RCRD: CPT | Mod: CPTII,S$GLB,, | Performed by: INTERNAL MEDICINE

## 2024-12-10 PROCEDURE — 3078F DIAST BP <80 MM HG: CPT | Mod: CPTII,S$GLB,, | Performed by: INTERNAL MEDICINE

## 2024-12-10 PROCEDURE — 3074F SYST BP LT 130 MM HG: CPT | Mod: CPTII,S$GLB,, | Performed by: INTERNAL MEDICINE

## 2024-12-10 PROCEDURE — 1125F AMNT PAIN NOTED PAIN PRSNT: CPT | Mod: CPTII,S$GLB,, | Performed by: INTERNAL MEDICINE

## 2024-12-10 PROCEDURE — 99215 OFFICE O/P EST HI 40 MIN: CPT | Mod: S$GLB,,, | Performed by: INTERNAL MEDICINE

## 2024-12-10 PROCEDURE — 1101F PT FALLS ASSESS-DOCD LE1/YR: CPT | Mod: CPTII,S$GLB,, | Performed by: INTERNAL MEDICINE

## 2024-12-10 PROCEDURE — 3288F FALL RISK ASSESSMENT DOCD: CPT | Mod: CPTII,S$GLB,, | Performed by: INTERNAL MEDICINE

## 2024-12-10 PROCEDURE — 99999 PR PBB SHADOW E&M-EST. PATIENT-LVL IV: CPT | Mod: PBBFAC,,, | Performed by: INTERNAL MEDICINE

## 2024-12-10 RX ORDER — CLOPIDOGREL BISULFATE 300 MG/1
75 TABLET, FILM COATED ORAL ONCE
COMMUNITY

## 2024-12-10 NOTE — PROGRESS NOTES
Subjective:       Patient ID: Bo Chase is a 79 y.o. White male who presents for follow-up evaluation of Chronic Kidney Disease         Patient is a  white male who was  by profession.  Has history of chronic kidney disease for at least 5 years.  He was last seen by Dr. Prather in 2012.  Renal ultrasound at that time showed bilateral renal cysts.  Left kidney had a cyst of 2.5 cm.  Patient never had any proteinuria.  His creatinine has been fluctuating between 1.6 and 2.0.  His fluctuations have been due to diuretic therapy.  In the last 5 years his kidney function has been stable.  He has had history of coronary artery disease status post shortness of breath and dyspnea and exertion.    12/ 2014Occluded lad lcx and rca.Occluded svg to d1svg to rca patent svg to  om1 90% eccentric treated with dennis with filter wire protection.Patent lima.No complication. Dr. Joel     2014 LEANDRO : now on CPAP    6/2015 LBP s/p KAROLINA and s/p surgery laminectomy     4/2017 Renal USD CT scan of the abdomen shows extensive calcifications of the aorta    August 2017 2-D echo reviewed= PA pressure >26 mm Hg, creatinine down to 1.4, uric acid controlled, vitamin D level is 43, PTH 91, estimated GFR with Cystatin C is 50%; weight down by 10 ib from 263 to 254 ib    March 2018 patient's creatinine is up to 1.8.  Weight is up to 262 pounds.  Approximate GFR about 40%.  GFR loss is about 10% per year.  No proteinuria.  PTH has gone up from 91 in 2017-235 in March 2018.  Labwork shows severe metabolic alkalosis, severe hypokalemia, low chloride levels due to complications of diuretics. Severe Polycythemia may need phlebotomy     6/2018 K+ is better ; Hgb is better ; weight is higher ; GFR 37 % with Cr 1.7 ; retired and now in gym     09/2018 High Calcium; stop D; check USD and SPEP     November 2018 ultrasound done no cancer.  Negative serum protein electrophoresis    1/2019 s/p fall now pending left hips IA steroids in SI and Hip ;  "creatinine down off ibuprofen ; rtired now in [place] --gym and work out d/w patient usman    6/2019 s/p cellulitis x2 s/p inpatient and I&D ( serratia ) ; left hip injection and Chiro for Pyriformitis     August 2020 creatinine 2.0.  Status post phlebotomy for polycythemia under care of Dr. Diaz.    Interval history May 2021: Former pt of Dr. Bernal, last Seen Dec 2020. He knows he is in Stage 3 but was not taught creatinine nor GFR.  He recently underwent R hip replacement (Browning) , gained 20lbs of fluid, saw Cardiology and medications adjusted but asking if he can resume daily loop diuretic.     Interval History Dec 2021: Doing well. Hip pain. No NSAID use. LE is stable.     Interval History April 2022: He is doing better. Last Hba1c was 7.7 but recent BS at home have improved. Fatigue has improved     Sep 2022:  Life has become quite hectic. Has CP which respond to NTG. He is s/p two [facility] May and July without PCI.  Saw CTS at [facility]. This is for "clearance" for hopeful surgery in future. Notes chronic constipation with pain medications    Nov 2023:saw [facility]    May 2024: Staying active. Feeling quite well! saw [facility] cardiology. Now on Entresto. And Ozempic. Started vit d with K April 3X a week. Off NTG. Now going to gym weights and cycles. 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     Dec 2024: s/p TAVR 5-6 weeks ago but still SOB. Seeing Cards soon. Sinus infections>>ABX. Off Ozemoic due to cost., 1/2 allopurinol for 6-8 mo     Review of Systems   Constitutional:  Negative for activity change, appetite change and unexpected weight change.   HENT:  Negative for facial swelling.    Respiratory:  Negative for shortness of breath.    Cardiovascular:  Positive for leg swelling (much improved). Negative for chest pain.   Genitourinary:  Positive for frequency.   Musculoskeletal:  Positive for arthralgias and gait problem.   Allergic/Immunologic: Positive for immunocompromised state (due to DM 2 and " "CKD).   Psychiatric/Behavioral:  Negative for confusion and decreased concentration.        Objective:   /70 (BP Location: Right arm, Patient Position: Sitting)   Pulse 88   Ht 5' 6" (1.676 m)   Wt 123.6 kg (272 lb 6.4 oz)   BMI 43.97 kg/m²        Physical Exam  Vitals and nursing note reviewed.   Constitutional:       General: He is not in acute distress.     Appearance: Normal appearance. He is obese. He is not ill-appearing.   HENT:      Head: Atraumatic.   Eyes:      General: No scleral icterus.  Cardiovascular:      Rate and Rhythm: Normal rate and regular rhythm.      Heart sounds: Murmur heard.   Pulmonary:      Breath sounds: No wheezing or rales.   Abdominal:      General: There is no distension.   Musculoskeletal:      Right lower leg: Edema (much improved) present.      Left lower leg: Edema (much improved) present.   Skin:     General: Skin is warm and dry.      Coloration: Skin is not jaundiced.   Neurological:      Mental Status: He is alert and oriented to person, place, and time. Mental status is at baseline.   Psychiatric:         Mood and Affect: Mood normal.         Lab Results   Component Value Date    CREATININE 1.3 12/02/2024    BUN 31 (H) 12/02/2024     12/02/2024    K 4.9 12/02/2024     12/02/2024    CO2 19 (L) 12/02/2024     Lab Results   Component Value Date    WBC 8.2 10/18/2024    HGB 17.1 10/18/2024    HCT 49.0 10/18/2024    MCV 92.5 10/18/2024     (L) 08/16/2024     Lab Results   Component Value Date    PTH 81.8 (H) 12/02/2024    CALCIUM 9.5 12/02/2024    PHOS 3.2 12/02/2024     Lab Results   Component Value Date    URICACID 4.0 03/08/2023             )    No diagnosis found.          Plan:         1.  Chronic kidney disease stage 3a-- patient may have cardiorenal syndrome:  fluctuating creatinine on diuretics.  Creatinine is stable and ranges 1.3-2. Stable kidney function with this visit 220mg on upc. Monitor on RAASi (Aldactone and valsartan of Entresto) "     2.  Chronic gout: Last attack was 2018.   Continue allopurinol 150 mg, followed by Rheumatology. Check uric acid level     3.  Hyperparathyroidism due to chronic kidney disease stage III:  Trend PTH, D level is adequate (hx of hypercalcemia)     4.   Chronic systolic/diastolic CHF--also with chronic venous stasis findings as well as brawny edema of the skin: Patient had an ultrasound of the legs in 2012 which showed no DVT.  Clinically patient has pulm. hypertension, Followed by Cardiology. Continue current diuretics. Low sodium diet.    6.  Pulm HTN and LEANDRO:   CPAP     7.  Hypokalemia: continue aldactone 25 mg and ARB (valsartan in Entresto)     9.  Acquired renal cysts: Patient had multiple complex cysts in the ultrasound done in 2017 and 2018 .  Status post  renal ultrasound June of 2020--the complex cysts seen prior were all classified as simple in the most recent renal u/s             Follow-up 6mo or prn sooner   40 minutes of total time spent on the encounter, which includes face to face time and non-face to face time preparing to see the patient (eg, review of tests), Obtaining and/or reviewing separately obtained history, Documenting clinical information in the electronic or other health record, Independently interpreting results (not separately reported) and communicating results to the patient/family/caregiver, or Care coordination (not separately reported).        Mumtaz Massey MD              HPI

## 2025-01-30 RX ORDER — ATORVASTATIN CALCIUM 40 MG/1
40 TABLET, FILM COATED ORAL DAILY
Qty: 90 TABLET | Refills: 3 | OUTPATIENT
Start: 2025-01-30

## 2025-02-04 ENCOUNTER — LAB VISIT (OUTPATIENT)
Dept: LAB | Facility: HOSPITAL | Age: 80
End: 2025-02-04
Attending: NURSE PRACTITIONER
Payer: MEDICARE

## 2025-02-04 DIAGNOSIS — R06.02 SHORTNESS OF BREATH: ICD-10-CM

## 2025-02-04 DIAGNOSIS — R07.89 OTHER CHEST PAIN: Primary | ICD-10-CM

## 2025-02-04 DIAGNOSIS — Z95.2 HEART VALVE REPLACED BY TRANSPLANT: ICD-10-CM

## 2025-02-04 DIAGNOSIS — D75.1 POLYCYTHEMIA, SECONDARY: ICD-10-CM

## 2025-02-04 PROCEDURE — 36415 COLL VENOUS BLD VENIPUNCTURE: CPT | Mod: PO | Performed by: NURSE PRACTITIONER

## 2025-02-04 PROCEDURE — 85025 COMPLETE CBC W/AUTO DIFF WBC: CPT | Performed by: NURSE PRACTITIONER

## 2025-02-05 LAB
BASOPHILS # BLD AUTO: 0.06 K/UL (ref 0–0.2)
BASOPHILS NFR BLD: 0.3 % (ref 0–1.9)
DIFFERENTIAL METHOD BLD: ABNORMAL
EOSINOPHIL # BLD AUTO: 0 K/UL (ref 0–0.5)
EOSINOPHIL NFR BLD: 0.1 % (ref 0–8)
ERYTHROCYTE [DISTWIDTH] IN BLOOD BY AUTOMATED COUNT: 17.3 % (ref 11.5–14.5)
HCT VFR BLD AUTO: 52.1 % (ref 40–54)
HGB BLD-MCNC: 16.8 G/DL (ref 14–18)
IMM GRANULOCYTES # BLD AUTO: 0.1 K/UL (ref 0–0.04)
IMM GRANULOCYTES NFR BLD AUTO: 0.5 % (ref 0–0.5)
LYMPHOCYTES # BLD AUTO: 0.6 K/UL (ref 1–4.8)
LYMPHOCYTES NFR BLD: 3.2 % (ref 18–48)
MCH RBC QN AUTO: 31.3 PG (ref 27–31)
MCHC RBC AUTO-ENTMCNC: 32.2 G/DL (ref 32–36)
MCV RBC AUTO: 97 FL (ref 82–98)
MONOCYTES # BLD AUTO: 0.7 K/UL (ref 0.3–1)
MONOCYTES NFR BLD: 3.7 % (ref 4–15)
NEUTROPHILS # BLD AUTO: 18.2 K/UL (ref 1.8–7.7)
NEUTROPHILS NFR BLD: 92.2 % (ref 38–73)
NRBC BLD-RTO: 0 /100 WBC
PLATELET # BLD AUTO: 167 K/UL (ref 150–450)
PMV BLD AUTO: 10.1 FL (ref 9.2–12.9)
RBC # BLD AUTO: 5.37 M/UL (ref 4.6–6.2)
WBC # BLD AUTO: 19.73 K/UL (ref 3.9–12.7)

## 2025-05-02 DIAGNOSIS — I50.32 CHRONIC DIASTOLIC CONGESTIVE HEART FAILURE: ICD-10-CM

## 2025-05-02 DIAGNOSIS — N18.31 STAGE 3A CHRONIC KIDNEY DISEASE: ICD-10-CM

## 2025-05-02 RX ORDER — SPIRONOLACTONE 25 MG/1
TABLET ORAL
Qty: 90 TABLET | Refills: 3 | Status: SHIPPED | OUTPATIENT
Start: 2025-05-02

## 2025-06-11 ENCOUNTER — TELEPHONE (OUTPATIENT)
Dept: PULMONOLOGY | Facility: CLINIC | Age: 80
End: 2025-06-11
Payer: MEDICARE

## 2025-06-11 NOTE — TELEPHONE ENCOUNTER
----- Message from Barbara sent at 6/11/2025  8:51 AM CDT -----  Regarding: RE: supplies  Good Morning,We do not carry just the headgear. He would have to do a mask exchange (exchange mask without headgear he received in May for mask with headgear) and can call [phone] to get that appointment scheduled.  ----- Message -----  From: Liya Saravia MA  Sent: 6/11/2025   7:40 AM CDT  To: Barbara Schrader  Subject: supplies                                         Best Call Back Number: [phone] ( fausto is requesting a callback today to know if a headpiece can be picked up that will fit his cpap machine.

## 2025-06-19 ENCOUNTER — LAB VISIT (OUTPATIENT)
Dept: LAB | Facility: HOSPITAL | Age: 80
End: 2025-06-19
Attending: INTERNAL MEDICINE
Payer: MEDICARE

## 2025-06-19 DIAGNOSIS — N18.31 STAGE 3A CHRONIC KIDNEY DISEASE: ICD-10-CM

## 2025-06-19 DIAGNOSIS — I50.22 CHRONIC SYSTOLIC (CONGESTIVE) HEART FAILURE: ICD-10-CM

## 2025-06-19 LAB
ALBUMIN SERPL BCP-MCNC: 3.5 G/DL (ref 3.5–5.2)
ANION GAP (OHS): 11 MMOL/L (ref 8–16)
BUN SERPL-MCNC: 24 MG/DL (ref 8–23)
CALCIUM SERPL-MCNC: 9.2 MG/DL (ref 8.7–10.5)
CHLORIDE SERPL-SCNC: 105 MMOL/L (ref 95–110)
CO2 SERPL-SCNC: 23 MMOL/L (ref 23–29)
CREAT SERPL-MCNC: 1.4 MG/DL (ref 0.5–1.4)
CREAT UR-MCNC: 122 MG/DL (ref 23–375)
GFR SERPLBLD CREATININE-BSD FMLA CKD-EPI: 51 ML/MIN/1.73/M2
GLUCOSE SERPL-MCNC: 168 MG/DL (ref 70–110)
MAGNESIUM SERPL-MCNC: 2.1 MG/DL (ref 1.6–2.6)
PHOSPHATE SERPL-MCNC: 3.5 MG/DL (ref 2.7–4.5)
POTASSIUM SERPL-SCNC: 4.2 MMOL/L (ref 3.5–5.1)
PROT UR-MCNC: 7 MG/DL
PROT/CREAT UR: 0.06 MG/G{CREAT}
SODIUM SERPL-SCNC: 139 MMOL/L (ref 136–145)
URATE SERPL-MCNC: 6.5 MG/DL (ref 3.4–7)

## 2025-06-19 PROCEDURE — 36415 COLL VENOUS BLD VENIPUNCTURE: CPT | Mod: PO

## 2025-06-19 PROCEDURE — 83735 ASSAY OF MAGNESIUM: CPT

## 2025-06-19 PROCEDURE — 84550 ASSAY OF BLOOD/URIC ACID: CPT

## 2025-06-19 PROCEDURE — 83970 ASSAY OF PARATHORMONE: CPT

## 2025-06-19 PROCEDURE — 80069 RENAL FUNCTION PANEL: CPT

## 2025-06-19 PROCEDURE — 82570 ASSAY OF URINE CREATININE: CPT

## 2025-06-20 LAB — PTH-INTACT SERPL-MCNC: 68.7 PG/ML (ref 9–77)

## 2025-06-25 ENCOUNTER — OFFICE VISIT (OUTPATIENT)
Dept: PULMONOLOGY | Facility: CLINIC | Age: 80
End: 2025-06-25
Payer: MEDICARE

## 2025-06-25 VITALS
RESPIRATION RATE: 18 BRPM | HEIGHT: 67 IN | SYSTOLIC BLOOD PRESSURE: 100 MMHG | BODY MASS INDEX: 42.75 KG/M2 | WEIGHT: 272.38 LBS | OXYGEN SATURATION: 96 % | HEART RATE: 69 BPM | DIASTOLIC BLOOD PRESSURE: 72 MMHG

## 2025-06-25 DIAGNOSIS — G47.33 SEVERE OBSTRUCTIVE SLEEP APNEA: ICD-10-CM

## 2025-06-25 DIAGNOSIS — G47.33 OSA ON CPAP: Primary | ICD-10-CM

## 2025-06-25 PROCEDURE — 1159F MED LIST DOCD IN RCRD: CPT | Mod: CPTII,S$GLB,, | Performed by: INTERNAL MEDICINE

## 2025-06-25 PROCEDURE — 3078F DIAST BP <80 MM HG: CPT | Mod: CPTII,S$GLB,, | Performed by: INTERNAL MEDICINE

## 2025-06-25 PROCEDURE — 1160F RVW MEDS BY RX/DR IN RCRD: CPT | Mod: CPTII,S$GLB,, | Performed by: INTERNAL MEDICINE

## 2025-06-25 PROCEDURE — 3288F FALL RISK ASSESSMENT DOCD: CPT | Mod: CPTII,S$GLB,, | Performed by: INTERNAL MEDICINE

## 2025-06-25 PROCEDURE — 3074F SYST BP LT 130 MM HG: CPT | Mod: CPTII,S$GLB,, | Performed by: INTERNAL MEDICINE

## 2025-06-25 PROCEDURE — 99999 PR PBB SHADOW E&M-EST. PATIENT-LVL III: CPT | Mod: PBBFAC,,, | Performed by: INTERNAL MEDICINE

## 2025-06-25 PROCEDURE — 1126F AMNT PAIN NOTED NONE PRSNT: CPT | Mod: CPTII,S$GLB,, | Performed by: INTERNAL MEDICINE

## 2025-06-25 PROCEDURE — 99213 OFFICE O/P EST LOW 20 MIN: CPT | Mod: S$GLB,,, | Performed by: INTERNAL MEDICINE

## 2025-06-25 PROCEDURE — 1100F PTFALLS ASSESS-DOCD GE2>/YR: CPT | Mod: CPTII,S$GLB,, | Performed by: INTERNAL MEDICINE

## 2025-06-25 RX ORDER — METOLAZONE 5 MG/1
TABLET ORAL
COMMUNITY

## 2025-06-25 RX ORDER — POTASSIUM CHLORIDE 750 MG/1
1 TABLET, EXTENDED RELEASE ORAL DAILY
COMMUNITY

## 2025-06-25 RX ORDER — RIVAROXABAN 20 MG/1
TABLET, FILM COATED ORAL
COMMUNITY
Start: 2025-01-07

## 2025-06-25 RX ORDER — FLUCONAZOLE 100 MG/1
100 TABLET ORAL
COMMUNITY

## 2025-06-25 RX ORDER — NAPROXEN SODIUM 220 MG/1
162 TABLET, FILM COATED ORAL
COMMUNITY
Start: 2024-10-19 | End: 2025-10-19

## 2025-06-25 RX ORDER — GUAIFENESIN 600 MG/1
TABLET, EXTENDED RELEASE ORAL
COMMUNITY

## 2025-06-25 RX ORDER — SEMAGLUTIDE 0.68 MG/ML
INJECTION, SOLUTION SUBCUTANEOUS
COMMUNITY
Start: 2025-01-14

## 2025-06-25 NOTE — PROGRESS NOTES
Subjective:     Patient ID: Bo Chase is a 80 y.o. male.    Chief Complaint:      HPI 80 y.o. with Obstructive Sleep Apnea      presents for review of CPAP compliance. Information from CPAP machine downloaded and reviewed. Summary of compliance report is as follows:      Patient has complaints of none  Patient is using CPAP as prescribed and benefiting from therapy.    3/26/2025 - 6/23/2025  Compliance Summary  3/26/2025 - 6/23/2025 (90 days)  Days with Device Usage 89 days  Days without Device Usage 1 day  Percent Days with Device Usage 98.9%  Cumulative Usage 16 days 11 hrs. 54 mins. 58 secs.  Maximum Usage (1 Day) 9 hrs. 20 mins. 48 secs.  Average Usage (All Days) 4 hrs. 23 mins. 56 secs.  Average Usage (Days Used) 4 hrs. 26 mins. 54 secs.  Minimum Usage (1 Day) 5 mins. 49 secs.  Percent of Days with Usage >= 4 Hours 58.9%  Percent of Days with Usage < 4 Hours 41.1%  Date Range  Total Blower Time 23 days 8 hrs. 2 mins. 49 secs.  Average AHI 8.2  Auto-CPAP Summary  Auto-CPAP Mean Pressure 15.5 cmH2O  Auto-CPAP Peak Average Pressure 18.0 cmH2O  Device Pressure <= 90% of Time 17.5 cmH2O  Average Time in Large Leak Per Day 8 mins. 38 secs.      Past Medical History:   Diagnosis Date    Acute coronary syndrome     CHF (congestive heart failure)     Chronic kidney disease     Coronary artery disease     Diabetes mellitus     Heart murmur     Hyperlipidemia     Hypertension     Lumbar spondylosis     Sleep apnea      Past Surgical History:   Procedure Laterality Date    ANGIOGRAM, AORTIC ARCH, CORONARY  8/16/2024    Procedure: Angiogram, Aortic Arch, Coronary;  Surgeon: Carl Gil MD;  Location: [facility] CATH LAB;  Service: Cardiology;;    back surgary      CARDIAC CATHETERIZATION  03/06/2012    CATHETERIZATION OF BOTH LEFT AND RIGHT HEART N/A 07/11/2022    Procedure: CATHETERIZATION, HEART, BOTH LEFT AND RIGHT;  Surgeon: Geovany Joel MD;  Location: [facility] CATH LAB;  Service: Cardiology;  Laterality: N/A;     CORONARY ANGIOGRAPHY N/A 8/16/2024    Procedure: ANGIOGRAM, CORONARY ARTERY/RIGHT AND LEFT;  Surgeon: Carl Gil MD;  Location: [facility] CATH LAB;  Service: Cardiology;  Laterality: N/A;    CORONARY ANGIOPLASTY      CORONARY ARTERY BYPASS GRAFT  1996    x4    CORONARY BYPASS GRAFT ANGIOGRAPHY  05/09/2022    Procedure: Bypass graft study;  Surgeon: Cornelius Browne MD;  Location: [facility] CATH LAB;  Service: Cardiology;;    CORONARY BYPASS GRAFT ANGIOGRAPHY  07/11/2022    Procedure: Bypass graft study;  Surgeon: Geovany Joel MD;  Location: [facility] CATH LAB;  Service: Cardiology;;    LEFT HEART CATHETERIZATION Left 05/09/2022    Procedure: CATHETERIZATION, HEART, LEFT;  Surgeon: Cornelius Browne MD;  Location: [facility] CATH LAB;  Service: Cardiology;  Laterality: Left;    RIGHT HEART CATHETERIZATION Right 07/11/2022    Procedure: INSERTION, CATHETER, RIGHT HEART;  Surgeon: Geovany Joel MD;  Location: [facility] CATH LAB;  Service: Cardiology;  Laterality: Right;    SKIN CANCER EXCISION      throid lobectomy  04/2012    TOTAL HIP ARTHROPLASTY Bilateral     umbilicial hernia      VENTRICULOGRAM, LEFT  8/16/2024    Procedure: Ventriculogram, Left;  Surgeon: Carl Gil MD;  Location: [facility] CATH LAB;  Service: Cardiology;;     Review of patient's allergies indicates:   Allergen Reactions    Cat/feline products Itching    Ciprofloxacin Other (See Comments)     Foggy, drowsy  Other reaction(s): Other (See Comments), Other (See Comments)    Latex      Other reaction(s): Swelling    Sulfa (sulfonamide antibiotics)      Other reaction(s): Flushing (skin)     Medications Ordered Prior to Encounter[1]  Social History[2]  Family History   Problem Relation Name Age of Onset    Heart disease Father         Review of Systems   Constitutional:  Positive for fatigue.   HENT:  Positive for postnasal drip.    Respiratory:  Positive for apnea.    Cardiovascular: Negative.    Genitourinary: Negative.    Endocrine: endocrine  "negative    Musculoskeletal: Negative.    Skin: Negative.    Gastrointestinal: Negative.    Neurological: Negative.    Psychiatric/Behavioral:  Positive for sleep disturbance.        Objective:      /72 (BP Location: Right arm)   Pulse 69   Resp 18   Ht 5' 7" (1.702 m)   Wt 123.5 kg (272 lb 6.1 oz)   SpO2 96%   BMI 42.66 kg/m²   Physical Exam  Vitals and nursing note reviewed.   Constitutional:       Appearance: He is well-developed.   HENT:      Head: Normocephalic and atraumatic.      Nose: Rhinorrhea present.   Eyes:      Conjunctiva/sclera: Conjunctivae normal.      Pupils: Pupils are equal, round, and reactive to light.   Neck:      Thyroid: No thyromegaly.      Vascular: No JVD.      Trachea: No tracheal deviation.   Cardiovascular:      Rate and Rhythm: Normal rate. Rhythm irregular.      Heart sounds: Normal heart sounds.   Pulmonary:      Effort: Pulmonary effort is normal.      Breath sounds: Normal breath sounds.   Abdominal:      Palpations: Abdomen is soft.   Musculoskeletal:         General: Normal range of motion.      Cervical back: Neck supple.   Lymphadenopathy:      Cervical: No cervical adenopathy.   Skin:     General: Skin is warm and dry.   Neurological:      Mental Status: He is alert and oriented to person, place, and time.   Psychiatric:         Mood and Affect: Mood normal.         Behavior: Behavior normal.       Personal Diagnostic Review  Polysomnogram        6/25/2025     4:11 PM   Pulmonary Studies Review   SpO2 96 %   Height 5' 7" (1.702 m)   Weight 123.5 kg (272 lb 6.1 oz)   BMI (Calculated) 42.7   Predicted Distance 199.19   Predicted Distance Meters (Calculated) 365.39 meters       Cardiac catheterization    Normal right heart pressures    Cardiac output 4.15 l/m    Occluded distal left main and proximal RCA    Patent SVG to OM with ostial 40-50% stenosis, patent stent in SVG.    Patent LIMA to LAD    Moderately severe AS with peak gradient of 50 mm hg, aortic valve " "area   = 0.8 sq cm.    No aortic insufficieacy    Good LV systolic function.    The procedure log was documented by Documenter: Leonora Lira RN and   verified by Carl Gil MD.    Date: 8/16/2024  Time: 9:34 AM      Office Spirometry Results:         6/25/2025     4:11 PM 12/10/2024     2:40 PM 8/20/2024     1:33 PM 8/16/2024     3:29 PM 8/16/2024     2:31 PM 8/16/2024     1:31 PM 8/16/2024    12:31 PM   Pulmonary Function Tests   SpO2 96 %  98 % 96 % 96 % 95 % 97 %   Height 5' 7" (1.702 m) 5' 6" (1.676 m) 5' 6" (1.676 m)       Weight 123.5 kg (272 lb 6.1 oz) 123.6 kg (272 lb 6.4 oz) 120.9 kg (266 lb 8.6 oz)       BMI (Calculated) 42.7 44 43             6/25/2025     4:11 PM   Pulmonary Studies Review   SpO2 96 %   Height 5' 7" (1.702 m)   Weight 123.5 kg (272 lb 6.1 oz)   BMI (Calculated) 42.7   Predicted Distance 199.19   Predicted Distance Meters (Calculated) 365.39 meters           No results found for this or any previous visit (from the past 2 weeks).    Assessment:            LEANDRO on CPAP  -     CPAP FOR HOME USE    Severe obstructive sleep apnea  -     CPAP FOR HOME USE          Encounter Medications[3]  Plan:       Requested Prescriptions      No prescriptions requested or ordered in this encounter     Problem List Items Addressed This Visit       LEANDRO on CPAP - Primary    Overview   12/18/2012 split night PSG showed the presence of severe LEANDRO, with an AHI of 62.6 event/hour, a SaO2 hipolito of 85%, and increased fragmentations. CPAP 10 cm optimal.   4/15/2020 per patient request changed from CPAP 10 to CPAP 14 cm  9/16/2020 Auto CPAP 14 - 20 cm on replacement Full face mask    12/9/2020 overnight oximetry no desat requiring supplemental oxygen with sleep on CPAP 14-20 cm.  Time with SpO2<89: 0:04:20, 0.7% of study period. Lowest oxygen saturation recorded   as 87%.     HME: [facility]          Relevant Orders    CPAP FOR HOME USE     Other Visit Diagnoses         Severe obstructive sleep apnea        " Relevant Orders    CPAP FOR HOME USE               Follow up in about 1 year (around 6/25/2026) for CPAP download - annual review.    MEDICAL DECISION MAKING: Moderate to high complexity.  Overall, the multiple problems listed are of moderate to high severity that may impact quality of life and activities of daily living. Side effects of medications, treatment plan as well as options and alternatives reviewed and discussed with patient. There was counseling of patient concerning these issues.    Total time spent in counseling and coordination of care - 20  minutes of total time spent on the encounter, which includes face to face time and non-face to face time preparing to see the patient (eg, review of tests), Obtaining and/or reviewing separately obtained history, Documenting clinical information in the electronic or other health record, Independently interpreting results (not separately reported) and communicating results to the patient/family/caregiver, or Care coordination (not separately reported).    Time was used in discussion of prognosis, risks, benefits of treatment, instructions and compliance with regimen . Discussion with other physicians and/or health care providers - home health or for use of durable medical equipment (oxygen, nebulizers, CPAP, BiPAP) occurred.         [1]   Current Outpatient Medications on File Prior to Visit   Medication Sig Dispense Refill    allopurinoL (ZYLOPRIM) 300 MG tablet TAKE 1 AND 1/2 TABLETS BY MOUTH  ONCE DAILY 135 tablet 3    ascorbic acid, vitamin C, (VITAMIN C) 500 MG tablet Take 500 mg by mouth once daily.      aspirin 81 MG Chew Take 162 mg by mouth.      atorvastatin (LIPITOR) 40 MG tablet Take 40 mg by mouth.      blood sugar diagnostic (TRUE METRIX GLUCOSE TEST STRIP) Strp 1 strip.      cetirizine (ZYRTEC) 10 MG tablet Take 1 tablet by mouth once daily.      clopidogreL (PLAVIX) 300 mg Tab Take 75 mg by mouth once. About ayera s/p TAVR      docusate sodium  (COLACE) 100 MG capsule Take 100 mg by mouth.      DULoxetine (CYMBALTA) 30 MG capsule Take 30 mg by mouth once daily.      dutasteride (AVODART) 0.5 mg capsule Take by mouth 3 (three) times a week.      ENTRESTO 24-26 mg per tablet Take 1 tablet by mouth 2 (two) times daily.      fluticasone propionate (FLONASE) 50 mcg/actuation nasal spray daily as needed.      ipratropium (ATROVENT) 42 mcg (0.06 %) nasal spray 2 sprays by Nasal route.      isosorbide mononitrate (IMDUR) 60 MG 24 hr tablet Take 1 tablet (60 mg total) by mouth once daily. 30 tablet 11    lancets Misc Use daily      levothyroxine (SYNTHROID) 100 MCG tablet TAKE 1 TABLET EVERY DAY BEFORE BREAKFAST 90 tablet 2    metoprolol tartrate (LOPRESSOR) 25 MG tablet Take 1 tablet (25 mg total) by mouth 2 (two) times daily. 180 tablet 3    multivitamin (THERAGRAN) per tablet Take 1 tablet by mouth once daily.      nitroGLYCERIN (NITROSTAT) 0.4 MG SL tablet Place 1 tablet (0.4 mg total) under the tongue every 5 (five) minutes as needed for Chest pain. 25 tablet 6    OZEMPIC 0.25 mg or 0.5 mg (2 mg/3 mL) pen injector INJECT 0.5 MG SUBCUTANEOUSLY ONCE WEEKLY      spironolactone (ALDACTONE) 25 MG tablet One by mouth daily 90 tablet 3    tamsulosin (FLOMAX) 0.4 mg Cap Take 1 capsule by mouth once daily.      torsemide (DEMADEX) 10 MG Tab Take 10 mg by mouth once daily. 1/2 tablet QD      TRUE METRIX GO GLUCOSE METER Misc USE TO CHECK GLUCOSE ONCE DAILY AS DIRECTED      vitamin D3-vitamin K2 1,250-200 mcg Cap Take by mouth.      XARELTO 20 mg Tab TAKE 1 TABLET BY MOUTH DAILY  WITH DINNER FOR AORTIC VALVE  HIGH GRADIENTS, POTENTIAL  THROMBUS      fluconazole (DIFLUCAN) 100 MG tablet Take 100 mg by mouth.      guaiFENesin (MUCINEX) 600 mg 12 hr tablet 1 tablet as needed Orally every 12 hrs      metOLazone (ZAROXOLYN) 5 MG tablet 1 tablet Orally Once a day      potassium chloride (KLOR-CON) 10 MEQ TbSR Take 1 tablet by mouth once daily.       No current  facility-administered medications on file prior to visit.   [2]   Social History  Socioeconomic History    Marital status:    Occupational History    Occupation:    Tobacco Use    Smoking status: Never    Smokeless tobacco: Never   Substance and Sexual Activity    Alcohol use: Yes     Comment: 6 BEERS A YEAR     Drug use: No     Social Drivers of Health     Financial Resource Strain: High Risk (2/13/2025)    Received from [facility] SDOH Screening     In the past year, have you been unable to get any of the following when you really needed them? choose all that apply.: Clothing     In the past year, have you been unable to get any of the following when you really needed them? choose all that apply.: Internet     In the past year, have you been unable to get any of the following when you really needed them? choose all that apply.: Medicine or health care   Food Insecurity: No Food Insecurity (10/18/2024)    Received from [facility]    Hunger Vital Sign     Worried About Running Out of Food in the Last Year: Never true     Ran Out of Food in the Last Year: Never true   Transportation Needs: No Transportation Needs (10/18/2024)    Received from [facility]    PRAPARE - Transportation     Lack of Transportation (Medical): No     Lack of Transportation (Non-Medical): No   Physical Activity: Sufficiently Active (9/13/2021)    Received from [facility]    Exercise Vital Sign     Days of Exercise per Week: 5 days     Minutes of Exercise per Session: 90 min   Stress: Stress Concern Present (9/13/2021)    Received from [facility]    [employer] [place] [employer] - Occupational Stress Questionnaire     Feeling of Stress : Very much   Housing Stability: High Risk (2/13/2025)    Received from [facility] SDOH Screening     In the past year, have you been unable to get any of the following when you really needed them? choose all that apply.: Utilities  (electric, gas, and water)   [3]   Outpatient Encounter Medications as of 6/25/2025   Medication Sig Dispense Refill    allopurinoL (ZYLOPRIM) 300 MG tablet TAKE 1 AND 1/2 TABLETS BY MOUTH  ONCE DAILY 135 tablet 3    ascorbic acid, vitamin C, (VITAMIN C) 500 MG tablet Take 500 mg by mouth once daily.      aspirin 81 MG Chew Take 162 mg by mouth.      atorvastatin (LIPITOR) 40 MG tablet Take 40 mg by mouth.      blood sugar diagnostic (TRUE METRIX GLUCOSE TEST STRIP) Strp 1 strip.      cetirizine (ZYRTEC) 10 MG tablet Take 1 tablet by mouth once daily.      clopidogreL (PLAVIX) 300 mg Tab Take 75 mg by mouth once. About ayera s/p TAVR      docusate sodium (COLACE) 100 MG capsule Take 100 mg by mouth.      DULoxetine (CYMBALTA) 30 MG capsule Take 30 mg by mouth once daily.      dutasteride (AVODART) 0.5 mg capsule Take by mouth 3 (three) times a week.      ENTRESTO 24-26 mg per tablet Take 1 tablet by mouth 2 (two) times daily.      fluticasone propionate (FLONASE) 50 mcg/actuation nasal spray daily as needed.      ipratropium (ATROVENT) 42 mcg (0.06 %) nasal spray 2 sprays by Nasal route.      isosorbide mononitrate (IMDUR) 60 MG 24 hr tablet Take 1 tablet (60 mg total) by mouth once daily. 30 tablet 11    lancets Misc Use daily      levothyroxine (SYNTHROID) 100 MCG tablet TAKE 1 TABLET EVERY DAY BEFORE BREAKFAST 90 tablet 2    metoprolol tartrate (LOPRESSOR) 25 MG tablet Take 1 tablet (25 mg total) by mouth 2 (two) times daily. 180 tablet 3    multivitamin (THERAGRAN) per tablet Take 1 tablet by mouth once daily.      nitroGLYCERIN (NITROSTAT) 0.4 MG SL tablet Place 1 tablet (0.4 mg total) under the tongue every 5 (five) minutes as needed for Chest pain. 25 tablet 6    OZEMPIC 0.25 mg or 0.5 mg (2 mg/3 mL) pen injector INJECT 0.5 MG SUBCUTANEOUSLY ONCE WEEKLY      spironolactone (ALDACTONE) 25 MG tablet One by mouth daily 90 tablet 3    tamsulosin (FLOMAX) 0.4 mg Cap Take 1 capsule by mouth once daily.       torsemide (DEMADEX) 10 MG Tab Take 10 mg by mouth once daily. 1/2 tablet QD      TRUE METRIX GO GLUCOSE METER Misc USE TO CHECK GLUCOSE ONCE DAILY AS DIRECTED      vitamin D3-vitamin K2 1,250-200 mcg Cap Take by mouth.      XARELTO 20 mg Tab TAKE 1 TABLET BY MOUTH DAILY  WITH DINNER FOR AORTIC VALVE  HIGH GRADIENTS, POTENTIAL  THROMBUS      fluconazole (DIFLUCAN) 100 MG tablet Take 100 mg by mouth.      guaiFENesin (MUCINEX) 600 mg 12 hr tablet 1 tablet as needed Orally every 12 hrs      metOLazone (ZAROXOLYN) 5 MG tablet 1 tablet Orally Once a day      potassium chloride (KLOR-CON) 10 MEQ TbSR Take 1 tablet by mouth once daily.       No facility-administered encounter medications on file as of 6/25/2025.

## 2025-06-27 ENCOUNTER — TELEPHONE (OUTPATIENT)
Dept: PULMONOLOGY | Facility: CLINIC | Age: 80
End: 2025-06-27
Payer: MEDICARE

## 2025-06-27 NOTE — TELEPHONE ENCOUNTER
"Returned call to pt.  Tried explain that the cpap order from 6/25/25 may take 7-10 days to process.  Pt was very rude stating "it doesn't take 7 days for a cpap machine", and wanted to know he needed to call  to check status of order.  Gave him the number to DME.   "

## 2025-06-27 NOTE — TELEPHONE ENCOUNTER
Copied from CRM #[id number]. Topic: General Inquiry - Return Call  >> Jun 27, 2025  8:43 AM Pam wrote:  .Type:  Patient  Call    Who Called:Bo  Does the patient know what this is regarding?:pt called stating that he was checking the status of a sleep leo machine that they was ordering and he mentioned that he haven't heard anything   Would the patient rather a call back or a response via [facility]? Call back  Best Call Back Number:.[phone]   Additional Information:

## 2025-06-30 ENCOUNTER — TELEPHONE (OUTPATIENT)
Dept: PULMONOLOGY | Facility: CLINIC | Age: 80
End: 2025-06-30
Payer: MEDICARE

## 2025-06-30 NOTE — TELEPHONE ENCOUNTER
----- Message from Nurse Miranda sent at 6/30/2025  2:37 PM CDT -----  Regarding: FW: Cpap    ----- Message -----  From: Joy Baptiste MA  Sent: 6/30/2025   2:28 PM CDT  To: [facility] Staff  Subject: Cpap                                             Hi Dr. Garcia,  We're reaching out to inform you that the patient declined to schedule the next available appointment, which was on July 2nd. He stated that he only wants to  the CPAP machine and declined education on its use.  We were able to arrange for him to  the machine at the [place] location sometime today.  Thank you

## 2025-06-30 NOTE — TELEPHONE ENCOUNTER
----- Message from Deepa Pacheco sent at 6/30/2025  8:40 AM CDT -----  Good morning,    Pt is not eligible for a new device until 10/2025.

## 2025-08-11 ENCOUNTER — OFFICE VISIT (OUTPATIENT)
Dept: NEPHROLOGY | Facility: CLINIC | Age: 80
End: 2025-08-11
Payer: MEDICARE

## 2025-08-11 VITALS
WEIGHT: 277.19 LBS | BODY MASS INDEX: 43.51 KG/M2 | DIASTOLIC BLOOD PRESSURE: 68 MMHG | OXYGEN SATURATION: 95 % | SYSTOLIC BLOOD PRESSURE: 122 MMHG | HEIGHT: 67 IN | HEART RATE: 65 BPM

## 2025-08-11 DIAGNOSIS — R73.9 HYPERGLYCEMIA: ICD-10-CM

## 2025-08-11 DIAGNOSIS — M10.9 GOUT, UNSPECIFIED CAUSE, UNSPECIFIED CHRONICITY, UNSPECIFIED SITE: ICD-10-CM

## 2025-08-11 DIAGNOSIS — N18.31 STAGE 3A CHRONIC KIDNEY DISEASE: Primary | ICD-10-CM

## 2025-08-11 DIAGNOSIS — E11.59 DM TYPE 2 CAUSING VASCULAR DISEASE: ICD-10-CM

## 2025-08-11 DIAGNOSIS — I10 HYPERTENSION, ESSENTIAL: ICD-10-CM

## 2025-08-11 DIAGNOSIS — N25.81 SECONDARY HYPERPARATHYROIDISM OF RENAL ORIGIN: ICD-10-CM

## 2025-08-11 DIAGNOSIS — M1A.09X0 IDIOPATHIC CHRONIC GOUT OF MULTIPLE SITES WITHOUT TOPHUS: ICD-10-CM

## 2025-08-11 DIAGNOSIS — N28.1 COMPLEX RENAL CYST: ICD-10-CM

## 2025-08-11 PROCEDURE — 99999 PR PBB SHADOW E&M-EST. PATIENT-LVL III: CPT | Mod: PBBFAC,,, | Performed by: INTERNAL MEDICINE

## 2025-08-11 RX ORDER — ALLOPURINOL 300 MG/1
150 TABLET ORAL DAILY
Start: 2025-08-11

## 2025-08-12 ENCOUNTER — PATIENT MESSAGE (OUTPATIENT)
Dept: NEPHROLOGY | Facility: CLINIC | Age: 80
End: 2025-08-12
Payer: MEDICARE

## 2025-09-05 DIAGNOSIS — I50.32 CHRONIC DIASTOLIC CONGESTIVE HEART FAILURE: ICD-10-CM

## 2025-09-05 DIAGNOSIS — I10 HYPERTENSION, UNSPECIFIED TYPE: Primary | Chronic | ICD-10-CM

## (undated) DEVICE — CATH DXTERITY PIGSTR 110CM 6FR

## (undated) DEVICE — DEVICE MYNX GRIP 6/7F

## (undated) DEVICE — DEVICE PERCLOSE SUT CLSR 6FR

## (undated) DEVICE — CATH PIG145 INFINITI 5X110CM

## (undated) DEVICE — CATH JR4 5FR

## (undated) DEVICE — KIT MANIFOLD LOW PRESS TUBING

## (undated) DEVICE — GUIDEWIRE WHOLEY HI TORQ 175CM

## (undated) DEVICE — CATH DXTERITY JL40 100CM 6FR

## (undated) DEVICE — KIT GLIDESHEATH SLEND 6FR 10CM

## (undated) DEVICE — VISE RADIFOCUS MULTI TORQUE

## (undated) DEVICE — GUIDEWIRE SAFE T .035IN 180CM

## (undated) DEVICE — CATH SWAN GANZ STND 7FR

## (undated) DEVICE — KIT MOUNT POLE CBL PGTL 24IN

## (undated) DEVICE — GUIDEWIRE EMERALD .035IN 260CM

## (undated) DEVICE — PACK CATH LAB CUSTOM BR

## (undated) DEVICE — OMNIPAQUE 300MG 150ML VIAL

## (undated) DEVICE — WIRE CHOICE PT X SUPP 014X300

## (undated) DEVICE — TUBING PRESS MONITOR 6 STER

## (undated) DEVICE — Device

## (undated) DEVICE — BAND TR COMP DEVICE REG 24CM

## (undated) DEVICE — GUIDE LAUNCHER 6FR HS I 100CM

## (undated) DEVICE — CATH DXTERITY AL20 100CM 6FR

## (undated) DEVICE — CATH MPA2 INFINITI SH 5X100CM

## (undated) DEVICE — PAD DEFIB CADENCE ADULT R2

## (undated) DEVICE — INTRODUCER CATH 7F 11CML

## (undated) DEVICE — SHEATH INTRODUCER 6FR 11CM

## (undated) DEVICE — SYR MARK 7 ARTERION 150ML

## (undated) DEVICE — OMNIPAQUE CONTRAST 350MG/100ML

## (undated) DEVICE — KIT SYR REUSABLE

## (undated) DEVICE — CATH DXTERITY JR40 100CM 6FR

## (undated) DEVICE — CATH INFINITI MP JL3.5 JR4 5FR

## (undated) DEVICE — GUIDE HEARTRAIL IKARI R 6F 1.5

## (undated) DEVICE — CATH DXTERITY IMA 100CM 6FR

## (undated) DEVICE — BAG SNAP KOVER BAND 36 X 28 IN

## (undated) DEVICE — CATH AL1 5FR

## (undated) DEVICE — CATH JL4 5FR

## (undated) DEVICE — CONTRAST VISIPAQUE 150ML

## (undated) DEVICE — CATH IMPULSE LCB 100X.047 5FR

## (undated) DEVICE — CATH IMPULSE AR2 5FR 100CM

## (undated) DEVICE — TRAY ANGIO BAPTIST

## (undated) DEVICE — CATH AR1 INFINITI 5X100

## (undated) DEVICE — CATH AL2 5FR

## (undated) DEVICE — ANGIOTOUCH KIT

## (undated) DEVICE — SEE MEDLINE ITEM 157187

## (undated) DEVICE — CATH CV QD LUMN 6FRX110CM

## (undated) DEVICE — GUIDEWIRE OMNI J TIP 185CM

## (undated) DEVICE — CATH JL3.5 5FR

## (undated) DEVICE — CATH MPA2 INFINITI 4FR 100CM

## (undated) DEVICE — STOPCOCK 3 WAY MED PRESSURE

## (undated) DEVICE — CATH REVOLUTION IVUS 45MHZ